# Patient Record
Sex: FEMALE | Race: BLACK OR AFRICAN AMERICAN | NOT HISPANIC OR LATINO | Employment: OTHER | URBAN - METROPOLITAN AREA
[De-identification: names, ages, dates, MRNs, and addresses within clinical notes are randomized per-mention and may not be internally consistent; named-entity substitution may affect disease eponyms.]

---

## 2017-01-20 RX ORDER — GABAPENTIN 100 MG/1
CAPSULE ORAL
Qty: 180 CAPSULE | Refills: 3 | Status: SHIPPED | OUTPATIENT
Start: 2017-01-20 | End: 2017-03-07

## 2017-02-20 ENCOUNTER — TELEPHONE (OUTPATIENT)
Dept: INTERNAL MEDICINE | Facility: CLINIC | Age: 72
End: 2017-02-20

## 2017-02-20 NOTE — TELEPHONE ENCOUNTER
----- Message from Hermila Carrillo sent at 2/20/2017 11:23 AM EST -----  Contact: Patient  Patient called still with complaints of left hip pain.  Patient saw Dr. Dumont on 01/17/2017, and hip was injected but with no relief.  She would like to speak with Dr. Romero, especially if Dr. Romero is considering another medication as most meds have negative effect on patient's glaucoma.  Please advise.     Patient:  [phone]    Pharmacy:  [facility] [address] [place] [address] AT [facility] - [phone] [facility] [phone]

## 2017-02-28 ENCOUNTER — TELEPHONE (OUTPATIENT)
Dept: INTERNAL MEDICINE | Facility: CLINIC | Age: 72
End: 2017-02-28

## 2017-02-28 NOTE — TELEPHONE ENCOUNTER
----- Message from Anant Cameron sent at 2/28/2017  2:25 PM EST -----  Contact: pt  Pt calling , she says that she saw Dr. Dumont last week, and he says there is nothing further he can do for her. She says that Dr. Dumont thinks that it is nerve. She says that she called over the weekend and Dr. Mercado was on call. She says she was in pain, and he directed her to increase her Gabapentin to 3 times a day and take tylenol. She says that she is getting some relief from that. She is wanting to discuss. Please advise.     #[phone]

## 2017-03-07 ENCOUNTER — OFFICE VISIT (OUTPATIENT)
Dept: INTERNAL MEDICINE | Facility: CLINIC | Age: 72
End: 2017-03-07

## 2017-03-07 VITALS
SYSTOLIC BLOOD PRESSURE: 186 MMHG | DIASTOLIC BLOOD PRESSURE: 110 MMHG | HEART RATE: 97 BPM | BODY MASS INDEX: 23.69 KG/M2 | WEIGHT: 138 LBS | OXYGEN SATURATION: 98 %

## 2017-03-07 DIAGNOSIS — M54.14 THORACIC RADICULOPATHY: Primary | ICD-10-CM

## 2017-03-07 DIAGNOSIS — M54.16 LUMBAR RADICULOPATHY, CHRONIC: ICD-10-CM

## 2017-03-07 PROCEDURE — 99213 OFFICE O/P EST LOW 20 MIN: CPT | Performed by: INTERNAL MEDICINE

## 2017-03-07 RX ORDER — TIMOLOL MALEATE 5 MG/ML
1 SOLUTION/ DROPS OPHTHALMIC 4 TIMES DAILY
COMMUNITY
Start: 2017-01-27 | End: 2018-04-16

## 2017-03-07 RX ORDER — GABAPENTIN 100 MG/1
100 CAPSULE ORAL 3 TIMES DAILY
COMMUNITY
End: 2017-04-13 | Stop reason: SDUPTHER

## 2017-03-07 RX ORDER — PREDNISONE 10 MG/1
TABLET ORAL
Qty: 36 TABLET | Refills: 0 | Status: SHIPPED | OUTPATIENT
Start: 2017-03-07 | End: 2017-04-13

## 2017-03-07 RX ORDER — TRIPROLIDINE/PSEUDOEPHEDRINE 2.5MG-60MG
1 TABLET ORAL
COMMUNITY
Start: 2017-01-20 | End: 2021-03-01 | Stop reason: SDUPTHER

## 2017-03-07 NOTE — PROGRESS NOTES
Subjective   Marilia Leo is a 71 y.o. female. Patient c/o pain behind the left shoulder blade and left side of the lower back.     History of Present Illness   Patient c/o pain in the left lower back, radiating down to her ankle with prolonged sitting. Also she has pain behind the left shoulderblade radiating to the left arm. There is burning sensation and one spot on the medial part of the left elbow gets hot.  Patient had those pains for a while, but gradually they are getting worse. The pain is worse with immobility, sitting and laying. Stretching helps a bit. She had been to see  and he had given lidocaine and steroid injection into the left hip bursa for presumable bursitis 6 weeks ago. Had her pelvis and left hip s-rays. The injection effect lasted about a week.  Patient takes Tylenol and also Gabapentin, recently she had increased Gabapentin to 300 mg at bedtime and 100 mg twice a day. She believes that Gabapentin had affected adversly her eye sight and now she has to use cane.Had been using ice packs on  recommendation. Patient states that she is unable to go to PT. Interested in epidurals.  Patient has h/o resistant HTN and for years required combination treatment for good BP control. The choice of drugs had been difficult due to glaucoma and several intolerances and side effects. Patient now takes low doses of Amlodipine 2.5 mg, Losartan 50 mg, Spironolactone 25 mg and Hydralazine. Reports that her BP are well controlled at home.  The following portions of the patient's history were reviewed and updated as appropriate: allergies, current medications, past family history, past medical history, past social history, past surgical history and problem list.    Review of Systems   HENT: Negative.    Respiratory: Negative.    Cardiovascular: Negative.    Musculoskeletal: Positive for back pain and gait problem.   Hematological: Negative.        Objective   Physical Exam   Constitutional: She is  oriented to person, place, and time. Vital signs are normal. She appears well-developed. No distress.   HENT:   Head: Normocephalic and atraumatic.   Right Ear: External ear normal.   Left Ear: External ear normal.   Nose: Nose normal. No mucosal edema. Right sinus exhibits no maxillary sinus tenderness and no frontal sinus tenderness. Left sinus exhibits no maxillary sinus tenderness and no frontal sinus tenderness.   Mouth/Throat: Oropharynx is clear and moist. No oropharyngeal exudate.   Eyes: Conjunctivae, EOM and lids are normal. Pupils are equal, round, and reactive to light. Right eye exhibits no discharge. Left eye exhibits no discharge. Right conjunctiva is not injected. Left conjunctiva is not injected. No scleral icterus. Right eye exhibits normal extraocular motion. Left eye exhibits normal extraocular motion.   Neck: Normal range of motion and full passive range of motion without pain. Neck supple. No JVD present. Carotid bruit is not present. No thyromegaly present.   Cardiovascular: Normal rate, regular rhythm, S1 normal, S2 normal, normal heart sounds and intact distal pulses.  PMI is not displaced.  Exam reveals no gallop and no friction rub.    No murmur heard.  Pulmonary/Chest: Effort normal and breath sounds normal. No accessory muscle usage. No respiratory distress. She has no decreased breath sounds. She has no wheezes. She has no rhonchi. She has no rales.   Abdominal: Soft. Bowel sounds are normal. She exhibits no distension, no pulsatile liver, no fluid wave, no abdominal bruit, no ascites and no mass. There is no tenderness. There is no rebound and no guarding.   Musculoskeletal: She exhibits no edema or deformity.   Lymphadenopathy:        Head (right side): No submental, no submandibular, no preauricular, no posterior auricular and no occipital adenopathy present.        Head (left side): No submental, no submandibular, no tonsillar, no preauricular, no posterior auricular and no  occipital adenopathy present.     She has no cervical adenopathy.        Right cervical: No superficial cervical, no deep cervical and no posterior cervical adenopathy present.       Left cervical: No superficial cervical, no deep cervical and no posterior cervical adenopathy present.        Right: No supraclavicular adenopathy present.        Left: No supraclavicular adenopathy present.   Neurological: She is alert and oriented to person, place, and time. She has normal strength and normal reflexes.   Reflex Scores:       Bicep reflexes are 2+ on the right side and 2+ on the left side.       Patellar reflexes are 2+ on the right side and 2+ on the left side.  Skin: Skin is warm and dry. No rash noted. She is not diaphoretic. No erythema.   Psychiatric: She has a normal mood and affect. Her speech is normal and behavior is normal. Thought content normal.   Vitals reviewed.      Assessment/Plan   1. Thoracic radiculopathy - continue Gabapentin and OTC Tylenol, add steroids and refer for possible epidurals.  2. Lumbar radiculopathy - refer for epidurals and treat with Gabapentin and steroids. Apply heat. 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  3. HTN - high BP today, well controlled on her previous office visits. Possibly due to the pain and anguish. Will monitor and reevaluate in 2 weeks. I do worry that prednisone may increase it even more. Might need to double her Losartan and/or Amlodipine doses.

## 2017-03-10 ENCOUNTER — TELEPHONE (OUTPATIENT)
Dept: INTERNAL MEDICINE | Facility: CLINIC | Age: 72
End: 2017-03-10

## 2017-03-10 NOTE — TELEPHONE ENCOUNTER
----- Message from Anant Cameron sent at 3/10/2017  2:38 PM EST -----  Contact: Quiana nurse  with [facility]  Quiana with [facility] would like to know if it is ok to start pt's care on Monday per Pt's request. Please advise.     #[phone]

## 2017-03-13 ENCOUNTER — TELEPHONE (OUTPATIENT)
Dept: INTERNAL MEDICINE | Facility: CLINIC | Age: 72
End: 2017-03-13

## 2017-03-13 NOTE — TELEPHONE ENCOUNTER
----- Message from Hermila Carrillo sent at 3/13/2017 11:25 AM EDT -----  Contact: Quintin Ribeiro, PT with [facility], called to inform Dr. Romero he has evaluated this patient and patient does not meet criteria for treatment.     Quintin:  [phone]

## 2017-03-13 NOTE — TELEPHONE ENCOUNTER
----- Message from Isaura Martinez sent at 3/13/2017  9:21 AM EDT -----  Corina joshua/ [facility] calling to let you know they called for  orders Friday for PT.  They just wamnted you to know they will be going to the pts home  today for PT eval.    Corina hernandez [facility]- # [phone]

## 2017-03-22 ENCOUNTER — OFFICE VISIT (OUTPATIENT)
Dept: INTERNAL MEDICINE | Facility: CLINIC | Age: 72
End: 2017-03-22

## 2017-03-22 VITALS
SYSTOLIC BLOOD PRESSURE: 134 MMHG | BODY MASS INDEX: 24.59 KG/M2 | DIASTOLIC BLOOD PRESSURE: 92 MMHG | HEIGHT: 64 IN | WEIGHT: 144 LBS

## 2017-03-22 DIAGNOSIS — M54.14 THORACIC RADICULOPATHY: Primary | ICD-10-CM

## 2017-03-22 DIAGNOSIS — M54.16 LUMBAR RADICULOPATHY, CHRONIC: ICD-10-CM

## 2017-03-22 PROCEDURE — 99213 OFFICE O/P EST LOW 20 MIN: CPT | Performed by: INTERNAL MEDICINE

## 2017-03-22 NOTE — PROGRESS NOTES
Subjective   Marilia Leo is a 71 y.o. female. Here for thoracic and lumbar radiculopathy f/u.    History of Present Illness   Marilia Leo 71 y.o. female presents today for thoracic and lumbar radiculopathy f/u. Last was seen on 03/07/3017 and on that visit medication was changed due to uncontrolled pain and discomfort.We had started Prednisone.  Patient is compliant with treatment and denies  side effects.She was not able to tolerate increased dose of Gabapentin during the day due to sedation, but doing better with increased dose at night. Patient states that change in treatment helped to achieve better control of the symptoms.  The following portions of the patient's history were reviewed and updated as appropriate: allergies, current medications, past family history, past medical history, past social history, past surgical history and problem list.    Review of Systems   Constitutional: Negative for chills and fever.   Eyes: Negative for pain and redness.   Respiratory: Negative for cough and shortness of breath.    Cardiovascular: Negative for chest pain and leg swelling.   Neurological: Negative for dizziness and headaches.       Objective   Physical Exam   Constitutional: She is oriented to person, place, and time. She appears well-developed and well-nourished.   HENT:   Head: Normocephalic and atraumatic.   Right Ear: Tympanic membrane, external ear and ear canal normal.   Left Ear: Tympanic membrane, external ear and ear canal normal.   Nose: Nose normal. Right sinus exhibits no maxillary sinus tenderness and no frontal sinus tenderness. Left sinus exhibits no maxillary sinus tenderness and no frontal sinus tenderness.   Mouth/Throat: Uvula is midline, oropharynx is clear and moist and mucous membranes are normal.   Eyes: Conjunctivae and EOM are normal. Pupils are equal, round, and reactive to light. Right eye exhibits no discharge. Left eye exhibits no discharge. No scleral icterus.   Neck: Neck supple.  No JVD present.   Cardiovascular: Normal rate, regular rhythm and normal heart sounds.  Exam reveals no gallop and no friction rub.    No murmur heard.  Pulmonary/Chest: Effort normal and breath sounds normal. She has no wheezes. She has no rales.   Musculoskeletal: She exhibits no edema.   Lymphadenopathy:     She has no cervical adenopathy.   Neurological: She is alert and oriented to person, place, and time. No cranial nerve deficit.   Skin: Skin is warm and dry. No rash noted.   Psychiatric: She has a normal mood and affect. Her behavior is normal.   Vitals reviewed.      Assessment/Plan   Diagnoses and all orders for this visit:    Thoracic radiculopathy    Lumbar radiculopathy, chronic    Thoracic and lumbar radiculopathy - much improved with use of steroids and increased dose of Gabapentin at nighty. Finish steroids, continue Gabapentin and exercise. OK to get massage or go to chiropractor.

## 2017-04-10 ENCOUNTER — LAB (OUTPATIENT)
Dept: INTERNAL MEDICINE | Facility: CLINIC | Age: 72
End: 2017-04-10

## 2017-04-10 DIAGNOSIS — I10 BENIGN ESSENTIAL HYPERTENSION: ICD-10-CM

## 2017-04-10 DIAGNOSIS — E78.00 PURE HYPERCHOLESTEROLEMIA: ICD-10-CM

## 2017-04-10 LAB
ALBUMIN SERPL-MCNC: 3.99 G/DL (ref 3.4–4.6)
ALBUMIN/GLOB SERPL: 1.1 G/DL
ALP SERPL-CCNC: 65 U/L (ref 46–116)
ALT SERPL W P-5'-P-CCNC: 22 U/L (ref 14–59)
ANION GAP SERPL CALCULATED.3IONS-SCNC: 8 MMOL/L
AST SERPL-CCNC: 20 U/L (ref 7–37)
BILIRUB SERPL-MCNC: 0.6 MG/DL (ref 0.2–1)
BUN BLD-MCNC: 15 MG/DL (ref 6–22)
BUN/CREAT SERPL: 13.5 (ref 7–25)
CALCIUM SPEC-SCNC: 9.1 MG/DL (ref 8.6–10.5)
CHLORIDE SERPL-SCNC: 103 MMOL/L (ref 95–107)
CHOLEST SERPL-MCNC: 260 MG/DL (ref 0–200)
CO2 SERPL-SCNC: 30 MMOL/L (ref 23–32)
CREAT BLD-MCNC: 1.11 MG/DL (ref 0.55–1.02)
GFR SERPL CREATININE-BSD FRML MDRD: 59 ML/MIN/1.73
GLOBULIN UR ELPH-MCNC: 3.5 GM/DL
GLUCOSE BLD-MCNC: 88 MG/DL (ref 70–100)
HDLC SERPL-MCNC: 103 MG/DL (ref 40–81)
LDLC SERPL CALC-MCNC: 144 MG/DL (ref 0–100)
LDLC/HDLC SERPL: 1.4 {RATIO}
POTASSIUM BLD-SCNC: 3.9 MMOL/L (ref 3.3–5.3)
PROT SERPL-MCNC: 7.5 G/DL (ref 6.3–8.4)
SODIUM BLD-SCNC: 141 MMOL/L (ref 136–145)
TRIGL SERPL-MCNC: 64 MG/DL (ref 0–150)
VLDLC SERPL-MCNC: 12.8 MG/DL

## 2017-04-10 PROCEDURE — 36415 COLL VENOUS BLD VENIPUNCTURE: CPT | Performed by: INTERNAL MEDICINE

## 2017-04-10 PROCEDURE — 80061 LIPID PANEL: CPT | Performed by: INTERNAL MEDICINE

## 2017-04-10 PROCEDURE — 80053 COMPREHEN METABOLIC PANEL: CPT | Performed by: INTERNAL MEDICINE

## 2017-04-13 ENCOUNTER — OFFICE VISIT (OUTPATIENT)
Dept: INTERNAL MEDICINE | Facility: CLINIC | Age: 72
End: 2017-04-13

## 2017-04-13 VITALS
DIASTOLIC BLOOD PRESSURE: 92 MMHG | HEIGHT: 64 IN | WEIGHT: 144 LBS | BODY MASS INDEX: 24.59 KG/M2 | SYSTOLIC BLOOD PRESSURE: 154 MMHG

## 2017-04-13 DIAGNOSIS — I10 BENIGN ESSENTIAL HYPERTENSION: ICD-10-CM

## 2017-04-13 DIAGNOSIS — R53.82 CHRONIC FATIGUE: ICD-10-CM

## 2017-04-13 DIAGNOSIS — M54.16 LUMBAR RADICULOPATHY, CHRONIC: ICD-10-CM

## 2017-04-13 DIAGNOSIS — E78.00 PURE HYPERCHOLESTEROLEMIA: Primary | ICD-10-CM

## 2017-04-13 PROCEDURE — 99213 OFFICE O/P EST LOW 20 MIN: CPT | Performed by: INTERNAL MEDICINE

## 2017-04-13 RX ORDER — GABAPENTIN 100 MG/1
100 CAPSULE ORAL 4 TIMES DAILY
Qty: 360 CAPSULE | Refills: 3 | Status: SHIPPED | OUTPATIENT
Start: 2017-04-13 | End: 2017-10-16 | Stop reason: SDUPTHER

## 2017-04-13 RX ORDER — AMLODIPINE BESYLATE 5 MG/1
5 TABLET ORAL DAILY
Qty: 90 TABLET | Refills: 3 | Status: SHIPPED | OUTPATIENT
Start: 2017-04-13 | End: 2017-10-16 | Stop reason: SDUPTHER

## 2017-04-13 RX ORDER — LOSARTAN POTASSIUM 50 MG/1
50 TABLET ORAL 2 TIMES DAILY
Qty: 180 TABLET | Refills: 3 | Status: SHIPPED | OUTPATIENT
Start: 2017-04-13 | End: 2017-10-16 | Stop reason: SDUPTHER

## 2017-04-13 NOTE — PROGRESS NOTES
"Mae Leo is a 72 y.o. female. Here for HTN and hyperlipidemia f/u    History of Present Illness     /92  Ht 64\" (162.6 cm)  Wt 144 lb (65.3 kg)  BMI 24.72 kg/m2    Current Outpatient Prescriptions:   •  acetaminophen (TYLENOL) 325 MG tablet, , Disp: , Rfl:   •  amLODIPine (NORVASC) 2.5 MG tablet, Take 1 tablet by mouth daily., Disp: 90 tablet, Rfl: 3  •  Benzocaine-Menthol 3-3 MG strip, , Disp: , Rfl:   •  COMBIGAN 0.2-0.5 % ophthalmic solution, 1 drop Every 12 (Twelve) Hours., Disp: , Rfl:   •  DUREZOL 0.05 % ophthalmic emulsion, Administer 1 drop to the right eye 4 (Four) Times a Day., Disp: , Rfl:   •  estradiol (ESTRACE) 0.5 MG tablet, Take  by mouth daily., Disp: , Rfl:   •  gabapentin (NEURONTIN) 100 MG capsule, Take 100 mg by mouth 3 (Three) Times a Day. Takes 2 capsules three times a day, Disp: , Rfl:   •  hydrALAZINE (APRESOLINE) 50 MG tablet, Take 1 tablet by mouth 3 (three) times a day. (Patient taking differently: Take 50 mg by mouth 2 (two) times a day.), Disp: 90 tablet, Rfl: 11  •  ketorolac (ACULAR) 0.5 % ophthalmic solution, 1 drop 2 (Two) Times a Day., Disp: , Rfl:   •  losartan (COZAAR) 50 MG tablet, Take 1 tablet by mouth 2 (two) times a day., Disp: 180 tablet, Rfl: 3  •  prednisoLONE acetate (PRED FORTE) 1 % ophthalmic suspension, , Disp: , Rfl:   •  STOOL SOFTENER & LAXATIVE 8.6-50 MG per tablet, , Disp: , Rfl:   •  timolol (TIMOPTIC) 0.5 % ophthalmic solution, Administer 1 drop to the right eye 4 (Four) Times a Day., Disp: , Rfl:   •  spironolactone (ALDACTONE) 25 MG tablet, Take 1 tablet by mouth daily., Disp: 30 tablet, Rfl: 11    Patient has long-standing benign resistant HTN.  BP is usually in 140s/150s/80s-90s at home with daily use of Ca channel blocker and ARB. On low salt diet with good compliance. Takes medication regularly. Denies chest pain, dyspnea,lightheadedness,  lower extremity edema. Patient checks blood pressure at home regularly. Reports that all " numbers are in the normal range. Had been off Spironolactone and Hydralazine for the lat 8-9 months.    Patient has been diagnosed with hyperlipidemia. Target LDL is < 130 mg/dl (2 or more risk factors are present). Patient is on low fat diet with good compliance. Patient has never been on medical treatment: has excellent HDL. Exercise 5-6 days a week and walking.    The following portions of the patient's history were reviewed and updated as appropriate: allergies, current medications, past family history, past medical history, past social history, past surgical history and problem list.    Review of Systems   Constitutional: Negative for chills and fever.   HENT: Ear discharge: right ear pops.    Eyes: Negative for pain and redness.   Respiratory: Negative for cough and shortness of breath.    Cardiovascular: Negative for chest pain and leg swelling.   Neurological: Negative for dizziness and headaches.       Objective   Physical Exam   Constitutional: She is oriented to person, place, and time. She appears well-developed and well-nourished.   HENT:   Head: Normocephalic and atraumatic.   Right Ear: Tympanic membrane, external ear and ear canal normal.   Left Ear: Tympanic membrane, external ear and ear canal normal.   Nose: Nose normal. Right sinus exhibits no maxillary sinus tenderness and no frontal sinus tenderness. Left sinus exhibits no maxillary sinus tenderness and no frontal sinus tenderness.   Mouth/Throat: Uvula is midline, oropharynx is clear and moist and mucous membranes are normal.   Eyes: Conjunctivae and EOM are normal. Pupils are equal, round, and reactive to light. Right eye exhibits no discharge. Left eye exhibits no discharge. No scleral icterus.   Neck: Neck supple. No JVD present.   Cardiovascular: Normal rate, regular rhythm and normal heart sounds.  Exam reveals no gallop and no friction rub.    No murmur heard.  Pulmonary/Chest: Effort normal and breath sounds normal. She has no wheezes.  She has no rales.   Musculoskeletal: She exhibits no edema.   Lymphadenopathy:     She has no cervical adenopathy.   Neurological: She is alert and oriented to person, place, and time. No cranial nerve deficit.   Skin: Skin is warm and dry. No rash noted.   Psychiatric: She has a normal mood and affect. Her behavior is normal.   Vitals reviewed.      Assessment/Plan   Diagnoses and all orders for this visit:    Pure hypercholesterolemia  -     Comprehensive Metabolic Panel; Future  -     Lipid Panel; Future  -     TSH; Future    Benign essential hypertension  -     Comprehensive Metabolic Panel; Future  -     Lipid Panel; Future  -     Urinalysis With / Microscopic If Indicated; Future  -     TSH; Future  -     Microalbumin / Creatinine Urine Ratio; Future  -     amLODIPine (NORVASC) 5 MG tablet; Take 1 tablet by mouth Daily.  -     losartan (COZAAR) 50 MG tablet; Take 1 tablet by mouth 2 (Two) Times a Day.    Chronic fatigue  -     TSH; Future  -     CBC & Differential; Future    Lumbar radiculopathy, chronic  -     gabapentin (NEURONTIN) 100 MG capsule; Take 1 capsule by mouth 4 (Four) Times a Day. Takes 2 capsules three times a day      HTN - not well contrlled with current medication regimen. Normal kidney tests and electrolytes. Recommended low salt diet. Continue same dose of Losartan and increase Amlodipine to 5 mg a day. Continue home BP monitoring. Potential side effects explained.  Hyperlipidemia - well controlled with diet alone. Excellent HDL, decent LDL, no interventions needed.  Thoracic radiculopathy - continue Gabapentin 100 mg in am and 300 mg in pm, decent control.

## 2017-09-21 ENCOUNTER — OFFICE VISIT (OUTPATIENT)
Dept: INTERNAL MEDICINE | Facility: CLINIC | Age: 72
End: 2017-09-21

## 2017-09-21 VITALS
DIASTOLIC BLOOD PRESSURE: 94 MMHG | WEIGHT: 145 LBS | HEIGHT: 64 IN | SYSTOLIC BLOOD PRESSURE: 142 MMHG | BODY MASS INDEX: 24.75 KG/M2

## 2017-09-21 DIAGNOSIS — M54.14 THORACIC RADICULOPATHY: Primary | ICD-10-CM

## 2017-09-21 PROCEDURE — 99213 OFFICE O/P EST LOW 20 MIN: CPT | Performed by: INTERNAL MEDICINE

## 2017-09-21 RX ORDER — PREDNISONE 10 MG/1
TABLET ORAL
Qty: 20 TABLET | Refills: 0 | Status: SHIPPED | OUTPATIENT
Start: 2017-09-21 | End: 2017-10-16

## 2017-09-21 NOTE — PROGRESS NOTES
Subjective   Marilia Leo is a 72 y.o. female.     History of Present Illness   Marilia Leo 72 y.o. female complains of the pain over the upper left back next to the left shoulder. It had started few months ago after she had to be in the supine position for a long time while in the eye surgery.  Marilia Leo describes pain as burning and sharp/stabbing. Patient states that intensity of pain is severe and reaches 7 /10. Pain is aggravated by reaching oput with the left arm - states that the pain is located under the shoulder blade. Pain is alleviated by ice and VOltaren gel, given to her by her sister.. Marilia Leo had been using Tylenol and Neurontin (Gabapentin) for the pain control with some relief. Patient takes medication at night and 1-2 times a day - Gabapentin makes her very drowzy.She has burning sensation under the shoulder blade if she raises her left arm. The only time she is not in pain, is when she sits very still w/o any movements on the left side..  PMH is significant to cervical radiculopathy (had cervical fusion Sx)and per patient her current pain feels a lot like that.  The following portions of the patient's history were reviewed and updated as appropriate: allergies, current medications, past family history, past medical history, past social history, past surgical history and problem list.    Review of Systems   Constitutional: Negative for chills and fever.   Eyes: Negative for pain and redness.   Respiratory: Negative for cough and shortness of breath.    Cardiovascular: Negative for chest pain and leg swelling.   Musculoskeletal: Positive for joint swelling (left shoulder pain and feels like a stabbing sharp pain at times).   Neurological: Negative for dizziness and headaches.       Objective   Physical Exam   Constitutional: She is oriented to person, place, and time. She appears well-developed and well-nourished.   HENT:   Head: Normocephalic and atraumatic.   Right Ear: Tympanic membrane,  external ear and ear canal normal.   Left Ear: Tympanic membrane, external ear and ear canal normal.   Nose: Nose normal. Right sinus exhibits no maxillary sinus tenderness and no frontal sinus tenderness. Left sinus exhibits no maxillary sinus tenderness and no frontal sinus tenderness.   Mouth/Throat: Uvula is midline, oropharynx is clear and moist and mucous membranes are normal.   Eyes: Conjunctivae and EOM are normal. Pupils are equal, round, and reactive to light. Right eye exhibits no discharge. Left eye exhibits no discharge. No scleral icterus.   Neck: Neck supple. No JVD present.   Cardiovascular: Normal rate, regular rhythm and normal heart sounds.  Exam reveals no gallop and no friction rub.    No murmur heard.  Pulmonary/Chest: Effort normal and breath sounds normal. She has no wheezes. She has no rales.   Musculoskeletal: She exhibits tenderness (extreme tendereness on palpation left off the T5-8). She exhibits no edema.   Lymphadenopathy:     She has no cervical adenopathy.   Neurological: She is alert and oriented to person, place, and time. No cranial nerve deficit.   Skin: Skin is warm and dry. No rash noted.   Psychiatric: She has a normal mood and affect. Her behavior is normal.   Vitals reviewed.      Assessment/Plan   Marilia was seen today for shoulder pain.    Diagnoses and all orders for this visit:    Thoracic radiculopathy  -     predniSONE (DELTASONE) 10 MG tablet; TID x 3d, then BID x 3d, then qd x 5d      Thoracic radiculopathy - will try to treat with steroids. Patient declines PT, as she doesn't drive. Will refer to .

## 2017-09-28 ENCOUNTER — TELEPHONE (OUTPATIENT)
Dept: INTERNAL MEDICINE | Facility: CLINIC | Age: 72
End: 2017-09-28

## 2017-09-28 NOTE — TELEPHONE ENCOUNTER
----- Message from Leti Durand sent at 9/28/2017 10:43 AM EDT -----  Contact: pt - Dr Romero's pt - RE: visit  Pt calling and would like to inform that pt was given Prednisone last week. Pt informs that pt stills burning, not as bad, Prednisone has helped. Could you please call pt if any questions or concerns? Please advise. Thanks      Pt # [phone]

## 2017-10-02 DIAGNOSIS — M54.14 THORACIC RADICULOPATHY: Primary | ICD-10-CM

## 2017-10-11 ENCOUNTER — LAB (OUTPATIENT)
Dept: INTERNAL MEDICINE | Facility: CLINIC | Age: 72
End: 2017-10-11

## 2017-10-11 DIAGNOSIS — I10 BENIGN ESSENTIAL HYPERTENSION: ICD-10-CM

## 2017-10-11 DIAGNOSIS — R53.82 CHRONIC FATIGUE: ICD-10-CM

## 2017-10-11 DIAGNOSIS — E78.00 PURE HYPERCHOLESTEROLEMIA: ICD-10-CM

## 2017-10-11 LAB
ALBUMIN SERPL-MCNC: 4.5 G/DL (ref 3.5–5.2)
ALBUMIN/GLOB SERPL: 1.5 G/DL
ALP SERPL-CCNC: 65 U/L (ref 39–117)
ALT SERPL-CCNC: 15 U/L (ref 1–33)
AST SERPL-CCNC: 15 U/L (ref 1–32)
BACTERIA UR QL AUTO: ABNORMAL /HPF
BASOPHILS # BLD AUTO: 0.04 10*3/MM3 (ref 0–0.2)
BASOPHILS NFR BLD AUTO: 0.6 % (ref 0–1.5)
BILIRUB SERPL-MCNC: 0.6 MG/DL (ref 0.1–1.2)
BILIRUB UR QL STRIP: NEGATIVE
BUN SERPL-MCNC: 17 MG/DL (ref 8–23)
BUN/CREAT SERPL: 15 (ref 7–25)
CALCIUM SERPL-MCNC: 9.9 MG/DL (ref 8.6–10.5)
CHLORIDE SERPL-SCNC: 104 MMOL/L (ref 98–107)
CHOLEST SERPL-MCNC: 238 MG/DL (ref 0–200)
CLARITY UR: CLEAR
CO2 SERPL-SCNC: 26.8 MMOL/L (ref 22–29)
COLOR UR: YELLOW
CREAT SERPL-MCNC: 1.13 MG/DL (ref 0.57–1)
EOSINOPHIL # BLD AUTO: 0.4 10*3/MM3 (ref 0–0.7)
EOSINOPHIL # BLD AUTO: 6.5 % (ref 0.3–6.2)
ERYTHROCYTE [DISTWIDTH] IN BLOOD BY AUTOMATED COUNT: 15.1 % (ref 11.7–13)
GLOBULIN SER CALC-MCNC: 3 GM/DL
GLUCOSE SERPL-MCNC: 90 MG/DL (ref 65–99)
GLUCOSE UR STRIP-MCNC: NEGATIVE MG/DL
HCT VFR BLD AUTO: 40 % (ref 35.6–45.5)
HDLC SERPL-MCNC: 88 MG/DL (ref 40–60)
HGB BLD-MCNC: 12.7 G/DL (ref 11.9–15.5)
HGB UR QL STRIP.AUTO: ABNORMAL
HYALINE CASTS UR QL AUTO: ABNORMAL /LPF
IMM GRANULOCYTES # BLD: 0 10*3/MM3 (ref 0–0.03)
IMM GRANULOCYTES NFR BLD: 0 % (ref 0–0.5)
KETONES UR QL STRIP: NEGATIVE
LDLC SERPL CALC-MCNC: 133 MG/DL (ref 0–100)
LEUKOCYTE ESTERASE UR QL STRIP.AUTO: ABNORMAL
LYMPHOCYTES # BLD AUTO: 3.4 10*3/MM3 (ref 0.9–4.8)
LYMPHOCYTES NFR BLD AUTO: 54.8 % (ref 19.6–45.3)
MCH RBC QN AUTO: 28.3 PG (ref 26.9–32)
MCHC RBC AUTO-ENTMCNC: 31.8 G/DL (ref 32.4–36.3)
MCV RBC AUTO: 89.1 FL (ref 80.5–98.2)
MONOCYTES # BLD AUTO: 0.38 10*3/MM3 (ref 0.2–1.2)
MONOCYTES NFR BLD AUTO: 6.1 % (ref 5–12)
NEUTROPHILS # BLD AUTO: 1.98 10*3/MM3 (ref 1.9–8.1)
NEUTROPHILS NFR BLD AUTO: 32 % (ref 42.7–76)
NITRITE UR QL STRIP: NEGATIVE
PH UR STRIP.AUTO: 7 [PH] (ref 5–8)
PLATELET # BLD AUTO: 255 10*3/MM3 (ref 140–500)
POTASSIUM SERPL-SCNC: 4.4 MMOL/L (ref 3.5–5.2)
PROT SERPL-MCNC: 7.5 G/DL (ref 6–8.5)
PROT UR QL STRIP: NEGATIVE
RBC # BLD AUTO: 4.49 10*6/MM3 (ref 3.9–5.2)
RBC # UR: ABNORMAL /HPF
REF LAB TEST METHOD: ABNORMAL
SODIUM SERPL-SCNC: 145 MMOL/L (ref 136–145)
SP GR UR STRIP: 1.01 (ref 1–1.03)
SQUAMOUS #/AREA URNS HPF: ABNORMAL /HPF
TRIGL SERPL-MCNC: 86 MG/DL (ref 0–150)
TSH SERPL-ACNC: 2.36 MIU/ML (ref 0.27–4.2)
UROBILINOGEN UR QL STRIP: ABNORMAL
VLDLC SERPL-MCNC: 17.2 MG/DL (ref 5–40)
WBC # BLD AUTO: 6.2 10*3/MM3 (ref 4.5–10.7)
WBC UR QL AUTO: ABNORMAL /HPF

## 2017-10-11 PROCEDURE — 81001 URINALYSIS AUTO W/SCOPE: CPT | Performed by: INTERNAL MEDICINE

## 2017-10-12 LAB
CREAT 24H UR-MCNC: 29.4 MG/DL
MICROALBUMIN UR-MCNC: 20 UG/ML
MICROALBUMIN/CREAT UR: 68 MG/G CREAT (ref 0–30)

## 2017-10-13 ENCOUNTER — APPOINTMENT (OUTPATIENT)
Dept: MRI IMAGING | Facility: HOSPITAL | Age: 72
End: 2017-10-13

## 2017-10-16 ENCOUNTER — OFFICE VISIT (OUTPATIENT)
Dept: INTERNAL MEDICINE | Facility: CLINIC | Age: 72
End: 2017-10-16

## 2017-10-16 VITALS
BODY MASS INDEX: 24.59 KG/M2 | RESPIRATION RATE: 14 BRPM | SYSTOLIC BLOOD PRESSURE: 142 MMHG | WEIGHT: 144 LBS | HEIGHT: 64 IN | DIASTOLIC BLOOD PRESSURE: 90 MMHG

## 2017-10-16 DIAGNOSIS — I10 BENIGN ESSENTIAL HYPERTENSION: ICD-10-CM

## 2017-10-16 DIAGNOSIS — Z12.31 VISIT FOR SCREENING MAMMOGRAM: ICD-10-CM

## 2017-10-16 DIAGNOSIS — Z23 NEED FOR VACCINATION: ICD-10-CM

## 2017-10-16 DIAGNOSIS — Z00.00 HEALTH CARE MAINTENANCE: Primary | ICD-10-CM

## 2017-10-16 DIAGNOSIS — M54.16 LUMBAR RADICULOPATHY, CHRONIC: ICD-10-CM

## 2017-10-16 DIAGNOSIS — H61.21 IMPACTED CERUMEN OF RIGHT EAR: ICD-10-CM

## 2017-10-16 DIAGNOSIS — E78.00 PURE HYPERCHOLESTEROLEMIA: ICD-10-CM

## 2017-10-16 DIAGNOSIS — Z12.11 SCREENING FOR COLORECTAL CANCER: ICD-10-CM

## 2017-10-16 DIAGNOSIS — Z12.12 SCREENING FOR COLORECTAL CANCER: ICD-10-CM

## 2017-10-16 PROCEDURE — 99214 OFFICE O/P EST MOD 30 MIN: CPT | Performed by: INTERNAL MEDICINE

## 2017-10-16 PROCEDURE — 90662 IIV NO PRSV INCREASED AG IM: CPT | Performed by: INTERNAL MEDICINE

## 2017-10-16 PROCEDURE — G0439 PPPS, SUBSEQ VISIT: HCPCS | Performed by: INTERNAL MEDICINE

## 2017-10-16 PROCEDURE — G0008 ADMIN INFLUENZA VIRUS VAC: HCPCS | Performed by: INTERNAL MEDICINE

## 2017-10-16 PROCEDURE — 96160 PT-FOCUSED HLTH RISK ASSMT: CPT | Performed by: INTERNAL MEDICINE

## 2017-10-16 RX ORDER — LOSARTAN POTASSIUM 50 MG/1
50 TABLET ORAL 2 TIMES DAILY
Qty: 180 TABLET | Refills: 3 | Status: SHIPPED | OUTPATIENT
Start: 2017-10-16 | End: 2019-01-03

## 2017-10-16 RX ORDER — AMLODIPINE BESYLATE 5 MG/1
5 TABLET ORAL DAILY
Qty: 90 TABLET | Refills: 3 | Status: SHIPPED | OUTPATIENT
Start: 2017-10-16 | End: 2018-09-04 | Stop reason: SDUPTHER

## 2017-10-16 RX ORDER — NEPAFENAC 0.3 %
SUSPENSION, DROPS(FINAL DOSAGE FORM)(ML) OPHTHALMIC (EYE)
COMMUNITY
Start: 2017-10-06 | End: 2019-05-13

## 2017-10-16 RX ORDER — SPIRONOLACTONE 25 MG/1
25 TABLET ORAL DAILY
Qty: 30 TABLET | Refills: 11 | Status: SHIPPED | OUTPATIENT
Start: 2017-10-16 | End: 2020-08-04

## 2017-10-16 RX ORDER — GABAPENTIN 100 MG/1
100 CAPSULE ORAL 4 TIMES DAILY
Qty: 360 CAPSULE | Refills: 3 | Status: SHIPPED | OUTPATIENT
Start: 2017-10-16 | End: 2018-09-04 | Stop reason: SDUPTHER

## 2017-10-16 NOTE — PROGRESS NOTES
"Mae Leo is a 72 y.o. female.     History of Present Illness   /90 (BP Location: Left arm, Patient Position: Sitting, Cuff Size: Adult)  Resp 14  Ht 64\" (162.6 cm)  Wt 144 lb (65.3 kg)  BMI 24.72 kg/m2  Patient is being seen for subsequent wellness visit.  Hospitalizations in a past: none  Once per lifetime Medicare screening tests: ECG - 02/04/2010, nl    AAA risk assessment: 1. FH: none. 2.H/o tobacco smoking: in remote past, as per . 3. CV or PAD: none.    Tobacco use: in remote past, as per    Alcohol use: occasionally  Illicit drugs use: none  Diet: healthy heart  Exercise: walking    Anxiety screening: How many days in the last 2 weeks you were  1. Feeling anxious, nervous, on edge: none  2. Unable to stop worrying: none  3. Worrying too much about different things: none  4. Having problems relaxing:none  5. Feeling restless or unable to sit still:none  6. Feeling irritable or easely annoyed: none  7. Being afraid that something awful might happen: none    Depression screening PHQ9:  Over the past 2 weeks how often have you been bothered by  1. Lack of interest or pleasuer in usual activities: none  2. Feeling down, depressed, hopeless:none  3. Troubles falling asleep/sleeping too long:none  4. Feeling tired, having little energy: none  5. Poor appetite or overeating: none  6. Feeling bad about yourself:none.  7. Trouble concentrating: at the baseline.  8. Moving or speaking too slow or much faster than usual: none  9. Thoughts about harming yourself:none.    Cognitive impairment screening:   Do you have difficulties with:  1. Language: no  2. Behavior: no  3. Learning/retaining new information: no  4. Handling complex tasks: no  5. Reasoning: no  6. Spacial ability and orientation: no    Hearing: normal.  Driving: none due to poor vision  ADL: independent  ADL details: Does patient needs help with:  telephone use: no  Transportation: yes  Housework: no  Shopping: no  meal " "preparation: little  laundry: no  managing medication:no  Participate in the social activities: Y    Fall risk factors:   1.Use of alcohol: no    2.Polypharmacy: no  3.H/o of previous fall:  no  4. Mobility impairment:  No, but uses a cane due to poor eyesight  5. Visual impairment:  yes  6. Deconditioning: yes  7. Use of antihypertensive medication:  yes  8. Use of sedatives: no  9. Use of antidepressant: no    Home safety:   1.loose rugs: no   2.unfamiliar environment: no   3. Uneven floors: no   4. No grab bars in the bathroom:  no  5. No banister on stairs: no      Advanced directives: has Living Will. Discussed. I agree with patient wishes and decision. and has POA..Daughter is POA.    Co-managers: ophthalmology  and  with Param, general surgeon       /90 (BP Location: Left arm, Patient Position: Sitting, Cuff Size: Adult)  Resp 14  Ht 64\" (162.6 cm)  Wt 144 lb (65.3 kg)  BMI 24.72 kg/m2    Current Outpatient Prescriptions:   •  acetaminophen (TYLENOL) 325 MG tablet, , Disp: , Rfl:   •  amLODIPine (NORVASC) 5 MG tablet, Take 1 tablet by mouth Daily., Disp: 90 tablet, Rfl: 3  •  Benzocaine-Menthol 3-3 MG strip, , Disp: , Rfl:   •  COMBIGAN 0.2-0.5 % ophthalmic solution, 1 drop Every 12 (Twelve) Hours., Disp: , Rfl:   •  DUREZOL 0.05 % ophthalmic emulsion, Administer 1 drop to the right eye 4 (Four) Times a Day., Disp: , Rfl:   •  estradiol (ESTRACE) 0.5 MG tablet, Take  by mouth daily., Disp: , Rfl:   •  gabapentin (NEURONTIN) 100 MG capsule, Take 1 capsule by mouth 4 (Four) Times a Day. Takes 2 capsules three times a day, Disp: 360 capsule, Rfl: 3  •  ILEVRO 0.3 % suspension, , Disp: , Rfl:   •  ketorolac (ACULAR) 0.5 % ophthalmic solution, 1 drop 2 (Two) Times a Day., Disp: , Rfl:   •  losartan (COZAAR) 50 MG tablet, Take 1 tablet by mouth 2 (Two) Times a Day., Disp: 180 tablet, Rfl: 3  •  predniSONE (DELTASONE) 10 MG tablet, TID x 3d, then BID x 3d, then qd x " 5d, Disp: 20 tablet, Rfl: 0  •  spironolactone (ALDACTONE) 25 MG tablet, Take 1 tablet by mouth daily., Disp: 30 tablet, Rfl: 11  •  STOOL SOFTENER & LAXATIVE 8.6-50 MG per tablet, , Disp: , Rfl:   •  timolol (TIMOPTIC) 0.5 % ophthalmic solution, Administer 1 drop to the right eye 4 (Four) Times a Day., Disp: , Rfl:     Patient has long-standing benign essential HTN.  BP is usually well controlled with daily use of Ca channel blocker, potassium sparing diuretic and ARB. On low salt diet with good compliance. Takes medication regularly. Denies chest pain, dyspnea,lightheadedness,  lower extremity edema. Patient checks blood pressure at home regularly. Reports that all numbers are in the normal range.    Patient has been diagnosed with hyperlipidemia. Target LDL is < 130 mg/dl (2 or more risk factors are present). Patient is on low fat diet with good compliance. Patient had never been prescribed statins. Exercise none due to visual impairment.    Patient with chronic thoracic radiculopathy. Had been talking Gabapentin 100 mg 4 pills a day with decent pain control. Also takes over the counter Tylenol. Patient states that has minimal sedation due to Gabapentin. Patient was referred for MRI, but was not able to get a test done due to claustrophobia.                                    The following portions of the patient's history were reviewed and updated as appropriate: allergies, current medications, past family history, past medical history, past social history, past surgical history and problem list.    Review of Systems   Constitutional: Negative for chills and fever.   HENT: Negative for postnasal drip, sinus pressure and sore throat.    Eyes: Negative for pain and itching.   Respiratory: Negative for cough and chest tightness.    Cardiovascular: Negative for chest pain and leg swelling.   Gastrointestinal: Negative for abdominal pain and blood in stool.   Endocrine: Negative for cold intolerance and heat  intolerance.   Genitourinary: Negative for difficulty urinating and flank pain.   Musculoskeletal: Positive for back pain (left side pain ). Negative for neck pain.   Skin: Negative for color change and rash.   Neurological: Positive for dizziness. Negative for weakness and headaches.   Hematological: Negative for adenopathy. Does not bruise/bleed easily.   Psychiatric/Behavioral: Negative for sleep disturbance. The patient is not nervous/anxious.        Objective   Physical Exam   Constitutional: She is oriented to person, place, and time. She appears well-developed and well-nourished. No distress.   HENT:   Head: Normocephalic and atraumatic.   Right Ear: External ear normal.   Left Ear: Tympanic membrane, external ear and ear canal normal.   Nose: Nose normal.   Mouth/Throat: Oropharynx is clear and moist. No oropharyngeal exudate.   R ear cerumen impaction   Eyes: Conjunctivae and EOM are normal. Pupils are equal, round, and reactive to light. Right eye exhibits no discharge. Left eye exhibits no discharge. No scleral icterus.       Pale blue discoloration of the right cornea   Neck: Normal range of motion. Neck supple. No JVD present. No thyromegaly present.   Cardiovascular: Normal rate, regular rhythm, S1 normal, S2 normal, normal heart sounds and intact distal pulses.  Exam reveals no gallop and no friction rub.    No murmur heard.  Pulmonary/Chest: Effort normal and breath sounds normal. No respiratory distress. She has no decreased breath sounds. She has no wheezes. She has no rhonchi. She has no rales. Right breast exhibits no inverted nipple, no mass, no nipple discharge, no skin change and no tenderness. Left breast exhibits no inverted nipple, no mass, no nipple discharge, no skin change and no tenderness. Breasts are symmetrical.   S/p breast reduction   Abdominal: Soft. Bowel sounds are normal. She exhibits no distension and no mass. There is no tenderness. There is no rebound and no guarding.    Musculoskeletal: She exhibits no edema.   Lymphadenopathy:        Head (right side): No submental, no submandibular, no preauricular, no posterior auricular and no occipital adenopathy present.        Head (left side): No submental, no submandibular, no preauricular, no posterior auricular and no occipital adenopathy present.     She has no cervical adenopathy.        Right cervical: No superficial cervical, no deep cervical and no posterior cervical adenopathy present.       Left cervical: No superficial cervical, no deep cervical and no posterior cervical adenopathy present.     She has no axillary adenopathy.        Right: No supraclavicular adenopathy present.        Left: No supraclavicular adenopathy present.   Neurological: She is alert and oriented to person, place, and time. She has normal reflexes. She displays normal reflexes. No cranial nerve deficit. She exhibits normal muscle tone. Coordination normal.   Reflex Scores:       Bicep reflexes are 2+ on the right side and 2+ on the left side.       Patellar reflexes are 2+ on the right side and 2+ on the left side.  Skin: Skin is warm. No rash noted. She is not diaphoretic. No erythema.   Psychiatric: She has a normal mood and affect. Her behavior is normal. Thought content normal.   Vitals reviewed.      Assessment/Plan   Marilia was seen today for subsequent medicare wellness, hypertension and hyperlipidemia.    Diagnoses and all orders for this visit:    Health care maintenance    Need for vaccination  -     Flu Vaccine High Dose PF 65YR+ ([id number])    Pure hypercholesterolemia    Visit for screening mammogram  -     Mammo Screening Bilateral With CAD    Screening for colorectal cancer  -     Ambulatory Referral to General Surgery    Benign essential hypertension  -     spironolactone (ALDACTONE) 25 MG tablet; Take 1 tablet by mouth Daily.  -     losartan (COZAAR) 50 MG tablet; Take 1 tablet by mouth 2 (Two) Times a Day.  -     amLODIPine (NORVASC) 5  MG tablet; Take 1 tablet by mouth Daily.    Lumbar radiculopathy, chronic  -     gabapentin (NEURONTIN) 100 MG capsule; Take 1 capsule by mouth 4 (Four) Times a Day. Takes 2 capsules three times a day    Impacted cerumen of right ear    Annual wellness visit with preventive exam as well as age and risk appropriate councelling completed.    Cardiovascular disease councelling: current. Recommended: Healthy Heart diet recommended.  Diabetes screening and councelling: current. Recommended: Not at risk for diabetes.  Breast cancer screening: is due. Will schedule..  Osteoporosis screening: up to date. Recommended every 5 years. Next screening is due in 2020..  Glaucoma screening: under the care of ophthalmologist.   AAA screening: not needed..  Hep C screening (born between 5[birth year]-5911) completed..  Colorectal cancer screening: is due. Will schedule. Benefits and risks discussed..    Immunizations:   1. Influenza vaccine  is up to date and recommended yearly.   2. Pneumococcal vaccines: completed.   3. Zostavax: completed.      Advanced directives planning: has Living Will. Discussed. I agree with patient wishes and decision..    Medications reviewed and medication list updated.   Potential harmful drug-disease interactions in the elderly:none.  High risk medication in elderly: none  ASA use: no indications.    HTN - decent blood pressure  with current medication. Reminded of the importance of low salt diet. Normal kidney tests and electrolytes. Increase fluid intake.Continue same treatment.  Hyperlipidemia - decent cholesterol control with diet., No interventions needed at that time.  Microscopic hematuria, very mild - most likely due to post-menopausal dryness. No interventions needed. Reassured.  Chronic pain due to thoracic radiculopathy - will continue Gabapentin, that she takes in addition to OTC Tylenol, usually takes 4 pills a day.Riley report reviewed. [facility] Controlled substance agreement was signed  today and will be scanned into EHR. Patient is compliant with medication and takes it according to the directions. Riley reports are being reviewed at least every 6 months., I will continue to monitor clinical progress with regualar office visits, random pill counts and urine drug screens..  Cerumen impaction right ear - will clean.

## 2017-10-16 NOTE — PATIENT INSTRUCTIONS
Annual wellness visit with preventive exam as well as age and risk appropriate councelling completed.    Cardiovascular disease councelling: current. Recommended: Healthy Heart diet recommended.  Diabetes screening and councelling: current. Recommended: Not at risk for diabetes.  Breast cancer screening: is due. Will schedule..  Osteoporosis screening: up to date. Recommended every 5 years. Next screening is due in 2020..  Glaucoma screening: under the care of ophthalmologist.   AAA screening: not needed..  Hep C screening (born between 0353-1549) completed..  Colorectal cancer screening: is due. Will schedule. Benefits and risks discussed..    Immunizations:   1. Influenza vaccine  is up to date and recommended yearly.   2. Pneumococcal vaccines: completed.   3. Zostavax: completed.      Advanced directives planning: has Living Will. Discussed. I agree with patient wishes and decision..    Medications reviewed and medication list updated.   Potential harmful drug-disease interactions in the elderly:none.  High risk medication in elderly: none  ASA use: no indications.    HTN - decent blood pressure  with current medication. Reminded of the importance of low salt diet. Normal kidney tests and electrolytes. Increase fluid intake.Continue same treatment.  Hyperlipidemia - decent cholesterol control with diet., No interventions needed at that time.  Microscopic hematuria, very mild - most likely due to post-menopausal dryness. No interventions needed. Reassured.    Return in about 6 months (around 4/16/2018) for HTN, chronic pain.

## 2018-04-16 ENCOUNTER — OFFICE VISIT (OUTPATIENT)
Dept: INTERNAL MEDICINE | Facility: CLINIC | Age: 73
End: 2018-04-16

## 2018-04-16 VITALS
HEIGHT: 64 IN | BODY MASS INDEX: 24.75 KG/M2 | OXYGEN SATURATION: 95 % | DIASTOLIC BLOOD PRESSURE: 88 MMHG | HEART RATE: 97 BPM | WEIGHT: 145 LBS | SYSTOLIC BLOOD PRESSURE: 172 MMHG

## 2018-04-16 DIAGNOSIS — N95.1 MENOPAUSAL VASOMOTOR SYNDROME: ICD-10-CM

## 2018-04-16 DIAGNOSIS — I10 BENIGN ESSENTIAL HYPERTENSION: Primary | ICD-10-CM

## 2018-04-16 DIAGNOSIS — M54.12 CERVICAL RADICULOPATHY: ICD-10-CM

## 2018-04-16 DIAGNOSIS — F41.9 ANXIETY: ICD-10-CM

## 2018-04-16 PROBLEM — H61.21 IMPACTED CERUMEN OF RIGHT EAR: Status: RESOLVED | Noted: 2017-10-16 | Resolved: 2018-04-16

## 2018-04-16 PROCEDURE — 99214 OFFICE O/P EST MOD 30 MIN: CPT | Performed by: INTERNAL MEDICINE

## 2018-04-16 RX ORDER — TOBRAMYCIN AND DEXAMETHASONE 3; 1 MG/ML; MG/ML
SUSPENSION/ DROPS OPHTHALMIC
COMMUNITY
Start: 2018-03-16 | End: 2019-05-13

## 2018-04-16 RX ORDER — DULOXETIN HYDROCHLORIDE 30 MG/1
30 CAPSULE, DELAYED RELEASE ORAL DAILY
Qty: 30 CAPSULE | Refills: 0 | Status: SHIPPED | OUTPATIENT
Start: 2018-04-16 | End: 2018-09-04

## 2018-04-16 RX ORDER — ESTRADIOL 0.5 MG/1
0.5 TABLET ORAL DAILY
Qty: 30 TABLET | Refills: 11 | Status: SHIPPED | OUTPATIENT
Start: 2018-04-16 | End: 2018-09-04 | Stop reason: SDUPTHER

## 2018-04-16 RX ORDER — DULOXETIN HYDROCHLORIDE 60 MG/1
60 CAPSULE, DELAYED RELEASE ORAL DAILY
Qty: 30 CAPSULE | Refills: 11 | Status: SHIPPED | OUTPATIENT
Start: 2018-04-16 | End: 2018-09-04

## 2018-04-16 NOTE — PROGRESS NOTES
"Subjective   Marilia Leo is a 73 y.o. female.     History of Present Illness     /88 (BP Location: Left arm, Patient Position: Sitting, Cuff Size: Adult)   Pulse 97   Ht 162.6 cm (64\")   Wt 65.8 kg (145 lb)   SpO2 95%   BMI 24.89 kg/m²     Current Outpatient Prescriptions:   •  acetaminophen (TYLENOL) 325 MG tablet, , Disp: , Rfl:   •  amLODIPine (NORVASC) 5 MG tablet, Take 1 tablet by mouth Daily., Disp: 90 tablet, Rfl: 3  •  Benzocaine-Menthol 3-3 MG strip, , Disp: , Rfl:   •  DUREZOL 0.05 % ophthalmic emulsion, Administer 1 drop to the right eye 4 (Four) Times a Day., Disp: , Rfl:   •  gabapentin (NEURONTIN) 100 MG capsule, Take 1 capsule by mouth 4 (Four) Times a Day. Takes 2 capsules three times a day, Disp: 360 capsule, Rfl: 3  •  ILEVRO 0.3 % suspension, , Disp: , Rfl:   •  ketorolac (ACULAR) 0.5 % ophthalmic solution, 1 drop 2 (Two) Times a Day., Disp: , Rfl:   •  losartan (COZAAR) 50 MG tablet, Take 1 tablet by mouth 2 (Two) Times a Day., Disp: 180 tablet, Rfl: 3  •  spironolactone (ALDACTONE) 25 MG tablet, Take 1 tablet by mouth Daily., Disp: 30 tablet, Rfl: 11  •  STOOL SOFTENER & LAXATIVE 8.6-50 MG per tablet, , Disp: , Rfl:   •  tobramycin-dexamethasone (TOBRADEX) 0.3-0.1 % ophthalmic suspension, , Disp: , Rfl:     Patient has long-standing benign essential HTN.  BP is usually not well contrlled with daily use of Ca channel blocker, potassium sparing diuretic and ARB. On low salt diet with poor compliance. Takes medication regularly, but is compliant with with daily use of Amlodipine 5 mg and Losartan BID. She does not take Spironolactone regularly, as she believes that it affects her vision adversely. Denies chest pain, dyspnea,lightheadedness,  lower extremity edema. Patient checks blood pressure at home regularly. and BPs had been elevated up to 150s systolic.     Patient states that her left scalp and head, and back is better. Usually she takes 100 mg of Gabapentin, and 200 mg at " bedtime. Gabapentin is too expensive and makes her too sedated, has to take afternoon nap.     Patient is postmenopausal and c/o uncontrollable night sweats.    Patient c/o increased anxiety and occasional panic attacks with fast heartbeats.    The following portions of the patient's history were reviewed and updated as appropriate: allergies, current medications, past family history, past medical history, past social history, past surgical history and problem list.    Review of Systems   Constitutional: Negative for chills and fever.   Eyes: Negative for pain and redness.   Respiratory: Negative for cough and shortness of breath.    Cardiovascular: Negative for chest pain and leg swelling.   Neurological: Positive for headaches. Negative for dizziness.       Objective   Physical Exam   Constitutional: She is oriented to person, place, and time. She appears well-developed and well-nourished.   HENT:   Head: Normocephalic and atraumatic.   Right Ear: Tympanic membrane, external ear and ear canal normal.   Left Ear: Tympanic membrane, external ear and ear canal normal.   Nose: Nose normal. Right sinus exhibits no maxillary sinus tenderness and no frontal sinus tenderness. Left sinus exhibits no maxillary sinus tenderness and no frontal sinus tenderness.   Mouth/Throat: Uvula is midline, oropharynx is clear and moist and mucous membranes are normal.   Eyes: Conjunctivae and EOM are normal. Pupils are equal, round, and reactive to light. Right eye exhibits no discharge. Left eye exhibits no discharge. No scleral icterus.   Neck: Neck supple. No JVD present.   Cardiovascular: Normal rate, regular rhythm and normal heart sounds.  Exam reveals no gallop and no friction rub.    No murmur heard.  Pulmonary/Chest: Effort normal and breath sounds normal. She has no wheezes. She has no rales.   Musculoskeletal: She exhibits no edema.   Lymphadenopathy:     She has no cervical adenopathy.   Neurological: She is alert and  oriented to person, place, and time. No cranial nerve deficit.   Skin: Skin is warm and dry. No rash noted.   Psychiatric: She has a normal mood and affect. Her behavior is normal.   Vitals reviewed.      Assessment/Plan   Marilia was seen today for hypertension and pain.    Diagnoses and all orders for this visit:    Benign essential hypertension    Cervical radiculopathy  -     DULoxetine (CYMBALTA) 30 MG capsule; Take 1 capsule by mouth Daily.  -     DULoxetine (CYMBALTA) 60 MG capsule; Take 1 capsule by mouth Daily.    Anxiety  -     DULoxetine (CYMBALTA) 30 MG capsule; Take 1 capsule by mouth Daily.  -     DULoxetine (CYMBALTA) 60 MG capsule; Take 1 capsule by mouth Daily.    Menopausal vasomotor syndrome  -     estradiol (ESTRACE) 0.5 MG tablet; Take 1 tablet by mouth Daily.      1. HTN - poor control, anxiety might be contributing. The choice of medication is very difficult due to her glaucoma and visual problems. Will increase Amlodipine 10 mg a day, for now patient to take 2 of her 5 mg pills. Take Spironolactone 1-2 times a week.  2. Chronic pain - somewhat better with Gabapentin, but patient is concerned that the medication is expensive and has side effects.  Stop Gabapentin. Will try Duloxetine: start 30 mg daily, and in a month change to 60 mg a day.  3. Postmenopausal hot flushes- will restart Estrace 0.5 mg a day. Patient to buy as cash pay at [employer].

## 2018-04-16 NOTE — PATIENT INSTRUCTIONS
1. HTN - poor control, anxiety might be contributing. The choice of medication is very difficult due to her glaucoma and visual problems. Will increase Amlodipine 10 mg a day, for now patient to take 2 of her 5 mg pills. Continue Spironolactone 1-2 times a week.  2. Chronic pain - somewhat better with Gabapentin, but patient is concerned that the medication is expensive and has side effects.Stop Gabapentin. Will try Duloxetine: start 30 mg daily, and in a month change to 60 mg a day.  3. Postmenopausal hot flushes- will restart Estrace 0.5 mg a day. Patient to buy as cash pay at [employer].

## 2018-05-10 ENCOUNTER — TELEPHONE (OUTPATIENT)
Dept: INTERNAL MEDICINE | Facility: CLINIC | Age: 73
End: 2018-05-10

## 2018-05-10 NOTE — TELEPHONE ENCOUNTER
----- Message from Eliz Mckoy sent at 5/10/2018 12:38 PM EDT -----  Contact: Pt  Pt has been trying to take   DULoxetine (CYMBALTA) 60 MG capsule  For the last couple of weeks. She has experienced extreme Nausea, and tried to wait it out.  Has also vomited on occasion.  She would like to know if she can continue with her gabapentin.    Please advise.  Pt#  Phone:  M:[phone]

## 2018-05-15 ENCOUNTER — TELEPHONE (OUTPATIENT)
Dept: INTERNAL MEDICINE | Facility: CLINIC | Age: 73
End: 2018-05-15

## 2018-05-15 NOTE — TELEPHONE ENCOUNTER
----- Message from Leti Durand sent at 5/15/2018  3:48 PM EDT -----  Contact: pt - Dr Romero's pt - RE: changing med's  Pt calling and would like a return call regarding Rx Cymbalta. Pt informs that Cymbalta is making pt very sick. Pt informs would like to go back on Gabapentin. Pt informs that pt has some Gabapentin left to take. Pt informs that pt has appt on 06/15/2018. Could you please call pt to discuss? Please advise. Thanks    Pt informs does not need any Rx    Pt #[phone]

## 2018-09-04 ENCOUNTER — OFFICE VISIT (OUTPATIENT)
Dept: INTERNAL MEDICINE | Facility: CLINIC | Age: 73
End: 2018-09-04

## 2018-09-04 VITALS
DIASTOLIC BLOOD PRESSURE: 80 MMHG | WEIGHT: 144 LBS | BODY MASS INDEX: 24.59 KG/M2 | SYSTOLIC BLOOD PRESSURE: 142 MMHG | RESPIRATION RATE: 14 BRPM | HEIGHT: 64 IN

## 2018-09-04 DIAGNOSIS — N95.1 MENOPAUSAL SYMPTOM: ICD-10-CM

## 2018-09-04 DIAGNOSIS — Z12.12 SCREENING FOR COLORECTAL CANCER: ICD-10-CM

## 2018-09-04 DIAGNOSIS — N95.2 ATROPHIC VAGINITIS: ICD-10-CM

## 2018-09-04 DIAGNOSIS — Z12.11 SCREENING FOR COLORECTAL CANCER: ICD-10-CM

## 2018-09-04 DIAGNOSIS — I10 BENIGN ESSENTIAL HYPERTENSION: Primary | ICD-10-CM

## 2018-09-04 DIAGNOSIS — N95.1 MENOPAUSAL VASOMOTOR SYNDROME: ICD-10-CM

## 2018-09-04 DIAGNOSIS — M54.16 LUMBAR RADICULOPATHY, CHRONIC: ICD-10-CM

## 2018-09-04 PROBLEM — R53.82 CHRONIC FATIGUE: Status: RESOLVED | Noted: 2017-04-13 | Resolved: 2018-09-04

## 2018-09-04 PROCEDURE — 99214 OFFICE O/P EST MOD 30 MIN: CPT | Performed by: INTERNAL MEDICINE

## 2018-09-04 RX ORDER — ESTRADIOL 0.5 MG/1
0.5 TABLET ORAL DAILY
Qty: 90 TABLET | Refills: 3 | Status: SHIPPED | OUTPATIENT
Start: 2018-09-04 | End: 2019-01-03 | Stop reason: SDUPTHER

## 2018-09-04 RX ORDER — GABAPENTIN 100 MG/1
100 CAPSULE ORAL 4 TIMES DAILY
Qty: 120 CAPSULE | Refills: 5 | Status: SHIPPED | OUTPATIENT
Start: 2018-09-04 | End: 2019-04-13 | Stop reason: SDUPTHER

## 2018-09-04 RX ORDER — POLYMYXIN B SULFATE AND TRIMETHOPRIM 1; 10000 MG/ML; [USP'U]/ML
SOLUTION OPHTHALMIC
COMMUNITY
Start: 2018-07-31 | End: 2019-05-13

## 2018-09-04 RX ORDER — AMLODIPINE BESYLATE 10 MG/1
10 TABLET ORAL DAILY
Qty: 90 TABLET | Refills: 3 | Status: SHIPPED | OUTPATIENT
Start: 2018-09-04 | End: 2019-01-03 | Stop reason: SDUPTHER

## 2018-09-04 NOTE — PROGRESS NOTES
"Subjective   Marilia Leo is a 73 y.o. female. Here for HTN, chronic pain and hot flushes f/u.    History of Present Illness   Marilia Leo 73 y.o. female presents today for benign essential hypertension f/u. Last was seen on 04/16/2018 and on that visit medication was changed due to inadequate control.We had increased the dose of Amlodipine to 10 mg.  Patient is compliant with treatment and denies  side effects. Patient states that blood pressure at home had been in the good range. Checks regularly. Her systolic stays in 140s.    Marilia Leo 73 y.o. female presents today for chronic pain f/u. Last was seen on 04/16/2018 and on that visit medication was changed due to inadequate control.We had discontinued Gabapentin and started Duloxetine.  Patient is not compliant with treatment, she was not able to take and reports side effects: sleepiness and fatigue due to Duiloxetine.She had restarted Gabapentin, and states that it controls her pain quite well.     Marilia Leo 73 y.o. female presents today for postmenopausal hot flushes and atrophic vaginitis f/u. Last was seen on 04/16/2018 and on that visit medication was changed due to inadequate control.We had started Estradiol 0.5 mg a day.  Patient is compliant with treatment and denies  side effects. Patient reporets that the meedication had ehelped her hot flushes and she had been sleeping better, No improvement in vaginal dryness.     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    /80 (BP Location: Left arm, Patient Position: Sitting, Cuff Size: Adult)   Resp 14   Ht 162.6 cm (64\")   Wt 65.3 kg (144 lb)   BMI 24.72 kg/m²     Current Outpatient Prescriptions:   •  acetaminophen (TYLENOL) 325 MG tablet, , Disp: , Rfl:   •  amLODIPine (NORVASC) 5 MG tablet, Take 1 tablet by mouth Daily., Disp: 90 tablet, Rfl: 3  •  DUREZOL 0.05 % ophthalmic emulsion, Administer 1 drop to the right eye 4 (Four) " Times a Day., Disp: , Rfl:   •  estradiol (ESTRACE) 0.5 MG tablet, Take 1 tablet by mouth Daily., Disp: 30 tablet, Rfl: 11  •  gabapentin (NEURONTIN) 100 MG capsule, Take 1 capsule by mouth 4 (Four) Times a Day. Takes 2 capsules three times a day, Disp: 360 capsule, Rfl: 3  •  ILEVRO 0.3 % suspension, , Disp: , Rfl:   •  ketorolac (ACULAR) 0.5 % ophthalmic solution, 1 drop 2 (Two) Times a Day., Disp: , Rfl:   •  losartan (COZAAR) 50 MG tablet, Take 1 tablet by mouth 2 (Two) Times a Day., Disp: 180 tablet, Rfl: 3  •  spironolactone (ALDACTONE) 25 MG tablet, Take 1 tablet by mouth Daily., Disp: 30 tablet, Rfl: 11  •  STOOL SOFTENER & LAXATIVE 8.6-50 MG per tablet, , Disp: , Rfl:   •  tobramycin-dexamethasone (TOBRADEX) 0.3-0.1 % ophthalmic suspension, , Disp: , Rfl:   •  trimethoprim-polymyxin b (POLYTRIM) 27059-6.1 UNIT/ML-% ophthalmic solution, , Disp: , Rfl:     The following portions of the patient's history were reviewed and updated as appropriate: allergies, current medications, past family history, past medical history, past social history, past surgical history and problem list.    Review of Systems   Constitutional: Negative for chills and fever.   Eyes: Negative for pain and redness.   Respiratory: Negative for cough and shortness of breath.    Cardiovascular: Negative for chest pain and leg swelling.   Neurological: Positive for headaches. Negative for dizziness.       Objective   Physical Exam   Constitutional: She is oriented to person, place, and time. Vital signs are normal. She appears well-developed and well-nourished. No distress.   HENT:   Head: Normocephalic and atraumatic.   Right Ear: External ear normal.   Left Ear: External ear normal.   Nose: Nose normal. No mucosal edema. Right sinus exhibits no maxillary sinus tenderness and no frontal sinus tenderness. Left sinus exhibits no maxillary sinus tenderness and no frontal sinus tenderness.   Mouth/Throat: Oropharynx is clear and moist. No  oropharyngeal exudate.   Eyes: Pupils are equal, round, and reactive to light. Conjunctivae, EOM and lids are normal. Right eye exhibits no discharge. Left eye exhibits no discharge. Right conjunctiva is not injected. Left conjunctiva is not injected. No scleral icterus. Right eye exhibits normal extraocular motion. Left eye exhibits normal extraocular motion.       S/p corneal prosthesis right eye   Neck: Normal range of motion and full passive range of motion without pain. Neck supple. No JVD present. Carotid bruit is not present. No thyromegaly present.   Cardiovascular: Normal rate, regular rhythm, S1 normal, S2 normal, normal heart sounds and intact distal pulses.  PMI is not displaced.  Exam reveals no gallop and no friction rub.    No murmur heard.  Pulmonary/Chest: Effort normal and breath sounds normal. No accessory muscle usage. No respiratory distress. She has no decreased breath sounds. She has no wheezes. She has no rhonchi. She has no rales.   Abdominal: Soft. Bowel sounds are normal. She exhibits no distension, no pulsatile liver, no fluid wave, no abdominal bruit, no ascites and no mass. There is no tenderness. There is no rebound and no guarding.   Musculoskeletal: She exhibits no edema or deformity.   Lymphadenopathy:        Head (right side): No submental, no submandibular, no preauricular, no posterior auricular and no occipital adenopathy present.        Head (left side): No submental, no submandibular, no tonsillar, no preauricular, no posterior auricular and no occipital adenopathy present.     She has no cervical adenopathy.        Right cervical: No superficial cervical, no deep cervical and no posterior cervical adenopathy present.       Left cervical: No superficial cervical, no deep cervical and no posterior cervical adenopathy present.        Right: No supraclavicular adenopathy present.        Left: No supraclavicular adenopathy present.   Neurological: She is alert and oriented to  person, place, and time. She has normal strength and normal reflexes. She displays normal reflexes. No cranial nerve deficit. She exhibits normal muscle tone. Coordination normal.   Reflex Scores:       Bicep reflexes are 2+ on the right side and 2+ on the left side.       Patellar reflexes are 2+ on the right side and 2+ on the left side.  Skin: Skin is warm and dry. No rash noted. She is not diaphoretic. No erythema.   Psychiatric: She has a normal mood and affect. Her speech is normal and behavior is normal. Thought content normal.   Vitals reviewed.      Assessment/Plan   Marilia was seen today for hypertension and back pain.    Diagnoses and all orders for this visit:    Benign essential hypertension  -     amLODIPine (NORVASC) 10 MG tablet; Take 1 tablet by mouth Daily.    Atrophic vaginitis    Lumbar radiculopathy, chronic  -     gabapentin (NEURONTIN) 100 MG capsule; Take 1 capsule by mouth 4 (Four) Times a Day. Takes 2 capsules three times a day    Menopausal symptom    Menopausal vasomotor syndrome  -     estradiol (ESTRACE) 0.5 MG tablet; Take 1 tablet by mouth Daily.      1. HTN - it is probably as well controlled as possible - will continue low salt diet and current medication.  2. Chronic pain in the neck and back due to DDD - decent control with Gabapentin, was unable to tolerate Duloxetine. Will continue same. Riley report reviewed. [facility] Controlled substance agreement was signed and is in EHR. Patient is compliant with medication and takes it according to the directions. Riley reports are being reviewed at least every 6 months., I will continue to monitor clinical progress with regualar office visits, random pill counts and urine drug screens.   3. Menopausal hot flushes and insomnia - much improved with daily use of oral HRT, will continue same.

## 2018-09-04 NOTE — PATIENT INSTRUCTIONS
1. HTN - it is probably as well controlled as possible - will continue low salt diet and current medication.  2. Chronic pain in the neck and back due to DDD - decent control with Gabapentin, was unable to tolerate Duloxetine. Will continue same. Riley report reviewed. [facility] Controlled substance agreement was signed and is in EHR. Patient is compliant with medication and takes it according to the directions. Riley reports are being reviewed at least every 6 months., I will continue to monitor clinical progress with regualar office visits, random pill counts and urine drug screens.   3. Menopausal hot flushes and insomnia - much improved with daily use of oral HRT, will continue same.

## 2018-09-27 DIAGNOSIS — Z12.12 SCREENING FOR COLORECTAL CANCER: ICD-10-CM

## 2018-09-27 DIAGNOSIS — Z12.11 SCREENING FOR COLORECTAL CANCER: ICD-10-CM

## 2018-12-04 ENCOUNTER — TELEPHONE (OUTPATIENT)
Dept: INTERNAL MEDICINE | Facility: CLINIC | Age: 73
End: 2018-12-04

## 2018-12-04 ENCOUNTER — LAB (OUTPATIENT)
Dept: INTERNAL MEDICINE | Facility: CLINIC | Age: 73
End: 2018-12-04

## 2018-12-04 DIAGNOSIS — Z12.31 VISIT FOR SCREENING MAMMOGRAM: Primary | ICD-10-CM

## 2018-12-04 DIAGNOSIS — I10 BENIGN ESSENTIAL HYPERTENSION: ICD-10-CM

## 2018-12-04 LAB
ALBUMIN SERPL-MCNC: 4.6 G/DL (ref 3.5–5.2)
ALBUMIN/GLOB SERPL: 1.4 G/DL
ALP SERPL-CCNC: 79 U/L (ref 39–117)
ALT SERPL-CCNC: 12 U/L (ref 1–33)
AST SERPL-CCNC: 15 U/L (ref 1–32)
BACTERIA UR QL AUTO: ABNORMAL /HPF
BILIRUB SERPL-MCNC: 0.5 MG/DL (ref 0.1–1.2)
BILIRUB UR QL STRIP: NEGATIVE
BUN SERPL-MCNC: 11 MG/DL (ref 8–23)
BUN/CREAT SERPL: 12.2 (ref 7–25)
CALCIUM SERPL-MCNC: 9.7 MG/DL (ref 8.6–10.5)
CHLORIDE SERPL-SCNC: 102 MMOL/L (ref 98–107)
CHOLEST SERPL-MCNC: 251 MG/DL (ref 0–200)
CLARITY UR: CLEAR
CO2 SERPL-SCNC: 25.4 MMOL/L (ref 22–29)
COLOR UR: YELLOW
CREAT SERPL-MCNC: 0.9 MG/DL (ref 0.57–1)
GLOBULIN SER CALC-MCNC: 3.3 GM/DL
GLUCOSE SERPL-MCNC: 93 MG/DL (ref 65–99)
GLUCOSE UR STRIP-MCNC: NEGATIVE MG/DL
HDLC SERPL-MCNC: 86 MG/DL (ref 40–60)
HGB UR QL STRIP.AUTO: ABNORMAL
HYALINE CASTS UR QL AUTO: ABNORMAL /LPF
KETONES UR QL STRIP: NEGATIVE
LDLC SERPL CALC-MCNC: 150 MG/DL (ref 0–100)
LEUKOCYTE ESTERASE UR QL STRIP.AUTO: NEGATIVE
NITRITE UR QL STRIP: NEGATIVE
PH UR STRIP.AUTO: 6.5 [PH] (ref 5–8)
POTASSIUM SERPL-SCNC: 3.5 MMOL/L (ref 3.5–5.2)
PROT SERPL-MCNC: 7.9 G/DL (ref 6–8.5)
PROT UR QL STRIP: NEGATIVE
RBC # UR: ABNORMAL /HPF
REF LAB TEST METHOD: ABNORMAL
SODIUM SERPL-SCNC: 142 MMOL/L (ref 136–145)
SP GR UR STRIP: 1.01 (ref 1–1.03)
SQUAMOUS #/AREA URNS HPF: ABNORMAL /HPF
TRIGL SERPL-MCNC: 76 MG/DL (ref 0–150)
TSH SERPL-ACNC: 2.97 MIU/ML (ref 0.27–4.2)
UROBILINOGEN UR QL STRIP: ABNORMAL
VLDLC SERPL-MCNC: 15.2 MG/DL (ref 5–40)
WBC UR QL AUTO: ABNORMAL /HPF
YEAST URNS QL MICRO: ABNORMAL /HPF

## 2018-12-04 PROCEDURE — 36415 COLL VENOUS BLD VENIPUNCTURE: CPT | Performed by: INTERNAL MEDICINE

## 2018-12-04 PROCEDURE — 81001 URINALYSIS AUTO W/SCOPE: CPT | Performed by: INTERNAL MEDICINE

## 2018-12-04 NOTE — TELEPHONE ENCOUNTER
----- Message from Mayte Duval sent at 12/4/2018 10:51 AM EST -----  Contact: patient  Ms. Leo is requesting an order for her mammogram at [facility].  Ary didn't think she probably needed an order since she has been there before but Ms. Leo thought she did.    Patient call back number:  [phone]    Thank you,    Mayte

## 2019-01-03 ENCOUNTER — OFFICE VISIT (OUTPATIENT)
Dept: INTERNAL MEDICINE | Facility: CLINIC | Age: 74
End: 2019-01-03

## 2019-01-03 VITALS
SYSTOLIC BLOOD PRESSURE: 148 MMHG | RESPIRATION RATE: 14 BRPM | BODY MASS INDEX: 24.59 KG/M2 | WEIGHT: 144 LBS | DIASTOLIC BLOOD PRESSURE: 82 MMHG | HEIGHT: 64 IN

## 2019-01-03 DIAGNOSIS — N95.1 MENOPAUSAL VASOMOTOR SYNDROME: ICD-10-CM

## 2019-01-03 DIAGNOSIS — I10 BENIGN ESSENTIAL HYPERTENSION: ICD-10-CM

## 2019-01-03 DIAGNOSIS — M54.12 CERVICAL RADICULOPATHY: ICD-10-CM

## 2019-01-03 DIAGNOSIS — Z00.00 HEALTH CARE MAINTENANCE: Primary | ICD-10-CM

## 2019-01-03 DIAGNOSIS — E78.00 PURE HYPERCHOLESTEROLEMIA: ICD-10-CM

## 2019-01-03 DIAGNOSIS — N95.1 MENOPAUSAL SYMPTOM: ICD-10-CM

## 2019-01-03 DIAGNOSIS — N95.2 ATROPHIC VAGINITIS: ICD-10-CM

## 2019-01-03 DIAGNOSIS — M54.16 LUMBAR RADICULOPATHY, CHRONIC: ICD-10-CM

## 2019-01-03 PROCEDURE — 99397 PER PM REEVAL EST PAT 65+ YR: CPT | Performed by: INTERNAL MEDICINE

## 2019-01-03 PROCEDURE — G0439 PPPS, SUBSEQ VISIT: HCPCS | Performed by: INTERNAL MEDICINE

## 2019-01-03 PROCEDURE — 99214 OFFICE O/P EST MOD 30 MIN: CPT | Performed by: INTERNAL MEDICINE

## 2019-01-03 PROCEDURE — 96160 PT-FOCUSED HLTH RISK ASSMT: CPT | Performed by: INTERNAL MEDICINE

## 2019-01-03 RX ORDER — AMLODIPINE BESYLATE 10 MG/1
10 TABLET ORAL DAILY
Qty: 90 TABLET | Refills: 3 | Status: SHIPPED | OUTPATIENT
Start: 2019-01-03 | End: 2019-11-18 | Stop reason: SDUPTHER

## 2019-01-03 RX ORDER — ESTRADIOL 0.5 MG/1
0.5 TABLET ORAL DAILY
Qty: 90 TABLET | Refills: 3 | Status: SHIPPED | OUTPATIENT
Start: 2019-01-03 | End: 2020-01-16

## 2019-01-03 RX ORDER — ROSUVASTATIN CALCIUM 5 MG/1
5 TABLET, COATED ORAL DAILY
Qty: 30 TABLET | Refills: 11 | Status: SHIPPED | OUTPATIENT
Start: 2019-01-03 | End: 2020-01-16

## 2019-01-03 RX ORDER — IRBESARTAN 150 MG/1
150 TABLET ORAL NIGHTLY
Qty: 90 TABLET | Refills: 3 | Status: SHIPPED | OUTPATIENT
Start: 2019-01-03 | End: 2019-11-06 | Stop reason: SDUPTHER

## 2019-01-03 NOTE — PATIENT INSTRUCTIONS
"Annual wellness visit with preventive exam as well as age and risk appropriate councelling completed.    Cardiovascular disease councelling: current. Recommended: Needs better cholesterol control.Offered statin and reasons for treatment had been explained, but patient continues to decline.  Diabetes screening and councelling: current. Recommended: Not at risk for diabetes.  Breast cancer screening: up to date. Recommended every year..  Osteoporosis screening: up to date. Recommended every 5 years. Next screening is due in 2020..  Glaucoma screening: being treated.   AAA screening: not needed..  Hep C screening (born between [birth year]) completed..  Colorectal cancer screening: patient had negative Cologuart in 09/2018 - will have to repeat in 2021..    Immunizations:   1. Influenza vaccine  is up to date and recommended yearly.   2. Pneumococcal vaccines: completed.   3. Shingles prevention:  Zostavax completed, recommended to get new shingles vaccine Shindrix at [facility], benefits explained.      Advanced directives planning: has Living Will. Discussed. I agree with patient wishes and decision..    Medications reviewed and medication list updated.   Potential harmful drug-disease interactions in the elderly:none.  High risk medication in elderly: none  ASA use: no indications.    HTN - not well contrlled with current medication regimen, but this is as good as we are able to manage, given her previous attempts, contraindications and myriad of side effects. Normal kidney tests and electrolytes. Recommended low salt diet. Continue same medical treatment.  Hyperlipidemia - not well contrlled , patient has increased risk of heart disease, but she keeps to decline statin due to the side effects that she had experienced in a past (hair loss). I had managed to convince her to take Crestor twice a week, very low dose. Normal liver function tests. LDL target is below < 100 mg/dl (CHD or \"CHD risk equivalent\" is present). " Patient's 150.   Cervical and lumbar radiculopathy - well controlled with daily use of Gabapentin. Will continue same, Riley report reviewed. [facility] Controlled substance agreement was signed today and will be scanned into EHR. Patient is compliant with medication and takes it according to the directions. Riley reports are being reviewed at least every 6 months., I will continue to monitor clinical progress with regualar office visits, random pill counts and urine drug screens..  Postmenopausal vasomotor s-m and atrophic vaginitis - will continue Estradiol.  Return in about 2 months (around 3/3/2019) for HTN, cholest.

## 2019-01-03 NOTE — PROGRESS NOTES
"Mae Leo is a 73 y.o. female.     History of Present Illness   /82 (BP Location: Left arm, Patient Position: Sitting, Cuff Size: Adult)   Resp 14   Ht 162.6 cm (64\")   Wt 65.3 kg (144 lb)   BMI 24.72 kg/m²   Patient is being seen for subsequent wellness visit.  Hospitalizations in a past: none  Once per lifetime Medicare screening tests: ECG - 02/04/2010, nl    AAA risk assessment: 1. FH: none. 2.H/o tobacco smoking: in remote past, as per . 3. CV or PAD: none.    Tobacco use: in remote past, as per    Alcohol use: occasionally  Illicit drugs use: none  Diet: healthy heart  Exercise: walking    Anxiety screening: How many days in the last 2 weeks you were  1. Feeling anxious, nervous, on edge:few, usually triggered by her limitations and inability to see well.  2. Unable to stop worrying: none  3. Worrying too much about different things: none  4. Having problems relaxing:none  5. Feeling restless or unable to sit still:none  6. Feeling irritable or easely annoyed: none  7. Being afraid that something awful might happen: none    Depression screening PHQ9:  Over the past 2 weeks how often have you been bothered by  1. Lack of interest or pleasuer in usual activities: none  2. Feeling down, depressed, hopeless:none  3. Troubles falling asleep/sleeping too long:none  4. Feeling tired, having little energy: none  5. Poor appetite or overeating: none  6. Feeling bad about yourself:none.  7. Trouble concentrating: at the baseline.  8. Moving or speaking too slow or much faster than usual: none  9. Thoughts about harming yourself:none.    Cognitive impairment screening:   Do you have difficulties with:  1. Language: no  2. Behavior: no  3. Learning/retaining new information: no  4. Handling complex tasks: no  5. Reasoning: no  6. Spacial ability and orientation: no    Hearing: normal.  Driving: unrestricted  ADL: independent  ADL details: Does patient needs help with:  telephone use: " "no  Transportation: no  Housework: no  Shopping: no  meal preparation: no  laundry: no  managing medication:no  Participate in the social activities: Y    Fall risk factors:   1.Use of alcohol: no    2.Polypharmacy: yes  3.H/o of previous fall:  no  4. Mobility impairment:  no  5. Visual impairment:  yes  6. Deconditioning: yes  7. Use of antihypertensive medication:  yes  8. Use of sedatives: no  9. Use of antidepressant: no    Home safety:   1.loose rugs: no   2.unfamiliar environment: no   3. Uneven floors: no   4. No grab bars in the bathroom: yes, recommended to install, but patient states that she uses shower bench.  5. No banister on stairs: no      Advanced directives: completed and Living Will is on file in the hospital. Discussed. I agree with patients decision..    Co-managers: ophthalmology  and  at [facility], general surgeon         /82 (BP Location: Left arm, Patient Position: Sitting, Cuff Size: Adult)   Resp 14   Ht 162.6 cm (64\")   Wt 65.3 kg (144 lb)   BMI 24.72 kg/m²     Current Outpatient Medications:   •  acetaminophen (TYLENOL) 325 MG tablet, , Disp: , Rfl:   •  amLODIPine (NORVASC) 10 MG tablet, Take 1 tablet by mouth Daily., Disp: 90 tablet, Rfl: 3  •  DUREZOL 0.05 % ophthalmic emulsion, Administer 1 drop to the right eye 4 (Four) Times a Day., Disp: , Rfl:   •  estradiol (ESTRACE) 0.5 MG tablet, Take 1 tablet by mouth Daily., Disp: 90 tablet, Rfl: 3  •  gabapentin (NEURONTIN) 100 MG capsule, Take 1 capsule by mouth 4 (Four) Times a Day. Takes 2 capsules three times a day (Patient taking differently: Take 100 mg by mouth 4 (Four) Times a Day. Patient takes one capsule by mouth every morning and two capsules by mouth every evening verified with patient on 09/12/2018 [facility]), Disp: 120 capsule, Rfl: 5  •  ILEVRO 0.3 % suspension, , Disp: , Rfl:   •  ketorolac (ACULAR) 0.5 % ophthalmic solution, 1 drop 2 (Two) Times a Day., Disp: , Rfl:   •  losartan " "(COZAAR) 50 MG tablet, Take 1 tablet by mouth 2 (Two) Times a Day., Disp: 180 tablet, Rfl: 3  •  spironolactone (ALDACTONE) 25 MG tablet, Take 1 tablet by mouth Daily., Disp: 30 tablet, Rfl: 11  •  STOOL SOFTENER & LAXATIVE 8.6-50 MG per tablet, , Disp: , Rfl:   •  tobramycin-dexamethasone (TOBRADEX) 0.3-0.1 % ophthalmic suspension, , Disp: , Rfl:   •  trimethoprim-polymyxin b (POLYTRIM) 56707-0.1 UNIT/ML-% ophthalmic solution, , Disp: , Rfl:     Patient has long-standing benign essential HTN.  BP is usually well controlled with daily use of Ca channel blocker, potassium sparing diuretic and ARB. On low salt diet with good compliance. Takes medication regularly. Denies chest pain, dyspnea,lightheadedness,  lower extremity edema. Patient checks blood pressure at home regularly. Reports that all numbers are in the normal range. She is unable to take 100 mg of Losartan, as she believes that this medication causes her to have back pain.    Patient has been diagnosed with hyperlipidemia. Target LDL is < 100 mg/dl (CHD or \"CHD risk equivalent\" is present). Patient is on low fat diet with good compliance. Patient had been offered statins treatment repeatedly, her calculated next 10 years CV risk is known to exceed 20%, but she had repeatedly declined the medication.      Patient had been diagnosed with chronic lumbar radiculopathy and with cervical radiculopathy, that had been bothering her perhaps less frequently. Her pain is well controlled with Gabapentin - takes on average 300 mg a day. NO side effects. Patient is aware of the scope of side effects, including dependence risk.                              The following portions of the patient's history were reviewed and updated as appropriate: allergies, current medications, past family history, past medical history, past social history, past surgical history and problem list.    Review of Systems   Constitutional: Negative for chills and fever.   HENT: Negative for " postnasal drip, sinus pressure and sore throat.    Eyes: Negative for pain and itching.   Respiratory: Negative for cough and chest tightness.    Cardiovascular: Negative for chest pain and leg swelling.   Gastrointestinal: Negative for abdominal pain and blood in stool.   Endocrine: Negative for cold intolerance and heat intolerance.   Genitourinary: Positive for frequency. Negative for difficulty urinating and flank pain.   Musculoskeletal: Negative for back pain and neck pain.   Skin: Negative for color change and rash.   Neurological: Positive for dizziness. Negative for weakness and headaches.   Hematological: Negative for adenopathy. Does not bruise/bleed easily.   Psychiatric/Behavioral: Negative for sleep disturbance. The patient is not nervous/anxious.        Objective   Physical Exam   Constitutional: She is oriented to person, place, and time. Vital signs are normal. She appears well-developed. No distress.   Central weight distribution   HENT:   Head: Normocephalic and atraumatic.   Right Ear: External ear normal.   Left Ear: External ear normal.   Nose: Nose normal. No mucosal edema. Right sinus exhibits no maxillary sinus tenderness and no frontal sinus tenderness. Left sinus exhibits no maxillary sinus tenderness and no frontal sinus tenderness.   Mouth/Throat: Oropharynx is clear and moist. No oropharyngeal exudate.   Eyes: Lids are normal. Right eye exhibits no discharge. Left eye exhibits no discharge. Left conjunctiva is not injected. Left conjunctiva has no hemorrhage. No scleral icterus. Right eye exhibits abnormal extraocular motion. Left eye exhibits normal extraocular motion. Right pupil is not round and not reactive. Pupils are unequal.   Right pupil is obliterated by the white patch   Neck: Normal range of motion and full passive range of motion without pain. Neck supple. No JVD present. Carotid bruit is not present. No thyromegaly present.   Cardiovascular: Normal rate, regular rhythm, S1  normal, S2 normal, normal heart sounds and intact distal pulses. PMI is not displaced. Exam reveals no gallop and no friction rub.   No murmur heard.  Pulmonary/Chest: Effort normal and breath sounds normal. No accessory muscle usage. No respiratory distress. She has no decreased breath sounds. She has no wheezes. She has no rhonchi. She has no rales.   Abdominal: Soft. Bowel sounds are normal. She exhibits no distension, no pulsatile liver, no fluid wave, no abdominal bruit, no ascites and no mass. There is no tenderness. There is no rebound and no guarding.   Musculoskeletal: She exhibits no edema or deformity.   Lymphadenopathy:        Head (right side): No submental, no submandibular, no preauricular, no posterior auricular and no occipital adenopathy present.        Head (left side): No submental, no submandibular, no tonsillar, no preauricular, no posterior auricular and no occipital adenopathy present.     She has no cervical adenopathy.        Right cervical: No superficial cervical, no deep cervical and no posterior cervical adenopathy present.       Left cervical: No superficial cervical, no deep cervical and no posterior cervical adenopathy present.        Right: No supraclavicular adenopathy present.        Left: No supraclavicular adenopathy present.   Neurological: She is alert and oriented to person, place, and time. She has normal strength and normal reflexes. She displays normal reflexes. No cranial nerve deficit. She exhibits normal muscle tone. Coordination normal.   Reflex Scores:       Bicep reflexes are 2+ on the right side and 2+ on the left side.       Patellar reflexes are 2+ on the right side and 2+ on the left side.  Skin: Skin is warm and dry. No rash noted. She is not diaphoretic. No erythema.   Psychiatric: She has a normal mood and affect. Her speech is normal and behavior is normal. Thought content normal.   Vitals reviewed.      Assessment/Plan   Marilia was seen today for medicare  wellness-subsequent, hypertension and hyperlipidemia.    Diagnoses and all orders for this visit:    Health care maintenance    Benign essential hypertension  -     amLODIPine (NORVASC) 10 MG tablet; Take 1 tablet by mouth Daily.  -     irbesartan (AVAPRO) 150 MG tablet; Take 1 tablet by mouth Every Night.    Pure hypercholesterolemia  -     rosuvastatin (CRESTOR) 5 MG tablet; Take 1 tablet by mouth Daily.  -     Comprehensive Metabolic Panel; Future  -     Lipid Panel; Future    Lumbar radiculopathy, chronic    Cervical radiculopathy    Atrophic vaginitis        Annual wellness visit with preventive exam as well as age and risk appropriate councelling completed.    Cardiovascular disease councelling: current. Recommended: Needs better cholesterol control.Offered statin and reasons for treatment had been explained, but patient continues to decline.  Diabetes screening and councelling: current. Recommended: Not at risk for diabetes.  Breast cancer screening: up to date. Recommended every year..  Osteoporosis screening: up to date. Recommended every 5 years. Next screening is due in 2020..  Glaucoma screening: being treated.   AAA screening: not needed..  Hep C screening (born between [birth year]) completed..  Colorectal cancer screening: patient had negative Cologuart in 09/2018 - will have to repeat in 2021..    Immunizations:   1. Influenza vaccine  is up to date and recommended yearly.   2. Pneumococcal vaccines: completed.   3. Shingles prevention:  Zostavax completed, recommended to get new shingles vaccine Shindrix at [facility], benefits explained.      Advanced directives planning: has Living Will. Discussed. I agree with patient wishes and decision..    Medications reviewed and medication list updated.   Potential harmful drug-disease interactions in the elderly:none.  High risk medication in elderly: none  ASA use: no indications.    HTN - not well contrlled with current medication regimen, but this is as good  "as we are able to manage, given her previous attempts, contraindications and myriad of side effects. Normal kidney tests and electrolytes. Recommended low salt diet. Continue same medical treatment.  Hyperlipidemia - not well contrlled , patient has increased risk of heart disease, but she keeps to decline statin due to the side effects that she had experienced in a past (hair loss). I had managed to convince her to take Crestor twice a week, very low dose. Normal liver function tests. LDL target is below < 100 mg/dl (CHD or \"CHD risk equivalent\" is present). Patient's 150.   Cervical and lumbar radiculopathy - well controlled with daily use of Gabapentin. Will continue same, Riley report reviewed. [facility] Controlled substance agreement was signed today and will be scanned into EHR. Patient is compliant with medication and takes it according to the directions. Riley reports are being reviewed at least every 6 months., I will continue to monitor clinical progress with regualar office visits, random pill counts and urine drug screens..  Postmenopausal vasomotor s-m and atrophic vaginitis - will continue Estradiol.  "

## 2019-04-13 DIAGNOSIS — M54.16 LUMBAR RADICULOPATHY, CHRONIC: ICD-10-CM

## 2019-04-16 RX ORDER — GABAPENTIN 100 MG/1
CAPSULE ORAL
Qty: 120 CAPSULE | Refills: 0 | OUTPATIENT
Start: 2019-04-16 | End: 2019-05-13 | Stop reason: SDUPTHER

## 2019-05-06 ENCOUNTER — LAB (OUTPATIENT)
Dept: INTERNAL MEDICINE | Facility: CLINIC | Age: 74
End: 2019-05-06

## 2019-05-06 DIAGNOSIS — E78.00 PURE HYPERCHOLESTEROLEMIA: ICD-10-CM

## 2019-05-06 LAB
ALBUMIN SERPL-MCNC: 4.7 G/DL (ref 3.5–5.2)
ALBUMIN/GLOB SERPL: 1.5 G/DL
ALP SERPL-CCNC: 68 U/L (ref 39–117)
ALT SERPL-CCNC: 16 U/L (ref 1–33)
AST SERPL-CCNC: 15 U/L (ref 1–32)
BILIRUB SERPL-MCNC: 0.4 MG/DL (ref 0.2–1.2)
BUN SERPL-MCNC: 11 MG/DL (ref 8–23)
BUN/CREAT SERPL: 12.5 (ref 7–25)
CALCIUM SERPL-MCNC: 9.8 MG/DL (ref 8.6–10.5)
CHLORIDE SERPL-SCNC: 102 MMOL/L (ref 98–107)
CHOLEST SERPL-MCNC: 233 MG/DL (ref 0–200)
CO2 SERPL-SCNC: 26.9 MMOL/L (ref 22–29)
CREAT SERPL-MCNC: 0.88 MG/DL (ref 0.57–1)
GLOBULIN SER CALC-MCNC: 3.1 GM/DL
GLUCOSE SERPL-MCNC: 102 MG/DL (ref 65–99)
HDLC SERPL-MCNC: 84 MG/DL (ref 40–60)
LDLC SERPL CALC-MCNC: 138 MG/DL (ref 0–100)
POTASSIUM SERPL-SCNC: 3.6 MMOL/L (ref 3.5–5.2)
PROT SERPL-MCNC: 7.8 G/DL (ref 6–8.5)
SODIUM SERPL-SCNC: 143 MMOL/L (ref 136–145)
TRIGL SERPL-MCNC: 57 MG/DL (ref 0–150)
VLDLC SERPL-MCNC: 11.4 MG/DL

## 2019-05-06 PROCEDURE — 36415 COLL VENOUS BLD VENIPUNCTURE: CPT | Performed by: INTERNAL MEDICINE

## 2019-05-13 ENCOUNTER — OFFICE VISIT (OUTPATIENT)
Dept: INTERNAL MEDICINE | Facility: CLINIC | Age: 74
End: 2019-05-13

## 2019-05-13 VITALS
WEIGHT: 145 LBS | BODY MASS INDEX: 24.75 KG/M2 | SYSTOLIC BLOOD PRESSURE: 132 MMHG | HEIGHT: 64 IN | RESPIRATION RATE: 14 BRPM | DIASTOLIC BLOOD PRESSURE: 78 MMHG

## 2019-05-13 DIAGNOSIS — M54.12 CERVICAL RADICULOPATHY: ICD-10-CM

## 2019-05-13 DIAGNOSIS — R73.01 IMPAIRED FASTING BLOOD SUGAR: ICD-10-CM

## 2019-05-13 DIAGNOSIS — R00.2 PALPITATIONS: ICD-10-CM

## 2019-05-13 DIAGNOSIS — M54.16 LUMBAR RADICULOPATHY, CHRONIC: ICD-10-CM

## 2019-05-13 DIAGNOSIS — I10 BENIGN ESSENTIAL HYPERTENSION: Primary | ICD-10-CM

## 2019-05-13 DIAGNOSIS — E78.00 PURE HYPERCHOLESTEROLEMIA: ICD-10-CM

## 2019-05-13 PROCEDURE — 99214 OFFICE O/P EST MOD 30 MIN: CPT | Performed by: INTERNAL MEDICINE

## 2019-05-13 RX ORDER — DORZOLAMIDE HYDROCHLORIDE AND TIMOLOL MALEATE 20; 5 MG/ML; MG/ML
1 SOLUTION/ DROPS OPHTHALMIC 2 TIMES DAILY
COMMUNITY
End: 2022-02-18

## 2019-05-13 RX ORDER — GABAPENTIN 100 MG/1
CAPSULE ORAL
Qty: 120 CAPSULE | Refills: 1 | OUTPATIENT
Start: 2019-05-13 | End: 2019-11-30 | Stop reason: SDUPTHER

## 2019-05-13 NOTE — PATIENT INSTRUCTIONS
HTN - well controlled with current medication regimen. Normal kidney tests and electrolytes. Recommended low salt diet. Patient believes that she had developed several side effects due to Irbesartan, awe will try to decrease the dose to 75 mg a day - patient will try to take just half of 150 mg pills..  Hyperlipidemia - not well contrlled with statin. Normal liver function tests. LDL target is below < 130 mg/dl (2 or more risk factors are present). Patient's 138. Continue same treatment.   Impaired fasting blood sugar - patient has a strong FH of diabetes, she is at risk of diabetes. Low carb diet and regular exercise recommended.   Cervical radiculopathy - will continue Gabapentin. Riley report reviewed. [facility] Controlled substance agreement was signed and is in EHR. Patient is compliant with medication and takes it according to the directions. Riley reports are being reviewed at least every 6 months., I will continue to monitor clinical progress with regualar office visits, random pill counts and urine drug screens.

## 2019-05-13 NOTE — PROGRESS NOTES
"Subjective   Marilia Leo is a 74 y.o. female.     History of Present Illness     /78 (BP Location: Left arm, Patient Position: Sitting, Cuff Size: Adult)   Resp 14   Ht 162.6 cm (64\")   Wt 65.8 kg (145 lb)   BMI 24.89 kg/m²     Current Outpatient Medications:   •  acetaminophen (TYLENOL) 325 MG tablet, , Disp: , Rfl:   •  amLODIPine (NORVASC) 10 MG tablet, Take 1 tablet by mouth Daily., Disp: 90 tablet, Rfl: 3  •  dorzolamide-timolol (COSOPT) 22.3-6.8 MG/ML ophthalmic solution, 1 drop 2 (Two) Times a Day., Disp: , Rfl:   •  DUREZOL 0.05 % ophthalmic emulsion, Administer 1 drop to the right eye 4 (Four) Times a Day., Disp: , Rfl:   •  estradiol (ESTRACE) 0.5 MG tablet, Take 1 tablet by mouth Daily., Disp: 90 tablet, Rfl: 3  •  gabapentin (NEURONTIN) 100 MG capsule, TAKE 1 CAPSULE BY MOUTH EVERY MORNING AND 2 CAPSULES BY MOUTH EVERY NIGHT AT BEDTIME, Disp: 120 capsule, Rfl: 0  •  irbesartan (AVAPRO) 150 MG tablet, Take 1 tablet by mouth Every Night., Disp: 90 tablet, Rfl: 3  •  rosuvastatin (CRESTOR) 5 MG tablet, Take 1 tablet by mouth Daily., Disp: 30 tablet, Rfl: 11  •  spironolactone (ALDACTONE) 25 MG tablet, Take 1 tablet by mouth Daily., Disp: 30 tablet, Rfl: 11  •  STOOL SOFTENER & LAXATIVE 8.6-50 MG per tablet, , Disp: , Rfl:   •  tobramycin (TOBREX) 0.3 % ointment ophthalmic ointment, Every 8 (Eight) Hours., Disp: , Rfl:     Patient has long-standing benign essential HTN.  BP is usually well controlled with daily use of Ca channel blocker, potassium sparing diuretic and ARB. On low salt diet with good compliance. Takes medication regularly. Denies chest pain, dyspnea,lightheadedness,  lower extremity edema. Patient does not check blood pressure. Patient believes that Irbesartan causes her back to hurt and some itching. Sometimes she has palpitation sat night - \"feels like my heart flips\".    Patient has been diagnosed with hyperlipidemia. Target LDL is < 100 mg/dl (CHD or \"CHD risk equivalent\" is " present). Patient is on low fat diet with good compliance. Patient is compliant with treatment: daily use of Crestor (rozuvastatin). Side effects of medication: none. Exercise none. Patient c/o pains in the left shoulder and arm for the last 3-4 weeks, that she associates with keeping her head in a different position to see anything, and pull on her neck. She takes Gabapentin 100 mg during the daytime and 200 at bedtime.      The following portions of the patient's history were reviewed and updated as appropriate: allergies, current medications, past family history, past medical history, past social history, past surgical history and problem list.    Review of Systems   Constitutional: Negative for chills and fever.   Eyes: Negative for pain and redness.   Respiratory: Negative for cough and shortness of breath.    Cardiovascular: Negative for chest pain and leg swelling.   Neurological: Negative for dizziness and headaches.       Objective   Physical Exam   Constitutional: She is oriented to person, place, and time. She appears well-developed.   Central weight distribution   HENT:   Head: Normocephalic and atraumatic.   Right Ear: Tympanic membrane, external ear and ear canal normal.   Left Ear: Tympanic membrane, external ear and ear canal normal.   Nose: Nose normal. Right sinus exhibits no maxillary sinus tenderness and no frontal sinus tenderness. Left sinus exhibits no maxillary sinus tenderness and no frontal sinus tenderness.   Mouth/Throat: Uvula is midline, oropharynx is clear and moist and mucous membranes are normal.   Eyes: Conjunctivae and EOM are normal. Pupils are equal, round, and reactive to light. Right eye exhibits discharge (some whitish scant crusts in the right eye eyelashes). Left eye exhibits no discharge. No scleral icterus.   Neck: Neck supple. No JVD present.   Cardiovascular: Normal rate, regular rhythm and normal heart sounds. Exam reveals no gallop and no friction rub.   No murmur  heard.  Pulmonary/Chest: Effort normal and breath sounds normal. She has no wheezes. She has no rales.   Musculoskeletal: She exhibits edema (minimal right eye edema) and tenderness (left side of the neck and shoulder).   Lymphadenopathy:     She has no cervical adenopathy.   Neurological: She is alert and oriented to person, place, and time. No cranial nerve deficit.   Skin: Skin is warm and dry. No rash noted.   Psychiatric: She has a normal mood and affect. Her behavior is normal.   Vitals reviewed.      Assessment/Plan   Marilia was seen today for hypertension and hyperlipidemia.    Diagnoses and all orders for this visit:    Benign essential hypertension  -     Comprehensive Metabolic Panel; Future  -     Urinalysis With Microscopic If Indicated (No Culture) - Urine, Clean Catch; Future    Pure hypercholesterolemia  -     CBC & Differential; Future  -     Comprehensive Metabolic Panel; Future  -     Lipid Panel; Future  -     TSH; Future    Cervical radiculopathy    Impaired fasting blood sugar    Palpitations  -     Holter Monitor - 48 Hour; Future    HTN - well controlled with current medication regimen. Normal kidney tests and electrolytes. Recommended low salt diet. Patient believes that she had developed several side effects due to Irbesartan, awe will try to decrease the dose to 75 mg a day - patient will try to take just half of 150 mg pills..  Hyperlipidemia - not well contrlled with statin. Normal liver function tests. LDL target is below < 130 mg/dl (2 or more risk factors are present). Patient's 138. Continue same treatment.   Impaired fasting blood sugar - patient has a strong FH of diabetes, she is at risk of diabetes. Low carb diet and regular exercise recommended.   Cervical radiculopathy - will continue Gabapentin. Riley report reviewed. Psychiatric Controlled substance agreement was signed and is in EHR. Patient is compliant with medication and takes it according to the directions. Riley  reports are being reviewed at least every 6 months., I will continue to monitor clinical progress with regualar office visits, random pill counts and urine drug screens.   Palpitations - I had offered Holter and ECHO, but patient declines.

## 2019-08-16 ENCOUNTER — HOSPITAL ENCOUNTER (EMERGENCY)
Facility: HOSPITAL | Age: 74
Discharge: HOME | End: 2019-08-16
Attending: EMERGENCY MEDICINE | Admitting: EMERGENCY MEDICINE

## 2019-08-16 ENCOUNTER — TELEPHONE (OUTPATIENT)
Dept: INTERNAL MEDICINE | Facility: CLINIC | Age: 74
End: 2019-08-16

## 2019-08-16 ENCOUNTER — APPOINTMENT (OUTPATIENT)
Dept: CT IMAGING | Facility: HOSPITAL | Age: 74
End: 2019-08-16

## 2019-08-16 VITALS
WEIGHT: 142 LBS | BODY MASS INDEX: 22.82 KG/M2 | HEART RATE: 59 BPM | TEMPERATURE: 97.4 F | RESPIRATION RATE: 16 BRPM | HEIGHT: 66 IN | OXYGEN SATURATION: 99 % | DIASTOLIC BLOOD PRESSURE: 104 MMHG | SYSTOLIC BLOOD PRESSURE: 179 MMHG

## 2019-08-16 DIAGNOSIS — R10.9 ABDOMINAL WALL PAIN IN RIGHT FLANK: Primary | ICD-10-CM

## 2019-08-16 DIAGNOSIS — N28.1 RENAL CYST, LEFT: ICD-10-CM

## 2019-08-16 LAB
ALBUMIN SERPL-MCNC: 4.7 G/DL (ref 3.5–5.2)
ALBUMIN/GLOB SERPL: 1.7 G/DL
ALP SERPL-CCNC: 66 U/L (ref 39–117)
ALT SERPL W P-5'-P-CCNC: 10 U/L (ref 1–33)
ANION GAP SERPL CALCULATED.3IONS-SCNC: 12.2 MMOL/L (ref 5–15)
AST SERPL-CCNC: 15 U/L (ref 1–32)
BASOPHILS # BLD AUTO: 0.07 10*3/MM3 (ref 0–0.2)
BASOPHILS NFR BLD AUTO: 0.8 % (ref 0–1.5)
BILIRUB SERPL-MCNC: 0.4 MG/DL (ref 0.2–1.2)
BILIRUB UR QL STRIP: NEGATIVE
BUN BLD-MCNC: 13 MG/DL (ref 8–23)
BUN/CREAT SERPL: 13.3 (ref 7–25)
CALCIUM SPEC-SCNC: 9.6 MG/DL (ref 8.6–10.5)
CHLORIDE SERPL-SCNC: 107 MMOL/L (ref 98–107)
CLARITY UR: CLEAR
CO2 SERPL-SCNC: 27.8 MMOL/L (ref 22–29)
COLOR UR: YELLOW
CREAT BLD-MCNC: 0.98 MG/DL (ref 0.57–1)
DEPRECATED RDW RBC AUTO: 46.1 FL (ref 37–54)
EOSINOPHIL # BLD AUTO: 0.83 10*3/MM3 (ref 0–0.4)
EOSINOPHIL NFR BLD AUTO: 10 % (ref 0.3–6.2)
ERYTHROCYTE [DISTWIDTH] IN BLOOD BY AUTOMATED COUNT: 14 % (ref 12.3–15.4)
GFR SERPL CREATININE-BSD FRML MDRD: 67 ML/MIN/1.73
GLOBULIN UR ELPH-MCNC: 2.8 GM/DL
GLUCOSE BLD-MCNC: 94 MG/DL (ref 65–99)
GLUCOSE UR STRIP-MCNC: NEGATIVE MG/DL
HCT VFR BLD AUTO: 43.4 % (ref 34–46.6)
HGB BLD-MCNC: 13.3 G/DL (ref 12–15.9)
HGB UR QL STRIP.AUTO: NEGATIVE
IMM GRANULOCYTES # BLD AUTO: 0.01 10*3/MM3 (ref 0–0.05)
IMM GRANULOCYTES NFR BLD AUTO: 0.1 % (ref 0–0.5)
KETONES UR QL STRIP: NEGATIVE
LEUKOCYTE ESTERASE UR QL STRIP.AUTO: NEGATIVE
LIPASE SERPL-CCNC: 30 U/L (ref 13–60)
LYMPHOCYTES # BLD AUTO: 3.01 10*3/MM3 (ref 0.7–3.1)
LYMPHOCYTES NFR BLD AUTO: 36.4 % (ref 19.6–45.3)
MCH RBC QN AUTO: 27.4 PG (ref 26.6–33)
MCHC RBC AUTO-ENTMCNC: 30.6 G/DL (ref 31.5–35.7)
MCV RBC AUTO: 89.3 FL (ref 79–97)
MONOCYTES # BLD AUTO: 0.58 10*3/MM3 (ref 0.1–0.9)
MONOCYTES NFR BLD AUTO: 7 % (ref 5–12)
NEUTROPHILS # BLD AUTO: 3.77 10*3/MM3 (ref 1.7–7)
NEUTROPHILS NFR BLD AUTO: 45.7 % (ref 42.7–76)
NITRITE UR QL STRIP: NEGATIVE
NRBC BLD AUTO-RTO: 0.1 /100 WBC (ref 0–0.2)
PH UR STRIP.AUTO: 7.5 [PH] (ref 5–8)
PLATELET # BLD AUTO: 321 10*3/MM3 (ref 140–450)
PMV BLD AUTO: 9.8 FL (ref 6–12)
POTASSIUM BLD-SCNC: 3.6 MMOL/L (ref 3.5–5.2)
PROT SERPL-MCNC: 7.5 G/DL (ref 6–8.5)
PROT UR QL STRIP: NEGATIVE
RBC # BLD AUTO: 4.86 10*6/MM3 (ref 3.77–5.28)
SODIUM BLD-SCNC: 147 MMOL/L (ref 136–145)
SP GR UR STRIP: 1.01 (ref 1–1.03)
UROBILINOGEN UR QL STRIP: NORMAL
WBC NRBC COR # BLD: 8.27 10*3/MM3 (ref 3.4–10.8)

## 2019-08-16 PROCEDURE — 99283 EMERGENCY DEPT VISIT LOW MDM: CPT

## 2019-08-16 PROCEDURE — 81003 URINALYSIS AUTO W/O SCOPE: CPT | Performed by: EMERGENCY MEDICINE

## 2019-08-16 PROCEDURE — 96374 THER/PROPH/DIAG INJ IV PUSH: CPT

## 2019-08-16 PROCEDURE — 80053 COMPREHEN METABOLIC PANEL: CPT | Performed by: EMERGENCY MEDICINE

## 2019-08-16 PROCEDURE — 85025 COMPLETE CBC W/AUTO DIFF WBC: CPT | Performed by: EMERGENCY MEDICINE

## 2019-08-16 PROCEDURE — 83690 ASSAY OF LIPASE: CPT | Performed by: EMERGENCY MEDICINE

## 2019-08-16 PROCEDURE — 25010000002 KETOROLAC TROMETHAMINE PER 15 MG: Performed by: EMERGENCY MEDICINE

## 2019-08-16 PROCEDURE — 74176 CT ABD & PELVIS W/O CONTRAST: CPT

## 2019-08-16 RX ORDER — GABAPENTIN 300 MG/1
300 CAPSULE ORAL ONCE
Status: DISCONTINUED | OUTPATIENT
Start: 2019-08-16 | End: 2019-08-16 | Stop reason: HOSPADM

## 2019-08-16 RX ORDER — KETOROLAC TROMETHAMINE 15 MG/ML
10 INJECTION, SOLUTION INTRAMUSCULAR; INTRAVENOUS ONCE
Status: COMPLETED | OUTPATIENT
Start: 2019-08-16 | End: 2019-08-16

## 2019-08-16 RX ORDER — SODIUM CHLORIDE 0.9 % (FLUSH) 0.9 %
10 SYRINGE (ML) INJECTION AS NEEDED
Status: DISCONTINUED | OUTPATIENT
Start: 2019-08-16 | End: 2019-08-16 | Stop reason: HOSPADM

## 2019-08-16 RX ADMIN — KETOROLAC TROMETHAMINE 10 MG: 15 INJECTION, SOLUTION INTRAMUSCULAR; INTRAVENOUS at 14:05

## 2019-08-16 NOTE — TELEPHONE ENCOUNTER
----- Message from Eliz Mckoy sent at 8/16/2019 11:07 AM EDT -----  Contact: pt JERRY  She is going to ER for abd pain.

## 2019-08-16 NOTE — ED PROVIDER NOTES
" EMERGENCY DEPARTMENT ENCOUNTER    CHIEF COMPLAINT  Chief Complaint: RLQ pain  History given by: pt  History limited by: none  Room Number: 11/11  PMD: Arianna Romero MD      HPI:  Pt is a 74 y.o. female who presents complaining of severe RLQ \"burning\" pain that started four days ago and has gotten progressively worse. Pt admits to chills, urinary frequency, and dysuria but denies fever, N/V/D.    Duration:  Four days  Onset: gradual  Timing: constant  Location: RLQ  Radiation: none  Quality: \"burning\"  Intensity/Severity: severe  Progression: progressively worse  Associated Symptoms: chills, urinary frequency, dysuria  Aggravating Factors: none  Alleviating Factors: none  Previous Episodes: none  Treatment before arrival: none    PAST MEDICAL HISTORY  Active Ambulatory Problems     Diagnosis Date Noted   • Atopic dermatitis 04/18/2016   • Benign essential hypertension 04/18/2016   • Cervical radiculopathy 04/18/2016   • Glaucoma 04/18/2016   • Pure hypercholesterolemia 04/18/2016   • Menopausal symptom 04/18/2016   • Atrophic vaginitis 04/18/2016   • Health care maintenance 10/13/2016   • Trochanteric bursitis of left hip 10/13/2016   • Thoracic radiculopathy 03/07/2017   • Lumbar radiculopathy, chronic 03/07/2017   • Impaired fasting blood sugar 05/13/2019   • Palpitations 05/13/2019     Resolved Ambulatory Problems     Diagnosis Date Noted   • Neck pain 04/18/2016   • Chronic fatigue 04/13/2017   • Impacted cerumen of right ear 10/16/2017     Past Medical History:   Diagnosis Date   • Angioedema    • Cataract    • Diverticulosis    • Edema    • Esophageal reflux    • Glaucoma    • Hammer toe    • History of mammogram    • Hx of bone scan    • Hypercholesterolemia    • Medial epicondylitis    • Papanicolaou smear    • Trochanteric bursitis        PAST SURGICAL HISTORY  Past Surgical History:   Procedure Laterality Date   • APPENDECTOMY     • CERVICAL FUSION      Dr.John Sexton   • [facility]      [facility]   • CORNEAL TRANSPLANT     • FETAL BLOOD TRANSFUSION     • FOOT SURGERY  2013    right bunion and hammer toe Dr Phelan   • PARTIAL HYSTERECTOMY      ovaries intact       FAMILY HISTORY  Family History   Problem Relation Age of Onset   • Hypertension Mother    • Diabetes Mother    • Hypertension Father    • Diabetes Sister    • Multiple sclerosis Sister    • Rheum arthritis Sister    • Lung cancer Sister         smoker   • Diabetes Brother    • Heart disease Brother    • Hypertension Brother    • Rheum arthritis Brother    • Sarcoidosis Brother    • Glaucoma Brother        SOCIAL HISTORY  Social History     Socioeconomic History   • Marital status: Single     Spouse name: Not on file   • Number of children: Not on file   • Years of education: Not on file   • Highest education level: Not on file   Tobacco Use   • Smoking status: Former Smoker     Packs/day: 1.00     Years: 5.00     Pack years: 5.00     Types: Cigarettes     Last attempt to quit: 1980     Years since quittin.6   • Smokeless tobacco: Never Used   Substance and Sexual Activity   • Alcohol use: Yes     Comment: occasionally   • Drug use: No       ALLERGIES  Codeine; Penicillins; and Sulfa antibiotics    REVIEW OF SYSTEMS  Review of Systems   Constitutional: Positive for chills. Negative for fever.   Respiratory: Negative for cough and shortness of breath.    Cardiovascular: Negative for chest pain and leg swelling.   Gastrointestinal: Positive for abdominal pain (RLQ). Negative for diarrhea and vomiting.   Genitourinary: Positive for frequency. Negative for dysuria.   Musculoskeletal: Negative for back pain.   Skin: Negative for rash.       PHYSICAL EXAM  ED Triage Vitals [19 1244]   Temp Heart Rate Resp BP SpO2   97.4 °F (36.3 °C) 79 18 -- 98 %      Temp src Heart Rate Source Patient Position BP Location FiO2 (%)   Tympanic Monitor -- -- --       Physical Exam   Constitutional: She is oriented to person, place, and time.  No distress.   HENT:   Head: Normocephalic and atraumatic.   Eyes: EOM are normal. Pupils are equal, round, and reactive to light.   Neck: Normal range of motion. Neck supple.   Cardiovascular: Normal rate, regular rhythm and normal heart sounds.   Pulmonary/Chest: Effort normal and breath sounds normal. No respiratory distress.   Abdominal: Soft. She exhibits no mass. There is tenderness in the right lower quadrant. There is CVA tenderness (right). There is no rebound and no guarding.   Musculoskeletal: Normal range of motion. She exhibits tenderness (right flank). She exhibits no edema.   Neurological: She is alert and oriented to person, place, and time. She has normal sensation and normal strength.   Skin: Skin is warm and dry. No rash noted.   Psychiatric: Mood and affect normal.   Nursing note and vitals reviewed.      LAB RESULTS  Lab Results (last 24 hours)     Procedure Component Value Units Date/Time    Urinalysis With Microscopic If Indicated (No Culture) - Urine, Clean Catch [[id number]]  (Normal) Collected:  08/16/19 1305    Specimen:  Urine, Clean Catch Updated:  08/16/19 1315     Color, UA Yellow     Appearance, UA Clear     pH, UA 7.5     Specific Gravity, UA 1.011     Glucose, UA Negative     Ketones, UA Negative     Bilirubin, UA Negative     Blood, UA Negative     Protein, UA Negative     Leuk Esterase, UA Negative     Nitrite, UA Negative     Urobilinogen, UA 1.0 E.U./dL    Narrative:       Urine microscopic not indicated.    CBC & Differential [[id number]] Collected:  08/16/19 1313    Specimen:  Blood Updated:  08/16/19 7551    Narrative:       The following orders were created for panel order CBC & Differential.  Procedure                               Abnormality         Status                     ---------                               -----------         ------                     CBC Auto Differential[[id number]]        Abnormal            Final result                 Please view results for  these tests on the individual orders.    Comprehensive Metabolic Panel [[id number]]  (Abnormal) Collected:  08/16/19 1313    Specimen:  Blood Updated:  08/16/19 1347     Glucose 94 mg/dL      BUN 13 mg/dL      Creatinine 0.98 mg/dL      Sodium 147 mmol/L      Potassium 3.6 mmol/L      Chloride 107 mmol/L      CO2 27.8 mmol/L      Calcium 9.6 mg/dL      Total Protein 7.5 g/dL      Albumin 4.70 g/dL      ALT (SGPT) 10 U/L      AST (SGOT) 15 U/L      Alkaline Phosphatase 66 U/L      Total Bilirubin 0.4 mg/dL      eGFR  African Amer 67 mL/min/1.73      Globulin 2.8 gm/dL      A/G Ratio 1.7 g/dL      BUN/Creatinine Ratio 13.3     Anion Gap 12.2 mmol/L     Narrative:       GFR Normal >60  Chronic Kidney Disease <60  Kidney Failure <15    Lipase [[id number]]  (Normal) Collected:  08/16/19 1313    Specimen:  Blood Updated:  08/16/19 1347     Lipase 30 U/L     CBC Auto Differential [[id number]]  (Abnormal) Collected:  08/16/19 1313    Specimen:  Blood Updated:  08/16/19 1331     WBC 8.27 10*3/mm3      RBC 4.86 10*6/mm3      Hemoglobin 13.3 g/dL      Hematocrit 43.4 %      MCV 89.3 fL      MCH 27.4 pg      MCHC 30.6 g/dL      RDW 14.0 %      RDW-SD 46.1 fl      MPV 9.8 fL      Platelets 321 10*3/mm3      Neutrophil % 45.7 %      Lymphocyte % 36.4 %      Monocyte % 7.0 %      Eosinophil % 10.0 %      Basophil % 0.8 %      Immature Grans % 0.1 %      Neutrophils, Absolute 3.77 10*3/mm3      Lymphocytes, Absolute 3.01 10*3/mm3      Monocytes, Absolute 0.58 10*3/mm3      Eosinophils, Absolute 0.83 10*3/mm3      Basophils, Absolute 0.07 10*3/mm3      Immature Grans, Absolute 0.01 10*3/mm3      nRBC 0.1 /100 WBC           I ordered the above labs and reviewed the results    RADIOLOGY  CT Abdomen Pelvis Without Contrast       small left ovarian cyst and multiple left renal cysts that will need follow up, but otherwise negative.        I ordered the above noted radiological studies. Interpreted by radiologist. Discussed with radiologist  (Dr Roberts). Reviewed by me in PACS.       PROCEDURES  Procedures      PROGRESS AND CONSULTS       1355  Rechecked patient who is resting. BP - 173/97.  Discussed all lab and test results, specifically the abd/pel CT results that will need follow up. Discussed plan to give the pt medication for the pain and then discharge the pt. Discussed my suspicion for shingles and that the pt should watch for a rash to the area. Pt understands and agrees with the plan. All questions have been answered.  Pt states she will take gabapentin and tylenol at home for the pain.  Will follow up for Renal cyst.       MEDICAL DECISION MAKING  Results were reviewed/discussed with the patient and they were also made aware of online access. Pt also made aware that some labs, such as cultures, will not be resulted during ER visit and follow up with PMD is necessary.     MDM  Number of Diagnoses or Management Options     Amount and/or Complexity of Data Reviewed  Clinical lab tests: ordered and reviewed (Normal)  Tests in the radiology section of CPT®: ordered and reviewed (Abd/pel CT - small left ovarian cyst and multiple left renal cysts that will need follow up, but otherwise negative.)  Discussion of test results with the performing providers: yes (Dr Roberts)           DIAGNOSIS  Final diagnoses:   Abdominal wall pain in right flank   Renal cyst, left       DISPOSITION  DISCHARGE    Patient discharged in stable condition.    Reviewed implications of results, diagnosis, meds, responsibility to follow up, warning signs and symptoms of possible worsening, potential complications and reasons to return to ER.    Patient/Family voiced understanding of above instructions.    Discussed plan for discharge, as there is no emergent indication for admission. Patient referred to primary care provider for BP management due to today's BP. Pt/family is agreeable and understands need for follow up and repeat testing.  Pt is aware that discharge does not  mean that nothing is wrong but it indicates no emergency is present that requires admission and they must continue care with follow-up as given below or physician of their choice.     FOLLOW-UP  Arianna Romero MD  [id number] [id number]  [phone]    Schedule an appointment as soon as possible for a visit   If symptoms worsen or do not improve         Medication List      No changes were made to your prescriptions during this visit.           Latest Documented Vital Signs:  As of 2:02 PM  BP- (!) 181/97 HR- 79 Temp- 97.4 °F (36.3 °C) (Tympanic) O2 sat- 98%    --  Documentation assistance provided by bishnu Mcintyre for Dr Dean.  Information recorded by the scribe was done at my direction and has been verified and validated by me.        Betty Mcintyre  08/16/19 [id number]       Clifford Dean MD  08/16/19 [id number]

## 2019-11-06 DIAGNOSIS — I10 BENIGN ESSENTIAL HYPERTENSION: ICD-10-CM

## 2019-11-07 RX ORDER — IRBESARTAN 150 MG/1
TABLET ORAL
Qty: 90 TABLET | Refills: 3 | Status: SHIPPED | OUTPATIENT
Start: 2019-11-07 | End: 2020-08-04

## 2019-11-18 DIAGNOSIS — I10 BENIGN ESSENTIAL HYPERTENSION: ICD-10-CM

## 2019-11-18 RX ORDER — AMLODIPINE BESYLATE 10 MG/1
TABLET ORAL
Qty: 90 TABLET | Refills: 1 | Status: SHIPPED | OUTPATIENT
Start: 2019-11-18 | End: 2020-06-24

## 2019-11-30 DIAGNOSIS — M54.16 LUMBAR RADICULOPATHY, CHRONIC: ICD-10-CM

## 2019-12-03 RX ORDER — GABAPENTIN 100 MG/1
CAPSULE ORAL
Qty: 120 CAPSULE | Refills: 0 | Status: SHIPPED | OUTPATIENT
Start: 2019-12-03 | End: 2020-02-28

## 2020-01-09 ENCOUNTER — LAB (OUTPATIENT)
Dept: INTERNAL MEDICINE | Facility: CLINIC | Age: 75
End: 2020-01-09

## 2020-01-09 DIAGNOSIS — I10 BENIGN ESSENTIAL HYPERTENSION: ICD-10-CM

## 2020-01-09 DIAGNOSIS — E78.00 PURE HYPERCHOLESTEROLEMIA: ICD-10-CM

## 2020-01-09 LAB
ALBUMIN SERPL-MCNC: 4.8 G/DL (ref 3.5–5.2)
ALBUMIN/GLOB SERPL: 1.5 G/DL
ALP SERPL-CCNC: 72 U/L (ref 39–117)
ALT SERPL-CCNC: 14 U/L (ref 1–33)
APPEARANCE UR: CLEAR
AST SERPL-CCNC: 15 U/L (ref 1–32)
BASOPHILS # BLD AUTO: 0.06 10*3/MM3 (ref 0–0.2)
BASOPHILS NFR BLD AUTO: 0.9 % (ref 0–1.5)
BILIRUB SERPL-MCNC: 0.8 MG/DL (ref 0.2–1.2)
BILIRUB UR QL STRIP: NEGATIVE
BUN SERPL-MCNC: 13 MG/DL (ref 8–23)
BUN/CREAT SERPL: 13.1 (ref 7–25)
CALCIUM SERPL-MCNC: 9.4 MG/DL (ref 8.6–10.5)
CHLORIDE SERPL-SCNC: 102 MMOL/L (ref 98–107)
CHOLEST SERPL-MCNC: 269 MG/DL (ref 0–200)
CO2 SERPL-SCNC: 26.2 MMOL/L (ref 22–29)
COLOR UR: YELLOW
CREAT SERPL-MCNC: 0.99 MG/DL (ref 0.57–1)
EOSINOPHIL # BLD AUTO: 0.51 10*3/MM3 (ref 0–0.4)
EOSINOPHIL # BLD AUTO: 7.6 % (ref 0.3–6.2)
ERYTHROCYTE [DISTWIDTH] IN BLOOD BY AUTOMATED COUNT: 13.6 % (ref 12.3–15.4)
GLOBULIN SER CALC-MCNC: 3.1 GM/DL
GLUCOSE SERPL-MCNC: 83 MG/DL (ref 65–99)
GLUCOSE UR QL: NEGATIVE
HCT VFR BLD AUTO: 40 % (ref 34–46.6)
HDLC SERPL-MCNC: 96 MG/DL (ref 40–60)
HGB BLD-MCNC: 13.6 G/DL (ref 12–15.9)
HGB UR QL STRIP: NEGATIVE
IMM GRANULOCYTES # BLD: 0.02 10*3/MM3 (ref 0–0.05)
IMM GRANULOCYTES NFR BLD: 0.3 % (ref 0–0.5)
KETONES UR QL STRIP: NEGATIVE
LDLC SERPL CALC-MCNC: 155 MG/DL (ref 0–100)
LEUKOCYTE ESTERASE UR QL STRIP: NEGATIVE
LYMPHOCYTES # BLD AUTO: 3.14 10*3/MM3 (ref 0.7–3.1)
LYMPHOCYTES NFR BLD AUTO: 46.7 % (ref 19.6–45.3)
MCH RBC QN AUTO: 27.9 PG (ref 26.6–33)
MCHC RBC AUTO-ENTMCNC: 34 G/DL (ref 31.5–35.7)
MCV RBC AUTO: 82 FL (ref 79–97)
MONOCYTES # BLD AUTO: 0.51 10*3/MM3 (ref 0.1–0.9)
MONOCYTES NFR BLD AUTO: 7.6 % (ref 5–12)
NEUTROPHILS # BLD AUTO: 2.48 10*3/MM3 (ref 1.7–7)
NEUTROPHILS NFR BLD AUTO: 36.9 % (ref 42.7–76)
NITRITE UR QL STRIP: NEGATIVE
NRBC BLD AUTO-RTO: 0 /100 WBC (ref 0–0.2)
PH UR STRIP.AUTO: 7 [PH] (ref 5–8)
PLATELET # BLD AUTO: 274 10*3/MM3 (ref 140–450)
POTASSIUM SERPL-SCNC: 3.8 MMOL/L (ref 3.5–5.2)
PROT SERPL-MCNC: 7.9 G/DL (ref 6–8.5)
PROTEIN: NEGATIVE
RBC # BLD AUTO: 4.88 10*6/MM3 (ref 3.77–5.28)
SODIUM SERPL-SCNC: 142 MMOL/L (ref 136–145)
SP GR UR: 1.01 (ref 1–1.03)
TRIGL SERPL-MCNC: 89 MG/DL (ref 0–150)
UROBILINOGEN UR STRIP-MCNC: NORMAL MG/DL
VLDLC SERPL-MCNC: 17.8 MG/DL
WBC # BLD AUTO: 6.72 10*3/MM3 (ref 3.4–10.8)

## 2020-01-10 LAB — TSH SERPL-ACNC: 1.98 UIU/ML (ref 0.27–4.2)

## 2020-01-16 ENCOUNTER — OFFICE VISIT (OUTPATIENT)
Dept: INTERNAL MEDICINE | Facility: CLINIC | Age: 75
End: 2020-01-16

## 2020-01-16 VITALS
BODY MASS INDEX: 23.63 KG/M2 | WEIGHT: 147 LBS | SYSTOLIC BLOOD PRESSURE: 138 MMHG | DIASTOLIC BLOOD PRESSURE: 80 MMHG | RESPIRATION RATE: 14 BRPM | HEIGHT: 66 IN

## 2020-01-16 DIAGNOSIS — I10 BENIGN ESSENTIAL HYPERTENSION: ICD-10-CM

## 2020-01-16 DIAGNOSIS — M54.12 CERVICAL RADICULOPATHY: ICD-10-CM

## 2020-01-16 DIAGNOSIS — Z12.31 VISIT FOR SCREENING MAMMOGRAM: ICD-10-CM

## 2020-01-16 DIAGNOSIS — E78.00 PURE HYPERCHOLESTEROLEMIA: ICD-10-CM

## 2020-01-16 DIAGNOSIS — Z23 NEED FOR PNEUMOCOCCAL VACCINE: ICD-10-CM

## 2020-01-16 DIAGNOSIS — Z00.00 HEALTH CARE MAINTENANCE: Primary | ICD-10-CM

## 2020-01-16 DIAGNOSIS — R60.0 EDEMA, LOWER EXTREMITY: ICD-10-CM

## 2020-01-16 PROBLEM — R00.2 PALPITATIONS: Status: RESOLVED | Noted: 2019-05-13 | Resolved: 2020-01-16

## 2020-01-16 PROBLEM — R73.01 IMPAIRED FASTING BLOOD SUGAR: Status: RESOLVED | Noted: 2019-05-13 | Resolved: 2020-01-16

## 2020-01-16 PROCEDURE — 96160 PT-FOCUSED HLTH RISK ASSMT: CPT | Performed by: INTERNAL MEDICINE

## 2020-01-16 PROCEDURE — 99214 OFFICE O/P EST MOD 30 MIN: CPT | Performed by: INTERNAL MEDICINE

## 2020-01-16 PROCEDURE — G0009 ADMIN PNEUMOCOCCAL VACCINE: HCPCS | Performed by: INTERNAL MEDICINE

## 2020-01-16 PROCEDURE — 90732 PPSV23 VACC 2 YRS+ SUBQ/IM: CPT | Performed by: INTERNAL MEDICINE

## 2020-01-16 PROCEDURE — G0439 PPPS, SUBSEQ VISIT: HCPCS | Performed by: INTERNAL MEDICINE

## 2020-01-16 NOTE — PATIENT INSTRUCTIONS
Annual wellness visit with preventive exam as well as age and risk appropriate councelling completed.    Cardiovascular disease councelling: current. Recommended: unfortunately, patient is unable to take statins due to myelopathy.  Diabetes screening and councelling: current. Recommended: Not at risk for diabetes.  Breast cancer screening: is due. Will schedule..  Osteoporosis screening: up to date. Recommended every 5 years. Next screening is due in 2021..  Glaucoma screening: under the care of ophthalmologist.   AAA screening: not needed..  Hep C screening (born between [birth year]) completed..  Colorectal cancer screening: up to date, recommended every 3 years. Next screening is due in 2021 - will repeat Cologuard..    Immunizations:   1. Influenza vaccine  is up to date and recommended yearly.   2. Pneumococcal vaccines: Pneumovaccine will be administered today.   3. Shingles prevention:  Zostavax completed, recommended to get new shingles vaccine Shingrix at the pharmacy, benefits explained.      Advanced directives planning: has Living Will. Discussed. I agree with patient wishes and decision. and has POA..    Medications reviewed and medication list updated.   Potential harmful drug-disease interactions in the elderly:Ca channel blocker and edema and Gabapentin and edema.  High risk medication in elderly: Gabapentin  ASA use: no indications.    HTN - well controlled with current medication regimen. Normal kidney tests and electrolytes. Recommended low salt diet. Continue same medical treatment.  Hyperlipidemia - not well contrlled without statin. Normal liver function tests. LDL target is below < 130 mg/dl (2 or more risk factors are present). Patient's 89. Patient states that she is not able to take medication due to leg pains, the pain had improved as she had discontinued medication. Her calculated 10 years CV risk is 8%.  LE edema - most likely due to the combination of use of Amlodipine and Gabapentin,  explained. Role of dietary salt restriction in control and prevention of edema explained to the patient. Advised to read labels on comertial food packaging, avoid TV dinners, canned soups and processed foods. Recommended to elevate feet whenever at rest. Wearing compression stoking or travelers socks encouraged, especially for the long car rides, airplane flights and in all situations of prolonged standing or walking.I had asked patient to restart taking Spironolactone every morning.She still has some pills at home.Will call us if refill is needed.  Cervical radiculopathy - decent control with daily use of Gabapentin and Tylenol, will continue same.Riley report reviewed. [facility] Controlled substance agreement was signed today and will be scanned into EHR. Patient is compliant with medication and takes it according to the directions. Riley reports are being reviewed at least every 6 months., I will continue to monitor clinical progress with regualar office visits, random pill counts and urine drug screens..  Return in about 6 months (around 7/16/2020) for HTn, cervical radiculopathy, LE edema.

## 2020-01-16 NOTE — PROGRESS NOTES
"Mae Leo is a 74 y.o. female.     History of Present Illness   /80 (BP Location: Left arm, Patient Position: Sitting, Cuff Size: Adult)   Resp 14   Ht 167.6 cm (66\")   Wt 66.7 kg (147 lb)   BMI 23.73 kg/m²   Patient is being seen for subsequent wellness visit.  Hospitalizations in a past: once for eye surgery and because the BP was uncontrolled after the surgery..   Once per lifetime Medicare screening tests: ECG - 02/04/2010, nl    AAA risk assessment: 1. FH: none. 2.H/o tobacco smoking: in remote past, as per . 3. CV or PAD: none.    Tobacco use: in remote past, as per    Alcohol use: seldom  Illicit drugs use: none  Diet: well balanced  Exercise: none    Anxiety screening: How many days in the last 2 weeks you were  1. Feeling anxious, nervous, on edge: none  2. Unable to stop worrying: none  3. Worrying too much about different things: none  4. Having problems relaxing:none  5. Feeling restless or unable to sit still:none  6. Feeling irritable or easely annoyed: none  7. Being afraid that something awful might happen: none    Depression screening PHQ9:  Over the past 2 weeks how often have you been bothered by  1. Lack of interest or pleasuer in usual activities: none  2. Feeling down, depressed, hopeless:none  3. Troubles falling asleep/sleeping too long:none  4. Feeling tired, having little energy: none  5. Poor appetite or overeating: none  6. Feeling bad about yourself:none.  7. Trouble concentrating: at the baseline.  8. Moving or speaking too slow or much faster than usual: none  9. Thoughts about harming yourself:none.    Cognitive impairment screening:   Do you have difficulties with:  1. Language: no  2. Behavior: no  3. Learning/retaining new information: no  4. Handling complex tasks: no  5. Reasoning: no  6. Spacial ability and orientation: no    Hearing: normal.  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 Mobility impairment:  no  5. Visual impairment:  yes  6. Deconditioning: yes  7. Use of antihypertensive medication:  yes  8. Use of sedatives: no  9. Use of antidepressant: no    Home safety:   1.loose rugs: no   2.unfamiliar environment: no   3. Uneven floors: no   4. No grab bars in the bathroom: yes, recommended to install, but she usually uses a bench while taking a shower.  5. No banister on stairs: no      Advanced directives: completed and Living Will is on file in the hospital. Discussed. I agree with patients decision..Daughter is POA.    Co-managers: ophthalmology , rheumatology , general surgeon       /80 (BP Location: Left arm, Patient Position: Sitting, Cuff Size: Adult)   Resp 14   Ht 167.6 cm (66\")   Wt 66.7 kg (147 lb)   BMI 23.73 kg/m²     Current Outpatient Medications:   •  acetaminophen (TYLENOL) 325 MG tablet, , Disp: , Rfl:   •  amLODIPine (NORVASC) 10 MG tablet, TAKE 1 TABLET EVERY DAY, Disp: 90 tablet, Rfl: 1  •  dorzolamide-timolol (COSOPT) 22.3-6.8 MG/ML ophthalmic solution, 1 drop 2 (Two) Times a Day., Disp: , Rfl:   •  DUREZOL 0.05 % ophthalmic emulsion, Administer 1 drop to the right eye 4 (Four) Times a Day., Disp: , Rfl:   •  estradiol (ESTRACE) 0.5 MG tablet, Take 1 tablet by mouth Daily., Disp: 90 tablet, Rfl: 3  •  gabapentin (NEURONTIN) 100 MG capsule, TAKE 1 CAPSULE BY MOUTH EVERY MORNING AND 2 CAPSULES AT BEDTIME, Disp: 120 capsule, Rfl: 0  •  irbesartan (AVAPRO) 150 MG tablet, TAKE 1 TABLET  NIGHTLY, Disp: 90 tablet, Rfl: 3  •  rosuvastatin (CRESTOR) 5 MG tablet, Take 1 tablet by mouth Daily., Disp: 30 tablet, Rfl: 11  •  spironolactone (ALDACTONE) 25 MG tablet, Take 1 " tablet by mouth Daily., Disp: 30 tablet, Rfl: 11  •  STOOL SOFTENER & LAXATIVE 8.6-50 MG per tablet, , Disp: , Rfl:   •  tobramycin (TOBREX) 0.3 % ointment ophthalmic ointment, Every 8 (Eight) Hours., Disp: , Rfl:     Patient has long-standing benign essential HTN.  BP is usually well controlled with daily use of Ca channel blocker and ARB. On low salt diet with good compliance. Takes medication regularly. Denies chest pain, dyspnea,lightheadednes. Patient does not check blood pressure. She used to take Spironolactone in a past, but had not been taking lately.    Patient complains of the swelling in the lower extremities. Patient reports that swelling started several months ago. Describes swelling as constant.  Marilia Leo denies  shortness of breath. Patient reports that the swelling is worse on the left and the swelling is worse toward the end of the day and improves overnight. Pertinent medical history includes use of Ca channel blocker, use of Neurontin.      Current Outpatient Medications:   •  acetaminophen (TYLENOL) 325 MG tablet, , Disp: , Rfl:   •  amLODIPine (NORVASC) 10 MG tablet, TAKE 1 TABLET EVERY DAY, Disp: 90 tablet, Rfl: 1  •  dorzolamide-timolol (COSOPT) 22.3-6.8 MG/ML ophthalmic solution, 1 drop 2 (Two) Times a Day., Disp: , Rfl:   •  DUREZOL 0.05 % ophthalmic emulsion, Administer 1 drop to the right eye 4 (Four) Times a Day., Disp: , Rfl:   •  estradiol (ESTRACE) 0.5 MG tablet, Take 1 tablet by mouth Daily., Disp: 90 tablet, Rfl: 3  •  gabapentin (NEURONTIN) 100 MG capsule, TAKE 1 CAPSULE BY MOUTH EVERY MORNING AND 2 CAPSULES AT BEDTIME, Disp: 120 capsule, Rfl: 0  •  irbesartan (AVAPRO) 150 MG tablet, TAKE 1 TABLET  NIGHTLY, Disp: 90 tablet, Rfl: 3  •  rosuvastatin (CRESTOR) 5 MG tablet, Take 1 tablet by mouth Daily., Disp: 30 tablet, Rfl: 11  •  spironolactone (ALDACTONE) 25 MG tablet, Take 1 tablet by mouth Daily., Disp: 30 tablet, Rfl: 11  •  STOOL SOFTENER & LAXATIVE 8.6-50 MG per tablet, ,  "Disp: , Rfl:   •  tobramycin (TOBREX) 0.3 % ointment ophthalmic ointment, Every 8 (Eight) Hours., Disp: , Rfl:     /80 (BP Location: Left arm, Patient Position: Sitting, Cuff Size: Adult)   Resp 14   Ht 167.6 cm (66\")   Wt 66.7 kg (147 lb)   BMI 23.73 kg/m²     Patient has been diagnosed with hyperlipidemia. Target LDL is < 100 mg/dl (CHD or \"CHD risk equivalent\" is present). Patient is on low fat diet with good compliance. Patient is not compliant with treatment: daily use of Crestor (rozuvastatin). She had discontinued medication on her own in 06/2019 due to some tightness and pain in her legs. Stopping medication had relieved her s-ms.    Patient had been suffering with cervical radiculopathy, that she usually controlled with Gabapentin 200 mg at bedtime, and 1 pill at noon. Takes OTC Tylenol as well.                                    The following portions of the patient's history were reviewed and updated as appropriate: allergies, current medications, past family history, past medical history, past social history, past surgical history and problem list.    Review of Systems   Constitutional: Negative for chills and fever.   HENT: Negative for postnasal drip, sinus pressure and sore throat.    Eyes: Negative for pain and itching.   Respiratory: Negative for cough and chest tightness.    Cardiovascular: Positive for leg swelling. Negative for chest pain.   Gastrointestinal: Negative for abdominal pain and blood in stool.   Endocrine: Negative for cold intolerance and heat intolerance.   Genitourinary: Negative for difficulty urinating and flank pain.   Musculoskeletal: Positive for back pain. Negative for neck pain.   Skin: Negative for color change and rash.   Neurological: Negative for dizziness, weakness and headaches.   Hematological: Negative for adenopathy. Does not bruise/bleed easily.   Psychiatric/Behavioral: Positive for sleep disturbance. The patient is not nervous/anxious.        Objective "   Physical Exam   Constitutional: She is oriented to person, place, and time. Vital signs are normal. She appears well-developed and well-nourished. No distress.   HENT:   Head: Normocephalic and atraumatic.   Right Ear: External ear normal.   Left Ear: External ear normal.   Nose: Nose normal. No mucosal edema. Right sinus exhibits no maxillary sinus tenderness and no frontal sinus tenderness. Left sinus exhibits no maxillary sinus tenderness and no frontal sinus tenderness.   Mouth/Throat: Oropharynx is clear and moist. No oropharyngeal exudate.   Eyes: Pupils are equal, round, and reactive to light. Conjunctivae, EOM and lids are normal. Right eye exhibits no discharge. Left eye exhibits no discharge. Right conjunctiva is not injected. Left conjunctiva is not injected. No scleral icterus. Right eye exhibits normal extraocular motion. Left eye exhibits normal extraocular motion.   Neck: Normal range of motion and full passive range of motion without pain. Neck supple. No JVD present. Carotid bruit is not present. No thyromegaly present.   Cardiovascular: Normal rate, regular rhythm, S1 normal, S2 normal, normal heart sounds and intact distal pulses. PMI is not displaced. Exam reveals no gallop and no friction rub.   No murmur heard.  Pulmonary/Chest: Effort normal and breath sounds normal. No accessory muscle usage. No respiratory distress. She has no decreased breath sounds. She has no wheezes. She has no rhonchi. She has no rales.   Abdominal: Soft. Bowel sounds are normal. She exhibits no distension, no pulsatile liver, no fluid wave, no abdominal bruit, no ascites and no mass. There is no tenderness. There is no rebound and no guarding.   Musculoskeletal: She exhibits edema (periorbital OD). She exhibits no deformity.   Lymphadenopathy:        Head (right side): No submental, no submandibular, no preauricular, no posterior auricular and no occipital adenopathy present.        Head (left side): No submental, no  submandibular, no tonsillar, no preauricular, no posterior auricular and no occipital adenopathy present.     She has no cervical adenopathy.        Right cervical: No superficial cervical, no deep cervical and no posterior cervical adenopathy present.       Left cervical: No superficial cervical, no deep cervical and no posterior cervical adenopathy present.        Right: No supraclavicular adenopathy present.        Left: No supraclavicular adenopathy present.   Neurological: She is alert and oriented to person, place, and time. She has normal strength and normal reflexes. She displays normal reflexes. No cranial nerve deficit. She exhibits normal muscle tone. Coordination normal.   Reflex Scores:       Bicep reflexes are 2+ on the right side and 2+ on the left side.       Patellar reflexes are 2+ on the right side and 2+ on the left side.  Skin: Skin is warm and dry. No rash noted. She is not diaphoretic. No erythema.   Psychiatric: She has a normal mood and affect. Her speech is normal and behavior is normal. Thought content normal.   Vitals reviewed.      Assessment/Plan   Marilia was seen today for medicare wellness-subsequent, foot swelling and hyperlipidemia.    Diagnoses and all orders for this visit:    Health care maintenance    Need for pneumococcal vaccine  -     Pneumococcal Polysaccharide Vaccine 23-Valent Greater Than or Equal To 1yo Subcutaneous / IM    Visit for screening mammogram  -     Mammo Screening Bilateral With CAD; Future    Benign essential hypertension    Pure hypercholesterolemia    Cervical radiculopathy    Edema, lower extremity         Annual wellness visit with preventive exam as well as age and risk appropriate councelling completed.    Cardiovascular disease councelling: current. Recommended: unfortunately, patient is unable to take statins due to myelopathy.  Diabetes screening and councelling: current. Recommended: Not at risk for diabetes.  Breast cancer screening: is due. Will  schedule..  Osteoporosis screening: up to date. Recommended every 5 years. Next screening is due in 2021..  Glaucoma screening: under the care of ophthalmologist.   AAA screening: not needed..  Hep C screening (born between [birth year]) completed..  Colorectal cancer screening: up to date, recommended every 3 years. Next screening is due in 2021 - will repeat Cologuard..    Immunizations:   1. Influenza vaccine  is up to date and recommended yearly.   2. Pneumococcal vaccines: Pneumovaccine will be administered today.   3. Shingles prevention:  Zostavax completed, recommended to get new shingles vaccine Shingrix at the pharmacy, benefits explained.      Advanced directives planning: has Living Will. Discussed. I agree with patient wishes and decision. and has POA..    Medications reviewed and medication list updated.   Potential harmful drug-disease interactions in the elderly:Ca channel blocker and edema and Gabapentin and edema.  High risk medication in elderly: Gabapentin  ASA use: no indications.    HTN - well controlled with current medication regimen. Normal kidney tests and electrolytes. Recommended low salt diet. Continue same medical treatment.  Hyperlipidemia - not well contrlled without statin. Normal liver function tests. LDL target is below < 130 mg/dl (2 or more risk factors are present). Patient's 89. Patient states that she is not able to take medication due to leg pains, the pain had improved as she had discontinued medication. Her calculated 10 years CV risk is 8%.  LE edema - most likely due to the combination of use of Amlodipine and Gabapentin, explained. Role of dietary salt restriction in control and prevention of edema explained to the patient. Advised to read labels on comertial food packaging, avoid TV dinners, canned soups and processed foods. Recommended to elevate feet whenever at rest. Wearing compression stoking or travelers socks encouraged, especially for the long car rides, airplane  flights and in all situations of prolonged standing or walking.I had asked patient to restart taking Spironolactone every morning.She still has some pills at home.Will call us if refill is needed.  Cervical radiculopathy - decent control with daily use of Gabapentin and Tylenol, will continue same.Riley report reviewed. [facility] Controlled substance agreement was signed today and will be scanned into EHR. Patient is compliant with medication and takes it according to the directions. Riley reports are being reviewed at least every 6 months., I will continue to monitor clinical progress with regualar office visits, random pill counts and urine drug screens..

## 2020-02-27 DIAGNOSIS — M54.16 LUMBAR RADICULOPATHY, CHRONIC: ICD-10-CM

## 2020-02-28 RX ORDER — GABAPENTIN 100 MG/1
CAPSULE ORAL
Qty: 120 CAPSULE | Refills: 3 | OUTPATIENT
Start: 2020-02-28 | End: 2020-08-04

## 2020-06-24 DIAGNOSIS — I10 BENIGN ESSENTIAL HYPERTENSION: ICD-10-CM

## 2020-06-24 RX ORDER — AMLODIPINE BESYLATE 10 MG/1
TABLET ORAL
Qty: 90 TABLET | Refills: 1 | Status: SHIPPED | OUTPATIENT
Start: 2020-06-24 | End: 2020-08-04

## 2020-08-04 ENCOUNTER — TELEPHONE (OUTPATIENT)
Dept: INTERNAL MEDICINE | Facility: CLINIC | Age: 75
End: 2020-08-04

## 2020-08-04 ENCOUNTER — APPOINTMENT (OUTPATIENT)
Dept: GENERAL RADIOLOGY | Facility: HOSPITAL | Age: 75
End: 2020-08-04

## 2020-08-04 ENCOUNTER — APPOINTMENT (OUTPATIENT)
Dept: CT IMAGING | Facility: HOSPITAL | Age: 75
End: 2020-08-04

## 2020-08-04 ENCOUNTER — HOSPITAL ENCOUNTER (OUTPATIENT)
Facility: HOSPITAL | Age: 75
Setting detail: OBSERVATION
Discharge: HOME | End: 2020-08-06
Attending: EMERGENCY MEDICINE | Admitting: INTERNAL MEDICINE

## 2020-08-04 DIAGNOSIS — A41.9 SEPSIS, DUE TO UNSPECIFIED ORGANISM, UNSPECIFIED WHETHER ACUTE ORGAN DYSFUNCTION PRESENT (HCC): Primary | ICD-10-CM

## 2020-08-04 DIAGNOSIS — R74.8 ABNORMAL TRANSAMINASES: ICD-10-CM

## 2020-08-04 PROBLEM — R00.0 SINUS TACHYCARDIA: Status: ACTIVE | Noted: 2020-08-04

## 2020-08-04 PROBLEM — M54.9 BACK PAIN: Status: ACTIVE | Noted: 2020-08-04

## 2020-08-04 PROBLEM — R11.0 NAUSEA: Status: ACTIVE | Noted: 2020-08-04

## 2020-08-04 PROBLEM — R79.89 ELEVATED LFTS: Status: ACTIVE | Noted: 2020-08-04

## 2020-08-04 PROBLEM — R50.9 FEVER OF UNKNOWN ORIGIN (FUO): Status: ACTIVE | Noted: 2020-08-04

## 2020-08-04 LAB
ALBUMIN SERPL-MCNC: 4.4 G/DL (ref 3.5–5.2)
ALBUMIN/GLOB SERPL: 1.3 G/DL
ALP SERPL-CCNC: 116 U/L (ref 39–117)
ALT SERPL W P-5'-P-CCNC: 198 U/L (ref 1–33)
ANION GAP SERPL CALCULATED.3IONS-SCNC: 9.3 MMOL/L (ref 5–15)
AST SERPL-CCNC: 157 U/L (ref 1–32)
B PARAPERT DNA SPEC QL NAA+PROBE: NOT DETECTED
B PERT DNA SPEC QL NAA+PROBE: NOT DETECTED
BACTERIA UR QL AUTO: ABNORMAL /HPF
BASOPHILS # BLD AUTO: 0.04 10*3/MM3 (ref 0–0.2)
BASOPHILS NFR BLD AUTO: 0.4 % (ref 0–1.5)
BILIRUB SERPL-MCNC: 0.6 MG/DL (ref 0–1.2)
BILIRUB UR QL STRIP: NEGATIVE
BUN SERPL-MCNC: 11 MG/DL (ref 8–23)
BUN/CREAT SERPL: 10 (ref 7–25)
C PNEUM DNA NPH QL NAA+NON-PROBE: NOT DETECTED
CALCIUM SPEC-SCNC: 9.7 MG/DL (ref 8.6–10.5)
CHLORIDE SERPL-SCNC: 103 MMOL/L (ref 98–107)
CLARITY UR: CLEAR
CO2 SERPL-SCNC: 25.7 MMOL/L (ref 22–29)
COLOR UR: YELLOW
CREAT SERPL-MCNC: 1.1 MG/DL (ref 0.57–1)
D-LACTATE SERPL-SCNC: 1.1 MMOL/L (ref 0.5–2)
DEPRECATED RDW RBC AUTO: 41.5 FL (ref 37–54)
EOSINOPHIL # BLD AUTO: 0.32 10*3/MM3 (ref 0–0.4)
EOSINOPHIL NFR BLD AUTO: 3.3 % (ref 0.3–6.2)
ERYTHROCYTE [DISTWIDTH] IN BLOOD BY AUTOMATED COUNT: 13.2 % (ref 12.3–15.4)
FLUAV H1 2009 PAND RNA NPH QL NAA+PROBE: NOT DETECTED
FLUAV H1 HA GENE NPH QL NAA+PROBE: NOT DETECTED
FLUAV H3 RNA NPH QL NAA+PROBE: NOT DETECTED
FLUAV SUBTYP SPEC NAA+PROBE: NOT DETECTED
FLUBV RNA ISLT QL NAA+PROBE: NOT DETECTED
GFR SERPL CREATININE-BSD FRML MDRD: 59 ML/MIN/1.73
GLOBULIN UR ELPH-MCNC: 3.4 GM/DL
GLUCOSE SERPL-MCNC: 116 MG/DL (ref 65–99)
GLUCOSE UR STRIP-MCNC: NEGATIVE MG/DL
HADV DNA SPEC NAA+PROBE: NOT DETECTED
HCOV 229E RNA SPEC QL NAA+PROBE: NOT DETECTED
HCOV HKU1 RNA SPEC QL NAA+PROBE: NOT DETECTED
HCOV NL63 RNA SPEC QL NAA+PROBE: NOT DETECTED
HCOV OC43 RNA SPEC QL NAA+PROBE: NOT DETECTED
HCT VFR BLD AUTO: 43.6 % (ref 34–46.6)
HGB BLD-MCNC: 13.8 G/DL (ref 12–15.9)
HGB UR QL STRIP.AUTO: NEGATIVE
HMPV RNA NPH QL NAA+NON-PROBE: NOT DETECTED
HPIV1 RNA SPEC QL NAA+PROBE: NOT DETECTED
HPIV2 RNA SPEC QL NAA+PROBE: NOT DETECTED
HPIV3 RNA NPH QL NAA+PROBE: NOT DETECTED
HPIV4 P GENE NPH QL NAA+PROBE: NOT DETECTED
HYALINE CASTS UR QL AUTO: ABNORMAL /LPF
IMM GRANULOCYTES # BLD AUTO: 0.03 10*3/MM3 (ref 0–0.05)
IMM GRANULOCYTES NFR BLD AUTO: 0.3 % (ref 0–0.5)
KETONES UR QL STRIP: NEGATIVE
LEUKOCYTE ESTERASE UR QL STRIP.AUTO: NEGATIVE
LYMPHOCYTES # BLD AUTO: 1.19 10*3/MM3 (ref 0.7–3.1)
LYMPHOCYTES NFR BLD AUTO: 12.1 % (ref 19.6–45.3)
M PNEUMO IGG SER IA-ACNC: NOT DETECTED
MCH RBC QN AUTO: 27.2 PG (ref 26.6–33)
MCHC RBC AUTO-ENTMCNC: 31.7 G/DL (ref 31.5–35.7)
MCV RBC AUTO: 86 FL (ref 79–97)
MONOCYTES # BLD AUTO: 0.65 10*3/MM3 (ref 0.1–0.9)
MONOCYTES NFR BLD AUTO: 6.6 % (ref 5–12)
NEUTROPHILS NFR BLD AUTO: 7.59 10*3/MM3 (ref 1.7–7)
NEUTROPHILS NFR BLD AUTO: 77.3 % (ref 42.7–76)
NITRITE UR QL STRIP: NEGATIVE
NRBC BLD AUTO-RTO: 0 /100 WBC (ref 0–0.2)
PH UR STRIP.AUTO: 7.5 [PH] (ref 5–8)
PLATELET # BLD AUTO: 288 10*3/MM3 (ref 140–450)
PMV BLD AUTO: 9.2 FL (ref 6–12)
POTASSIUM SERPL-SCNC: 3.6 MMOL/L (ref 3.5–5.2)
PROCALCITONIN SERPL-MCNC: 0.2 NG/ML (ref 0–0.25)
PROT SERPL-MCNC: 7.8 G/DL (ref 6–8.5)
PROT UR QL STRIP: ABNORMAL
RBC # BLD AUTO: 5.07 10*6/MM3 (ref 3.77–5.28)
RBC # UR: ABNORMAL /HPF
REF LAB TEST METHOD: ABNORMAL
RHINOVIRUS RNA SPEC NAA+PROBE: NOT DETECTED
RSV RNA NPH QL NAA+NON-PROBE: NOT DETECTED
SARS-COV-2 RNA NPH QL NAA+NON-PROBE: NOT DETECTED
SODIUM SERPL-SCNC: 138 MMOL/L (ref 136–145)
SP GR UR STRIP: 1.01 (ref 1–1.03)
SQUAMOUS #/AREA URNS HPF: ABNORMAL /HPF
UROBILINOGEN UR QL STRIP: ABNORMAL
WBC # BLD AUTO: 9.82 10*3/MM3 (ref 3.4–10.8)
WBC UR QL AUTO: ABNORMAL /HPF

## 2020-08-04 PROCEDURE — G0378 HOSPITAL OBSERVATION PER HR: HCPCS

## 2020-08-04 PROCEDURE — 25010000002 CEFTRIAXONE PER 250 MG: Performed by: EMERGENCY MEDICINE

## 2020-08-04 PROCEDURE — 84145 PROCALCITONIN (PCT): CPT | Performed by: EMERGENCY MEDICINE

## 2020-08-04 PROCEDURE — 80053 COMPREHEN METABOLIC PANEL: CPT | Performed by: EMERGENCY MEDICINE

## 2020-08-04 PROCEDURE — 0202U NFCT DS 22 TRGT SARS-COV-2: CPT | Performed by: EMERGENCY MEDICINE

## 2020-08-04 PROCEDURE — P9612 CATHETERIZE FOR URINE SPEC: HCPCS

## 2020-08-04 PROCEDURE — 71045 X-RAY EXAM CHEST 1 VIEW: CPT

## 2020-08-04 PROCEDURE — 99285 EMERGENCY DEPT VISIT HI MDM: CPT

## 2020-08-04 PROCEDURE — 25010000002 ONDANSETRON PER 1 MG: Performed by: EMERGENCY MEDICINE

## 2020-08-04 PROCEDURE — 81001 URINALYSIS AUTO W/SCOPE: CPT | Performed by: EMERGENCY MEDICINE

## 2020-08-04 PROCEDURE — 96361 HYDRATE IV INFUSION ADD-ON: CPT

## 2020-08-04 PROCEDURE — 36415 COLL VENOUS BLD VENIPUNCTURE: CPT

## 2020-08-04 PROCEDURE — 87040 BLOOD CULTURE FOR BACTERIA: CPT | Performed by: EMERGENCY MEDICINE

## 2020-08-04 PROCEDURE — 85025 COMPLETE CBC W/AUTO DIFF WBC: CPT | Performed by: EMERGENCY MEDICINE

## 2020-08-04 PROCEDURE — 96375 TX/PRO/DX INJ NEW DRUG ADDON: CPT

## 2020-08-04 PROCEDURE — 25010000002 MORPHINE PER 10 MG: Performed by: EMERGENCY MEDICINE

## 2020-08-04 PROCEDURE — 74177 CT ABD & PELVIS W/CONTRAST: CPT

## 2020-08-04 PROCEDURE — 83605 ASSAY OF LACTIC ACID: CPT | Performed by: EMERGENCY MEDICINE

## 2020-08-04 PROCEDURE — 25010000002 IOPAMIDOL 61 % SOLUTION: Performed by: EMERGENCY MEDICINE

## 2020-08-04 PROCEDURE — 96365 THER/PROPH/DIAG IV INF INIT: CPT

## 2020-08-04 RX ORDER — LOSARTAN POTASSIUM 50 MG/1
50 TABLET ORAL NIGHTLY
Status: DISCONTINUED | OUTPATIENT
Start: 2020-08-04 | End: 2020-08-06 | Stop reason: HOSPADM

## 2020-08-04 RX ORDER — SODIUM CHLORIDE 0.9 % (FLUSH) 0.9 %
10 SYRINGE (ML) INJECTION AS NEEDED
Status: DISCONTINUED | OUTPATIENT
Start: 2020-08-04 | End: 2020-08-06 | Stop reason: HOSPADM

## 2020-08-04 RX ORDER — TIMOLOL MALEATE 5 MG/ML
1 SOLUTION/ DROPS OPHTHALMIC 2 TIMES DAILY
Status: DISCONTINUED | OUTPATIENT
Start: 2020-08-04 | End: 2020-08-05

## 2020-08-04 RX ORDER — MORPHINE SULFATE 2 MG/ML
2 INJECTION, SOLUTION INTRAMUSCULAR; INTRAVENOUS ONCE
Status: COMPLETED | OUTPATIENT
Start: 2020-08-04 | End: 2020-08-04

## 2020-08-04 RX ORDER — ACETAMINOPHEN 325 MG/1
650 TABLET ORAL EVERY 4 HOURS PRN
Status: DISCONTINUED | OUTPATIENT
Start: 2020-08-04 | End: 2020-08-06 | Stop reason: HOSPADM

## 2020-08-04 RX ORDER — DORZOLAMIDE HCL 20 MG/ML
1 SOLUTION/ DROPS OPHTHALMIC 2 TIMES DAILY
Status: DISCONTINUED | OUTPATIENT
Start: 2020-08-04 | End: 2020-08-05

## 2020-08-04 RX ORDER — IRBESARTAN 150 MG/1
150 TABLET ORAL NIGHTLY
COMMUNITY
End: 2020-12-09 | Stop reason: SDUPTHER

## 2020-08-04 RX ORDER — ACETAMINOPHEN 650 MG/1
650 SUPPOSITORY RECTAL EVERY 4 HOURS PRN
Status: DISCONTINUED | OUTPATIENT
Start: 2020-08-04 | End: 2020-08-06 | Stop reason: HOSPADM

## 2020-08-04 RX ORDER — CEFTRIAXONE SODIUM 1 G/50ML
1 INJECTION, SOLUTION INTRAVENOUS EVERY 24 HOURS
Status: DISCONTINUED | OUTPATIENT
Start: 2020-08-05 | End: 2020-08-05

## 2020-08-04 RX ORDER — GABAPENTIN 100 MG/1
100 CAPSULE ORAL EVERY 12 HOURS SCHEDULED
Status: DISCONTINUED | OUTPATIENT
Start: 2020-08-04 | End: 2020-08-06 | Stop reason: HOSPADM

## 2020-08-04 RX ORDER — HYDROCODONE BITARTRATE AND ACETAMINOPHEN 5; 325 MG/1; MG/1
1 TABLET ORAL EVERY 4 HOURS PRN
Status: DISCONTINUED | OUTPATIENT
Start: 2020-08-04 | End: 2020-08-06 | Stop reason: HOSPADM

## 2020-08-04 RX ORDER — CEFTRIAXONE SODIUM 1 G/50ML
1 INJECTION, SOLUTION INTRAVENOUS ONCE
Status: COMPLETED | OUTPATIENT
Start: 2020-08-04 | End: 2020-08-04

## 2020-08-04 RX ORDER — OFLOXACIN 3 MG/ML
1 SOLUTION/ DROPS OPHTHALMIC 2 TIMES DAILY
COMMUNITY

## 2020-08-04 RX ORDER — GABAPENTIN 100 MG/1
100 CAPSULE ORAL DAILY
COMMUNITY
End: 2020-08-28 | Stop reason: DRUGHIGH

## 2020-08-04 RX ORDER — SODIUM CHLORIDE 9 MG/ML
75 INJECTION, SOLUTION INTRAVENOUS CONTINUOUS
Status: DISCONTINUED | OUTPATIENT
Start: 2020-08-04 | End: 2020-08-06 | Stop reason: HOSPADM

## 2020-08-04 RX ORDER — ACETAMINOPHEN 160 MG/5ML
650 SOLUTION ORAL EVERY 4 HOURS PRN
Status: DISCONTINUED | OUTPATIENT
Start: 2020-08-04 | End: 2020-08-06 | Stop reason: HOSPADM

## 2020-08-04 RX ORDER — AMLODIPINE BESYLATE 10 MG/1
10 TABLET ORAL DAILY
Status: DISCONTINUED | OUTPATIENT
Start: 2020-08-05 | End: 2020-08-06 | Stop reason: HOSPADM

## 2020-08-04 RX ORDER — ONDANSETRON 2 MG/ML
4 INJECTION INTRAMUSCULAR; INTRAVENOUS ONCE
Status: COMPLETED | OUTPATIENT
Start: 2020-08-04 | End: 2020-08-04

## 2020-08-04 RX ORDER — GABAPENTIN 100 MG/1
100 CAPSULE ORAL 3 TIMES DAILY
COMMUNITY
End: 2020-09-14 | Stop reason: SDUPTHER

## 2020-08-04 RX ORDER — OFLOXACIN 3 MG/ML
1 SOLUTION/ DROPS OPHTHALMIC 4 TIMES DAILY
COMMUNITY
End: 2021-03-01 | Stop reason: SDUPTHER

## 2020-08-04 RX ORDER — NITROFURANTOIN 25; 75 MG/1; MG/1
100 CAPSULE ORAL 2 TIMES DAILY
COMMUNITY
End: 2020-08-04

## 2020-08-04 RX ORDER — AMLODIPINE BESYLATE 10 MG/1
10 TABLET ORAL DAILY
COMMUNITY
End: 2020-12-09 | Stop reason: SDUPTHER

## 2020-08-04 RX ORDER — ACETAMINOPHEN 500 MG
1000 TABLET ORAL ONCE
Status: COMPLETED | OUTPATIENT
Start: 2020-08-04 | End: 2020-08-04

## 2020-08-04 RX ORDER — DORZOLAMIDE HYDROCHLORIDE AND TIMOLOL MALEATE 20; 5 MG/ML; MG/ML
1 SOLUTION/ DROPS OPHTHALMIC 2 TIMES DAILY
Status: DISCONTINUED | OUTPATIENT
Start: 2020-08-04 | End: 2020-08-04 | Stop reason: CLARIF

## 2020-08-04 RX ORDER — SODIUM CHLORIDE 0.9 % (FLUSH) 0.9 %
10 SYRINGE (ML) INJECTION EVERY 12 HOURS SCHEDULED
Status: DISCONTINUED | OUTPATIENT
Start: 2020-08-04 | End: 2020-08-06 | Stop reason: HOSPADM

## 2020-08-04 RX ORDER — FLUOROMETHOLONE 0.1 %
1 SUSPENSION, DROPS(FINAL DOSAGE FORM)(ML) OPHTHALMIC (EYE) 2 TIMES DAILY
Status: DISCONTINUED | OUTPATIENT
Start: 2020-08-04 | End: 2020-08-05

## 2020-08-04 RX ORDER — ONDANSETRON 2 MG/ML
4 INJECTION INTRAMUSCULAR; INTRAVENOUS EVERY 6 HOURS PRN
Status: DISCONTINUED | OUTPATIENT
Start: 2020-08-04 | End: 2020-08-06 | Stop reason: HOSPADM

## 2020-08-04 RX ADMIN — LOSARTAN POTASSIUM 50 MG: 50 TABLET, FILM COATED ORAL at 22:49

## 2020-08-04 RX ADMIN — MORPHINE SULFATE 2 MG: 2 INJECTION, SOLUTION INTRAMUSCULAR; INTRAVENOUS at 17:01

## 2020-08-04 RX ADMIN — ONDANSETRON 4 MG: 2 INJECTION INTRAMUSCULAR; INTRAVENOUS at 17:01

## 2020-08-04 RX ADMIN — GABAPENTIN 100 MG: 100 CAPSULE ORAL at 22:49

## 2020-08-04 RX ADMIN — IOPAMIDOL 85 ML: 612 INJECTION, SOLUTION INTRAVENOUS at 18:05

## 2020-08-04 RX ADMIN — SODIUM CHLORIDE 75 ML/HR: 9 INJECTION, SOLUTION INTRAVENOUS at 22:55

## 2020-08-04 RX ADMIN — ACETAMINOPHEN 1000 MG: 500 TABLET ORAL at 15:02

## 2020-08-04 RX ADMIN — CEFTRIAXONE SODIUM 1 G: 1 INJECTION, SOLUTION INTRAVENOUS at 15:59

## 2020-08-04 NOTE — ED TRIAGE NOTES
Dx c uti on Wednesday - has been on abx and is having body wide pain    Patient was placed in face mask during first look triage.  Patient was wearing a face mask throughout encounter.  I wore personal protective equipment throughout the encounter.  Hand hygiene was performed before and after patient encounter.

## 2020-08-04 NOTE — ED NOTES
Patient was placed in face mask in first look. Patient was wearing facemask when I entered the room and throughout our encounter. I wore full protective equipment throughout this patient encounter including a face mask, and gloves. Hand hygiene was performed before donning protective equipment and after removal when leaving the room.       Ehsan Levy RN  08/04/20 [id number]

## 2020-08-04 NOTE — ED PROVIDER NOTES
EMERGENCY DEPARTMENT ENCOUNTER    Room Number:  20/20  Date seen:  8/4/2020  PCP: Provider, No Known  Historian: Patient      HPI:  Chief Complaint: Fever, back pain  A complete HPI/ROS/PMH/PSH/SH/FH are unobtainable due to: Nothing  Context: Marilia Leo is a 75 y.o. female who presents to the ED c/o fever and back pain onset this morning.  She reports that she was seen last Wednesday at [facility] and was diagnosed with a urinary tract infection for which she was treated with Macrodantin.  She had been feeling better but today developed pain throughout her back and also fever and chills.  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  She denies further dysuria.  She has had mild cough but no shortness of air.  She denies any known sick contacts.  She is had no diarrhea.            PAST MEDICAL HISTORY  Active Ambulatory Problems     Diagnosis Date Noted   • Benign essential hypertension 04/18/2016   • Cervical radiculopathy 04/18/2016   • Glaucoma 04/18/2016   • Pure hypercholesterolemia 04/18/2016   • Menopausal symptom 04/18/2016   • Atrophic vaginitis 04/18/2016   • Health care maintenance 10/13/2016   • Trochanteric bursitis of left hip 10/13/2016   • Thoracic radiculopathy 03/07/2017   • Lumbar radiculopathy, chronic 03/07/2017   • Edema, lower extremity 01/16/2020     Resolved Ambulatory Problems     Diagnosis Date Noted   • Atopic dermatitis 04/18/2016   • Neck pain 04/18/2016   • Chronic fatigue 04/13/2017   • Impacted cerumen of right ear 10/16/2017   • Impaired fasting blood sugar 05/13/2019   • Palpitations 05/13/2019     Past Medical History:   Diagnosis Date   • Angioedema    • Cataract    • Diverticulosis    • Edema    • Esophageal reflux    • Hammer toe    • History of mammogram    • Hx of bone scan    • Hypercholesterolemia    • Medial epicondylitis    • Papanicolaou smear    •  Trochanteric bursitis          PAST SURGICAL HISTORY  Past Surgical History:   Procedure Laterality Date   • APPENDECTOMY     • CERVICAL FUSION      Dr.John Sexton   • COLONOSCOPY      2007 Dr Bradshaw Normal   • CORNEAL TRANSPLANT     • FETAL BLOOD TRANSFUSION     • FOOT SURGERY  2013    right bunion and hammer toe Dr Phelan   • SUBTOTAL HYSTERECTOMY      ovaries intact         FAMILY HISTORY  Family History   Problem Relation Age of Onset   • Hypertension Mother    • Diabetes Mother    • Hypertension Father    • Diabetes Sister    • Multiple sclerosis Sister    • Rheum arthritis Sister    • Lung cancer Sister         smoker   • Diabetes Brother    • Heart disease Brother    • Hypertension Brother    • Rheum arthritis Brother    • Sarcoidosis Brother    • Glaucoma Brother          SOCIAL HISTORY  Social History     Socioeconomic History   • Marital status: Single     Spouse name: Not on file   • Number of children: Not on file   • Years of education: Not on file   • Highest education level: Not on file   Tobacco Use   • Smoking status: Former Smoker     Packs/day: 1.00     Years: 5.00     Pack years: 5.00     Types: Cigarettes     Last attempt to quit: 1980     Years since quittin.6   • Smokeless tobacco: Never Used   Substance and Sexual Activity   • Alcohol use: Yes     Comment: occasionally   • Drug use: No         ALLERGIES  Codeine; Penicillins; and Sulfa antibiotics        REVIEW OF SYSTEMS  Review of Systems   Review of all 14 systems is negative other than stated in the HPI above.      PHYSICAL EXAM  ED Triage Vitals [20 1421]   Temp Heart Rate Resp BP SpO2   (!) 101.3 °F (38.5 °C) (!) 135 16 142/93 94 %      Temp src Heart Rate Source Patient Position BP Location FiO2 (%)   Tympanic Monitor -- -- --         GENERAL: Awake and alert, no acute distress  HENT: nares patent, oropharynx clear  EYES: The right eyelid is shot, the left cornea is partially opacified  CV: regular rhythm,  tachycardic  RESPIRATORY: normal effort, lungs clear to auscultation bilaterally  ABDOMEN: soft, nondistended, mild left upper quadrant tenderness without rebound or guarding  MUSCULOSKELETAL: no deformity  NEURO: alert, moves all extremities, follows commands  SKIN: warm, dry    Vital signs and nursing notes reviewed.          LAB RESULTS  Recent Results (from the past 24 hour(s))   Respiratory Panel PCR w/COVID-19(SARS-CoV-2) MICHAEL/DEVYN/CALLIE In-House, NP Swab in UTM/VTP, 8-12 Hr TAT - Swab, Nasopharynx    Collection Time: 08/04/20  3:08 PM   Result Value Ref Range    ADENOVIRUS, PCR Not Detected Not Detected    Coronavirus 229E Not Detected Not Detected    Coronavirus HKU1 Not Detected Not Detected    Coronavirus NL63 Not Detected Not Detected    Coronavirus OC43 Not Detected Not Detected    COVID19 Not Detected Not Detected - Ref. Range    Human Metapneumovirus Not Detected Not Detected    Human Rhinovirus/Enterovirus Not Detected Not Detected    Influenza A PCR Not Detected Not Detected    Influenza A H1 Not Detected Not Detected    Influenza A H1 2009 PCR Not Detected Not Detected    Influenza A H3 Not Detected Not Detected    Influenza B PCR Not Detected Not Detected    Parainfluenza Virus 1 Not Detected Not Detected    Parainfluenza Virus 2 Not Detected Not Detected    Parainfluenza Virus 3 Not Detected Not Detected    Parainfluenza Virus 4 Not Detected Not Detected    RSV, PCR Not Detected Not Detected    Bordetella pertussis pcr Not Detected Not Detected    Bordetella parapertussis PCR Not Detected Not Detected    Chlamydophila pneumoniae PCR Not Detected Not Detected    Mycoplasma pneumo by PCR Not Detected Not Detected   Comprehensive Metabolic Panel    Collection Time: 08/04/20  3:27 PM   Result Value Ref Range    Glucose 116 (H) 65 - 99 mg/dL    BUN 11 8 - 23 mg/dL    Creatinine 1.10 (H) 0.57 - 1.00 mg/dL    Sodium 138 136 - 145 mmol/L    Potassium 3.6 3.5 - 5.2 mmol/L    Chloride 103 98 - 107 mmol/L    CO2  25.7 22.0 - 29.0 mmol/L    Calcium 9.7 8.6 - 10.5 mg/dL    Total Protein 7.8 6.0 - 8.5 g/dL    Albumin 4.40 3.50 - 5.20 g/dL    ALT (SGPT) 198 (H) 1 - 33 U/L    AST (SGOT) 157 (H) 1 - 32 U/L    Alkaline Phosphatase 116 39 - 117 U/L    Total Bilirubin 0.6 0.0 - 1.2 mg/dL    eGFR  African Amer 59 (L) >60 mL/min/1.73    Globulin 3.4 gm/dL    A/G Ratio 1.3 g/dL    BUN/Creatinine Ratio 10.0 7.0 - 25.0    Anion Gap 9.3 5.0 - 15.0 mmol/L   Procalcitonin    Collection Time: 08/04/20  3:27 PM   Result Value Ref Range    Procalcitonin 0.20 0.00 - 0.25 ng/mL   CBC Auto Differential    Collection Time: 08/04/20  3:27 PM   Result Value Ref Range    WBC 9.82 3.40 - 10.80 10*3/mm3    RBC 5.07 3.77 - 5.28 10*6/mm3    Hemoglobin 13.8 12.0 - 15.9 g/dL    Hematocrit 43.6 34.0 - 46.6 %    MCV 86.0 79.0 - 97.0 fL    MCH 27.2 26.6 - 33.0 pg    MCHC 31.7 31.5 - 35.7 g/dL    RDW 13.2 12.3 - 15.4 %    RDW-SD 41.5 37.0 - 54.0 fl    MPV 9.2 6.0 - 12.0 fL    Platelets 288 140 - 450 10*3/mm3    Neutrophil % 77.3 (H) 42.7 - 76.0 %    Lymphocyte % 12.1 (L) 19.6 - 45.3 %    Monocyte % 6.6 5.0 - 12.0 %    Eosinophil % 3.3 0.3 - 6.2 %    Basophil % 0.4 0.0 - 1.5 %    Immature Grans % 0.3 0.0 - 0.5 %    Neutrophils, Absolute 7.59 (H) 1.70 - 7.00 10*3/mm3    Lymphocytes, Absolute 1.19 0.70 - 3.10 10*3/mm3    Monocytes, Absolute 0.65 0.10 - 0.90 10*3/mm3    Eosinophils, Absolute 0.32 0.00 - 0.40 10*3/mm3    Basophils, Absolute 0.04 0.00 - 0.20 10*3/mm3    Immature Grans, Absolute 0.03 0.00 - 0.05 10*3/mm3    nRBC 0.0 0.0 - 0.2 /100 WBC   Lactic Acid, Plasma    Collection Time: 08/04/20  3:34 PM   Result Value Ref Range    Lactate 1.1 0.5 - 2.0 mmol/L   Urinalysis With Culture If Indicated - Urine, Catheter    Collection Time: 08/04/20  3:59 PM   Result Value Ref Range    Color, UA Yellow Yellow, Straw    Appearance, UA Clear Clear    pH, UA 7.5 5.0 - 8.0    Specific Gravity, UA 1.014 1.005 - 1.030    Glucose, UA Negative Negative    Ketones, UA  Negative Negative    Bilirubin, UA Negative Negative    Blood, UA Negative Negative    Protein, UA 30 mg/dL (1+) (A) Negative    Leuk Esterase, UA Negative Negative    Nitrite, UA Negative Negative    Urobilinogen, UA 1.0 E.U./dL 0.2 - 1.0 E.U./dL   Urinalysis, Microscopic Only - Urine, Catheter    Collection Time: 08/04/20  3:59 PM   Result Value Ref Range    RBC, UA 3-5 (A) None Seen, 0-2 /HPF    WBC, UA 0-2 None Seen, 0-2 /HPF    Bacteria, UA None Seen None Seen /HPF    Squamous Epithelial Cells, UA 0-2 None Seen, 0-2 /HPF    Hyaline Casts, UA None Seen None Seen /LPF    Methodology Automated Microscopy        Ordered the above labs and reviewed the results.        RADIOLOGY  Ct Abdomen Pelvis With Contrast    Result Date: 8/4/2020  CT ABDOMEN AND PELVIS WITH IV CONTRAST  HISTORY: 75-year-old female with left upper quadrant pain and fever. Recent UTI.  TECHNIQUE: Radiation dose reduction techniques were utilized, including automated exposure control and exposure modulation based on body size. 3 mm images were obtained through the abdomen and pelvis after the administration of IV contrast. Compared with previous CT from 08/16/2019.  FINDINGS: The liver and gallbladder appear unremarkable and there is no biliary dilatation. Splenic size is normal. The pancreas, adrenals, and right kidney appear unremarkable other than a subcentimeter right renal cortical cyst. There are a few tiny left renal cysts as well as a 4.4 cm cyst which contains a calcification along the posterior wall, stable. No acute bowel abnormality is seen. Gastric folds may be slightly thickened. The uterus is surgically absent. There is a stable 1.6 cm left ovarian cyst, image 93. Right adnexa appears unremarkable. There are very mild abdominal aortic atherosclerotic changes without aneurysmal dilatation.      No definite acute abnormality is seen. Please follow-up as clinically indicated.  Discussed with Dr. Navarro.      Xr Chest 1 View    Result  Date: 8/4/2020  ONE VIEW PORTABLE CHEST  HISTORY: Fever. Possible Covid 19 infection.  FINDINGS: The lungs are well-expanded and clear and the heart and hilar structures are normal. There is no acute disease.  This report was finalized on 8/4/2020 4:43 PM by Dr. Nathanael Kyle M.D.        Ordered the above noted radiological studies. Reviewed by me in PACS.            PROCEDURES  Procedures            MEDICATIONS GIVEN IN ER  Medications   sodium chloride 0.9 % flush 10 mL (has no administration in time range)   acetaminophen (TYLENOL) tablet 1,000 mg (1,000 mg Oral Given 8/4/20 1502)   cefTRIAXone (ROCEPHIN) IVPB 1 g (0 g Intravenous Stopped 8/4/20 1630)   morphine injection 2 mg (2 mg Intravenous Given 8/4/20 1701)   ondansetron (ZOFRAN) injection 4 mg (4 mg Intravenous Given 8/4/20 1701)   iopamidol (ISOVUE-300) 61 % injection 100 mL (85 mL Intravenous Given by Other 8/4/20 1805)                   MEDICAL DECISION MAKING, PROGRESS, and CONSULTS    ED Course as of Aug 04 1907   Tue Aug 04, 2020   1552 Urine culture information from [place] has been requested.    [JR]   1628 COVID19: Not Detected [JR]   1628 ALT (SGPT)(!): 198 [JR]   1629 AST (SGOT)(!): 157 [JR]   1905 Discussed with Dr. Grewal [facility], who agrees to admit to inpatient telemetry bed.     [JR]      ED Course User Index  [JR] Oli Navarro MD       MDM      75-year-old female presents with sepsis criteria including fever and tachycardia.  This is in the setting of recent urinary tract infection treated with Macrodantin.  She was given empiric Rocephin due to suspected pyelonephritis on arrival.  Her urine however looks reassuring today and on CT abdomen there is no radiographic evidence of pyelonephritis.  Additionally her ALT and AST are elevated however there is no significant right upper quadrant tenderness on exam and no radiographic evidence of cholecystitis on CT abdomen.  It is unclear some of her symptoms and laboratory abnormalities  may be related to recent initiation of methotrexate.  I am still concerned for possible bacteremia.  She will be admitted for blood culture surveillance.  COVID testing is negative.  Isolation has been discontinued at this time after discussion with the admitting hospitalist.    I wore a surgical mask, gloves, and eye protection during this patient encounter.  Patient also wearing a surgical mask.  Hand hygeine performed before and after seeing the patient.    DIAGNOSIS  Final diagnoses:   Sepsis, due to unspecified organism, unspecified whether acute organ dysfunction present (CMS/[facility])   Abnormal transaminases         DISPOSITION  ADMIT            Latest Documented Vital Signs:  As of 19:07  BP- 127/81 HR- 84 Temp- (!) 101.3 °F (38.5 °C) (Tympanic) O2 sat- 96%        --    Please note that portions of this were completed with a voice recognition program.          Oli Navarro MD  08/04/20 1911

## 2020-08-04 NOTE — ED NOTES
Spoke with pt family and updated them on pt care. Family provided pt code.     Danyelle Harrison RN  08/04/20 8214

## 2020-08-04 NOTE — TELEPHONE ENCOUNTER
GONZALO CALLED IN AND STATED HER MOTHER BELIEVES SHE STILL HAS A UTI AND FEELS LIKE SHE HAS GOTTEN WORSE . PATIENT DID GO TO THE ER ON Wednesday FOR DIZZINESS AND FAINTING   120/80  WENT TO [facility] .  THE ONLY THING THAT CAME BACK WAS A UTI  AND SHE WAS GIVEN THE MEDICATION gabapentin (NEURONTIN) 100 MG capsule   PATIENT HAS 3 PILLS LEFT .     PATIENT HAS  THE FOLLOWING SYMPTOMS : COUGH , BODY ACHES , NAUSEA , BACK PAIN AND BUTTOCKS PAIN AND GETS HORSE AT NIGHT PATIENT BELIEVES ITS THE MEDICATION THAT IS GIVING HER THE SYMPTOMS AND NOT THE INJECTION OF METHOTREXATE FOR HER CLOCKOMA.       SENT TO [id number] - [facility] [address] AT [facility] - [phone] [facility] [phone]   [phone]      GONZALO STATED PATIENT WANTS TO KNOW WHAT SHE SHOULD DO .      PATIENT CALL BACK  [phone].  GONZALO NUMBER [phone]

## 2020-08-05 LAB
ALBUMIN SERPL-MCNC: 3.5 G/DL (ref 3.5–5.2)
ALBUMIN/GLOB SERPL: 1.2 G/DL
ALP SERPL-CCNC: 104 U/L (ref 39–117)
ALT SERPL W P-5'-P-CCNC: 184 U/L (ref 1–33)
ANION GAP SERPL CALCULATED.3IONS-SCNC: 8.2 MMOL/L (ref 5–15)
AST SERPL-CCNC: 152 U/L (ref 1–32)
BASOPHILS # BLD AUTO: 0.05 10*3/MM3 (ref 0–0.2)
BASOPHILS NFR BLD AUTO: 0.8 % (ref 0–1.5)
BILIRUB SERPL-MCNC: 0.4 MG/DL (ref 0–1.2)
BUN SERPL-MCNC: 15 MG/DL (ref 8–23)
BUN/CREAT SERPL: 14.7 (ref 7–25)
CALCIUM SPEC-SCNC: 8.8 MG/DL (ref 8.6–10.5)
CHLORIDE SERPL-SCNC: 105 MMOL/L (ref 98–107)
CO2 SERPL-SCNC: 23.8 MMOL/L (ref 22–29)
CREAT SERPL-MCNC: 1.02 MG/DL (ref 0.57–1)
DEPRECATED RDW RBC AUTO: 39.3 FL (ref 37–54)
EOSINOPHIL # BLD AUTO: 0.46 10*3/MM3 (ref 0–0.4)
EOSINOPHIL NFR BLD AUTO: 7.3 % (ref 0.3–6.2)
ERYTHROCYTE [DISTWIDTH] IN BLOOD BY AUTOMATED COUNT: 12.9 % (ref 12.3–15.4)
GFR SERPL CREATININE-BSD FRML MDRD: 64 ML/MIN/1.73
GLOBULIN UR ELPH-MCNC: 2.9 GM/DL
GLUCOSE SERPL-MCNC: 99 MG/DL (ref 65–99)
HAV IGM SERPL QL IA: NORMAL
HBV CORE IGM SERPL QL IA: NORMAL
HBV SURFACE AG SERPL QL IA: NORMAL
HCT VFR BLD AUTO: 37.1 % (ref 34–46.6)
HCV AB SER DONR QL: NORMAL
HGB BLD-MCNC: 12.2 G/DL (ref 12–15.9)
HOLD SPECIMEN: NORMAL
IMM GRANULOCYTES # BLD AUTO: 0.02 10*3/MM3 (ref 0–0.05)
IMM GRANULOCYTES NFR BLD AUTO: 0.3 % (ref 0–0.5)
LYMPHOCYTES # BLD AUTO: 1.39 10*3/MM3 (ref 0.7–3.1)
LYMPHOCYTES NFR BLD AUTO: 22.1 % (ref 19.6–45.3)
MCH RBC QN AUTO: 27.6 PG (ref 26.6–33)
MCHC RBC AUTO-ENTMCNC: 32.9 G/DL (ref 31.5–35.7)
MCV RBC AUTO: 83.9 FL (ref 79–97)
MONOCYTES # BLD AUTO: 0.63 10*3/MM3 (ref 0.1–0.9)
MONOCYTES NFR BLD AUTO: 10 % (ref 5–12)
NEUTROPHILS NFR BLD AUTO: 3.73 10*3/MM3 (ref 1.7–7)
NEUTROPHILS NFR BLD AUTO: 59.5 % (ref 42.7–76)
NRBC BLD AUTO-RTO: 0 /100 WBC (ref 0–0.2)
PLATELET # BLD AUTO: 258 10*3/MM3 (ref 140–450)
PMV BLD AUTO: 9.5 FL (ref 6–12)
POTASSIUM SERPL-SCNC: 3.6 MMOL/L (ref 3.5–5.2)
PROCALCITONIN SERPL-MCNC: 0.24 NG/ML (ref 0–0.25)
PROT SERPL-MCNC: 6.4 G/DL (ref 6–8.5)
RBC # BLD AUTO: 4.42 10*6/MM3 (ref 3.77–5.28)
SODIUM SERPL-SCNC: 137 MMOL/L (ref 136–145)
WBC # BLD AUTO: 6.28 10*3/MM3 (ref 3.4–10.8)

## 2020-08-05 PROCEDURE — 96361 HYDRATE IV INFUSION ADD-ON: CPT

## 2020-08-05 PROCEDURE — 99205 OFFICE O/P NEW HI 60 MIN: CPT | Performed by: INTERNAL MEDICINE

## 2020-08-05 PROCEDURE — G0378 HOSPITAL OBSERVATION PER HR: HCPCS

## 2020-08-05 PROCEDURE — 80053 COMPREHEN METABOLIC PANEL: CPT | Performed by: HOSPITALIST

## 2020-08-05 PROCEDURE — 80074 ACUTE HEPATITIS PANEL: CPT | Performed by: INTERNAL MEDICINE

## 2020-08-05 PROCEDURE — 85025 COMPLETE CBC W/AUTO DIFF WBC: CPT | Performed by: HOSPITALIST

## 2020-08-05 PROCEDURE — 84145 PROCALCITONIN (PCT): CPT | Performed by: HOSPITALIST

## 2020-08-05 RX ORDER — OFLOXACIN 3 MG/ML
1 SOLUTION/ DROPS OPHTHALMIC 2 TIMES DAILY
Status: DISCONTINUED | OUTPATIENT
Start: 2020-08-05 | End: 2020-08-06 | Stop reason: HOSPADM

## 2020-08-05 RX ORDER — DIFLUPREDNATE OPHTHALMIC 0.5 MG/ML
1 EMULSION OPHTHALMIC DAILY
COMMUNITY
End: 2021-09-28 | Stop reason: SDUPTHER

## 2020-08-05 RX ORDER — OFLOXACIN 3 MG/ML
1 SOLUTION/ DROPS OPHTHALMIC 4 TIMES DAILY
Status: DISCONTINUED | OUTPATIENT
Start: 2020-08-05 | End: 2020-08-06 | Stop reason: HOSPADM

## 2020-08-05 RX ORDER — DIFLUPREDNATE OPHTHALMIC 0.5 MG/ML
1 EMULSION OPHTHALMIC
Status: DISCONTINUED | OUTPATIENT
Start: 2020-08-05 | End: 2020-08-06 | Stop reason: HOSPADM

## 2020-08-05 RX ORDER — DIFLUPREDNATE OPHTHALMIC 0.5 MG/ML
1 EMULSION OPHTHALMIC DAILY
Status: DISCONTINUED | OUTPATIENT
Start: 2020-08-05 | End: 2020-08-06 | Stop reason: HOSPADM

## 2020-08-05 RX ADMIN — NETARSUDIL 1 DROP: 0.2 SOLUTION/ DROPS OPHTHALMIC; TOPICAL at 20:38

## 2020-08-05 RX ADMIN — GABAPENTIN 100 MG: 100 CAPSULE ORAL at 20:35

## 2020-08-05 RX ADMIN — OFLOXACIN 1 DROP: 3 SOLUTION/ DROPS OPHTHALMIC at 11:24

## 2020-08-05 RX ADMIN — DUREZOL 1 DROP: 0.5 EMULSION OPHTHALMIC at 16:20

## 2020-08-05 RX ADMIN — OFLOXACIN 1 DROP: 3 SOLUTION/ DROPS OPHTHALMIC at 19:05

## 2020-08-05 RX ADMIN — LOSARTAN POTASSIUM 50 MG: 50 TABLET, FILM COATED ORAL at 20:35

## 2020-08-05 RX ADMIN — DUREZOL 1 DROP: 0.5 EMULSION OPHTHALMIC at 20:35

## 2020-08-05 RX ADMIN — ACETAMINOPHEN 650 MG: 325 TABLET, FILM COATED ORAL at 03:41

## 2020-08-05 RX ADMIN — DUREZOL 1 DROP: 0.5 EMULSION OPHTHALMIC at 10:00

## 2020-08-05 RX ADMIN — DUREZOL 1 DROP: 0.5 EMULSION OPHTHALMIC at 19:05

## 2020-08-05 RX ADMIN — DUREZOL 1 DROP: 0.5 EMULSION OPHTHALMIC at 14:55

## 2020-08-05 RX ADMIN — OFLOXACIN 1 DROP: 3 SOLUTION/ DROPS OPHTHALMIC at 22:13

## 2020-08-05 RX ADMIN — OFLOXACIN 1 DROP: 3 SOLUTION/ DROPS OPHTHALMIC at 10:00

## 2020-08-05 RX ADMIN — SODIUM CHLORIDE 75 ML/HR: 9 INJECTION, SOLUTION INTRAVENOUS at 11:29

## 2020-08-05 RX ADMIN — AMLODIPINE BESYLATE 10 MG: 10 TABLET ORAL at 09:03

## 2020-08-05 RX ADMIN — DORZOLAMIDE HYDROCHLORIDE AND TIMOLOL MALEATE 1 DROP: 20; 5 SOLUTION/ DROPS OPHTHALMIC at 09:00

## 2020-08-05 RX ADMIN — SODIUM CHLORIDE, PRESERVATIVE FREE 10 ML: 5 INJECTION INTRAVENOUS at 20:36

## 2020-08-05 RX ADMIN — GABAPENTIN 100 MG: 100 CAPSULE ORAL at 09:03

## 2020-08-05 RX ADMIN — DUREZOL 1 DROP: 0.5 EMULSION OPHTHALMIC at 22:12

## 2020-08-05 RX ADMIN — DUREZOL 1 DROP: 0.5 EMULSION OPHTHALMIC at 11:24

## 2020-08-05 NOTE — H&P
Patient Care Team:  Provider, No Known as PCP - General    Chief complaint fever    Subjective     Very pleasant 74yo woman diagnosed with UTI 7d ago at [place] and treated with Macrodantin (still taking). 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 She woke this AM feeling very sick. She describes nausea (no vomiting), ARCHIE, muscle aches, back pain, and subjective fever. On arrival in ER her temp was 101.3 and she was tachycardic with HR of 134 (sinus tach on EKG). She was given IV Rocephin, a gram of APAP, 4mg of IV Zofran, and 2mg of IV morphine and is feeling much better now. She is afebrile now and HR is 84. She remains normotensive.       Review of Systems   Constitutional: Positive for appetite change, fatigue and fever. Negative for chills, diaphoresis and unexpected weight change.   HENT: Negative for congestion, ear pain, facial swelling, hearing loss, mouth sores, nosebleeds, rhinorrhea, sore throat, trouble swallowing and voice change.    Eyes: Positive for visual disturbance (chronic).   Respiratory: Negative for cough, choking, chest tightness, shortness of breath and stridor.    Cardiovascular: Negative for chest pain, palpitations and leg swelling.   Gastrointestinal: Positive for nausea. Negative for abdominal pain, blood in stool, diarrhea and vomiting.   Endocrine: Negative for cold intolerance and heat intolerance.   Genitourinary: Negative for decreased urine volume, difficulty urinating, dysuria and hematuria.   Musculoskeletal: Positive for back pain and myalgias. Negative for neck pain.   Skin: Negative for color change, pallor and rash.   Allergic/Immunologic: Positive for immunocompromised state. Negative for environmental allergies and food allergies.   Neurological: Negative for tremors, seizures, syncope, facial asymmetry, speech difficulty, light-headedness, numbness and headaches.   Hematological:  Negative for adenopathy. Does not bruise/bleed easily.   Psychiatric/Behavioral: Negative for behavioral problems, confusion and hallucinations.        Past Medical History:   Diagnosis Date   • Angioedema     due to ACEI 2002   • Atopic dermatitis 2016   • Cataract    • Diverticulosis    • Edema     due to Norvasc   • Esophageal reflux    • Glaucoma     right eye open angle Dr astorga s/p lazar Sx x3   • Hammer toe    • History of mammogram     3/11/14 Quick 4/10/15   • Hx of bone scan     DEXA normal 2010, 2015   • Hypercholesterolemia    • Impaired fasting blood sugar 2019   • Medial epicondylitis     L    • Palpitations 2019   • Papanicolaou smear     reported pos. pap smear and previous pos 2 or more years ago  s/p hysterectomy   • Trochanteric bursitis      Past Surgical History:   Procedure Laterality Date   • APPENDECTOMY     • CERVICAL FUSION      Dr.John Sexton   • COLONOSCOPY      2007 Dr Bradshaw Normal   • CORNEAL TRANSPLANT     • FETAL BLOOD TRANSFUSION     • FOOT SURGERY  2013    right bunion and hammer toe Dr Phelan   • SUBTOTAL HYSTERECTOMY      ovaries intact     Family History   Problem Relation Age of Onset   • Hypertension Mother    • Diabetes Mother    • Hypertension Father    • Diabetes Sister    • Multiple sclerosis Sister    • Rheum arthritis Sister    • Lung cancer Sister         smoker   • Diabetes Brother    • Heart disease Brother    • Hypertension Brother    • Rheum arthritis Brother    • Sarcoidosis Brother    • Glaucoma Brother      Social History     Tobacco Use   • Smoking status: Former Smoker     Packs/day: 1.00     Years: 5.00     Pack years: 5.00     Types: Cigarettes     Last attempt to quit: 1980     Years since quittin.6   • Smokeless tobacco: Never Used   Substance Use Topics   • Alcohol use: Yes     Comment: occasionally   • Drug use: No       (Not in a hospital admission)  Allergies:  Codeine; Penicillins; and Sulfa  antibiotics    Objective      Vital Signs  Temp:  [98.4 °F (36.9 °C)-101.3 °F (38.5 °C)] 98.4 °F (36.9 °C)  Heart Rate:  [] 78  Resp:  [16-17] 17  BP: (108-142)/(65-93) 108/75    Physical Exam   Constitutional: She is oriented to person, place, and time. She appears well-developed and well-nourished. No distress.   HENT:   Head: Normocephalic and atraumatic.   Mouth/Throat: Oropharynx is clear and moist. No oropharyngeal exudate.   Eyes:   Left pupil is round and reactive to light  Right pupil is obscured by scarred cornea   Neck: Normal range of motion. Neck supple. No JVD present. No thyromegaly present.   Cardiovascular: Normal rate, regular rhythm, normal heart sounds and intact distal pulses.   Pulmonary/Chest: Effort normal and breath sounds normal. No respiratory distress.   Abdominal: Soft. Bowel sounds are normal. She exhibits no distension and no mass. There is no tenderness. No hernia.   Musculoskeletal: Normal range of motion. She exhibits no edema or deformity.   Lymphadenopathy:     She has no cervical adenopathy.   Neurological: She is alert and oriented to person, place, and time. No cranial nerve deficit or sensory deficit.   Skin: Skin is warm and dry. Capillary refill takes 2 to 3 seconds. No rash noted. She is not diaphoretic. No erythema.   Psychiatric: She has a normal mood and affect. Her behavior is normal. Thought content normal.   Nursing note and vitals reviewed.      Results Review:   I reviewed the patient's new clinical results.  I reviewed the patient's new imaging results and agree with the interpretation.  I reviewed the patient's other test results and agree with the interpretation  I personally viewed and interpreted the patient's EKG/Telemetry data  Discussed with pt and Dr. Navarro  Results from last 7 days   Lab Units 08/04/20  1527   WBC 10*3/mm3 9.82   HEMOGLOBIN g/dL 13.8   PLATELETS 10*3/mm3 288     Results from last 7 days   Lab Units 08/04/20  1527   SODIUM mmol/L  138   POTASSIUM mmol/L 3.6   CHLORIDE mmol/L 103   CO2 mmol/L 25.7   BUN mg/dL 11   CREATININE mg/dL 1.10*   CALCIUM mg/dL 9.7   Estimated Creatinine Clearance: 44.3 mL/min (A) (by C-G formula based on SCr of 1.1 mg/dL (H)).      Assessment/Plan       Fever of unknown origin (FUO)    Benign essential hypertension    Glaucoma    Sepsis (CMS/HCC)    Elevated LFTs    Nausea    Back pain    Sinus tachycardia          Plan:   (Given Rocephin in ER, will continue for now  Blood cultures sent  No evidence of UTI here  ID consult  Cr up just a bit, will give gentle IVFs overnight  Trend temperatures  BPs reassuring, HR much improved now  Watch LFTs, no abnormality on imaging, wonder if due to MTX, Macrodantin, or combination of both?  ?could MTX be causing fever  Continue home meds for BP and ophthalmologic issues    Full code  SCDs for DVT ppx  Further orders to follow as suggested by evolving hospital course    ).       I discussed the patients findings and my recommendations with patient and primary care team    Cali Grewal MD  08/04/20  20:01    Time: 45min

## 2020-08-05 NOTE — PLAN OF CARE
Problem: Patient Care Overview  Goal: Plan of Care Review  Outcome: Ongoing (interventions implemented as appropriate)  Flowsheets (Taken 8/5/2020 2818)  Progress: no change  Plan of Care Reviewed With: patient  Outcome Summary: VSS. prn tylenol for abdomen/neck pain. Med rec complete, eye drops need review by MD. Home meds locked on unit, home eye drops sent to pharmacy. Will CTM.

## 2020-08-05 NOTE — PROGRESS NOTES
Clinical Pharmacy Services: Medication History    Marilia Leo is a 75 y.o. female presenting to [facility] for   Chief Complaint   Patient presents with   • Pain   • Fever       She  has a past medical history of Angioedema, Atopic dermatitis (4/18/2016), Cataract, Diverticulosis, Edema, Esophageal reflux, Glaucoma, Hammer toe, History of mammogram, bone scan, Hypercholesterolemia, Impaired fasting blood sugar (5/13/2019), Medial epicondylitis, Palpitations (5/13/2019), Papanicolaou smear, and Trochanteric bursitis.    Allergies as of 08/04/2020 - Reviewed 08/04/2020   Allergen Reaction Noted   • Codeine  04/18/2016   • Penicillins  04/18/2016   • Sulfa antibiotics  04/18/2016       Medication information was obtained from: patient  Pharmacy and Phone Number:     Prior to Admission Medications     Prescriptions Last Dose Informant Patient Reported? Taking?    acetaminophen (TYLENOL) 325 MG tablet  Self Yes Yes    Take 325 mg by mouth As Needed.    amLODIPine (NORVASC) 10 MG tablet  Self Yes Yes    Take 10 mg by mouth Daily.    dorzolamide-timolol (COSOPT) 22.3-6.8 MG/ML ophthalmic solution  Self Yes Yes    Administer 1 drop to both eyes 2 (Two) Times a Day.    DUREZOL 0.05 % ophthalmic emulsion  Self Yes Yes    Administer 1 drop to the right eye Every 2 (Two) Hours As Needed.    gabapentin (NEURONTIN) 100 MG capsule  Self Yes Yes    Take 100 mg by mouth Daily.    gabapentin (NEURONTIN) 100 MG capsule  Self Yes Yes    Take 200 mg by mouth Every Night.    irbesartan (AVAPRO) 150 MG tablet  Self Yes Yes    Take 150 mg by mouth Every Night.    Netarsudil Dimesylate (Rhopressa) 0.02 % solution  Self Yes Yes    Administer 1 drop into the left eye Every Night.    ofloxacin (OCUFLOX) 0.3 % ophthalmic solution  Self Yes Yes    Administer 1 drop into the left eye 2 (Two) Times a Day.    ofloxacin (OCUFLOX) 0.3 % ophthalmic solution  Self Yes Yes    Administer 1 drop to the right eye 4 (Four) Times a Day.     natamycin (Natacyn) 5 % ophthalmic solution  Self Yes No    Administer 1 drop to both eyes Every 14 (Fourteen) Days. Patient does not need until 8/18/20            Medication notes:     This medication list is complete to the best of my knowledge as of 8/4/2020    Please call if questions.    Pearl Maciel [facility]  Medication History Technician  [phone]    8/4/2020 20:29

## 2020-08-05 NOTE — PROGRESS NOTES
[facility]     LOS: 1 day     Name: Marilia Leo  Age: 75 y.o.  Sex: female  :  1945  MRN: [id number]         Primary Care Physician: Grover Garcias MD    Diet Regular    Subjective     Patient is seen.    Objective   Temp:  [98.2 °F (36.8 °C)-98.6 °F (37 °C)] 98.6 °F (37 °C)  Heart Rate:  [74-78] 76  Resp:  [16-17] 16  BP: (103-128)/(54-79) 114/69  SpO2:  [98 %-99 %] 98 %  on   ;   Device (Oxygen Therapy): room air  Body mass index is 23.86 kg/m².    Physical Exam   General: patient is awake and alert.  Head and ENT: normocephalic and atraumatic. Right pupil obscured  Lungs: symmetric expansion and equal air entry bilaterally.  Heart: regular rate, rhythm, no murmurs.  Abdomen: soft, nontender, bowel sounds present.  Extremities:  No clubbing, cyanosis or edema.  Neurologic:  No focal neurological deficits present.  Cranial nerves intact.  Psychiatry:  Normal mood and affect.  Skin:  Warm and no rash.    Reviewed medications and new clinical results        Results from last 7 days   Lab Units 20  0510 20  1527   WBC 10*3/mm3 6.28 9.82   HEMOGLOBIN g/dL 12.2 13.8   PLATELETS 10*3/mm3 258 288     Results from last 7 days   Lab Units 20  0510 20  1527   SODIUM mmol/L 137 138   POTASSIUM mmol/L 3.6 3.6   CHLORIDE mmol/L 105 103   CO2 mmol/L 23.8 25.7   BUN mg/dL 15 11   CREATININE mg/dL 1.02* 1.10*   CALCIUM mg/dL 8.8 9.7   GLUCOSE mg/dL 99 116*     Lab Results   Component Value Date    ANIONGAP 8.2 2020     No results found for: POCGLU  No results found for: HGBA1C  Estimated Creatinine Clearance: 47.5 mL/min (A) (by C-G formula based on SCr of 1.02 mg/dL (H)).    Lab Results   Component Value Date    COVID19 Not Detected 2020     Assessment/Plan   Active Hospital Problems    Diagnosis  POA   • **Fever of unknown origin (FUO) [R50.9]  Yes   • Sepsis (CMS/HCC) [A41.9]  Yes   • Elevated LFTs [R79.89]  Yes   • Nausea  [R11.0]  Yes   • Back pain [M54.9]  Yes   • Sinus tachycardia [R00.0]  Yes   • Benign essential hypertension [I10]  Yes   • Glaucoma [H40.9]  Yes      Resolved Hospital Problems   No resolved problems to display.     75 y.o. female      Assessment and plan  1. Fever of unknown origin, infectious Disease on board.  Etiology is considered to be most likely hepatitis.  2. Drug-induced hepatitis, likely due to nitrofurantoin.  Infectious Disease on board, follow recommendations.  3.  Hypertension, continue home antihypertensive medications, titrate medications further based on BP response.  4.  Neuropathy: Continue Neurontin.  5.  Further plans based on hospital course and follow-up with repeat labs.    Cliff Griffin MD   08/05/20  19:17

## 2020-08-05 NOTE — PROGRESS NOTES
Discharge Planning Assessment  [facility]     Patient Name: Marilia Leo  MRN: [id number]  Today's Date: 8/5/2020    Admit Date: 8/4/2020    Discharge Needs Assessment     Row Name 08/05/20 9981       Living Environment    Lives With  significant other    Current Living Arrangements  home/apartment/condo    Primary Care Provided by  self    Provides Primary Care For  no one, unable/limited ability to care for self    Family Caregiver if Needed  child(paul), adult;significant other    Quality of Family Relationships  unable to assess    Able to Return to Prior Arrangements  yes       Resource/Environmental Concerns    Resource/Environmental Concerns  none       Transition Planning    Patient/Family Anticipates Transition to  home with family    Patient/Family Anticipated Services at Transition  none    Transportation Anticipated  family or friend will provide       Discharge Needs Assessment    Readmission Within the Last 30 Days  no previous admission in last 30 days    Concerns to be Addressed  care coordination/care conferences;decision making;discharge planning    Equipment Currently Used at Home  cane, straight    Anticipated Changes Related to Illness  none    Equipment Needed After Discharge  none    Provided Post Acute Provider List?  N/A    Current Discharge Risk  physical impairment        Discharge Plan     Row Name 08/05/20 5487       Plan    Plan  Home with significant other. Denies any needs. Family to transport.     Patient/Family in Agreement with Plan  yes    Plan Comments  Met with pt. at bedside. Explained roll of . Face sheet and pharmacy verified. Pt lives with her significant other. There are no steps to enter home.  DME equipment includes a cane.  Pt is independent with ADLs. Pt has never been to Rehab or used HH in the past. Pt's PCP was Dr Romero but she has left her practice. Pt states she has an appointment with the Dr Garcias, which took Dr Romero's place, on 8/28/20 at 1000.  Uses [facility] on [address] and [facility] for her long-term medication.  At discharge, family will transport. Pt denies any discharge needs and none identified at this time.  Explained that CCP would follow to assess for discharge needs.  Javier Pollack RN-BC               Demographic Summary     Row Name 08/05/20 9907       General Information    Admission Type  observation    Arrived From  emergency department    Required Notices Provided  Observation Status Notice    Reason for Consult  discharge planning    Preferred Language  English     Used During This Interaction  no        Functional Status     Row Name 08/05/20 2982       Functional Status    Usual Activity Tolerance  good    Current Activity Tolerance  good       Functional Status, IADL    Medications  assistive equipment and person    Meal Preparation  assistive equipment and person    Housekeeping  assistive equipment and person    Laundry  assistive equipment and person    Shopping  assistive equipment and person       Mental Status    General Appearance WDL  WDL       Mental Status Summary    Recent Changes in Mental Status/Cognitive Functioning  no changes        Psychosocial    No documentation.       Abuse/Neglect    No documentation.       Legal    No documentation.       Substance Abuse    No documentation.       Patient Forms    No documentation.           Javier Pollack RN

## 2020-08-05 NOTE — CONSULTS
Referring Provider: Cali Grewal MD      Subjective   History of present illness:    This very nice 75-year-old we are asked for evaluation opinion regarding fever of unknown origin    She reports that last week she an injection of methotrexate into her right eye to reduce inflammation from prior corneal surgeries.  She said after this procedure she had a syncopal episode was evaluated at a local but outlying ER.  She was evaluated with CAT scans and cardiac testing and ultimately diagnosed with a UTI.  She says neither then nor now does she have any urinary symptoms whatsoever.  She was started on nitrofurantoin and discharge.  She did well up until yesterday when she awoke and had diffuse myalgias.  These were very intense and associated with some mild chills but no jerzy fevers or night sweats.  She denies any other new symptoms except some mild abdominal discomfort.  She never heard back from her primary care doctor on what to do based on the symptoms so presented to the [facility] emergency department yesterday and was admitted.  She was given a dose of Rocephin and urinalysis was negative.  Her liver function tests were acutely elevated with an ALT of 198 and .  Alkaline phosphatase was mildly elevated 116 but total bilirubin was normal.  She denies any exposure to ticks or animals other than her daughter's pet dog.  She denies alcohol consumption outside of 1-2 alcoholic beverages every 2 weeks    Reviewed her prior liver function tests and she had an ALT/AST of 14/15 in January 2020 and 10/15 in August 2019    Past Medical History:   Diagnosis Date   • Angioedema     due to ACEI 8/2002   • Atopic dermatitis 4/18/2016   • Cataract    • Diverticulosis    • Edema     due to Norvasc   • Esophageal reflux    • Glaucoma     right eye open angle Dr astorga s/p corrie Sx x3   • Hammer toe    • History of mammogram     3/11/14 Quick 4/10/15   • Hx of bone scan     DEXA normal 2/2010, 09/2015    • Hypercholesterolemia    • Impaired fasting blood sugar 5/13/2019   • Medial epicondylitis     L 2005   • Palpitations 5/13/2019   • Papanicolaou smear     reported pos. pap smear and previous pos 2 or more years ago  s/p hysterectomy   • Trochanteric bursitis        Past Surgical History:   Procedure Laterality Date   • APPENDECTOMY     • CERVICAL FUSION      Dr.John Sexton   • COLONOSCOPY      4/2007 Dr Bradshaw Normal   • CORNEAL TRANSPLANT     • FETAL BLOOD TRANSFUSION  1960   • FOOT SURGERY  02/2013    right bunion and hammer toe Dr Phelan   • SUBTOTAL HYSTERECTOMY      ovaries intact       Family history infectious diseases  Social history: She lives here in [place], [place] with her significant other.  Denies tobacco use.  She is retired from "[employer].    Allergies   Allergen Reactions   • Codeine    • Penicillins    • Sulfa Antibiotics        Review of Systems  Pertinent items are noted in HPI, all other systems reviewed and negative    Objective     Physical Exam:   Vital Signs   Temp:  [98.2 °F (36.8 °C)-101.3 °F (38.5 °C)] 98.6 °F (37 °C)  Heart Rate:  [] 76  Resp:  [16-17] 16  BP: (103-142)/(54-93) 128/77    GENERAL: Awake and alert, in no acute distress.   HEENT: Oropharynx is clear. Hearing is grossly normal.   EYES: PERRL. No conjunctival injection. No lid lag.   LYMPHATICS: No lymphadenopathy of the neck or inguinal regions.   HEART: Regular rate and regular rhythm. No peripheral edema.   LUNGS: Clear to auscultation anteriorly with normal respiratory effort.   GI: Soft, nontender, nondistended. No appreciable organomegaly.   SKIN: Warm and dry without cutaneous eruptions   PSYCHIATRIC: Appropriate mood, affect, insight, and judgment.     Results Review:  White count 6.28  Procalcitonin 0.24  Platelets 258  Creatinine 1.02             Microbiology:  Blood cultures no growth to date  Respiratory pathogen panel negative    Radiology:  CT abdomen pelvis shows no  definitive acute abnormality  Chest x-ray and apparently interpreted: No acute disease    Assessment/Plan   1. Fever likely to number 2  2. Hepatitis, likely drug induced    Suspect drug toxicity related to the nitrofurantoin.  She does not have UTI and never had urinary symptoms so I do not imagine that this drug was ever indicated.  We are going to hold now and I would also hold the Rocephin.  I will check an acute hepatitis panel just to make sure she does not have something like acute hepatitis a but I think this is really unlikely.  I would simply recommend holding the medications and repeating test as an outpatient.  She stays afebrile then I think she can be discharged whenever okay with others.  Regarding the methotrexate, I do not think ophthalmologic administration will achieve sufficient serum levels to cause liver toxicity although I would have to defer to her treating ophthalmologist whether or not she should continue on this medication.      Thank you for this consult.  We will continue to follow along and tailor antibiotics as the patient's clinical course evolves.      Jasno Sifuentes MD  08/05/20  10:22 AM

## 2020-08-05 NOTE — PROGRESS NOTES
Continued Stay Note  [facility]     Patient Name: Marilia Leo  MRN: [id number]  Today's Date: 8/5/2020    Admit Date: 8/4/2020    Discharge Plan     Row Name 08/05/20 1337       Plan    Plan  Home with significant other. Denies any needs. Family to transport.     Patient/Family in Agreement with Plan  yes    Plan Comments  Met with pt. at bedside. Explained roll of . Face sheet and pharmacy verified. Pt lives with her significant other. There are no steps to enter home.  DME equipment includes a cane.  Pt is independent with ADLs. Pt has never been to Rehab or used HH in the past. Pt's PCP was Dr Romero but she has left her practice. Pt states she has an appointment with the Dr Garcias, which took Dr Romero's place, on 8/28/20 at 1000. Uses [facility] on [address] and [facility] for her long-term medication.  At discharge, family will transport. Pt denies any discharge needs and none identified at this time.  Explained that CCP would follow to assess for discharge needs.  Javier Pollack RN-BC        Discharge Codes    No documentation.             Javier Pollack, RN

## 2020-08-05 NOTE — PLAN OF CARE
Problem: Patient Care Overview  Goal: Plan of Care Review  Outcome: Ongoing (interventions implemented as appropriate)  Flowsheets (Taken 8/5/2020 7850)  Progress: improving  Plan of Care Reviewed With: patient  Outcome Summary: Abd pain improved, home meds corrected, IVF, doing well.

## 2020-08-05 NOTE — ED NOTES
Daughter stated that she received methotrexate 100 mcg - the ED MD has requested this dose     Ehsan Levy, RN  08/04/20 2100       Ehsan Levy, RN  08/04/20 2100

## 2020-08-06 ENCOUNTER — READMISSION MANAGEMENT (OUTPATIENT)
Dept: CALL CENTER | Facility: HOSPITAL | Age: 75
End: 2020-08-06

## 2020-08-06 VITALS
TEMPERATURE: 98.7 F | OXYGEN SATURATION: 100 % | SYSTOLIC BLOOD PRESSURE: 138 MMHG | DIASTOLIC BLOOD PRESSURE: 76 MMHG | WEIGHT: 139 LBS | HEIGHT: 64 IN | BODY MASS INDEX: 23.73 KG/M2 | HEART RATE: 86 BPM | RESPIRATION RATE: 18 BRPM

## 2020-08-06 LAB
ALBUMIN SERPL-MCNC: 3.5 G/DL (ref 3.5–5.2)
ALP SERPL-CCNC: 110 U/L (ref 39–117)
ALT SERPL W P-5'-P-CCNC: 196 U/L (ref 1–33)
ANION GAP SERPL CALCULATED.3IONS-SCNC: 9.7 MMOL/L (ref 5–15)
AST SERPL-CCNC: 117 U/L (ref 1–32)
BASOPHILS # BLD AUTO: 0.08 10*3/MM3 (ref 0–0.2)
BASOPHILS NFR BLD AUTO: 0.9 % (ref 0–1.5)
BILIRUB CONJ SERPL-MCNC: <0.2 MG/DL (ref 0–0.3)
BILIRUB INDIRECT SERPL-MCNC: ABNORMAL MG/DL
BILIRUB SERPL-MCNC: 0.2 MG/DL (ref 0–1.2)
BUN SERPL-MCNC: 11 MG/DL (ref 8–23)
BUN/CREAT SERPL: 13.6 (ref 7–25)
CALCIUM SPEC-SCNC: 8.7 MG/DL (ref 8.6–10.5)
CHLORIDE SERPL-SCNC: 106 MMOL/L (ref 98–107)
CO2 SERPL-SCNC: 23.3 MMOL/L (ref 22–29)
CREAT SERPL-MCNC: 0.81 MG/DL (ref 0.57–1)
DEPRECATED RDW RBC AUTO: 39.8 FL (ref 37–54)
EOSINOPHIL # BLD AUTO: 0.87 10*3/MM3 (ref 0–0.4)
EOSINOPHIL NFR BLD AUTO: 10.2 % (ref 0.3–6.2)
ERYTHROCYTE [DISTWIDTH] IN BLOOD BY AUTOMATED COUNT: 13 % (ref 12.3–15.4)
GFR SERPL CREATININE-BSD FRML MDRD: 84 ML/MIN/1.73
GLUCOSE SERPL-MCNC: 103 MG/DL (ref 65–99)
HCT VFR BLD AUTO: 34.4 % (ref 34–46.6)
HGB BLD-MCNC: 11.7 G/DL (ref 12–15.9)
IMM GRANULOCYTES # BLD AUTO: 0.02 10*3/MM3 (ref 0–0.05)
IMM GRANULOCYTES NFR BLD AUTO: 0.2 % (ref 0–0.5)
LYMPHOCYTES # BLD AUTO: 2.55 10*3/MM3 (ref 0.7–3.1)
LYMPHOCYTES NFR BLD AUTO: 29.8 % (ref 19.6–45.3)
MCH RBC QN AUTO: 28.4 PG (ref 26.6–33)
MCHC RBC AUTO-ENTMCNC: 34 G/DL (ref 31.5–35.7)
MCV RBC AUTO: 83.5 FL (ref 79–97)
MONOCYTES # BLD AUTO: 0.99 10*3/MM3 (ref 0.1–0.9)
MONOCYTES NFR BLD AUTO: 11.6 % (ref 5–12)
NEUTROPHILS NFR BLD AUTO: 4.05 10*3/MM3 (ref 1.7–7)
NEUTROPHILS NFR BLD AUTO: 47.3 % (ref 42.7–76)
NRBC BLD AUTO-RTO: 0 /100 WBC (ref 0–0.2)
PLATELET # BLD AUTO: 269 10*3/MM3 (ref 140–450)
PMV BLD AUTO: 9.6 FL (ref 6–12)
POTASSIUM SERPL-SCNC: 3.7 MMOL/L (ref 3.5–5.2)
PROT SERPL-MCNC: 6.2 G/DL (ref 6–8.5)
RBC # BLD AUTO: 4.12 10*6/MM3 (ref 3.77–5.28)
SODIUM SERPL-SCNC: 139 MMOL/L (ref 136–145)
WBC # BLD AUTO: 8.56 10*3/MM3 (ref 3.4–10.8)

## 2020-08-06 PROCEDURE — 99214 OFFICE O/P EST MOD 30 MIN: CPT | Performed by: INTERNAL MEDICINE

## 2020-08-06 PROCEDURE — 80048 BASIC METABOLIC PNL TOTAL CA: CPT | Performed by: INTERNAL MEDICINE

## 2020-08-06 PROCEDURE — 80076 HEPATIC FUNCTION PANEL: CPT | Performed by: INTERNAL MEDICINE

## 2020-08-06 PROCEDURE — G0378 HOSPITAL OBSERVATION PER HR: HCPCS

## 2020-08-06 PROCEDURE — 85025 COMPLETE CBC W/AUTO DIFF WBC: CPT | Performed by: INTERNAL MEDICINE

## 2020-08-06 RX ADMIN — AMLODIPINE BESYLATE 10 MG: 10 TABLET ORAL at 11:09

## 2020-08-06 RX ADMIN — DORZOLAMIDE HYDROCHLORIDE AND TIMOLOL MALEATE 1 DROP: 20; 5 SOLUTION/ DROPS OPHTHALMIC at 13:05

## 2020-08-06 RX ADMIN — OFLOXACIN 1 DROP: 3 SOLUTION/ DROPS OPHTHALMIC at 11:10

## 2020-08-06 RX ADMIN — DUREZOL 1 DROP: 0.5 EMULSION OPHTHALMIC at 11:10

## 2020-08-06 RX ADMIN — GABAPENTIN 100 MG: 100 CAPSULE ORAL at 11:09

## 2020-08-06 RX ADMIN — ACETAMINOPHEN 650 MG: 325 TABLET, FILM COATED ORAL at 06:30

## 2020-08-06 RX ADMIN — DUREZOL 1 DROP: 0.5 EMULSION OPHTHALMIC at 11:15

## 2020-08-06 RX ADMIN — SODIUM CHLORIDE, PRESERVATIVE FREE 10 ML: 5 INJECTION INTRAVENOUS at 11:20

## 2020-08-06 RX ADMIN — DUREZOL 1 DROP: 0.5 EMULSION OPHTHALMIC at 06:25

## 2020-08-06 NOTE — PROGRESS NOTES
ID note for FUO  Subjective: She denies any significant pain.  She denies any fevers or chills or night sweats  General: Afebrile, no acute distress, abdomen soft nontender nondistended    Acute hepatitis panel was negative  Blood cultures no growth to date  Creatinine 0.8  White count 8.6  Platelets 269  Differential in the white count is 47% neutrophils, 29% lymphocytes, 12% monocytes, 10% eosinophils    Assessment and plan  1. Fever likely to number 2  2. Hepatitis, likely drug induced  3.  Eosinophilia, likely related to drug-induced hepatitis    She is stable off the antibiotics.  I am going to go ahead and repeat her LFTs today.  Suspect drug-induced hepatitis from the nitrofurantoin.    Home when okay with others.

## 2020-08-06 NOTE — PLAN OF CARE
Problem: Patient Care Overview  Goal: Plan of Care Review  Outcome: Ongoing (interventions implemented as appropriate)  Flowsheets (Taken 8/6/2020 2309)  Progress: improving  Plan of Care Reviewed With: patient  Outcome Summary: VSS. No c/o pain. Possible d/c today. Continue to monitor.

## 2020-08-06 NOTE — DISCHARGE SUMMARY
[facility]    Date of Discharge:  8/6/2020    PCP: Grover Garcias MD    Discharge Diagnosis:   Active Hospital Problems    Diagnosis  POA   • **Fever of unknown origin (FUO) [R50.9]  Yes   • Sepsis (CMS/HCC) [A41.9]  Yes   • Elevated LFTs [R79.89]  Yes   • Nausea [R11.0]  Yes   • Back pain [M54.9]  Yes   • Sinus tachycardia [R00.0]  Yes   • Benign essential hypertension [I10]  Yes   • Glaucoma [H40.9]  Yes      Resolved Hospital Problems   No resolved problems to display.     Procedures Performed       Consults     Date and Time Order Name Status Description    8/4/2020 2146 Inpatient Infectious Diseases Consult Completed     8/4/2020 1812 [facility] (on-call MD unless specified) Details Completed         Hospital Course    75-year-old male admitted to the hospital with fever of unknown origin, infectious Disease evaluated the patient.  Patient's fever etiology was thought most likely due to hepatitis.  Patient had drug-induced hepatitis likely due to nitrofurantoin.  Infectious Disease recommended discontinue antibiotics.  Patient also has neuropathy, for which she would continue her Neurontin on outpatient basis.  Her blood pressure also remained stable, patient is deemed stable to be discharged, she does have mild transaminitis, liver enzymes have trended down.  Patient is deemed stable and safe to be discharged home.  Total time spent is more than 30 minutes.  Plan of care was discussed with the patient and she verbalized understanding.    Condition on Discharge: Improved.     Temp:  [98.2 °F (36.8 °C)-98.7 °F (37.1 °C)] 98.7 °F (37.1 °C)  Heart Rate:  [79-86] 86  Resp:  [16-18] 18  BP: (114-138)/(69-77) 138/76  Body mass index is 23.86 kg/m².    Physical Exam    General: patient is awake and alert.  Head and ENT: normocephalic and atraumatic.  Lungs: symmetric expansion and equal air entry bilaterally.  Heart: regular rate, rhythm, no murmurs.  Abdomen: soft,  nontender, bowel sounds present.  Extremities:  No clubbing, cyanosis or edema.  Neurologic:  No focal neurological deficits present.  Cranial nerves intact.  Psychiatry:  Normal mood and affect.  Skin:  Warm and no rash.       Discharge Medications      Continue These Medications      Instructions Start Date   acetaminophen 325 MG tablet  Commonly known as:  TYLENOL   325 mg, Oral, As Needed      amLODIPine 10 MG tablet  Commonly known as:  NORVASC   10 mg, Oral, Daily      dorzolamide-timolol 22.3-6.8 MG/ML ophthalmic solution  Commonly known as:  COSOPT   1 drop, Both Eyes, 2 Times Daily, Patient uses PF Cosopt and wishes to use own supply of PF      Durezol 0.05 % ophthalmic emulsion  Generic drug:  difluprednate   1 drop, Left Eye, Daily, Daily in Left eye and q2h while awake in right eye       Durezol 0.05 % ophthalmic emulsion  Generic drug:  difluprednate   1 drop, Right Eye, Every 2 Hours While Awake, Q2H while awake in Right eye and daily in Left eye      gabapentin 100 MG capsule  Commonly known as:  NEURONTIN   100 mg, Oral, Daily      gabapentin 100 MG capsule  Commonly known as:  NEURONTIN   200 mg, Oral, Nightly      irbesartan 150 MG tablet  Commonly known as:  AVAPRO   150 mg, Oral, Nightly      METHOTREXATE SODIUM (PF) IJ   Injection, Every 14 Days, Administered at [facility] every 14 days with first injection on 7/29       Natacyn 5 % ophthalmic solution  Generic drug:  natamycin   1 drop, Right Eye, 2 times daily, Cycles on for 14 days and off for 14 days Patient does not need until 8/18/20, this comes from compound pharmacy      ofloxacin 0.3 % ophthalmic solution  Commonly known as:  OCUFLOX   1 drop, Left Eye, 2 Times Daily      ofloxacin 0.3 % ophthalmic solution  Commonly known as:  OCUFLOX   1 drop, Right Eye, 4 Times Daily      REFRESH OPTIVE ALBERTO-3 OP   1 drop, Ophthalmic, 2 Times Daily PRN      Rhopressa 0.02 % solution  Generic drug:  Netarsudil Dimesylate   1 drop,  Left Eye, Nightly              Additional Instructions for the Follow-ups that You Need to Schedule     Discharge Follow-up with PCP   As directed       Currently Documented PCP:    Grover Garcias MD    PCP Phone Number:    [phone]     Follow Up Details:  Follow-up with PCP in 7 days.           Follow-up Information     Grover Garcias MD .    Specialties:  Family Medicine, Emergency Medicine  Why:  Follow-up with PCP in 7 days.  Contact information:  [facility] [place] [address]  [phone]                 Test Results Pending at Discharge   Order Current Status    Blood Culture - Blood, Arm, Left Preliminary result    Blood Culture - Blood, Arm, Right Preliminary result         Cliff Griffin MD  08/06/20  13:32    Discharge time spent greater than 30 minutes.

## 2020-08-07 ENCOUNTER — TRANSITIONAL CARE MANAGEMENT TELEPHONE ENCOUNTER (OUTPATIENT)
Dept: CALL CENTER | Facility: HOSPITAL | Age: 75
End: 2020-08-07

## 2020-08-07 NOTE — OUTREACH NOTE
Prep Survey      Responses   [facility] patient discharged from?  [place]   Is LACE score < 7 ?  Yes   Eligibility  [facility]   Date of Admission  08/04/20   Date of Discharge  08/06/20   Discharge Disposition  Home or Self Care   COVID-19 Test Status  Negative   Does the patient have one of the following disease processes/diagnoses(primary or secondary)?  Sepsis   Does the patient have Home health ordered?  No   Is there a DME ordered?  No   Prep survey completed?  Yes          Arabella Rojas RN

## 2020-08-07 NOTE — PROGRESS NOTES
Case Management Discharge Note      Final Note: Home with significant other. Denies any needs. Family to transport.     Provided Post Acute Provider List?: N/A    Destination      No service has been selected for the patient.      Durable Medical Equipment      No service has been selected for the patient.      Dialysis/Infusion      No service has been selected for the patient.      Home Medical Care      No service has been selected for the patient.      Therapy      No service has been selected for the patient.      Community Resources      No service has been selected for the patient.        Transportation Services  Private: Car    Final Discharge Disposition Code: 01 - home or self-care

## 2020-08-07 NOTE — OUTREACH NOTE
"Call Center TCM Note      Responses   [facility]   COVID-19 Test Status  Negative   Does the patient have one of the following disease processes/diagnoses(primary or secondary)?  Sepsis   TCM attempt successful?  Yes [Pt and daughter, Talon]   Call start time  1016   Call end time  1022   Discharge diagnosis  Fever of unknown origin,  sepsis   Meds reviewed with patient/caregiver?  Yes   Is the patient having any side effects they believe may be caused by any medication additions or changes?  No   Does the patient have all medications related to this admission filled (includes all antibiotics, inhalers, nebulizers,steroids,etc.)  N/A   Is the patient taking all medications as directed (includes completed medication regime)?  Yes   Does the patient have a primary care provider?   Yes   Comments regarding PCP  8/18/20. Pt reported she wanted to be seen sooner so there was an appt available on 8/10/20 that was offered to patient. She reports she will check with daughter since she is the  and call back to schedule appt if it's ok.    Does the patient have an appointment with their PCP within 7 days of discharge?  Greater than 7 days   What is preventing the patient from scheduling follow up appointments within 7 days of discharge?  Earlier appointment not available   Nursing Interventions  Verified appointment date/time/provider   Has the patient kept scheduled appointments due by today?  N/A   Pulse Ox monitoring  None   Psychosocial issues?  No   Did the patient receive a copy of their discharge instructions?  Yes   Nursing interventions  Reviewed instructions with patient   What is the patient's perception of their health status since discharge?  Improving [Pt reports, \"I'm feeling better still a little off centered. My balance is not very to begin with.\" ]   Nursing interventions  Nurse provided patient education   Is the patient/caregiver able to teach back Sepsis?  " S - Shivering,fever or very cold, S - Sleepy, difficult to arouse,confused, S - Short of breath   Nursing interventions  Nurse provided patient education   Is patient/caregiver able to teach back steps to recovery at home?  Rest and regain strength   Is the patient/caregiver able to teach back signs and symptoms of worsening condition:  Fever, Shortness of breath/rapid respiratory rate, Hyperthermia, Altered mental status(confusion/coma)   If the patient is a current smoker, are they able to teach back resources for cessation?  -- [Nonsmoker]   Is the patient/caregiver able to teach back the hierarchy of who to call/visit for symptoms/problems? PCP, Specialist, Home health nurse, Urgent Care, ED, 911  Yes   TCM call completed?  Yes          Annel Parra RN    8/7/2020, 10:22

## 2020-08-09 LAB
BACTERIA SPEC AEROBE CULT: NORMAL
BACTERIA SPEC AEROBE CULT: NORMAL

## 2020-08-10 ENCOUNTER — OFFICE VISIT (OUTPATIENT)
Dept: INTERNAL MEDICINE | Facility: CLINIC | Age: 75
End: 2020-08-10

## 2020-08-10 VITALS
BODY MASS INDEX: 23.73 KG/M2 | HEIGHT: 64 IN | DIASTOLIC BLOOD PRESSURE: 82 MMHG | WEIGHT: 139 LBS | SYSTOLIC BLOOD PRESSURE: 128 MMHG | OXYGEN SATURATION: 98 % | HEART RATE: 85 BPM

## 2020-08-10 DIAGNOSIS — R74.01 TRANSAMINITIS: Primary | ICD-10-CM

## 2020-08-10 LAB
ALBUMIN SERPL-MCNC: 4.5 G/DL (ref 3.5–5.2)
ALBUMIN/GLOB SERPL: 1.8 G/DL
ALP SERPL-CCNC: 111 U/L (ref 39–117)
ALT SERPL-CCNC: 71 U/L (ref 1–33)
AST SERPL-CCNC: 22 U/L (ref 1–32)
BILIRUB SERPL-MCNC: 0.3 MG/DL (ref 0–1.2)
BUN SERPL-MCNC: 20 MG/DL (ref 8–23)
BUN/CREAT SERPL: 22.7 (ref 7–25)
CALCIUM SERPL-MCNC: 9.3 MG/DL (ref 8.6–10.5)
CHLORIDE SERPL-SCNC: 103 MMOL/L (ref 98–107)
CO2 SERPL-SCNC: 25.5 MMOL/L (ref 22–29)
CREAT SERPL-MCNC: 0.88 MG/DL (ref 0.57–1)
ERYTHROCYTE [DISTWIDTH] IN BLOOD BY AUTOMATED COUNT: 13.1 % (ref 12.3–15.4)
GLOBULIN SER CALC-MCNC: 2.5 GM/DL
GLUCOSE SERPL-MCNC: 83 MG/DL (ref 65–99)
HCT VFR BLD AUTO: 38.4 % (ref 34–46.6)
HGB BLD-MCNC: 12.3 G/DL (ref 12–15.9)
MCH RBC QN AUTO: 27.6 PG (ref 26.6–33)
MCHC RBC AUTO-ENTMCNC: 32 G/DL (ref 31.5–35.7)
MCV RBC AUTO: 86.1 FL (ref 79–97)
PLATELET # BLD AUTO: 328 10*3/MM3 (ref 140–450)
POTASSIUM SERPL-SCNC: 4.1 MMOL/L (ref 3.5–5.2)
PROT SERPL-MCNC: 7 G/DL (ref 6–8.5)
RBC # BLD AUTO: 4.46 10*6/MM3 (ref 3.77–5.28)
SODIUM SERPL-SCNC: 142 MMOL/L (ref 136–145)
WBC # BLD AUTO: 11 10*3/MM3 (ref 3.4–10.8)

## 2020-08-10 PROCEDURE — 99214 OFFICE O/P EST MOD 30 MIN: CPT | Performed by: FAMILY MEDICINE

## 2020-08-10 NOTE — PATIENT INSTRUCTIONS
Liver Function Tests  Why am I having this test?  Liver function tests are done to see how well your liver is working. The proteins and enzymes measured in the test can alert your health care provider to inflammation, damage, or disease in your liver. It is common to have liver function tests:  · When you are taking certain medicines.  · If you have liver disease.  · If you drink a lot of alcohol.  · When you are not feeling well.  · When you have other conditions that may affect your liver.  · During annual physical exams.  · If you have symptoms such as yellowing of the skin (jaundice), abdominal pain, or nausea and vomiting.  What is being tested?  These tests measure various substances in your blood. This may include:  · Alanine transaminase (ALT). This is an enzyme in the liver.  · Aspartate transaminase (AST). This is an enzyme in the liver, heart, and muscles.  · Alkaline phosphatase (ALP). This is a protein in the liver, bile ducts, bone, and other body tissues.  · Total bilirubin. This is a yellow pigment in bile.  · Albumin. This is a protein in the liver.  · Prothrombin time and international normalized ratio (PT and INR). PT measures the time it takes for your blood to clot. INR is a calculation of blood clotting time based on your PT result. It is also calculated based on normal ranges defined by the lab that processed your test.  · Total protein. This includes two proteins, albumin and globulin, found in the blood.  What kind of sample is taken?    A blood sample is required for this test. It is usually collected by inserting a needle into a blood vessel.  How do I prepare for this test?  How you prepare will depend on which tests are being done and the reason for doing them. You may need to:  · Avoid eating for 4-6 hours before the test, or as told by your health care provider.  · Stop taking certain medicines before your blood test, as told by your health care provider.  Tell a health care provider  about:  · All medicines you are taking, including vitamins, herbs, eye drops, creams, and over-the-counter medicines.  · Any medical conditions you have.  · Whether you are pregnant or may be pregnant.  How are the results reported?  Your test results will be reported as values. Your health care provider will compare your results to normal ranges that were established after testing a large group of people (reference ranges). Reference ranges may vary among labs and hospitals. For the substances measured in liver function tests, common reference ranges are:  ALT  · Infant: 10-40 international units/L.  · Child or adult: 4-36 international units/L at 37°C or 4-36 units/L (SI units).  · Reference ranges may be higher for older adults.  AST  · [place] 0-5 days old:  units/L.  · Child younger than 3 years old: 15-60 units/L.  · 3-6 years old: 15-50 units/L.  · 6-12 years old: 10-50 units/L.  · 12-18 years old: 10-40 units/L.  · Adult: 0-35 units/L or 0-0.58 microkatals/L (SI units).  · Reference ranges may be higher for older adults.  ALP  · Child younger than 2 years old:  units/L.  · 2-8 years old:  units/L.  · 9-15 years old:  units/L.  · 16-21 years old:  units/L.  · Adult:  units/L or 0.5-2.0 microkatals/L (SI units).  · Reference ranges may be higher for older adults.  Total bilirubin  · : 1.0-12.0 mg/dL or 17.1-205 micromoles/L (SI units).  · Child or adult: 0.3-1.0 mg/dL or 5.1-17 micromoles/L.  Albumin  · Premature infant: 3.0-4.2 g/dL.  · [place]: 3.5-5.4 g/dL.  · Infant: 4.4-5.4 g/dL.  · Child: 4.0-5.9 g/dL.  · Adult: 3.5-5.0 g/dL or 35-50 g/L (SI units).  PT  · 11.0-12.5 seconds; 85%-100%.  INR  · 0.8-1.1.  Total protein  · Premature infant: 4.2-7.6 g/dL.  · : 4.6-7.4 g/dL.  · Infant: 6.0-6.7 g/dL.  · Child: 6.2-8.0 g/dL.  · Adult: 6.4-8.3 g/dL or 64-83 g/L (SI units).  What do the results mean?  Results that are within the reference ranges are considered  normal. For each substance measured, results outside the reference range can indicate various health issues.  ALT  · Levels above the normal range may indicate liver disease.  AST  · Levels above the normal range may indicate liver disease. Sometimes levels also increase after burns, surgery, heart attack, muscle damage, or seizure.  ALP  · Levels above the normal range may be seen in biliary obstruction, liver diseases, bone disease, thyroid disease, tumors, fractures, leukemia, lymphoma, or several other conditions. People with blood type O or B may show higher levels after a fatty meal.  · Levels below the normal range may indicate bone and teeth conditions, malnutrition, protein deficiency, or Tr's disease.  Total bilirubin  · Levels above the normal range may indicate problems with the liver, gallbladder, or bile ducts.  Albumin  · Levels above the normal range may indicate dehydration. They may also be caused by a diet that is high in protein.  · Levels below the normal range may indicate kidney disease, liver disease, or malabsorption of nutrients.  PT and INR  · Levels above the normal range mean that your blood is clotting slower than normal. This may be due to blood disorders, liver disorders, or low levels of vitamin K.  Total protein  · Levels above the normal range may be due to infection or other diseases.  · Levels below the normal range may be due to an immune system disorder, bleeding, burns, kidney disorder, liver disease, trouble absorbing or getting nutrients, or other conditions that affect the intestines.  Talk with your health care provider about what your results mean.  Questions to ask your health care provider  Ask your health care provider, or the department that is doing the test:  · When will my results be ready?  · How will I get my results?  · What are my treatment options?  · What other tests do I need?  · What are my next steps?  Summary  · Liver function tests are done to see  how well your liver is working.  · These tests measure various proteins and enzymes in your blood. The results can alert your health care provider to inflammation, damage, or disease in your liver.  · Talk with your health care provider about what your results mean.  This information is not intended to replace advice given to you by your health care provider. Make sure you discuss any questions you have with your health care provider.  Document Released: 01/20/2006 Document Revised: 08/07/2019 Document Reviewed: 10/02/2018  Elsevier Patient Education © 2020 [employer]

## 2020-08-10 NOTE — PROGRESS NOTES
Subjective   Marilia Leo is a 75 y.o. female.  Establish care and patient here to discuss her hospital follow-up for sepsis.    History of Present Illness   New patient to me    Patient was admitted to [facility], [place], operated by [facility] from August 4, 2020 to August 6, 2020 and principal problem was fever of unknown origin which she was also treated for sepsis, elevated liver function tests as well as some nausea and blood pressure.  Initially there because of fever of unknown origin and after infectious diseases consulted it was determined that likely this is hepatitis secondary to nitrofurantoin.  Antibiotics were stopped and she was monitored and it was noted that her transaminitis was slowly improving.  Discharge summary and med reconciliation were reviewed.    The following portions of the patient's history were reviewed and updated as appropriate: allergies, current medications, past family history, past medical history, past social history, past surgical history and problem list.    Review of Systems   Constitutional: Negative for fatigue and fever.   Respiratory: Negative for shortness of breath and wheezing.    Cardiovascular: Negative for chest pain and palpitations.   Gastrointestinal: Negative for constipation and nausea.       Objective   Physical Exam   Constitutional: She appears well-developed and well-nourished. No distress.   HENT:   Right Ear: External ear normal.   Left Ear: External ear normal.   Nose: Nose normal.   Eyes: Conjunctivae are normal. Right eye exhibits no discharge. Left eye exhibits no discharge.   Cardiovascular: Normal rate, regular rhythm and normal heart sounds. Exam reveals no gallop and no friction rub.   No murmur heard.  Pulmonary/Chest: Effort normal and breath sounds normal. She has no wheezes. She has no rales.   Skin: Skin is warm. Capillary refill takes less than 2 seconds. She is not diaphoretic.   Nursing note and vitals reviewed.      Assessment/Plan   Marilia was seen today for  hospital follow up.    Diagnoses and all orders for this visit:    Transaminitis  -     CBC (No Diff)  -     Comprehensive Metabolic Panel      CBC and CMP ensure that the liver enzymes are trending down.  If they are trending down a lot and likely we will see the patient back in about a month.  If they are not trending down quite as much the patient should see me back at her regular scheduled time also counseled the patient against using any Tylenol or alcohol until she is healed.

## 2020-08-11 DIAGNOSIS — R74.01 TRANSAMINITIS: Primary | ICD-10-CM

## 2020-08-24 ENCOUNTER — RESULTS ENCOUNTER (OUTPATIENT)
Dept: INTERNAL MEDICINE | Facility: CLINIC | Age: 75
End: 2020-08-24

## 2020-08-24 DIAGNOSIS — R74.01 TRANSAMINITIS: ICD-10-CM

## 2020-08-26 LAB
ALBUMIN SERPL-MCNC: 4.7 G/DL (ref 3.5–5.2)
ALBUMIN/GLOB SERPL: 1.8 G/DL
ALP SERPL-CCNC: 78 U/L (ref 39–117)
ALT SERPL-CCNC: 13 U/L (ref 1–33)
AST SERPL-CCNC: 17 U/L (ref 1–32)
BILIRUB SERPL-MCNC: 0.6 MG/DL (ref 0–1.2)
BUN SERPL-MCNC: 14 MG/DL (ref 8–23)
BUN/CREAT SERPL: 14.3 (ref 7–25)
CALCIUM SERPL-MCNC: 9.3 MG/DL (ref 8.6–10.5)
CHLORIDE SERPL-SCNC: 105 MMOL/L (ref 98–107)
CO2 SERPL-SCNC: 26 MMOL/L (ref 22–29)
CREAT SERPL-MCNC: 0.98 MG/DL (ref 0.57–1)
ERYTHROCYTE [DISTWIDTH] IN BLOOD BY AUTOMATED COUNT: 13.2 % (ref 12.3–15.4)
GLOBULIN SER CALC-MCNC: 2.6 GM/DL
GLUCOSE SERPL-MCNC: 83 MG/DL (ref 65–99)
HCT VFR BLD AUTO: 39.2 % (ref 34–46.6)
HGB BLD-MCNC: 12.5 G/DL (ref 12–15.9)
MCH RBC QN AUTO: 27.7 PG (ref 26.6–33)
MCHC RBC AUTO-ENTMCNC: 31.9 G/DL (ref 31.5–35.7)
MCV RBC AUTO: 86.7 FL (ref 79–97)
PLATELET # BLD AUTO: 345 10*3/MM3 (ref 140–450)
POTASSIUM SERPL-SCNC: 4 MMOL/L (ref 3.5–5.2)
PROT SERPL-MCNC: 7.3 G/DL (ref 6–8.5)
RBC # BLD AUTO: 4.52 10*6/MM3 (ref 3.77–5.28)
SODIUM SERPL-SCNC: 143 MMOL/L (ref 136–145)
WBC # BLD AUTO: 7.33 10*3/MM3 (ref 3.4–10.8)

## 2020-08-28 ENCOUNTER — OFFICE VISIT (OUTPATIENT)
Dept: INTERNAL MEDICINE | Facility: CLINIC | Age: 75
End: 2020-08-28

## 2020-08-28 VITALS
OXYGEN SATURATION: 99 % | DIASTOLIC BLOOD PRESSURE: 92 MMHG | WEIGHT: 141 LBS | HEART RATE: 70 BPM | TEMPERATURE: 97.5 F | HEIGHT: 64 IN | BODY MASS INDEX: 24.07 KG/M2 | SYSTOLIC BLOOD PRESSURE: 130 MMHG

## 2020-08-28 DIAGNOSIS — R74.01 TRANSAMINITIS: Primary | ICD-10-CM

## 2020-08-28 DIAGNOSIS — Z79.899 HIGH RISK MEDICATION USE: ICD-10-CM

## 2020-08-28 DIAGNOSIS — Z13.220 SCREENING FOR LIPID DISORDERS: ICD-10-CM

## 2020-08-28 DIAGNOSIS — M54.16 LUMBAR RADICULOPATHY, CHRONIC: ICD-10-CM

## 2020-08-28 DIAGNOSIS — I10 BENIGN ESSENTIAL HYPERTENSION: ICD-10-CM

## 2020-08-28 PROCEDURE — 99213 OFFICE O/P EST LOW 20 MIN: CPT | Performed by: FAMILY MEDICINE

## 2020-08-28 NOTE — PROGRESS NOTES
Subjective   Marilia Leo is a 75 y.o. female. F/u on transaminitis.    History of Present Illness     Transaminitis - She has been really avoiding alcohol and tylenol since we met last and is here today to review her labs with me from a couple of days ago that I ordered.  No adbominal pain, jaundice, stool color changes.    Lumbar radiculopathy - stable.  She needed to get setup with me to get gabapentin in the future.  Takes as prescribed or sometimes less depending on need.  Needs UDS and med agreement.    The following portions of the patient's history were reviewed and updated as appropriate: allergies, current medications, past family history, past medical history, past social history, past surgical history and problem list.    Review of Systems   Constitutional: Negative for fatigue and fever.   Respiratory: Negative for shortness of breath and wheezing.    Cardiovascular: Negative for chest pain and palpitations.   Gastrointestinal: Negative for constipation and nausea.       Objective   Physical Exam   Constitutional: She appears well-developed and well-nourished. No distress.   Cardiovascular: Normal rate, regular rhythm and normal heart sounds. Exam reveals no gallop and no friction rub.   No murmur heard.  Pulmonary/Chest: Effort normal and breath sounds normal. She has no wheezes. She has no rales.   Skin: Skin is warm. Capillary refill takes less than 2 seconds. She is not diaphoretic.   Nursing note and vitals reviewed.      Assessment/Plan   Marilia was seen today for establish care and hypertension.    Diagnoses and all orders for this visit:    Transaminitis  -     CBC (No Diff); Future  -     Comprehensive Metabolic Panel; Future    Lumbar radiculopathy, chronic    High risk medication use  -     Compliance Drug Analysis, Ur - Urine, Clean Catch    Benign essential hypertension  -     CBC (No Diff); Future  -     Comprehensive Metabolic Panel; Future    Screening for lipid disorders  -     Lipid  Panel; Future      Transaminitis appears resolved.  I counseled to avoid alcohol and tylenol for another couple of weeks and then she can slowly have them back.  Also ordered drug screen for the gabapentin.  Also ordered labs for the future.  See back in 6 months or sooner if they need me.

## 2020-08-28 NOTE — PATIENT INSTRUCTIONS
Liver Function Tests  Why am I having this test?  Liver function tests are done to see how well your liver is working. The proteins and enzymes measured in the test can alert your health care provider to inflammation, damage, or disease in your liver. It is common to have liver function tests:  · When you are taking certain medicines.  · If you have liver disease.  · If you drink a lot of alcohol.  · When you are not feeling well.  · When you have other conditions that may affect your liver.  · During annual physical exams.  · If you have symptoms such as yellowing of the skin (jaundice), abdominal pain, or nausea and vomiting.  What is being tested?  These tests measure various substances in your blood. This may include:  · Alanine transaminase (ALT). This is an enzyme in the liver.  · Aspartate transaminase (AST). This is an enzyme in the liver, heart, and muscles.  · Alkaline phosphatase (ALP). This is a protein in the liver, bile ducts, bone, and other body tissues.  · Total bilirubin. This is a yellow pigment in bile.  · Albumin. This is a protein in the liver.  · Prothrombin time and international normalized ratio (PT and INR). PT measures the time it takes for your blood to clot. INR is a calculation of blood clotting time based on your PT result. It is also calculated based on normal ranges defined by the lab that processed your test.  · Total protein. This includes two proteins, albumin and globulin, found in the blood.  What kind of sample is taken?    A blood sample is required for this test. It is usually collected by inserting a needle into a blood vessel.  How do I prepare for this test?  How you prepare will depend on which tests are being done and the reason for doing them. You may need to:  · Avoid eating for 4-6 hours before the test, or as told by your health care provider.  · Stop taking certain medicines before your blood test, as told by your health care provider.  Tell a health care provider  about:  · All medicines you are taking, including vitamins, herbs, eye drops, creams, and over-the-counter medicines.  · Any medical conditions you have.  · Whether you are pregnant or may be pregnant.  How are the results reported?  Your test results will be reported as values. Your health care provider will compare your results to normal ranges that were established after testing a large group of people (reference ranges). Reference ranges may vary among labs and hospitals. For the substances measured in liver function tests, common reference ranges are:  ALT  · Infant: 10-40 international units/L.  · Child or adult: 4-36 international units/L at 37°C or 4-36 units/L (SI units).  · Reference ranges may be higher for older adults.  AST  · [place] 0-5 days old:  units/L.  · Child younger than 3 years old: 15-60 units/L.  · 3-6 years old: 15-50 units/L.  · 6-12 years old: 10-50 units/L.  · 12-18 years old: 10-40 units/L.  · Adult: 0-35 units/L or 0-0.58 microkatals/L (SI units).  · Reference ranges may be higher for older adults.  ALP  · Child younger than 2 years old:  units/L.  · 2-8 years old:  units/L.  · 9-15 years old:  units/L.  · 16-21 years old:  units/L.  · Adult:  units/L or 0.5-2.0 microkatals/L (SI units).  · Reference ranges may be higher for older adults.  Total bilirubin  · : 1.0-12.0 mg/dL or 17.1-205 micromoles/L (SI units).  · Child or adult: 0.3-1.0 mg/dL or 5.1-17 micromoles/L.  Albumin  · Premature infant: 3.0-4.2 g/dL.  · [place]: 3.5-5.4 g/dL.  · Infant: 4.4-5.4 g/dL.  · Child: 4.0-5.9 g/dL.  · Adult: 3.5-5.0 g/dL or 35-50 g/L (SI units).  PT  · 11.0-12.5 seconds; 85%-100%.  INR  · 0.8-1.1.  Total protein  · Premature infant: 4.2-7.6 g/dL.  · : 4.6-7.4 g/dL.  · Infant: 6.0-6.7 g/dL.  · Child: 6.2-8.0 g/dL.  · Adult: 6.4-8.3 g/dL or 64-83 g/L (SI units).  What do the results mean?  Results that are within the reference ranges are considered  normal. For each substance measured, results outside the reference range can indicate various health issues.  ALT  · Levels above the normal range may indicate liver disease.  AST  · Levels above the normal range may indicate liver disease. Sometimes levels also increase after burns, surgery, heart attack, muscle damage, or seizure.  ALP  · Levels above the normal range may be seen in biliary obstruction, liver diseases, bone disease, thyroid disease, tumors, fractures, leukemia, lymphoma, or several other conditions. People with blood type O or B may show higher levels after a fatty meal.  · Levels below the normal range may indicate bone and teeth conditions, malnutrition, protein deficiency, or Tr's disease.  Total bilirubin  · Levels above the normal range may indicate problems with the liver, gallbladder, or bile ducts.  Albumin  · Levels above the normal range may indicate dehydration. They may also be caused by a diet that is high in protein.  · Levels below the normal range may indicate kidney disease, liver disease, or malabsorption of nutrients.  PT and INR  · Levels above the normal range mean that your blood is clotting slower than normal. This may be due to blood disorders, liver disorders, or low levels of vitamin K.  Total protein  · Levels above the normal range may be due to infection or other diseases.  · Levels below the normal range may be due to an immune system disorder, bleeding, burns, kidney disorder, liver disease, trouble absorbing or getting nutrients, or other conditions that affect the intestines.  Talk with your health care provider about what your results mean.  Questions to ask your health care provider  Ask your health care provider, or the department that is doing the test:  · When will my results be ready?  · How will I get my results?  · What are my treatment options?  · What other tests do I need?  · What are my next steps?  Summary  · Liver function tests are done to see  how well your liver is working.  · These tests measure various proteins and enzymes in your blood. The results can alert your health care provider to inflammation, damage, or disease in your liver.  · Talk with your health care provider about what your results mean.  This information is not intended to replace advice given to you by your health care provider. Make sure you discuss any questions you have with your health care provider.  Document Released: 01/20/2006 Document Revised: 08/07/2019 Document Reviewed: 10/02/2018  Elsevier Patient Education © 2020 [employer] Inc.

## 2020-09-03 LAB — DRUGS UR: NORMAL

## 2020-09-14 ENCOUNTER — TELEPHONE (OUTPATIENT)
Dept: INTERNAL MEDICINE | Facility: CLINIC | Age: 75
End: 2020-09-14

## 2020-09-14 DIAGNOSIS — M54.16 LUMBAR RADICULOPATHY, CHRONIC: Primary | ICD-10-CM

## 2020-09-14 RX ORDER — GABAPENTIN 100 MG/1
100 CAPSULE ORAL 3 TIMES DAILY
Qty: 270 CAPSULE | Refills: 1 | Status: SHIPPED | OUTPATIENT
Start: 2020-09-14 | End: 2021-03-09 | Stop reason: SDUPTHER

## 2020-09-14 NOTE — TELEPHONE ENCOUNTER
PATIENT IS CALLING TO SEE IF A MEDICATION CAN BE REFILLED OF:    gabapentin (NEURONTIN) 100 MG capsule  SHE HAS 1 DAY SUPPLY LEFT. SHE IS USING PHARMACY ON FILE.    PHARMACY SAID IT NEEDS A PA.    [facility] PH#: [phone]

## 2020-12-09 DIAGNOSIS — I10 BENIGN ESSENTIAL HYPERTENSION: Primary | ICD-10-CM

## 2020-12-09 RX ORDER — IRBESARTAN 150 MG/1
150 TABLET ORAL NIGHTLY
Qty: 90 TABLET | Refills: 3 | Status: SHIPPED | OUTPATIENT
Start: 2020-12-09 | End: 2021-03-01 | Stop reason: SDUPTHER

## 2020-12-09 RX ORDER — AMLODIPINE BESYLATE 10 MG/1
10 TABLET ORAL DAILY
Qty: 90 TABLET | Refills: 3 | Status: SHIPPED | OUTPATIENT
Start: 2020-12-09 | End: 2021-03-01 | Stop reason: SDUPTHER

## 2020-12-09 NOTE — TELEPHONE ENCOUNTER
Caller: Marilia Leo    Relationship: Self    Best call back number: [phone]   Medication needed:   Requested Prescriptions     Pending Prescriptions Disp Refills   • amLODIPine (NORVASC) 10 MG tablet        Sig: Take 1 tablet by mouth Daily.   • irbesartan (AVAPRO) 150 MG tablet        Sig: Take 1 tablet by mouth Every Night.       When do you need the refill by: TODAY    What details did the patient provide when requesting the medication:   3 MONTH SUPPLY    Does the patient have less than a 3 day supply:  [x] Yes  [] No        What is the patient's preferred pharmacy: [facility] - [facility] [address] [facility] - [phone]  - [phone] FX

## 2021-01-04 ENCOUNTER — TELEMEDICINE (OUTPATIENT)
Dept: INTERNAL MEDICINE | Facility: CLINIC | Age: 76
End: 2021-01-04

## 2021-01-04 DIAGNOSIS — B02.29 OTHER POSTHERPETIC NERVOUS SYSTEM INVOLVEMENT: Primary | ICD-10-CM

## 2021-01-04 PROCEDURE — 99213 OFFICE O/P EST LOW 20 MIN: CPT | Performed by: FAMILY MEDICINE

## 2021-01-04 NOTE — PROGRESS NOTES
Subjective   Marilia Leo is a 75 y.o. female. Shingles for 10 days    You have chosen to receive care through a telehealth visit.  Do you consent to use a video/audio connection for your medical care today? Yes.  Used [employer] video.      History of Present Illness     Patient has been dealing with Shingles for the past 10 days.  A week ago she broke out on her chest above the breast and after using cortizone and benadryl the rash went away.  However she has been dealing with some pain and burning in the distribution of the rash and up on her shoulder since then.  She also mentions he has been having headaches over the last week or so but denies fever, chills, loss of taste or smell, exposure to Covid.    The following portions of the patient's history were reviewed and updated as appropriate: allergies, current medications, past family history, past medical history, past social history, past surgical history and problem list.    Review of Systems   Constitutional: Negative for chills, diaphoresis, fatigue and fever.   Respiratory: Negative for cough, chest tightness and shortness of breath.    Neurological: Positive for headaches.        Burning and itching in the distribution of where the rash was located.       Objective   Physical Exam  Constitutional:       Appearance: Normal appearance. She is normal weight.   HENT:      Right Ear: External ear normal.      Left Ear: External ear normal.      Nose: Nose normal.   Eyes:      General:         Right eye: No discharge.         Left eye: No discharge.      Conjunctiva/sclera: Conjunctivae normal.   Skin:     Findings: No rash.   Neurological:      Mental Status: She is alert.         Assessment/Plan   Diagnoses and all orders for this visit:    1. Other postherpetic nervous system involvement (Primary)      I authorized her to increase her gabapentin up to 4-5 doses per day to help with the postherpetic neuralgia.  This is for the short-term until she gets feeling  better.  I asked him to give me a call back at the end of the week if she is not getting any better.  I imagine she got a cold or some other virus that is causing her to have the shingles outbreak.  She screens negative for Covid.    I spent 15 minutes on the call and another 5 minutes completing the encounter along with research.

## 2021-01-04 NOTE — PATIENT INSTRUCTIONS
Postherpetic Neuralgia  Postherpetic neuralgia (PHN) is nerve pain that occurs after a shingles infection. Shingles is a painful rash that appears on one area of the body, usually on the trunk or face. Shingles is caused by the varicella-zoster virus. This is the same virus that causes chickenpox. In people who have had chickenpox, the virus can resurface years later and cause shingles.  You may have PHN if you continue to have pain for 4 months after your shingles rash has gone away. PHN appears in the same area where you had the shingles rash. The pain usually goes away after the rash disappears.  Getting a vaccination for shingles can prevent PHN. This vaccine is recommended for people older than 60. It may prevent shingles, and may also lower your risk of PHN if you do get shingles.  What are the causes?  This condition is caused by damage to your nerves from the varicella-zoster virus. The damage makes your nerves overly sensitive.  What increases the risk?  The following factors may make you more likely to develop this condition:  · Being older than 60 years of age.  · Having severe pain before your shingles rash starts.  · Having a severe rash.  · Having shingles in and around the eye area.  · Having a disease that makes your body unable to fight infections (weak immune system).  What are the signs or symptoms?  The main symptom of this condition is pain. The pain may:  · Often be very bad and may be described as stabbing, burning, or feeling like an electric shock.  · Come and go or may be there all the time.  · Be triggered by light touches on the skin or changes in temperature.  You may have itching along with the pain.  How is this diagnosed?  This condition may be diagnosed based on your symptoms and your history of shingles. Lab studies and other diagnostic tests are usually not needed.  How is this treated?  There is no cure for this condition. Treatment for PHN will focus on pain relief.  Over-the-counter pain relievers do not usually relieve PHN pain. You may need to work with a pain specialist. Treatment may include:  · Antidepressant medicines to help with pain and improve sleep.  · Anti-seizure medicines to relieve nerve pain.  · Strong pain relievers (opioids).  · A numbing patch worn on the skin (lidocaine patch).  · Botox (botulinum toxin) injections to block pain signals between nerves and muscles.  · Injections of numbing medicine or anti-inflammatory medicines around irritated nerves.  Follow these instructions at home:    · It may take a long time to recover from PHN. Work closely with your health care provider and develop a good support system at home.  · Take over-the-counter and prescription medicines only as told by your health care provider.  · Do not drive or use heavy machinery while taking prescription pain medicine.  · Wear loose, comfortable clothing.  · Cover sensitive areas with a dressing to reduce friction from clothing rubbing on the area.  · If directed, put ice on the painful area:  ? Put ice in a plastic bag.  ? Place a towel between your skin and the bag.  ? Leave the ice on for 20 minutes, 2-3 times a day.  · Talk to your health care provider if you feel depressed or desperate. Living with long-term pain can be depressing.  · Keep all follow-up visits as told by your health care provider. This is important.  Contact a health care provider if:  · Your medicine is not helping.  · You are struggling to manage your pain at home.  Summary  · Postherpetic neuralgia is a very painful disorder that can occur after an episode of shingles.  · The pain is often severe, burning, electric, or stabbing.  · Prescription medicines can be helpful in managing persistent pain.  · Getting a vaccination for shingles can prevent PHN. This vaccine is recommended for people older than 60.  This information is not intended to replace advice given to you by your health care provider. Make sure  you discuss any questions you have with your health care provider.  Document Revised: 11/30/2018 Document Reviewed: 03/06/2018  Elsevier Patient Education © 2020 [employer]

## 2021-01-19 DIAGNOSIS — Z03.89 ENCOUNTER FOR OBSERVATION FOR OTHER SUSPECTED DISEASES AND CONDITIONS RULED OUT: ICD-10-CM

## 2021-01-19 DIAGNOSIS — Z12.31 ENCOUNTER FOR SCREENING MAMMOGRAM FOR MALIGNANT NEOPLASM OF BREAST: Primary | ICD-10-CM

## 2021-01-25 DIAGNOSIS — Z12.31 ENCOUNTER FOR SCREENING MAMMOGRAM FOR BREAST CANCER: Primary | ICD-10-CM

## 2021-02-02 DIAGNOSIS — B02.23 POST-HERPETIC POLYNEUROPATHY: Primary | ICD-10-CM

## 2021-02-02 DIAGNOSIS — B02.9 HERPES ZOSTER WITHOUT COMPLICATION: ICD-10-CM

## 2021-02-02 RX ORDER — VALACYCLOVIR HYDROCHLORIDE 1 G/1
1000 TABLET, FILM COATED ORAL 3 TIMES DAILY
Qty: 21 TABLET | Refills: 0 | Status: SHIPPED | OUTPATIENT
Start: 2021-02-02 | End: 2021-02-09

## 2021-03-01 ENCOUNTER — OFFICE VISIT (OUTPATIENT)
Dept: INTERNAL MEDICINE | Facility: CLINIC | Age: 76
End: 2021-03-01

## 2021-03-01 VITALS
SYSTOLIC BLOOD PRESSURE: 140 MMHG | OXYGEN SATURATION: 99 % | HEIGHT: 64 IN | BODY MASS INDEX: 24.07 KG/M2 | WEIGHT: 141 LBS | TEMPERATURE: 98 F | HEART RATE: 78 BPM | DIASTOLIC BLOOD PRESSURE: 90 MMHG

## 2021-03-01 DIAGNOSIS — M54.16 LUMBAR RADICULOPATHY, CHRONIC: ICD-10-CM

## 2021-03-01 DIAGNOSIS — E78.00 PURE HYPERCHOLESTEROLEMIA: ICD-10-CM

## 2021-03-01 DIAGNOSIS — I10 BENIGN ESSENTIAL HYPERTENSION: Primary | ICD-10-CM

## 2021-03-01 PROBLEM — A41.9 SEPSIS: Status: RESOLVED | Noted: 2020-08-04 | Resolved: 2021-03-01

## 2021-03-01 PROBLEM — M54.9 BACK PAIN: Status: RESOLVED | Noted: 2020-08-04 | Resolved: 2021-03-01

## 2021-03-01 PROCEDURE — 99214 OFFICE O/P EST MOD 30 MIN: CPT | Performed by: FAMILY MEDICINE

## 2021-03-01 RX ORDER — AMLODIPINE BESYLATE 5 MG/1
5 TABLET ORAL DAILY
Qty: 90 TABLET | Refills: 3 | Status: SHIPPED | OUTPATIENT
Start: 2021-03-01 | End: 2022-03-18 | Stop reason: SDUPTHER

## 2021-03-01 RX ORDER — IRBESARTAN 150 MG/1
150 TABLET ORAL NIGHTLY
Qty: 90 TABLET | Refills: 3 | Status: SHIPPED | OUTPATIENT
Start: 2021-03-01 | End: 2021-12-16

## 2021-03-01 NOTE — PROGRESS NOTES
"Chief Complaint  Hyperlipidemia and Hypertension    Subjective          Marilia Leo presents to [facility]  History of Present Illness    Hypertension - stable.  Patient taking medication as prescribed.  Denies chest pain, shortness of breath, headache, lower extremity edema.  Patient is taking amlodipine and irbesartan.    HLD-stable.  Trying to adhere to a low-fat diet.  She is on no medication for it at this time.     Lumbar radiculopathy - stable.  Takes gabapentin as prescribed or sometimes less depending on need.  UDS and med agreement UTD.  Not sure where contract went last time so had her sign a new one.    Review of Systems   Constitutional: Negative for activity change, fatigue and fever.   Respiratory: Negative for cough and shortness of breath.    Cardiovascular: Negative for chest pain and palpitations.   Gastrointestinal: Negative for abdominal pain.     Objective   Vital Signs:   /90 (BP Location: Left arm, Patient Position: Sitting, Cuff Size: Adult)   Pulse 78   Temp 98 °F (36.7 °C) (Oral)   Ht 162.6 cm (64\")   Wt 64 kg (141 lb)   SpO2 99%   BMI 24.20 kg/m²     Physical Exam  Vitals signs and nursing note reviewed.   Constitutional:       General: She is not in acute distress.     Appearance: Normal appearance.   Cardiovascular:      Rate and Rhythm: Normal rate and regular rhythm.      Heart sounds: Normal heart sounds.   Pulmonary:      Effort: Pulmonary effort is normal.      Breath sounds: Normal breath sounds.   Neurological:      Mental Status: She is alert.          I have reviewed the HPI, review of systems, and physical exam and they are all accurate with the patient and for this encounter.      Result Review :   The following data was reviewed by: Grover Garcias MD on 03/01/2021:  Common labs    Common Labsle 8/6/20 8/6/20 8/6/20 8/10/20 8/10/20 8/26/20 8/26/20    0404 0404 0404 1353 1353 1025 1025   Glucose  103 (A)        Glucose     83  83 "   BUN  11   20  14   Creatinine  0.81   0.88  0.98   eGFR Non African Am     63  55 (A)   eGFR  Am  84   76  67   Sodium  139   142  143   Potassium  3.7   4.1  4.0   Chloride  106   103  105   Calcium  8.7   9.3  9.3   Total Protein     7.0  7.3   Albumin   3.50  4.50  4.70   Total Bilirubin   0.2  0.3  0.6   Alkaline Phosphatase   110  111  78   AST (SGOT)   117 (A)  22  17   ALT (SGPT)   196 (A)  71 (A)  13   WBC 8.56   11.00 (A)  7.33    Hemoglobin 11.7 (A)   12.3  12.5    Hematocrit 34.4   38.4  39.2    Platelets 269   328  345    (A) Abnormal value       Comments are available for some flowsheets but are not being displayed.                     Assessment and Plan    Diagnoses and all orders for this visit:    1. Benign essential hypertension (Primary)  -     amLODIPine (NORVASC) 5 MG tablet; Take 1 tablet by mouth Daily.  Dispense: 90 tablet; Refill: 3  -     irbesartan (AVAPRO) 150 MG tablet; Take 1 tablet by mouth Every Night.  Dispense: 90 tablet; Refill: 3  -     CBC (No Diff)  -     Comprehensive Metabolic Panel    2. Pure hypercholesterolemia  -     CBC (No Diff)  -     Comprehensive Metabolic Panel  -     Lipid Panel    3. Lumbar radiculopathy, chronic      All conditions stable.  I refilled medications as above and labs as above as well.  Everything appears stable.      Follow Up   Return in about 6 months (around 9/1/2021) for Medicare Wellness.  Patient was given instructions and counseling regarding her condition or for health maintenance advice. Please see specific information pulled into the AVS if appropriate.

## 2021-03-02 DIAGNOSIS — Z23 IMMUNIZATION DUE: ICD-10-CM

## 2021-03-02 LAB
ALBUMIN SERPL-MCNC: 4.1 G/DL (ref 3.5–5.2)
ALBUMIN/GLOB SERPL: 1.5 G/DL
ALP SERPL-CCNC: 72 U/L (ref 39–117)
ALT SERPL-CCNC: 10 U/L (ref 1–33)
AST SERPL-CCNC: 16 U/L (ref 1–32)
BILIRUB SERPL-MCNC: 0.4 MG/DL (ref 0–1.2)
BUN SERPL-MCNC: 18 MG/DL (ref 8–23)
BUN/CREAT SERPL: 17.3 (ref 7–25)
CALCIUM SERPL-MCNC: 9.4 MG/DL (ref 8.6–10.5)
CHLORIDE SERPL-SCNC: 105 MMOL/L (ref 98–107)
CHOLEST SERPL-MCNC: 208 MG/DL (ref 0–200)
CO2 SERPL-SCNC: 25 MMOL/L (ref 22–29)
CREAT SERPL-MCNC: 1.04 MG/DL (ref 0.57–1)
ERYTHROCYTE [DISTWIDTH] IN BLOOD BY AUTOMATED COUNT: 13.7 % (ref 12.3–15.4)
GLOBULIN SER CALC-MCNC: 2.8 GM/DL
GLUCOSE SERPL-MCNC: 88 MG/DL (ref 65–99)
HCT VFR BLD AUTO: 39.3 % (ref 34–46.6)
HDLC SERPL-MCNC: 69 MG/DL (ref 40–60)
HGB BLD-MCNC: 13.1 G/DL (ref 12–15.9)
LDLC SERPL CALC-MCNC: 117 MG/DL (ref 0–100)
MCH RBC QN AUTO: 27.8 PG (ref 26.6–33)
MCHC RBC AUTO-ENTMCNC: 33.3 G/DL (ref 31.5–35.7)
MCV RBC AUTO: 83.3 FL (ref 79–97)
PLATELET # BLD AUTO: 332 10*3/MM3 (ref 140–450)
POTASSIUM SERPL-SCNC: 3.9 MMOL/L (ref 3.5–5.2)
PROT SERPL-MCNC: 6.9 G/DL (ref 6–8.5)
RBC # BLD AUTO: 4.72 10*6/MM3 (ref 3.77–5.28)
SODIUM SERPL-SCNC: 141 MMOL/L (ref 136–145)
TRIGL SERPL-MCNC: 123 MG/DL (ref 0–150)
VLDLC SERPL CALC-MCNC: 22 MG/DL (ref 5–40)
WBC # BLD AUTO: 9.27 10*3/MM3 (ref 3.4–10.8)

## 2021-03-09 DIAGNOSIS — M54.16 LUMBAR RADICULOPATHY, CHRONIC: ICD-10-CM

## 2021-03-09 RX ORDER — GABAPENTIN 100 MG/1
100 CAPSULE ORAL 3 TIMES DAILY
Qty: 270 CAPSULE | Refills: 1 | Status: SHIPPED | OUTPATIENT
Start: 2021-03-09 | End: 2021-11-08

## 2021-03-09 NOTE — TELEPHONE ENCOUNTER
PATIENT NEEDS REFILL ON:gabapentin (NEURONTIN) 100 MG capsule     PATIENT CAN BE REACHED ON: [phone]    PHARMACY PREFERRED:[id number] - [facility] [address] AT [facility] [place] [place] [facility] [address] - [phone]  - [phone]   [phone]      That she would also like a call back about her labs please advise

## 2021-04-10 ENCOUNTER — HOSPITAL ENCOUNTER (EMERGENCY)
Facility: HOSPITAL | Age: 76
Discharge: HOME | End: 2021-04-10
Attending: EMERGENCY MEDICINE | Admitting: EMERGENCY MEDICINE

## 2021-04-10 ENCOUNTER — APPOINTMENT (OUTPATIENT)
Dept: GENERAL RADIOLOGY | Facility: HOSPITAL | Age: 76
End: 2021-04-10

## 2021-04-10 VITALS
DIASTOLIC BLOOD PRESSURE: 87 MMHG | OXYGEN SATURATION: 98 % | SYSTOLIC BLOOD PRESSURE: 133 MMHG | HEART RATE: 94 BPM | TEMPERATURE: 100.8 F | RESPIRATION RATE: 18 BRPM

## 2021-04-10 DIAGNOSIS — T50.Z95A ADVERSE EFFECT OF VACCINE, INITIAL ENCOUNTER: Primary | ICD-10-CM

## 2021-04-10 LAB
ALBUMIN SERPL-MCNC: 4.2 G/DL (ref 3.5–5.2)
ALBUMIN/GLOB SERPL: 1.4 G/DL
ALP SERPL-CCNC: 74 U/L (ref 39–117)
ALT SERPL W P-5'-P-CCNC: 12 U/L (ref 1–33)
ANION GAP SERPL CALCULATED.3IONS-SCNC: 9.5 MMOL/L (ref 5–15)
AST SERPL-CCNC: 15 U/L (ref 1–32)
B PARAPERT DNA SPEC QL NAA+PROBE: NOT DETECTED
B PERT DNA SPEC QL NAA+PROBE: NOT DETECTED
BACTERIA UR QL AUTO: ABNORMAL /HPF
BASOPHILS # BLD AUTO: 0.06 10*3/MM3 (ref 0–0.2)
BASOPHILS NFR BLD AUTO: 0.8 % (ref 0–1.5)
BILIRUB SERPL-MCNC: 0.5 MG/DL (ref 0–1.2)
BILIRUB UR QL STRIP: NEGATIVE
BUN SERPL-MCNC: 15 MG/DL (ref 8–23)
BUN/CREAT SERPL: 16.1 (ref 7–25)
C PNEUM DNA NPH QL NAA+NON-PROBE: NOT DETECTED
CALCIUM SPEC-SCNC: 8.8 MG/DL (ref 8.6–10.5)
CHLORIDE SERPL-SCNC: 108 MMOL/L (ref 98–107)
CLARITY UR: CLEAR
CO2 SERPL-SCNC: 24.5 MMOL/L (ref 22–29)
COLOR UR: YELLOW
CREAT SERPL-MCNC: 0.93 MG/DL (ref 0.57–1)
D-LACTATE SERPL-SCNC: 1.7 MMOL/L (ref 0.5–2)
DEPRECATED RDW RBC AUTO: 39.9 FL (ref 37–54)
EOSINOPHIL # BLD AUTO: 0.34 10*3/MM3 (ref 0–0.4)
EOSINOPHIL NFR BLD AUTO: 4.8 % (ref 0.3–6.2)
ERYTHROCYTE [DISTWIDTH] IN BLOOD BY AUTOMATED COUNT: 13.2 % (ref 12.3–15.4)
FLUAV SUBTYP SPEC NAA+PROBE: NOT DETECTED
FLUBV RNA ISLT QL NAA+PROBE: NOT DETECTED
GFR SERPL CREATININE-BSD FRML MDRD: 71 ML/MIN/1.73
GLOBULIN UR ELPH-MCNC: 2.9 GM/DL
GLUCOSE SERPL-MCNC: 107 MG/DL (ref 65–99)
GLUCOSE UR STRIP-MCNC: NEGATIVE MG/DL
HADV DNA SPEC NAA+PROBE: NOT DETECTED
HCOV 229E RNA SPEC QL NAA+PROBE: NOT DETECTED
HCOV HKU1 RNA SPEC QL NAA+PROBE: NOT DETECTED
HCOV NL63 RNA SPEC QL NAA+PROBE: NOT DETECTED
HCOV OC43 RNA SPEC QL NAA+PROBE: NOT DETECTED
HCT VFR BLD AUTO: 39.5 % (ref 34–46.6)
HGB BLD-MCNC: 12.9 G/DL (ref 12–15.9)
HGB UR QL STRIP.AUTO: NEGATIVE
HMPV RNA NPH QL NAA+NON-PROBE: NOT DETECTED
HPIV1 RNA SPEC QL NAA+PROBE: NOT DETECTED
HPIV2 RNA SPEC QL NAA+PROBE: NOT DETECTED
HPIV3 RNA NPH QL NAA+PROBE: NOT DETECTED
HPIV4 P GENE NPH QL NAA+PROBE: NOT DETECTED
HYALINE CASTS UR QL AUTO: ABNORMAL /LPF
IMM GRANULOCYTES # BLD AUTO: 0.02 10*3/MM3 (ref 0–0.05)
IMM GRANULOCYTES NFR BLD AUTO: 0.3 % (ref 0–0.5)
KETONES UR QL STRIP: NEGATIVE
LEUKOCYTE ESTERASE UR QL STRIP.AUTO: ABNORMAL
LYMPHOCYTES # BLD AUTO: 0.84 10*3/MM3 (ref 0.7–3.1)
LYMPHOCYTES NFR BLD AUTO: 11.8 % (ref 19.6–45.3)
M PNEUMO IGG SER IA-ACNC: NOT DETECTED
MCH RBC QN AUTO: 27.4 PG (ref 26.6–33)
MCHC RBC AUTO-ENTMCNC: 32.7 G/DL (ref 31.5–35.7)
MCV RBC AUTO: 83.9 FL (ref 79–97)
MONOCYTES # BLD AUTO: 0.44 10*3/MM3 (ref 0.1–0.9)
MONOCYTES NFR BLD AUTO: 6.2 % (ref 5–12)
NEUTROPHILS NFR BLD AUTO: 5.39 10*3/MM3 (ref 1.7–7)
NEUTROPHILS NFR BLD AUTO: 76.1 % (ref 42.7–76)
NITRITE UR QL STRIP: NEGATIVE
NRBC BLD AUTO-RTO: 0 /100 WBC (ref 0–0.2)
PH UR STRIP.AUTO: 7 [PH] (ref 5–8)
PLATELET # BLD AUTO: 287 10*3/MM3 (ref 140–450)
PMV BLD AUTO: 9.3 FL (ref 6–12)
POTASSIUM SERPL-SCNC: 3.4 MMOL/L (ref 3.5–5.2)
PROCALCITONIN SERPL-MCNC: 0.07 NG/ML (ref 0–0.25)
PROT SERPL-MCNC: 7.1 G/DL (ref 6–8.5)
PROT UR QL STRIP: ABNORMAL
RBC # BLD AUTO: 4.71 10*6/MM3 (ref 3.77–5.28)
RBC # UR: ABNORMAL /HPF
REF LAB TEST METHOD: ABNORMAL
RHINOVIRUS RNA SPEC NAA+PROBE: NOT DETECTED
RSV RNA NPH QL NAA+NON-PROBE: NOT DETECTED
SARS-COV-2 RNA NPH QL NAA+NON-PROBE: NOT DETECTED
SODIUM SERPL-SCNC: 142 MMOL/L (ref 136–145)
SP GR UR STRIP: 1.01 (ref 1–1.03)
SQUAMOUS #/AREA URNS HPF: ABNORMAL /HPF
UROBILINOGEN UR QL STRIP: ABNORMAL
WBC # BLD AUTO: 7.09 10*3/MM3 (ref 3.4–10.8)
WBC UR QL AUTO: ABNORMAL /HPF

## 2021-04-10 PROCEDURE — 87040 BLOOD CULTURE FOR BACTERIA: CPT | Performed by: PHYSICIAN ASSISTANT

## 2021-04-10 PROCEDURE — 99284 EMERGENCY DEPT VISIT MOD MDM: CPT

## 2021-04-10 PROCEDURE — 84145 PROCALCITONIN (PCT): CPT | Performed by: PHYSICIAN ASSISTANT

## 2021-04-10 PROCEDURE — 71045 X-RAY EXAM CHEST 1 VIEW: CPT

## 2021-04-10 PROCEDURE — 0202U NFCT DS 22 TRGT SARS-COV-2: CPT | Performed by: PHYSICIAN ASSISTANT

## 2021-04-10 PROCEDURE — 81001 URINALYSIS AUTO W/SCOPE: CPT | Performed by: PHYSICIAN ASSISTANT

## 2021-04-10 PROCEDURE — 80053 COMPREHEN METABOLIC PANEL: CPT | Performed by: PHYSICIAN ASSISTANT

## 2021-04-10 PROCEDURE — 85025 COMPLETE CBC W/AUTO DIFF WBC: CPT | Performed by: PHYSICIAN ASSISTANT

## 2021-04-10 PROCEDURE — 83605 ASSAY OF LACTIC ACID: CPT | Performed by: PHYSICIAN ASSISTANT

## 2021-04-10 RX ORDER — SODIUM CHLORIDE 0.9 % (FLUSH) 0.9 %
10 SYRINGE (ML) INJECTION AS NEEDED
Status: DISCONTINUED | OUTPATIENT
Start: 2021-04-10 | End: 2021-04-10 | Stop reason: HOSPADM

## 2021-04-10 RX ORDER — ACETAMINOPHEN 325 MG/1
650 TABLET ORAL ONCE
Status: COMPLETED | OUTPATIENT
Start: 2021-04-10 | End: 2021-04-10

## 2021-04-10 RX ADMIN — ACETAMINOPHEN 650 MG: 325 TABLET, FILM COATED ORAL at 09:27

## 2021-04-10 RX ADMIN — SODIUM CHLORIDE 1000 ML: 9 INJECTION, SOLUTION INTRAVENOUS at 09:24

## 2021-04-10 NOTE — ED TRIAGE NOTES
Pt reports she had her second dose of the pfizer vaccine around 1430 yesterday. Pt now reports a headache, body aches and chills that started around 0400 this morning.  Pt denies feeling ill prior to vaccine. Pt reports taking Tylenol 325MG around 0730 this morning.    Pt was wearing a mask during assessment.  This RN wore appropriate PPE

## 2021-04-10 NOTE — ED PROVIDER NOTES
EMERGENCY DEPARTMENT ENCOUNTER    Room Number:  02/02  Date of encounter:  4/10/2021  PCP: Grover Garcias MD  Historian: Patient, daughter      I used full protective equipment while examining this patient.  This includes face mask, gloves and protective eyewear.  I washed my hands before entering the room and immediately upon leaving the room      HPI:  Chief Complaint: Fever, malaise, cough  A complete HPI/ROS/PMH/PSH/SH/FH are unobtainable due to: Nothing    Context: Marilia Leo is a 76 y.o. female who presents to the ED c/o sudden onset of fever, headache, malaise, and cough, shortness of breath, myalgias.  Patient states this started approximately 4 AM this morning.  Patient did receive her second Covid vaccination yesterday afternoon.  Patient states she did well initially with this.  Patient states her cough and shortness of breath have resolved however she still feels ill with muscle aches and a headache.  Patient denies any known COVID-19 exposure.  She denies any dysuria, abdominal pain, vomiting.  She did take 325 of Tylenol this morning.  Patient denies any precipitating or alleviating factors.    Review of Medical Records  I reviewed patient's last office visit with her internal medicine doctor from 3/1/2021.  Patient be treated for hypertension, hypercholesterolemia.    PAST MEDICAL HISTORY  Active Ambulatory Problems     Diagnosis Date Noted   • Benign essential hypertension 04/18/2016   • Cervical radiculopathy 04/18/2016   • Glaucoma 04/18/2016   • Pure hypercholesterolemia 04/18/2016   • Menopausal symptom 04/18/2016   • Atrophic vaginitis 04/18/2016   • Health care maintenance 10/13/2016   • Thoracic radiculopathy 03/07/2017   • Lumbar radiculopathy, chronic 03/07/2017   • Edema, lower extremity 01/16/2020   • Elevated LFTs 08/04/2020   • Fever of unknown origin (FUO) 08/04/2020   • Nausea 08/04/2020   • Sinus tachycardia 08/04/2020     Resolved Ambulatory Problems     Diagnosis Date Noted    • Atopic dermatitis 2016   • Neck pain 2016   • Trochanteric bursitis of left hip 10/13/2016   • Chronic fatigue 2017   • Impacted cerumen of right ear 10/16/2017   • Impaired fasting blood sugar 2019   • Palpitations 2019   • Sepsis (CMS/HCC) 2020   • Back pain 2020     Past Medical History:   Diagnosis Date   • Angioedema    • Cataract    • Diverticulosis    • Edema    • Esophageal reflux    • Hammer toe    • History of mammogram    • Hx of bone scan    • Hypercholesterolemia    • Medial epicondylitis    • Papanicolaou smear    • Trochanteric bursitis          PAST SURGICAL HISTORY  Past Surgical History:   Procedure Laterality Date   • APPENDECTOMY     • CERVICAL FUSION      Dr.John Sexton   • COLONOSCOPY      2007 Dr Bradshaw Normal   • CORNEAL TRANSPLANT     • FETAL BLOOD TRANSFUSION     • FOOT SURGERY  2013    right bunion and hammer toe Dr Phelan   • SUBTOTAL HYSTERECTOMY      ovaries intact         FAMILY HISTORY  Family History   Problem Relation Age of Onset   • Hypertension Mother    • Diabetes Mother    • Hypertension Father    • Diabetes Sister    • Multiple sclerosis Sister    • Rheum arthritis Sister    • Lung cancer Sister         smoker   • Diabetes Brother    • Heart disease Brother    • Hypertension Brother    • Rheum arthritis Brother    • Sarcoidosis Brother    • Glaucoma Brother          SOCIAL HISTORY  Social History     Socioeconomic History   • Marital status: Single     Spouse name: Not on file   • Number of children: Not on file   • Years of education: Not on file   • Highest education level: Not on file   Tobacco Use   • Smoking status: Former Smoker     Packs/day: 1.00     Years: 5.00     Pack years: 5.00     Types: Cigarettes     Quit date:      Years since quittin.3   • Smokeless tobacco: Never Used   Substance and Sexual Activity   • Alcohol use: Yes     Comment: occasionally   • Drug use: No         ALLERGIES  Codeine,  Macrobid [nitrofurantoin], Penicillins, and Sulfa antibiotics        REVIEW OF SYSTEMS  All systems reviewed and negative except for those discussed in HPI.       PHYSICAL EXAM    I have reviewed the triage vital signs and nursing notes.    ED Triage Vitals   Temp Heart Rate Resp BP SpO2   04/10/21 0829 04/10/21 0828 04/10/21 0828 -- 04/10/21 0828   (!) 100.8 °F (38.2 °C) 120 18  94 %      Temp src Heart Rate Source Patient Position BP Location FiO2 (%)   04/10/21 0829 04/10/21 0828 -- -- --   Tympanic Monitor          Physical Exam  GENERAL: Alert, oriented, not distressed  HENT: head atraumatic, no nuchal rigidity  EYES: no scleral icterus, EOMI  CV: regular rhythm, regular rate, no murmur  RESPIRATORY: normal effort, CTA  ABDOMEN: soft, nontender  MUSCULOSKELETAL: no deformity, FROM, no calf swelling or tenderness  NEURO: alert, moves all extremities, follows commands  SKIN: warm, dry        LAB RESULTS  Recent Results (from the past 24 hour(s))   Respiratory Panel PCR w/COVID-19(SARS-CoV-2) MICHAEL/DEVYN/CALLIE/PAD/COR/MAD/TATIANNA In-House, NP Swab in UTM/VTM, 3-4 HR TAT - Swab, Nasopharynx    Collection Time: 04/10/21  9:07 AM    Specimen: Nasopharynx; Swab   Result Value Ref Range    ADENOVIRUS, PCR Not Detected Not Detected    Coronavirus 229E Not Detected Not Detected    Coronavirus HKU1 Not Detected Not Detected    Coronavirus NL63 Not Detected Not Detected    Coronavirus OC43 Not Detected Not Detected    COVID19 Not Detected Not Detected - Ref. Range    Human Metapneumovirus Not Detected Not Detected    Human Rhinovirus/Enterovirus Not Detected Not Detected    Influenza A PCR Not Detected Not Detected    Influenza B PCR Not Detected Not Detected    Parainfluenza Virus 1 Not Detected Not Detected    Parainfluenza Virus 2 Not Detected Not Detected    Parainfluenza Virus 3 Not Detected Not Detected    Parainfluenza Virus 4 Not Detected Not Detected    RSV, PCR Not Detected Not Detected    Bordetella pertussis pcr Not  Detected Not Detected    Bordetella parapertussis PCR Not Detected Not Detected    Chlamydophila pneumoniae PCR Not Detected Not Detected    Mycoplasma pneumo by PCR Not Detected Not Detected   Comprehensive Metabolic Panel    Collection Time: 04/10/21  9:22 AM    Specimen: Blood   Result Value Ref Range    Glucose 107 (H) 65 - 99 mg/dL    BUN 15 8 - 23 mg/dL    Creatinine 0.93 0.57 - 1.00 mg/dL    Sodium 142 136 - 145 mmol/L    Potassium 3.4 (L) 3.5 - 5.2 mmol/L    Chloride 108 (H) 98 - 107 mmol/L    CO2 24.5 22.0 - 29.0 mmol/L    Calcium 8.8 8.6 - 10.5 mg/dL    Total Protein 7.1 6.0 - 8.5 g/dL    Albumin 4.20 3.50 - 5.20 g/dL    ALT (SGPT) 12 1 - 33 U/L    AST (SGOT) 15 1 - 32 U/L    Alkaline Phosphatase 74 39 - 117 U/L    Total Bilirubin 0.5 0.0 - 1.2 mg/dL    eGFR  African Amer 71 >60 mL/min/1.73    Globulin 2.9 gm/dL    A/G Ratio 1.4 g/dL    BUN/Creatinine Ratio 16.1 7.0 - 25.0    Anion Gap 9.5 5.0 - 15.0 mmol/L   Lactic Acid, Plasma    Collection Time: 04/10/21  9:22 AM    Specimen: Blood   Result Value Ref Range    Lactate 1.7 0.5 - 2.0 mmol/L   Procalcitonin    Collection Time: 04/10/21  9:22 AM    Specimen: Blood   Result Value Ref Range    Procalcitonin 0.07 0.00 - 0.25 ng/mL   CBC Auto Differential    Collection Time: 04/10/21  9:22 AM    Specimen: Blood   Result Value Ref Range    WBC 7.09 3.40 - 10.80 10*3/mm3    RBC 4.71 3.77 - 5.28 10*6/mm3    Hemoglobin 12.9 12.0 - 15.9 g/dL    Hematocrit 39.5 34.0 - 46.6 %    MCV 83.9 79.0 - 97.0 fL    MCH 27.4 26.6 - 33.0 pg    MCHC 32.7 31.5 - 35.7 g/dL    RDW 13.2 12.3 - 15.4 %    RDW-SD 39.9 37.0 - 54.0 fl    MPV 9.3 6.0 - 12.0 fL    Platelets 287 140 - 450 10*3/mm3    Neutrophil % 76.1 (H) 42.7 - 76.0 %    Lymphocyte % 11.8 (L) 19.6 - 45.3 %    Monocyte % 6.2 5.0 - 12.0 %    Eosinophil % 4.8 0.3 - 6.2 %    Basophil % 0.8 0.0 - 1.5 %    Immature Grans % 0.3 0.0 - 0.5 %    Neutrophils, Absolute 5.39 1.70 - 7.00 10*3/mm3    Lymphocytes, Absolute 0.84 0.70 - 3.10  10*3/mm3    Monocytes, Absolute 0.44 0.10 - 0.90 10*3/mm3    Eosinophils, Absolute 0.34 0.00 - 0.40 10*3/mm3    Basophils, Absolute 0.06 0.00 - 0.20 10*3/mm3    Immature Grans, Absolute 0.02 0.00 - 0.05 10*3/mm3    nRBC 0.0 0.0 - 0.2 /100 WBC   Urinalysis With Microscopic If Indicated (No Culture) - Urine, Clean Catch    Collection Time: 04/10/21 10:50 AM    Specimen: Urine, Clean Catch   Result Value Ref Range    Color, UA Yellow Yellow, Straw    Appearance, UA Clear Clear    pH, UA 7.0 5.0 - 8.0    Specific Gravity, UA 1.014 1.005 - 1.030    Glucose, UA Negative Negative    Ketones, UA Negative Negative    Bilirubin, UA Negative Negative    Blood, UA Negative Negative    Protein, UA Trace (A) Negative    Leuk Esterase, UA Large (3+) (A) Negative    Nitrite, UA Negative Negative    Urobilinogen, UA 1.0 E.U./dL 0.2 - 1.0 E.U./dL   Urinalysis, Microscopic Only - Urine, Clean Catch    Collection Time: 04/10/21 10:50 AM    Specimen: Urine, Clean Catch   Result Value Ref Range    RBC, UA 0-2 None Seen, 0-2 /HPF    WBC, UA 31-50 (A) None Seen, 0-2 /HPF    Bacteria, UA None Seen None Seen /HPF    Squamous Epithelial Cells, UA 0-2 None Seen, 0-2 /HPF    Hyaline Casts, UA 0-2 None Seen /LPF    Methodology Automated Microscopy        Ordered the above labs and independently reviewed the results.        RADIOLOGY  XR Chest 1 View    Result Date: 4/10/2021  XR CHEST 1 VW-  HISTORY:  Cough, fever.  COMPARISON:  Chest radiograph 08/04/2020.  FINDINGS:  A single view of the chest was obtained. The cardiac silhouette and mediastinal and hilar contours are not significantly changed. There is calcific aortic atherosclerosis. Lungs and pleural spaces are clear. There is multilevel degenerative disc disease. There is partially surgical hardware projecting over the lower cervical spine. A safety pin projecting over the right axilla is likely external.  This report was finalized on 4/10/2021 11:00 AM by Dr. Rose Foss M.D.        I  ordered the above noted radiological studies. Reviewed by me and discussed with radiologist.  See dictation for official radiology interpretation.    MEDICATIONS GIVEN IN ER    Medications   sodium chloride 0.9 % bolus 1,000 mL (0 mL Intravenous Stopped 4/10/21 1217)   acetaminophen (TYLENOL) tablet 650 mg (650 mg Oral Given 4/10/21 0927)         PROGRESS, DATA ANALYSIS, CONSULTS, AND MEDICAL DECISION MAKING    All labs have been independently reviewed by me.  All radiology studies have been reviewed by me and discussed with radiologist dictating the report.   EKG's independently viewed and interpreted by me.  Discussion below represents my analysis of pertinent findings related to patient's condition, differential diagnosis, treatment plan and final disposition.    I have discussed case with Dr. Day, emergency room physician.  He has performed his own bedside examination and agrees with treatment plan.    ED Course as of Apr 10 1437   Sat Apr 10, 2021   0845 Patient presents with malaise, headache, fever, cough.  Patient did receive the second Covid vaccination yesterday.  Patient is febrile and tachycardic here.  We will check basic labs, chest x-ray, urine, Covid test to rule out underlying infection.    [EE]   0949 WBC: 7.09 [EE]   0949 Hemoglobin: 12.9 [EE]   1008 Procalcitonin: 0.07 [EE]   1008 Lactate: 1.7 [EE]   1056 Chest x-ray interpreted by myself shows no acute infiltrate or effusion.  I will await final radiologist interpretation.    [EE]   1127 Patient has stable vitals and a fairly benign work-up here.  No evidence of sepsis, pneumonia.  Patient has stable vitals.  Plan to discharge.  I suspect this is all due to patient's recent Covid vaccination.  I have discussed this with the patient and daughter, they are in agreement with discharge plan.    [EE]   1129 COVID19: Not Detected [EE]   1129 Bacteria, UA: None Seen [EE]      ED Course User Index  [EE] Star Encarnacion PA       AS OF 14:37 EDT  VITALS:    BP - 133/87  HR - 94  TEMP - (!) 100.8 °F (38.2 °C) (Tympanic)  O2 SATS - 98%        DIAGNOSIS  Final diagnoses:   Adverse effect of vaccine, initial encounter         DISPOSITION  Discharged           Star Encarnacion PA  04/10/21 [id number]

## 2021-04-10 NOTE — ED PROVIDER NOTES
Brief history of present illness: 76-year-old female presents the emergency department for evaluation of sudden onset fevers, chills, transient dyspnea, and headache.  She received her second COVID-19 immunization shot yesterday at 1400.  Tylenol 325 at 0730 this morning.    Physical exam:    ED Triage Vitals   Temp Heart Rate Resp BP SpO2   04/10/21 0829 04/10/21 0828 04/10/21 0828 04/10/21 0854 04/10/21 0828   (!) 100.8 °F (38.2 °C) 120 18 130/79 94 %      Temp src Heart Rate Source Patient Position BP Location FiO2 (%)   04/10/21 0829 04/10/21 0828 04/10/21 0854 04/10/21 0854 --   Tympanic Monitor Lying Right arm      Uncomfortable appearing though not overtly toxic.  Pink warm well perfused no respiratory distress.  No lower extremity edema or tenderness.  Awake and alert.    MDM: With plan for laboratory and radiologic evaluation of this patient as well as symptomatic treatment.  Patient agreeable with plan.    I have seen and personally evaluated this patient, discussed the case with the treating advanced practice provider, and reviewed their note. I was involved in the medical decision making during the evaluation, testing and disposition planning for this patient.     Claudy Day MD  04/10/21 0954

## 2021-04-10 NOTE — ED NOTES
This RN wore mask, gloves, gown, and protective eyewear throughout patient encounter. Pt wearing mask at all times. Hand hygiene performed before and after entering room.        Shiela Zarate RN  04/10/21 [id number]

## 2021-04-12 ENCOUNTER — EPISODE CHANGES (OUTPATIENT)
Dept: CASE MANAGEMENT | Facility: OTHER | Age: 76
End: 2021-04-12

## 2021-04-13 ENCOUNTER — PATIENT OUTREACH (OUTPATIENT)
Dept: CASE MANAGEMENT | Facility: OTHER | Age: 76
End: 2021-04-13

## 2021-04-13 NOTE — OUTREACH NOTE
Patient Outreach Note    Brief call with pt to follow up recent ED visit 4/10 with symptoms after second vaccine injection. Pt states she is doing fine. Only experiencing a slight headache. No questions, concerns or needs regarding health wellness. Pt given number for 24/7 hotliine. Pt appreciative of phone call and declined to participate in care advising program. No needs identified.     Marium Copeland RN  Ambulatory     4/13/2021, 11:30 EDT

## 2021-04-15 LAB
BACTERIA SPEC AEROBE CULT: NORMAL
BACTERIA SPEC AEROBE CULT: NORMAL

## 2021-09-20 ENCOUNTER — E-VISIT (OUTPATIENT)
Dept: FAMILY MEDICINE CLINIC | Facility: TELEHEALTH | Age: 76
End: 2021-09-20

## 2021-09-20 DIAGNOSIS — R39.89 SUSPECTED UTI: Primary | ICD-10-CM

## 2021-09-20 PROCEDURE — 99422 OL DIG E/M SVC 11-20 MIN: CPT | Performed by: NURSE PRACTITIONER

## 2021-09-20 RX ORDER — AMLODIPINE BESYLATE 5 MG/1
TABLET ORAL
COMMUNITY
Start: 2021-09-02 | End: 2021-09-28 | Stop reason: SDUPTHER

## 2021-09-20 RX ORDER — CIPROFLOXACIN 250 MG/1
250 TABLET, FILM COATED ORAL 2 TIMES DAILY
Qty: 6 TABLET | Refills: 0 | Status: SHIPPED | OUTPATIENT
Start: 2021-09-20 | End: 2021-09-23

## 2021-09-20 NOTE — PATIENT INSTRUCTIONS
Urinary Tract Infection, Adult    A urinary tract infection (UTI) is an infection of any part of the urinary tract. The urinary tract includes the kidneys, ureters, bladder, and urethra. These organs make, store, and get rid of urine in the body.  Your health care provider may use other names to describe the infection. An upper UTI affects the ureters and kidneys (pyelonephritis). A lower UTI affects the bladder (cystitis) and urethra (urethritis).  What are the causes?  Most urinary tract infections are caused by bacteria in your genital area, around the entrance to your urinary tract (urethra). These bacteria grow and cause inflammation of your urinary tract.  What increases the risk?  You are more likely to develop this condition if:  · You have a urinary catheter that stays in place (indwelling).  · You are not able to control when you urinate or have a bowel movement (you have incontinence).  · You are female and you:  ? Use a spermicide or diaphragm for birth control.  ? Have low estrogen levels.  ? Are pregnant.  · You have certain genes that increase your risk (genetics).  · You are sexually active.  · You take antibiotic medicines.  · You have a condition that causes your flow of urine to slow down, such as:  ? An enlarged prostate, if you are male.  ? Blockage in your urethra (stricture).  ? A kidney stone.  ? A nerve condition that affects your bladder control (neurogenic bladder).  ? Not getting enough to drink, or not urinating often.  · You have certain medical conditions, such as:  ? Diabetes.  ? A weak disease-fighting system (immunesystem).  ? Sickle cell disease.  ? Gout.  ? Spinal cord injury.  What are the signs or symptoms?  Symptoms of this condition include:  · Needing to urinate right away (urgently).  · Frequent urination or passing small amounts of urine frequently.  · Pain or burning with urination.  · Blood in the urine.  · Urine that smells bad or unusual.  · Trouble urinating.  · Cloudy  urine.  · Vaginal discharge, if you are female.  · Pain in the abdomen or the lower back.  You may also have:  · Vomiting or a decreased appetite.  · Confusion.  · Irritability or tiredness.  · A fever.  · Diarrhea.  The first symptom in older adults may be confusion. In some cases, they may not have any symptoms until the infection has worsened.  How is this diagnosed?  This condition is diagnosed based on your medical history and a physical exam. You may also have other tests, including:  · Urine tests.  · Blood tests.  · Tests for sexually transmitted infections (STIs).  If you have had more than one UTI, a cystoscopy or imaging studies may be done to determine the cause of the infections.  How is this treated?  Treatment for this condition includes:  · Antibiotic medicine.  · Over-the-counter medicines to treat discomfort.  · Drinking enough water to stay hydrated.  If you have frequent infections or have other conditions such as a kidney stone, you may need to see a health care provider who specializes in the urinary tract (urologist).  In rare cases, urinary tract infections can cause sepsis. Sepsis is a life-threatening condition that occurs when the body responds to an infection. Sepsis is treated in the hospital with IV antibiotics, fluids, and other medicines.  Follow these instructions at home:    Medicines  · Take over-the-counter and prescription medicines only as told by your health care provider.  · If you were prescribed an antibiotic medicine, take it as told by your health care provider. Do not stop using the antibiotic even if you start to feel better.  General instructions  · Make sure you:  ? Empty your bladder often and completely. Do not hold urine for long periods of time.  ? Empty your bladder after sex.  ? Wipe from front to back after a bowel movement if you are female. Use each tissue one time when you wipe.  · Drink enough fluid to keep your urine pale yellow.  · Keep all follow-up  visits as told by your health care provider. This is important.  Contact a health care provider if:  · Your symptoms do not get better after 1-2 days.  · Your symptoms go away and then return.  Get help right away if you have:  · Severe pain in your back or your lower abdomen.  · A fever.  · Nausea or vomiting.  Summary  · A urinary tract infection (UTI) is an infection of any part of the urinary tract, which includes the kidneys, ureters, bladder, and urethra.  · Most urinary tract infections are caused by bacteria in your genital area, around the entrance to your urinary tract (urethra).  · Treatment for this condition often includes antibiotic medicines.  · If you were prescribed an antibiotic medicine, take it as told by your health care provider. Do not stop using the antibiotic even if you start to feel better.  · Keep all follow-up visits as told by your health care provider. This is important.  This information is not intended to replace advice given to you by your health care provider. Make sure you discuss any questions you have with your health care provider.  Document Revised: 12/05/2019 Document Reviewed: 06/27/2019  [employer] Patient Education © 2021 [employer]

## 2021-09-20 NOTE — PROGRESS NOTES
Marilia Leo    1945  [id number]    I have reviewed the e-Visit questionnaire and patient's answers, my assessment and plan are as follows:      HPI  Marilia Leo is a 76 y.o. with a 1-4 day history of urinary frequency and urgency.  She denies fever/chills, nausea/vomiting, belly/back pain, hematuria, or vaginal symptoms.  She has had similar symptoms in the past for which she was hospitalized, the infection resolved with antibiotic treatment for UTI    Review of Systems - Negative except symptoms listed in HPI      Diagnoses and all orders for this visit:    1. Suspected UTI (Primary)  -     ciprofloxacin (Cipro) 250 MG tablet; Take 1 tablet by mouth 2 (Two) Times a Day for 3 days.  Dispense: 6 tablet; Refill: 0    --Cipro is an antibiotic for bacterial infections; complete this as prescribed    -Continue to increase your fluid intake.   -Abstain from intercourse during antibiotic treatment.   -Practice good perineal hygiene: wipe front to back  -Do not hold your urine- go to the bathroom every 2-3 hours.     -Warning signs: severe abdominal/pelvic/back pain, fever >101, blood in urine - seek medical attention as soon as possible for a hands on/objective exam and possible labs.     -Follow up with your PCP in 2 days if no improvement in symptoms or if symptoms begin to worsen.       Any medications prescribed have been sent electronically to   [id number] - [place], [place] - [address] AT [facility] - [phone]  - [phone] FX  [address]  [facility] [address]  Phone: [phone] Fax: [phone]    [facility] - [place], [place] - [address] [facility] - [phone]  - [phone] FX  [address] [facility] [address]  Phone: [phone] Fax: [phone]      Time Documentation  Counseled patient  Counseling topics: diagnosis, treatment options and return instructions  Total encounter time: counseling time more than 50%  of visit: 20 minutes        Stefani Diop, APRN  09/20/21  13:55 EDT

## 2021-09-28 ENCOUNTER — OFFICE VISIT (OUTPATIENT)
Dept: INTERNAL MEDICINE | Facility: CLINIC | Age: 76
End: 2021-09-28

## 2021-09-28 VITALS
SYSTOLIC BLOOD PRESSURE: 123 MMHG | HEART RATE: 88 BPM | BODY MASS INDEX: 23.76 KG/M2 | DIASTOLIC BLOOD PRESSURE: 68 MMHG | OXYGEN SATURATION: 98 % | TEMPERATURE: 98 F | HEIGHT: 64 IN | WEIGHT: 139.2 LBS

## 2021-09-28 DIAGNOSIS — R35.0 URINARY FREQUENCY: ICD-10-CM

## 2021-09-28 DIAGNOSIS — Z00.00 MEDICARE ANNUAL WELLNESS VISIT, SUBSEQUENT: Primary | ICD-10-CM

## 2021-09-28 DIAGNOSIS — E78.00 PURE HYPERCHOLESTEROLEMIA: ICD-10-CM

## 2021-09-28 DIAGNOSIS — Z13.820 SCREENING FOR OSTEOPOROSIS: ICD-10-CM

## 2021-09-28 DIAGNOSIS — Z78.0 POSTMENOPAUSAL: ICD-10-CM

## 2021-09-28 PROBLEM — M54.14 THORACIC RADICULOPATHY: Status: RESOLVED | Noted: 2017-03-07 | Resolved: 2021-09-28

## 2021-09-28 LAB
BACTERIA UR QL AUTO: ABNORMAL /HPF
BILIRUB UR QL STRIP: NEGATIVE
CLARITY UR: CLEAR
COLOR UR: YELLOW
GLUCOSE UR STRIP-MCNC: NEGATIVE MG/DL
HGB UR QL STRIP.AUTO: NEGATIVE
HYALINE CASTS UR QL AUTO: ABNORMAL /LPF
KETONES UR QL STRIP: NEGATIVE
LEUKOCYTE ESTERASE UR QL STRIP.AUTO: NEGATIVE
NITRITE UR QL STRIP: NEGATIVE
PH UR STRIP.AUTO: 7.5 [PH] (ref 5–8)
PROT UR QL STRIP: NEGATIVE
RBC # UR: ABNORMAL /HPF
REF LAB TEST METHOD: ABNORMAL
SP GR UR STRIP: 1.02 (ref 1–1.03)
SQUAMOUS #/AREA URNS HPF: ABNORMAL /HPF
UROBILINOGEN UR QL STRIP: NORMAL
WBC UR QL AUTO: ABNORMAL /HPF

## 2021-09-28 PROCEDURE — 99213 OFFICE O/P EST LOW 20 MIN: CPT | Performed by: FAMILY MEDICINE

## 2021-09-28 PROCEDURE — G0439 PPPS, SUBSEQ VISIT: HCPCS | Performed by: FAMILY MEDICINE

## 2021-09-28 PROCEDURE — 1126F AMNT PAIN NOTED NONE PRSNT: CPT | Performed by: FAMILY MEDICINE

## 2021-09-28 PROCEDURE — 81001 URINALYSIS AUTO W/SCOPE: CPT | Performed by: FAMILY MEDICINE

## 2021-09-28 PROCEDURE — 1170F FXNL STATUS ASSESSED: CPT | Performed by: FAMILY MEDICINE

## 2021-09-28 PROCEDURE — 96160 PT-FOCUSED HLTH RISK ASSMT: CPT | Performed by: FAMILY MEDICINE

## 2021-09-28 PROCEDURE — 1159F MED LIST DOCD IN RCRD: CPT | Performed by: FAMILY MEDICINE

## 2021-09-28 RX ORDER — DEXAMETHASONE SODIUM PHOSPHATE 1 MG/ML
1 SOLUTION/ DROPS OPHTHALMIC DAILY
COMMUNITY
Start: 2021-08-27 | End: 2022-01-28

## 2021-09-28 RX ORDER — ATROPINE SULFATE 10 MG/ML
1 SOLUTION/ DROPS OPHTHALMIC DAILY
Status: ON HOLD | COMMUNITY
Start: 2021-07-10 | End: 2023-01-11

## 2021-09-28 NOTE — PROGRESS NOTES
The ABCs of the Annual Wellness Visit  Subsequent Medicare Wellness Visit    Chief Complaint   Patient presents with   • Medicare Wellness-subsequent     awv      Subjective    History of Present Illness:  Marilia Leo is a 76 y.o. female who presents for a Subsequent Medicare Wellness Visit.    The following portions of the patient's history were reviewed and   updated as appropriate: allergies, current medications, past family history, past medical history, past social history, past surgical history and problem list.    Compared to one year ago, the patient feels her physical   health is the same except for Urinary frequency    Compared to one year ago, the patient feels her mental   health is the same.    Recent Hospitalizations:  She was not admitted to the hospital during the last year.       Current Medical Providers:  Patient Care Team:  Grover Garcias MD as PCP - General (Family Medicine)    Outpatient Medications Prior to Visit   Medication Sig Dispense Refill   • acetaminophen (TYLENOL) 325 MG tablet Take 325 mg by mouth As Needed.     • amLODIPine (NORVASC) 5 MG tablet Take 1 tablet by mouth Daily. 90 tablet 3   • Carboxymeth-Glycerin-Polysorb (REFRESH OPTIVE ALBERTO-3 OP) Apply 1 drop to eye(s) as directed by provider 2 (Two) Times a Day As Needed.     • dorzolamide-timolol (COSOPT) 22.3-6.8 MG/ML ophthalmic solution Administer 1 drop to both eyes 2 (Two) Times a Day. Patient uses PF Cosopt and wishes to use own supply of PF     • gabapentin (NEURONTIN) 100 MG capsule Take 1 capsule by mouth 3 (Three) Times a Day. Takes one in AM and 2 at night 270 capsule 1   • irbesartan (AVAPRO) 150 MG tablet Take 1 tablet by mouth Every Night. 90 tablet 3   • natamycin (Natacyn) 5 % ophthalmic solution Administer 1 drop to the right eye 2 (two) times a day. Cycles on for 14 days and off for 14 days  Patient does not need until 8/18/20, this comes from compound pharmacy     • Netarsudil Dimesylate (Rhopressa) 0.02 %  solution Administer 1 drop into the left eye Every Night.     • ofloxacin (OCUFLOX) 0.3 % ophthalmic solution Administer 1 drop into the left eye 2 (Two) Times a Day.     • difluprednate (Durezol) 0.05 % ophthalmic emulsion Administer 1 drop into the left eye Daily. Daily in Left eye and q2h while awake in right eye     • atropine 1 % ophthalmic solution Administer 1 drop to both eyes Daily.     • dexamethasone (DECADRON) 0.1 % ophthalmic solution Administer 1 drop to both eyes Daily.     • amLODIPine (NORVASC) 5 MG tablet        No facility-administered medications prior to visit.       No opioid medication identified on active medication list. I have reviewed chart for other potential  high risk medication/s and harmful drug interactions in the elderly.          Aspirin is not on active medication list.  Aspirin use is not indicated based on review of current medical condition/s. Risk of harm outweighs potential benefits.  .    Patient Active Problem List   Diagnosis   • Benign essential hypertension   • Cervical radiculopathy   • Glaucoma   • Pure hypercholesterolemia   • Menopausal symptom   • Atrophic vaginitis   • Health care maintenance   • Cervical radiculopathy   • Edema, lower extremity   • Elevated LFTs   • Fever of unknown origin (FUO)   • Nausea   • Sinus tachycardia     Advance Care Planning  Advance Directive is on file.  ACP discussion was held with the patient during this visit. Patient has an advance directive in EMR which is still valid.     Review of Systems   Constitutional: Negative for activity change, fatigue and fever.   Respiratory: Negative for cough and shortness of breath.    Cardiovascular: Negative for chest pain and palpitations.   Gastrointestinal: Negative for abdominal pain.        Objective    Vitals:    09/28/21 1007   BP: 123/68   BP Location: Left arm   Patient Position: Sitting   Cuff Size: Adult   Pulse: 88   Temp: 98 °F (36.7 °C)   TempSrc: Temporal   SpO2: 98%   Weight:  "63.1 kg (139 lb 3.2 oz)   Height: 162.6 cm (64.02\")   PainSc: 0-No pain     BMI Readings from Last 1 Encounters:   21 23.88 kg/m²   BMI is within normal parameters. No follow-up required.  Body mass index is 23.88 kg/m².  BMI has not been calculated during today's encounter.     Does the patient have evidence of cognitive impairment? No    Physical Exam  Vitals and nursing note reviewed.   Constitutional:       General: She is not in acute distress.     Appearance: Normal appearance.   Cardiovascular:      Rate and Rhythm: Normal rate and regular rhythm.      Heart sounds: Normal heart sounds. No murmur heard.     Pulmonary:      Effort: Pulmonary effort is normal.      Breath sounds: Normal breath sounds.   Neurological:      Mental Status: She is alert.                 HEALTH RISK ASSESSMENT    Smoking Status:  Social History     Tobacco Use   Smoking Status Former Smoker   • Packs/day: 1.00   • Years: 5.00   • Pack years: 5.00   • Types: Cigarettes   • Quit date:    • Years since quittin.7   Smokeless Tobacco Never Used     Alcohol Consumption:  Social History     Substance and Sexual Activity   Alcohol Use Yes    Comment: occasionally     Fall Risk Screen:    [facility] Fall Risk Assessment has not been completed.    Depression Screening:  PHQ-2/PHQ-9 Depression Screening 2021   Little interest or pleasure in doing things 0   Feeling down, depressed, or hopeless 0   Total Score 0       Health Habits and Functional and Cognitive Screening:  Functional & Cognitive Status 2021   Do you have difficulty preparing food and eating? Yes   Do you have difficulty bathing yourself, getting dressed or grooming yourself? No   Do you have difficulty using the toilet? No   Do you have difficulty moving around from place to place? Yes   Do you have trouble with steps or getting out of a bed or a chair? Yes   Current Diet Well Balanced Diet   Dental Exam Not up to date   Eye Exam Up to date   Exercise (times " per week) 3 times per week   Current Exercises Include Walking;House Cleaning   Do you need help using the phone?  No   Are you deaf or do you have serious difficulty hearing?  Yes   Do you need help with transportation? Yes   Do you need help shopping? Yes   Do you need help preparing meals?  Yes   Do you need help with housework?  Yes   Do you need help with laundry? No   Do you need help taking your medications? No   Do you need help managing money? No   Do you ever drive or ride in a car without wearing a seat belt? No   Have you felt unusual stress, anger or loneliness in the last month? No   Who do you live with? Spouse   If you need help, do you have trouble finding someone available to you? No   Have you been bothered in the last four weeks by sexual problems? No   Do you have difficulty concentrating, remembering or making decisions? Yes       Age-appropriate Screening Schedule:  Refer to the list below for future screening recommendations based on patient's age, sex and/or medical conditions. Orders for these recommended tests are listed in the plan section. The patient has been provided with a written plan.    Health Maintenance   Topic Date Due   • DXA SCAN  09/25/2020   • TDAP/TD VACCINES (1 - Tdap) 10/13/2026 (Originally 10/14/2016)   • INFLUENZA VACCINE  10/01/2021   • MAMMOGRAM  02/04/2022   • LIPID PANEL  03/01/2022   • ZOSTER VACCINE  Addressed              Assessment/Plan   CMS Preventative Services Quick Reference  Risk Factors Identified During Encounter  None Identified  The above risks/problems have been discussed with the patient.  Follow up actions/plans if indicated are seen below in the Assessment/Plan Section.  Pertinent information has been shared with the patient in the After Visit Summary.    Diagnoses and all orders for this visit:    1. Medicare annual wellness visit, subsequent (Primary)    2. Urinary frequency  -     Urine Culture - Urine, Urine, Clean Catch  -     Urinalysis With  Microscopic - Urine, Clean Catch    3. Pure hypercholesterolemia  -     Comprehensive Metabolic Panel; Future  -     CBC (No Diff); Future  -     Lipid Panel; Future    4. Postmenopausal  -     DEXA Bone Density Axial; Future    5. Screening for osteoporosis  -     DEXA Bone Density Axial; Future      AWV completed as above.    Follow Up:   Return in about 6 months (around 3/28/2022) for Recheck - HTN, HLD.     An After Visit Summary and PPPS were made available to the patient.    Additional code:    Recently had an E-visit for presumed UTI and treated with Cipro.  Note reviewed.  Still having itching and irritation around the labia.  Also having frequent nocturia and urinary frequency.  See Dr Medina for gynecology.  Will check UA and urine culture.  If no signs of UTI, will have her call her gyn for exam.

## 2021-09-30 LAB
BACTERIA UR CULT: NORMAL
BACTERIA UR CULT: NORMAL

## 2021-10-04 ENCOUNTER — CLINICAL SUPPORT (OUTPATIENT)
Dept: INTERNAL MEDICINE | Facility: CLINIC | Age: 76
End: 2021-10-04

## 2021-10-04 DIAGNOSIS — Z78.0 POSTMENOPAUSAL: ICD-10-CM

## 2021-10-04 DIAGNOSIS — Z13.820 SCREENING FOR OSTEOPOROSIS: ICD-10-CM

## 2021-10-04 PROCEDURE — 77080 DXA BONE DENSITY AXIAL: CPT | Performed by: FAMILY MEDICINE

## 2021-11-07 DIAGNOSIS — M54.16 LUMBAR RADICULOPATHY, CHRONIC: ICD-10-CM

## 2021-11-08 RX ORDER — GABAPENTIN 100 MG/1
CAPSULE ORAL
Qty: 270 CAPSULE | Refills: 1 | Status: SHIPPED | OUTPATIENT
Start: 2021-11-08 | End: 2022-08-17

## 2021-12-15 DIAGNOSIS — I10 BENIGN ESSENTIAL HYPERTENSION: ICD-10-CM

## 2021-12-16 RX ORDER — IRBESARTAN 150 MG/1
150 TABLET ORAL NIGHTLY
Qty: 90 TABLET | Refills: 3 | Status: SHIPPED | OUTPATIENT
Start: 2021-12-16 | End: 2022-03-18 | Stop reason: SDUPTHER

## 2022-01-03 ENCOUNTER — APPOINTMENT (OUTPATIENT)
Dept: GENERAL RADIOLOGY | Facility: HOSPITAL | Age: 77
End: 2022-01-03

## 2022-01-03 ENCOUNTER — HOSPITAL ENCOUNTER (EMERGENCY)
Facility: HOSPITAL | Age: 77
Discharge: HOME | End: 2022-01-04
Attending: EMERGENCY MEDICINE | Admitting: EMERGENCY MEDICINE

## 2022-01-03 ENCOUNTER — E-VISIT (OUTPATIENT)
Dept: FAMILY MEDICINE CLINIC | Facility: TELEHEALTH | Age: 77
End: 2022-01-03

## 2022-01-03 DIAGNOSIS — E78.5 HYPERLIPIDEMIA, UNSPECIFIED HYPERLIPIDEMIA TYPE: ICD-10-CM

## 2022-01-03 DIAGNOSIS — I10 HYPERTENSION, UNSPECIFIED TYPE: ICD-10-CM

## 2022-01-03 DIAGNOSIS — B34.2 CORONAVIRUS INFECTION: Primary | ICD-10-CM

## 2022-01-03 DIAGNOSIS — S39.011A STRAIN OF ABDOMINAL WALL, INITIAL ENCOUNTER: ICD-10-CM

## 2022-01-03 LAB
ALBUMIN SERPL-MCNC: 4.4 G/DL (ref 3.5–5.2)
ALBUMIN/GLOB SERPL: 1.4 G/DL
ALP SERPL-CCNC: 78 U/L (ref 39–117)
ALT SERPL W P-5'-P-CCNC: 13 U/L (ref 1–33)
ANION GAP SERPL CALCULATED.3IONS-SCNC: 11.1 MMOL/L (ref 5–15)
AST SERPL-CCNC: 18 U/L (ref 1–32)
BASOPHILS # BLD AUTO: 0.09 10*3/MM3 (ref 0–0.2)
BASOPHILS NFR BLD AUTO: 0.9 % (ref 0–1.5)
BILIRUB SERPL-MCNC: 0.3 MG/DL (ref 0–1.2)
BUN SERPL-MCNC: 16 MG/DL (ref 8–23)
BUN/CREAT SERPL: 16.7 (ref 7–25)
CALCIUM SPEC-SCNC: 9.5 MG/DL (ref 8.6–10.5)
CHLORIDE SERPL-SCNC: 104 MMOL/L (ref 98–107)
CO2 SERPL-SCNC: 24.9 MMOL/L (ref 22–29)
CREAT SERPL-MCNC: 0.96 MG/DL (ref 0.57–1)
DEPRECATED RDW RBC AUTO: 40.6 FL (ref 37–54)
EOSINOPHIL # BLD AUTO: 0.97 10*3/MM3 (ref 0–0.4)
EOSINOPHIL NFR BLD AUTO: 9.5 % (ref 0.3–6.2)
ERYTHROCYTE [DISTWIDTH] IN BLOOD BY AUTOMATED COUNT: 13.1 % (ref 12.3–15.4)
GFR SERPL CREATININE-BSD FRML MDRD: 68 ML/MIN/1.73
GLOBULIN UR ELPH-MCNC: 3.1 GM/DL
GLUCOSE SERPL-MCNC: 91 MG/DL (ref 65–99)
HCT VFR BLD AUTO: 43.1 % (ref 34–46.6)
HGB BLD-MCNC: 13.4 G/DL (ref 12–15.9)
HOLD SPECIMEN: NORMAL
HOLD SPECIMEN: NORMAL
IMM GRANULOCYTES # BLD AUTO: 0.01 10*3/MM3 (ref 0–0.05)
IMM GRANULOCYTES NFR BLD AUTO: 0.1 % (ref 0–0.5)
LYMPHOCYTES # BLD AUTO: 4.92 10*3/MM3 (ref 0.7–3.1)
LYMPHOCYTES NFR BLD AUTO: 48.3 % (ref 19.6–45.3)
MCH RBC QN AUTO: 27 PG (ref 26.6–33)
MCHC RBC AUTO-ENTMCNC: 31.1 G/DL (ref 31.5–35.7)
MCV RBC AUTO: 86.7 FL (ref 79–97)
MONOCYTES # BLD AUTO: 0.83 10*3/MM3 (ref 0.1–0.9)
MONOCYTES NFR BLD AUTO: 8.2 % (ref 5–12)
NEUTROPHILS NFR BLD AUTO: 3.36 10*3/MM3 (ref 1.7–7)
NEUTROPHILS NFR BLD AUTO: 33 % (ref 42.7–76)
NRBC BLD AUTO-RTO: 0 /100 WBC (ref 0–0.2)
PLATELET # BLD AUTO: 302 10*3/MM3 (ref 140–450)
PMV BLD AUTO: 9.7 FL (ref 6–12)
POTASSIUM SERPL-SCNC: 4.1 MMOL/L (ref 3.5–5.2)
PROT SERPL-MCNC: 7.5 G/DL (ref 6–8.5)
QT INTERVAL: 410 MS
RBC # BLD AUTO: 4.97 10*6/MM3 (ref 3.77–5.28)
SODIUM SERPL-SCNC: 140 MMOL/L (ref 136–145)
TROPONIN T SERPL-MCNC: <0.01 NG/ML (ref 0–0.03)
TROPONIN T SERPL-MCNC: <0.01 NG/ML (ref 0–0.03)
WBC NRBC COR # BLD: 10.18 10*3/MM3 (ref 3.4–10.8)
WHOLE BLOOD HOLD SPECIMEN: NORMAL
WHOLE BLOOD HOLD SPECIMEN: NORMAL

## 2022-01-03 PROCEDURE — 0202U NFCT DS 22 TRGT SARS-COV-2: CPT | Performed by: PHYSICIAN ASSISTANT

## 2022-01-03 PROCEDURE — 81001 URINALYSIS AUTO W/SCOPE: CPT | Performed by: EMERGENCY MEDICINE

## 2022-01-03 PROCEDURE — 84484 ASSAY OF TROPONIN QUANT: CPT

## 2022-01-03 PROCEDURE — 36415 COLL VENOUS BLD VENIPUNCTURE: CPT

## 2022-01-03 PROCEDURE — 71046 X-RAY EXAM CHEST 2 VIEWS: CPT

## 2022-01-03 PROCEDURE — 80053 COMPREHEN METABOLIC PANEL: CPT

## 2022-01-03 PROCEDURE — 85025 COMPLETE CBC W/AUTO DIFF WBC: CPT

## 2022-01-03 PROCEDURE — 99283 EMERGENCY DEPT VISIT LOW MDM: CPT

## 2022-01-03 PROCEDURE — 93010 ELECTROCARDIOGRAM REPORT: CPT | Performed by: INTERNAL MEDICINE

## 2022-01-03 PROCEDURE — 93005 ELECTROCARDIOGRAM TRACING: CPT | Performed by: EMERGENCY MEDICINE

## 2022-01-03 PROCEDURE — 93005 ELECTROCARDIOGRAM TRACING: CPT

## 2022-01-03 PROCEDURE — 87086 URINE CULTURE/COLONY COUNT: CPT | Performed by: EMERGENCY MEDICINE

## 2022-01-03 RX ORDER — ASPIRIN 325 MG
325 TABLET ORAL ONCE
Status: DISCONTINUED | OUTPATIENT
Start: 2022-01-03 | End: 2022-01-03

## 2022-01-03 RX ORDER — SODIUM CHLORIDE 0.9 % (FLUSH) 0.9 %
10 SYRINGE (ML) INJECTION AS NEEDED
Status: DISCONTINUED | OUTPATIENT
Start: 2022-01-03 | End: 2022-01-04 | Stop reason: HOSPADM

## 2022-01-03 RX ORDER — HYDROCODONE BITARTRATE AND ACETAMINOPHEN 5; 325 MG/1; MG/1
1 TABLET ORAL ONCE
Status: COMPLETED | OUTPATIENT
Start: 2022-01-03 | End: 2022-01-03

## 2022-01-03 RX ADMIN — HYDROCODONE BITARTRATE AND ACETAMINOPHEN 1 TABLET: 5; 325 TABLET ORAL at 23:36

## 2022-01-03 NOTE — E-VISIT ESCALATED
Patient escalated   Provider Caitlin Mcgovern chose to escalate patient to another level of care because: Diagnosis not available   Patient was sent the following message:   Based on the information you've provided us, you need to take another step to get care.   What to do now:    You should be seen within the next 24 hours. Please go to a walk-in clinic or urgent care, or make an appointment to be seen within the next 24 hours.   You won't be charged for your eVisit. If you paid with a credit card, the charge will be reversed.   Chief Complaint: Coronavirus (COVID-19), cold, sinus pain, allergy, or flu   Patient introduction   Patient is 76-year-old female who reports cough, congestion, rhinitis, voice hoarseness, headache, and myalgia that started 3-5 days ago.   Patient has not requested COVID testing.   Coronavirus Disease 2019 (COVID-19) exposure, testing history, and vaccination status:    No known exposure to a confirmed or suspected case of COVID-19.    No recent travel outside of their local community.    Patient had a viral test > 3 months ago. Test result was negative.    Reports receiving 3 doses.    Received the Pfizer vaccine for the first dose.    Received the Pfizer vaccine for the second dose.    Received the Pfizer vaccine for the third dose.    Received their most recent dose of the vaccine > 14 days ago.   Warning. The following may warrant further investigation:    Age 65 or older    Hypertension   When asked why they're seeking care online today, patient reports they want to know if they need to be seen by a provider.   Patient-submitted comments:   Patient writes: Symptoms began on Thurs of last wk. Throat was scratchy and real sore, better with Mucinex. Current issue is pain primarily under left breast, noticeably from coughing and sneezing. Head feels congested and pain is a 5 when coughing like ribs are cracked or bruised..   Patient did not request an excuse note.   General presentation    Symptoms came on gradually.   Fever:    Denies fever.   Sinus and nasal symptoms:    Reports rhinitis.    Reports congestion with sinus pain or pressure on or around their eyes, nose, and cheeks.    Patient first noticed sinus pain < 5 days ago.    Sinus pain is worse with Valsalva.    Denies itchy nose or sneezing.    Denies nasal drainage.    Denies postnasal drip.    Denies history of unhealed nasal septal ulcer/nasal wound.    Denies antibiotic treatment for sinus infection in the last year.    Denies history of deviated septum or nasal polyps.   Sore throat:    Reports mild hoarseness. Patient doesn't believe hoarseness is due to voice strain.    Denies sore throat.   Head and body aches:    Reports headache, described as mild (1-3 on a scale of 1-10).    Reports myalgia.    Denies sweats.    Denies chills.    Denies fatigue.   Dizziness:    Denies dizziness.   Cough:    Reports cough.    Cough is worse during the day.    Cough is mildly productive of sputum.    Describes color of mucus as clear.   Wheezing and SOB:    Denies COPD diagnosis.    Denies asthma diagnosis.    Denies wheezing.    Denies shortness of breath.    Denies previous albuterol inhaler use during URIs, bronchitis, or pneumonia.    Denies previous steroid inhaler use during URIs, bronchitis, or pneumonia.   Chest pain:    Reports chest pain, but only when coughing.    [place] Heart Score (MHS): 1, low risk of CAD. Assigning 1 point to each of 5 criteria (female >= 65 years old or male >= 55 years, known CAD, pain worse with exercise, pain not reproducible with palpation, and patient assumes pain is cardiac), the MHS is a validated clinical decision rule used to rule out coronary artery disease in primary care patients with chest pain.   Allergies:    Denies history of allergies.   Flu exposure:    Denies recent exposure to confirmed flu diagnosis.    Reports a flu vaccine in the last 1-3 months.   Patient denies the following red flags:     Changes in alertness or awareness    Symptoms suggesting intracranial hemorrhage    Decreased urination   Pregnancy/menstrual status/breastfeeding:    Patient is postmenopausal   Self-exam:    No difficulty moving their chin toward their chest    Swollen and tender neck lymph nodes   Denies antibiotic treatment for similar symptoms within the past month.   Current medications   Reports taking over-the-counter medication for current symptoms. Patient has taken acetaminophen, guaifenesin, and guaifenesin/dextromethorphan.   Denies taking other medications or supplements.   Medication allergies    Penicillins   Medication contraindication review   Reports history positive for hypertension. Therefore, the following medication(s) will not be prescribed:    Metoclopramide    Acetaminophen-diphenhydramine-phenylephrine    Aspirin-chlorpheniramine-phenylephrine   Denies history of metoclopramide-associated dystonic reaction and tardive dyskinesia.   No known history of azithromycin-associated cholestatic jaundice or hepatic impairment.   Past medical history   Immune conditions: Denies immunocompromising conditions. Denies history of cancer.   Social history   High-risk household contacts: Patient's household includes one or more members of a group with risk factors for influenza complications, including a person >= 65 years.   Former smoker.   Assessment:   Patient determined to need a level of care not appropriate to be delivered through eVisit.   Plan:   Patient informed of need to seek in-person care      ----------   Electronically signed by GISELL Lauren on 2022-01-03 at 08:31AM   ----------   Patient Interview Transcript:   Why are you getting care through eVisit today? We can't guarantee a specific treatment or test. Your provider will decide what's best for you. Select all that apply.    I want to know if I need to be seen by a provider   Not selected:    I want a specific treatment or medication    I want  to know if I have a cold or something more serious    I need a doctor's note    I want to be tested for COVID-19    I want to get the COVID-19 vaccine    I think I'm having side effects from the COVID-19 vaccine    I just want to feel better!    None of the above   Which of these symptoms are bothering you? Select all that apply.    Cough    Stuffed-up nose or sinuses    Runny nose    Hoarse voice or loss of voice    Headache    Muscle or body aches   Not selected:    Shortness of breath    Fever    Itchy or watery eyes    Itchy nose or sneezing    Loss of smell or taste    Sore throat    Sweats    Chills    Fatigue or tiredness    Nausea or vomiting    Diarrhea    I don't have any of these symptoms   Before we learn more about why you're here, we'll get some information related to COVID-19. We'll ask about risk factors, testing, vaccination status, and exposure. Do you have any of these conditions? If so, you may be at increased risk for complications from COVID-19. Select all that apply.    None of the above   Not selected:    Chronic lung disease, such as cystic fibrosis or interstitial fibrosis    Heart disease, such as congenital heart disease, congestive heart failure, or coronary artery disease    Disorder of the brain, spinal cord, or nerves and muscles, such as dementia, cerebral palsy, epilepsy, muscular dystrophy, or developmental delay    Metabolic disorder or mitochondrial disease    Cerebrovascular disease, such as stroke or another condition affecting the blood vessels or blood supply to the brain   Do you live in a group care setting? Examples include: - Nursing home - Residential care - Psychiatric treatment facility - Group home - Dormitory - Board and care home - Homeless shelter - Foster care setting Select one.    No   Not selected:    Yes   Have you ever been tested for COVID-19? Select one.    Yes   Not selected:    No   When was your most recent COVID-19 test? Select one.    More than 3  months ago   Not selected:    Within the last week    7 to 14 days ago    15 to 30 days ago    1 to 3 months ago   What type of COVID-19 test did you have? There are 2 types of COVID-19 tests: - Viral tests check if you're currently infected with COVID-19. - Antibody tests check if you've been infected in the past. Select one.    Viral test for current infection   Not selected:    Antibody test for past infection   What was the result of your most recent COVID-19 test? Select one.    Negative (no sign of infection)   Not selected:    Positive (signs of current or past infection)    I'm not sure   Have you gotten the COVID-19 vaccine? Select one.    Yes   Not selected:    No   How many doses of the COVID-19 vaccine have you gotten? This includes boosters. Select one.    3 doses   Not selected:    1 dose    2 doses   Which COVID-19 vaccine did you get for your first dose? Check your Vaccination Record Card under Product Name/. Select one.    Pfizer-BioNTech (Pfizer)   Not selected:    Srikanth & Srikanth's Steve Vaccine (J&J/Stvee)    Moderna   Which COVID-19 vaccine did you get for your second dose? Check your Vaccination Record Card under Product Name/. Select one.    Pfizer-BioNTech (Pfizer)   Not selected:    Srikanth & Srikanth's Steve Vaccine (J&J/Steve)    Moderna   Which COVID-19 vaccine did you get for your third dose? Check your Vaccination Record Card under Product Name/. Select one.    Pfizer-BioNTech (Pfizer)   Not selected:    Srikanth & Srikanth's Steve Vaccine (J&J/Steve)    Moderna   When did you get your most recent dose of the COVID-19 vaccine?    More than 14 days ago   Not selected:    Less than 48 hours (2 days) ago    48 to 72 hours (3 days) ago    3 to 5 days ago    5 to 7 days ago    7 to 14 days ago   In the last 14 days, have you traveled outside of your local community? This includes travel by car, RV, bus, train, or plane. Travel increases your  "chances of getting and spreading COVID-19. Select one.    No   Not selected:    Yes   In the last 14 days, have you had close contact with someone who has coronavirus (COVID-19)? \"Close contact\" means any of these: - Living in the same household as someone with COVID-19. - Caring for someone with COVID-19. - Being within 6 feet of someone with COVID-19 for a total of at least 15 minutes over a 24-hour period. For example, three 5-minute exposures for a total of 15 minutes. - Being in direct contact with respiratory droplets from someone with COVID-19 (being coughed on, kissing, sharing utensils). Select one.    No, not that I know of   Not selected:    Yes, a confirmed case    Yes, a suspected case   Thanks for completing our COVID-19 questions. Now we'll return to your symptoms. When did your symptoms start? If you know the exact date your symptoms started, choose Other and enter the month and day. Select one.    3 to 5 days ago   Not selected:    Less than 48 hours ago    6 to 9 days ago    10 to 14 days ago    2 to 4 weeks ago    More than a month ago    Other (specify)   Did your symptoms come on suddenly or gradually? Select one.    Gradually   Not selected:    Suddenly    I'm not sure   You mentioned having a headache. On a scale of 1 to 10, how severe is your headache pain? Select one.    Mild (1 to 3)   Not selected:    Moderate (4 to 6)    Severe (7 to 9)    Unbearable (10)    The worst headache of my life (10+)   Do you cough so hard that it's made you gag or vomit? By gag, we mean has your coughing made you choke or dry heave? Select all that apply.    No   Not selected:    Yes, my coughing has made me gag    Yes, my coughing has made me vomit   When is your cough the worst? Select all that apply.    During the day   Not selected:    In the morning, or when I wake up    At nighttime, or while I'm sleeping    I'm not sure   Are you coughing up mucus or phlegm? Select one.    Yes, a little   Not selected:   " " No, my cough is dry    Yes, a lot   What color is most of the mucus or phlegm that you're coughing up? Select one.    Clear   Not selected:    White/frothy    Yellow    Green    Red or pink    I'm not sure   You mentioned having a stuffy nose or sinus congestion. Do you feel pain or pressure in your sinuses?    Yes   Not selected:    No    I'm not sure   Where do you feel sinus pain or pressure?    Around my eyes    Behind my nose    In my cheeks   Not selected:    In my forehead    In my upper teeth or jaw    I'm not sure   When did you first notice your sinus pain or pressure? Select one.    Less than 5 days ago   Not selected:    5 to 9 days ago    10 to 14 days ago    2 to 4 weeks ago    1 month ago or longer   Does coughing, sneezing, or leaning forward make your sinuses feel worse? Select one.    Yes   Not selected:    No    I'm not sure   What color is your nasal drainage? Select one.    My nose is stuffed but not draining or running   Not selected:    Clear    White    Yellow    Green    I'm not sure   Is there any drainage (mucus) going down the back of your throat? This kind of drainage is also called \"postnasal drip.\" Select one.    No   Not selected:    Yes    I'm not sure   Since your symptoms started, have you felt dizzy? Select one.    No   Not selected:    Yes, but I can continue with my regular daily activities    Yes, and it makes it hard to stand, walk, or do daily activities   Do you have chest pain? You might also feel it as discomfort, aching, tightness, or squeezing in the chest. Select one.    Yes   Not selected:    No   Which of these is true of your chest pain? Select one.    My chest hurts only when I cough   Not selected:    My chest hurts even when I'm not coughing   Have you urinated at least 3 times in the last 24 hours? Select one.    Yes   Not selected:    No    I'm not sure   Changes in alertness or awareness may mean you need emergency care. Since your symptoms started, have you " had any of these? Select all that apply.    None of the above   Not selected:    Confusion    Slurred speech    Not knowing where you are or what day it is    Difficulty staying conscious    Fainting or passing out   Do your symptoms include a whistling sound, or wheezing, when you breathe? Select one.    No   Not selected:    Yes    I'm not sure   Early in this interview, you told us you were hoarse or you'd lost your voice. How would you describe the changes to your voice? Select one.    It just sounds a little raspy   Not selected:    It's harder than usual to talk    I can barely talk at all   Is it possible that you strained your voice? Singing, yelling, or talking more or louder than usual can cause voice strain. Select one.    No   Not selected:    Yes    I'm not sure   Do you have any of these symptoms in your ear(s)? Select all that apply.    Pressure    Crackling or popping   Not selected:    Pain    Fullness    Plugged or blocked sensation    None of the above   Can you move your chin toward your chest?    Yes   Not selected:    No, my neck is too stiff   Are your glands/lymph nodes swollen, or does it hurt when you touch them?    Yes   Not selected:    No    I'm not sure   People with a very high body mass index (BMI) are at higher risk for developing complications from the flu and severe illness from COVID-19. To determine your BMI, we need to know your weight and height. Please enter your weight (in pounds).    Weight   Please enter your height.    Height   In the past week, has anyone around you (such as at school, work, or home) had a confirmed diagnosis of the flu? A confirmed diagnosis means that a nose swab was done to verify a flu infection. Select all that apply.    No   Not selected:    I live with someone who has the flu    I've been within touching distance of someone who has the flu    I've walked by, or sat about 3 feet away from, someone who has the flu    I've been in the same building as  someone who has the flu    I'm not sure   Have you ever been diagnosed with asthma? Select one.    No   Not selected:    Yes   Have you ever been prescribed albuterol to use for wheezing, cough, or shortness of breath caused by a cold, bronchitis, or pneumonia? Albuterol (ProAir, Proventil, Ventolin) is prescribed as an inhaler or a solution to be used with a nebulizer machine. Select one.    No   Not selected:    Yes    I'm not sure   Have you ever been prescribed a steroid inhaler to use for wheezing, cough, or shortness of breath caused by a cold, bronchitis, or pneumonia? Some examples of steroid inhalers include Pulmicort, Flovent, Qvar, and Alvesco. Select one.    No   Not selected:    Yes    I'm not sure   Have you ever been diagnosed with chronic obstructive pulmonary disease (COPD)? Select one.    No   Not selected:    Yes    I'm not sure   In the last year, how many times were you treated with antibiotics for a sinus infection? Select one.    None   Not selected:    1 to 3 times    4 or more times   Have you been diagnosed with a deviated septum or nasal polyps? The nose is divided into two nostrils by the septum. A crooked septum is called a deviated septum. Nasal polyps are growths inside the nose or sinuses. Select one.    No   Not selected:    Yes, but I had surgery to treat them    Yes, I have a deviated septum    Yes, I have nasal polyps    Yes, I have a deviated septum and nasal polyps    I'm not sure   Do you have a sore inside your nose that won't heal? Select one.    No   Not selected:    Yes    I'm not sure   Do you have allergies (pollen, dust mites, mold, animal dander)? Select one.    No   Not selected:    Yes    I'm not sure   Have you had a flu shot this season? Select one.    Yes, 1 to 3 months ago   Not selected:    Yes, less than 2 weeks ago    Yes, 2 to 4 weeks ago    Yes, 3 to 6 months ago    Yes, more than 6 months ago    I'm not sure    No   The flu and COVID-19 can be more serious  for people with certain conditions or characteristics. These questions help us figure out if you or anyone you live with is at higher risk for complications from these infections. Do either of these statements apply to you? Select all that apply.    None of the above   Not selected:    I'm  or Native [place]    I'm a healthcare worker   Do you smoke tobacco? Select one.    No, I quit   Not selected:    Yes, every day    Yes, some days    No, never   Some conditions can put you at risk for more serious infections. Do any of these apply to you? Select all that apply.    None of the above   Not selected:    I've been hospitalized within the last 5 days    I have diabetes    I'm in close contact with a child in    Are you currently being treated for any of these conditions? Scroll to see all options. Select all that apply.    High blood pressure   Not selected:    Aspirin triad (also known as Samter's triad or ASA triad)    Asthma or hives from taking aspirin or other NSAIDs, such as ibuprofen or naproxen    Blockage or narrowing of the blood vessels of the heart    Blood dyscrasia, such anemia, leukemia, lymphoma, or myeloma    Bone marrow depression    Catecholamine-releasing paraganglioma    Blood clotting disorder    Congenital long QT syndrome    Depression    Difficulty urinating or completely emptying your bladder    Uncorrected electrolyte abnormalities    Fungal infection    Gastrointestinal (GI) bleeding    Gastrointestinal (GI) obstruction    G6PD deficiency    Recent heart attack    Irregular heartbeat or heart rhythm    Kidney disease or hemodialysis    Mononucleosis (mono)    Myasthenia gravis    Parkinson's disease    Pheochromocytoma    Reye syndrome    Seizure disorder    Ulcerative colitis    None of the above   Do you have any of these conditions that can affect the immune system? Scroll to see all options. Select all that apply.    None of these   Not selected:    History of  bone marrow transplant    Chronic kidney disease    Chronic liver disease (including cirrhosis)    HIV/AIDS    Inflammatory bowel disease (Crohn's disease or ulcerative colitis)    Lupus    Moderate to severe plaque psoriasis    Multiple sclerosis    Rheumatoid arthritis    Sickle cell anemia    Alpha or beta thalassemia    History of solid organ transplant (kidney, liver, or heart)    History of spleen removal    An autoimmune disorder not listed here    A condition requiring treatment with long-term use of oral steroids (such as prednisone, prednisolone, or dexamethasone)   Have you ever been diagnosed with cancer? Select one.    No   Not selected:    Yes, I have cancer now    Yes, but I'm in remission   Have you ever had either of these conditions? Select all that apply.    No   Not selected:    Metoclopramide-associated dystonic reaction    Tardive dyskinesia   Do any of these apply to the people who live with you? Select all that apply.    An adult 65 or older   Not selected:    A child under the age of 5    A person who is pregnant    A person who has given birth, had a miscarriage, had a pregnancy loss, or had an  in the last 2 weeks    An  or Native [place]    None of the above   Does any member of your household have any of these medical conditions? Select all that apply.    None of the above   Not selected:    Asthma    Disorders of the brain, spinal cord, or nerves and muscles, such as dementia, cerebral palsy, epilepsy, muscular dystrophy, or developmental delay    Chronic lung disease, such as COPD or cystic fibrosis    Heart disease, such as congenital heart disease, congestive heart failure, or coronary artery disease    Cerebrovascular disease, such as stroke or another condition affecting the blood vessels or blood supply to the brain    Blood disorders, such as sickle cell disease    Diabetes    Metabolic disorders such as inherited metabolic disorders or mitochondrial  disease    Kidney disorders    Liver disorders    Weakened immune system due to illness or medications such as chemotherapy or steroids    Children under the age of 19 who are on long-term aspirin therapy    Extreme obesity (BMI > 40)   Have you gone through menopause? Select one.    Yes   Not selected:    No   Just a few more questions about medications, and then you're finished. Have you used any non-prescription medications or nasal sprays for your current symptoms? Examples include saline sprays, decongestants, NyQuil, and Tylenol. Select one.    Yes   Not selected:    No   Which of these non-prescription medications have you tried? Scroll to see all options. Select all that apply.    Acetaminophen (Tylenol)    Guaifenesin (Mucinex)    Guaifenesin/dextromethorphan (Delsym DM, Mucinex DM, Robitussin DM)   Not selected:    Budesonide (Rhinocort)    Cetirizine (Zyrtec)    Chlorpheniramine (Aller-chlor, Chlor-Trimeton)    Cromolyn (NasalCrom)    Dextromethorphan (Delsym, Robitussin, Vicks DayQuil Cough)    Diphenhydramine (Benadryl)    Fexofenadine (Allegra)    Fluticasone (Flonase)    Ibuprofen (Advil, Motrin, Midol)    Ketotifen (Alaway, Zaditor)    Loratadine (Alavert, Claritin)    Naphazoline-pheniramine (Naphcon-A, Opcon-A, Visine-A)    Omeprazole (Prilosec)    Oxymetazoline (Afrin)    Phenylephrine (Sudafed)    Triamcinolone (Nasacort)    None of the above   In the past month, have you taken antibiotics for similar symptoms? Examples of antibiotics include amoxicillin, amoxicillin-clavulanate (Augmentin), penicillin, cefdinir (Omnicef), doxycycline, and clindamycin (Cleocin). Select one.    No   Not selected:    Yes    I'm not sure   Have you taken any monoamine oxidase inhibitor (MAOI) medications in the last 14 days? Examples include rasagiline (Azilect), selegiline (Eldepryl, Zelapar), isocarboxazid (Marplan), phenelzine (Nardil), and tranylcypromine (Parnate). Select one.    No   Not selected:    Yes    " I'm not sure   Do you take Kynmobi or Apokyn (apomorphine)? Select one.    No   Not selected:    Yes    I'm not sure   Are you taking any other medications or supplements? On the next screen, you need to list all vitamins, supplements, non-prescription medications (such as aspirin or Aleve), and prescription medications that you're taking. Select one.    No   Not selected:    Yes    Yes, but I'm not sure what they are   Have you ever had an allergic or bad reaction to any medication? Select one.    Yes   Not selected:    No   Have you had an allergic or bad reaction to any of these medications? Select all that apply.    None of the above   Not selected:    Baloxavir (Xofluza)    Benzonatate (Tessalon Perles)    Fluconazole, itraconazole, or terconazole (brands include Diflucan, Sporanox, Terazol)    Oseltamivir (Tamiflu) or zanamivir (Relenza)    I'm not sure   Have you had an allergic or bad reaction to any of these antibiotic medications? Select all that apply.    Penicillin or any \"-cillin\" antibiotic, such as amoxicillin, ampicillin, dicloxacillin, nafcillin, or piperacillin (Brands include Augmentin, Unasyn, and Zosyn)   Not selected:    Tetracycline or any \"-cycline\" antibiotic, such as doxycycline, demeclocycline, minocycline (Brands include Declomycin, Doryx, Dynacin, Oracea, Monodox, Panmycin, and Vibramycin)    Ciprofloxacin or any \"-floxacin\" antibiotic, such as gemifloxacin, levofloxacin, moxifloxacin, or ofloxacin (Brands include Factive, Cipro, Floxin, and Levaquin)    Cephalexin or any \"cef-\" antibiotic, such as cefazolin, cefdinir, cefuroxime, ceftriaxone, ceftazidime, or cefepime (Brands include Ancef, Ceftin, Fortaz, Keflex, Maxipime, Rocephin, and Simplicef)    Azithromycin or any \"-thromycin\" antibiotic, such as erythromycin or clarithromycin (Brands include Biaxin, Erythrocin, Z-dionne, and Zithromax)    Clindamycin or lincomycin (Brands include Cleocin and Lincocin)    None of the above    I'm " not sure   Have you had an allergic or bad reaction to any of these medications? Select all that apply.    None of the above   Not selected:    Albuterol or a similar medication    Corticosteroid (steroid) medication, including topical steroids, inhaled steroids, nasal steroids, or oral steroids (budesonide, ciclesonide, dexamethasone, flunisolide, fluticasone, methylprednisolone, triamcinolone, prednisone (or brand names Alvesco, Deltasone, Flovent, Medrol, Nasacort, Rhinocort, or Veramyst)    Metoclopramide (Reglan)    Ondansetron (Zuplenz, Zofran ODT, Zofran)    Prochlorperazine (Compazine)    I'm not sure   Have you had an allergic or bad reaction to any of these eye drops or nasal sprays? Scroll to see all options. Select all that apply.    None of the above   Not selected:    Azelastine (Astelin, Astepro, Optivar)    Cromolyn (Crolom, NasalCrom)    Ipratropium (Atrovent)    Ketotifen (Alaway, Zaditor)    Pheniramine/naphazoline (Naphcon-A, Opcon-A, Visine-A)    Olopatadine (Pataday, Patanol, Pazeo)    I'm not sure   Have you had an allergic or bad reaction to any of these non-prescription medications? Scroll to see all options. Select all that apply.    None of the above   Not selected:    Acetaminophen (Tylenol)    Aspirin    Cetirizine (Zyrtec)    Chlorpheniramine (Aller-chlor, Chlor-Trimeton)    Dextromethorphan (Delsym, Robitussin, Vicks DayQuil Cough)    Diphenhydramine (Benadryl)    Fexofenadine (Allegra)    Guaifenesin (Mucinex)    Guaifenesin/dextromethorphan (Delsym DM, Mucinex DM, Robitussin DM)    Ibuprofen (Advil, Motrin, Midol)    Loratadine (Alavert, Claritin)    Oxymetazoline (Afrin)    Phenylephrine (Sudafed)    I'm not sure   Are you allergic to milk or to the proteins found in milk (for example, whey or casein)? A milk allergy is different from lactose intolerance. Select one.    No   Not selected:    Yes    I'm not sure   Have you ever had jaundice or liver problems as a result of taking  azithromycin (Zithromax, Zmax)? Jaundice is a condition in which the skin and the whites of the eyes turn yellow. Select all that apply.    No, not that I know of   Not selected:    Yes, jaundice    Yes, liver problems   Do you need a doctor's note? A doctor's note confirms that you received care today and states when you can return to school or work. It does not contain information about your diagnosis or treatment plan. Your provider will make the final decision on whether to give you a doctor's note and for how long. Doctor's notes CANNOT be backdated. We can't provide medical leave paperwork through this type of visit. If more paperwork is needed to request time off, contact your primary care provider. Select one.    No   Not selected:    Today only (1 day)    Today and tomorrow (2 days)    3 days    7 days    10 days    14 days   Is there anything else you'd like to tell us about your symptoms?    Symptoms began on Thurs of last wk. Throat was scratchy and real sore, better with Mucinex. Current issue is pain primarily under left breast, noticeably from coughing and sneezing. Head feels congested and pain is a 5 when coughing like ribs are cracked or bruised.   ----------   Medical history   Medical history data does not currently exist for this patient.

## 2022-01-03 NOTE — ED TRIAGE NOTES
All triage performed with this RN wearing appropriate PPE.  Pt placed in mask upon arrival to ED.  Patient c/o coughing and sneezing for 5 days. She c/o pain under left breast and left flank pain for 4 days but only has the pain with coughing/sneezing.

## 2022-01-04 ENCOUNTER — PATIENT OUTREACH (OUTPATIENT)
Dept: CASE MANAGEMENT | Facility: OTHER | Age: 77
End: 2022-01-04

## 2022-01-04 VITALS
RESPIRATION RATE: 16 BRPM | BODY MASS INDEX: 23.73 KG/M2 | TEMPERATURE: 98.5 F | DIASTOLIC BLOOD PRESSURE: 98 MMHG | HEART RATE: 72 BPM | OXYGEN SATURATION: 100 % | WEIGHT: 139 LBS | SYSTOLIC BLOOD PRESSURE: 155 MMHG | HEIGHT: 64 IN

## 2022-01-04 LAB
B PARAPERT DNA SPEC QL NAA+PROBE: NOT DETECTED
B PERT DNA SPEC QL NAA+PROBE: NOT DETECTED
BACTERIA UR QL AUTO: ABNORMAL /HPF
BILIRUB UR QL STRIP: NEGATIVE
C PNEUM DNA NPH QL NAA+NON-PROBE: NOT DETECTED
CLARITY UR: CLEAR
COLOR UR: YELLOW
FLUAV SUBTYP SPEC NAA+PROBE: NOT DETECTED
FLUBV RNA ISLT QL NAA+PROBE: NOT DETECTED
GLUCOSE UR STRIP-MCNC: NEGATIVE MG/DL
HADV DNA SPEC NAA+PROBE: NOT DETECTED
HCOV 229E RNA SPEC QL NAA+PROBE: NOT DETECTED
HCOV HKU1 RNA SPEC QL NAA+PROBE: NOT DETECTED
HCOV NL63 RNA SPEC QL NAA+PROBE: NOT DETECTED
HCOV OC43 RNA SPEC QL NAA+PROBE: DETECTED
HGB UR QL STRIP.AUTO: NEGATIVE
HMPV RNA NPH QL NAA+NON-PROBE: NOT DETECTED
HPIV1 RNA ISLT QL NAA+PROBE: NOT DETECTED
HPIV2 RNA SPEC QL NAA+PROBE: NOT DETECTED
HPIV3 RNA NPH QL NAA+PROBE: NOT DETECTED
HPIV4 P GENE NPH QL NAA+PROBE: NOT DETECTED
HYALINE CASTS UR QL AUTO: ABNORMAL /LPF
KETONES UR QL STRIP: ABNORMAL
LEUKOCYTE ESTERASE UR QL STRIP.AUTO: ABNORMAL
M PNEUMO IGG SER IA-ACNC: NOT DETECTED
NITRITE UR QL STRIP: NEGATIVE
PH UR STRIP.AUTO: 7 [PH] (ref 5–8)
PROT UR QL STRIP: NEGATIVE
RBC # UR STRIP: ABNORMAL /HPF
REF LAB TEST METHOD: ABNORMAL
RHINOVIRUS RNA SPEC NAA+PROBE: NOT DETECTED
RSV RNA NPH QL NAA+NON-PROBE: NOT DETECTED
SARS-COV-2 RNA NPH QL NAA+NON-PROBE: NOT DETECTED
SP GR UR STRIP: 1.01 (ref 1–1.03)
SQUAMOUS #/AREA URNS HPF: ABNORMAL /HPF
UROBILINOGEN UR QL STRIP: ABNORMAL
WBC # UR STRIP: ABNORMAL /HPF

## 2022-01-04 NOTE — ED PROVIDER NOTES
EMERGENCY DEPARTMENT ENCOUNTER    Room Number:  04/04  Date of encounter:  1/4/2022  PCP: Grover Garcias MD  Historian: Patient      HPI:  Chief Complaint: Left side pain      Context: Marilia Leo is a 76 y.o. female with past medical history of HTN, HLD, glaucoma who presents to the ED c/o left side pain.  Patient states that she started feeling ill on Friday with cough with some mild mucus, sore throat, and runny nose.  Denies any associated chest pain, nausea, vomiting, diarrhea, UTI symptoms, or fever.  Patient states that the day after her cough started on Friday she developed side pain on Saturday that was worse with cough, sneezing, and certain movements.  Patient denies any shortness of breath.      PAST MEDICAL HISTORY  Active Ambulatory Problems     Diagnosis Date Noted   • Benign essential hypertension 04/18/2016   • Cervical radiculopathy 04/18/2016   • Glaucoma 04/18/2016   • Pure hypercholesterolemia 04/18/2016   • Menopausal symptom 04/18/2016   • Atrophic vaginitis 04/18/2016   • Health care maintenance 10/13/2016   • Cervical radiculopathy 04/18/2016   • Edema, lower extremity 01/16/2020   • Elevated LFTs 08/04/2020   • Fever of unknown origin (FUO) 08/04/2020   • Nausea 08/04/2020   • Sinus tachycardia 08/04/2020     Resolved Ambulatory Problems     Diagnosis Date Noted   • Atopic dermatitis 04/18/2016   • Neck pain 04/18/2016   • Trochanteric bursitis of left hip 10/13/2016   • Thoracic radiculopathy 03/07/2017   • Chronic fatigue 04/13/2017   • Impacted cerumen of right ear 10/16/2017   • Impaired fasting blood sugar 05/13/2019   • Palpitations 05/13/2019   • Sepsis (HCC) 08/04/2020   • Back pain 08/04/2020     Past Medical History:   Diagnosis Date   • Angioedema    • Cataract    • Diverticulosis    • Edema    • Esophageal reflux    • Hammer toe    • History of mammogram    • Hx of bone scan    • Hypercholesterolemia    • Medial epicondylitis    • Papanicolaou smear    • Trochanteric  bursitis          PAST SURGICAL HISTORY  Past Surgical History:   Procedure Laterality Date   • APPENDECTOMY     • CERVICAL FUSION      Dr.John Sexton   • COLONOSCOPY      2007 Dr Bradshaw Normal   • CORNEAL TRANSPLANT     • FETAL BLOOD TRANSFUSION     • FOOT SURGERY  2013    right bunion and hammer toe Dr Phelan   • SUBTOTAL HYSTERECTOMY      ovaries intact         FAMILY HISTORY  Family History   Problem Relation Age of Onset   • Hypertension Mother    • Diabetes Mother    • Hypertension Father    • Diabetes Sister    • Multiple sclerosis Sister    • Rheum arthritis Sister    • Lung cancer Sister         smoker   • Diabetes Brother    • Heart disease Brother    • Hypertension Brother    • Rheum arthritis Brother    • Sarcoidosis Brother    • Glaucoma Brother          SOCIAL HISTORY  Social History     Socioeconomic History   • Marital status: Single   Tobacco Use   • Smoking status: Former Smoker     Packs/day: 1.00     Years: 5.00     Pack years: 5.00     Types: Cigarettes     Quit date:      Years since quittin.0   • Smokeless tobacco: Never Used   Substance and Sexual Activity   • Alcohol use: Yes     Comment: occasionally   • Drug use: No         ALLERGIES  Nitrofurantoin, Codeine, Penicillins, and Sulfa antibiotics        REVIEW OF SYSTEMS  Review of Systems     All systems reviewed and negative except for those discussed in HPI.       PHYSICAL EXAM    I have reviewed the triage vital signs and nursing notes.    ED Triage Vitals   Temp Heart Rate Resp BP SpO2   22 1351 22 1351 22 1351 22 1414 22 1351   98.5 °F (36.9 °C) 85 18 (!) 192/102 97 %      Temp src Heart Rate Source Patient Position BP Location FiO2 (%)   22 1351 22 1351 22 1414 22 1414 --   Tympanic Monitor Sitting Left arm        Physical Exam  GENERAL: Alert and oriented x3, not distressed  HENT: nares patent  EYES: no scleral icterus  CV: regular rhythm, regular  rate  RESPIRATORY: normal effort  ABDOMEN: soft, mild left abdominal wall tenderness   MUSCULOSKELETAL: no deformity  NEURO: alert, moves all extremities, follows commands  SKIN: warm, dry, no rashes        LAB RESULTS  Recent Results (from the past 24 hour(s))   ECG 12 Lead    Collection Time: 01/03/22  2:07 PM   Result Value Ref Range    QT Interval 410 ms   Comprehensive Metabolic Panel    Collection Time: 01/03/22  4:31 PM    Specimen: Blood   Result Value Ref Range    Glucose 91 65 - 99 mg/dL    BUN 16 8 - 23 mg/dL    Creatinine 0.96 0.57 - 1.00 mg/dL    Sodium 140 136 - 145 mmol/L    Potassium 4.1 3.5 - 5.2 mmol/L    Chloride 104 98 - 107 mmol/L    CO2 24.9 22.0 - 29.0 mmol/L    Calcium 9.5 8.6 - 10.5 mg/dL    Total Protein 7.5 6.0 - 8.5 g/dL    Albumin 4.40 3.50 - 5.20 g/dL    ALT (SGPT) 13 1 - 33 U/L    AST (SGOT) 18 1 - 32 U/L    Alkaline Phosphatase 78 39 - 117 U/L    Total Bilirubin 0.3 0.0 - 1.2 mg/dL    eGFR  African Amer 68 >60 mL/min/1.73    Globulin 3.1 gm/dL    A/G Ratio 1.4 g/dL    BUN/Creatinine Ratio 16.7 7.0 - 25.0    Anion Gap 11.1 5.0 - 15.0 mmol/L   Troponin    Collection Time: 01/03/22  4:31 PM    Specimen: Blood   Result Value Ref Range    Troponin T <0.010 0.000 - 0.030 ng/mL   Green Top (Gel)    Collection Time: 01/03/22  4:31 PM   Result Value Ref Range    Extra Tube Hold for add-ons.    Lavender Top    Collection Time: 01/03/22  4:31 PM   Result Value Ref Range    Extra Tube hold for add-on    Gold Top - SST    Collection Time: 01/03/22  4:31 PM   Result Value Ref Range    Extra Tube Hold for add-ons.    Light Blue Top    Collection Time: 01/03/22  4:31 PM   Result Value Ref Range    Extra Tube hold for add-on    CBC Auto Differential    Collection Time: 01/03/22  4:31 PM    Specimen: Blood   Result Value Ref Range    WBC 10.18 3.40 - 10.80 10*3/mm3    RBC 4.97 3.77 - 5.28 10*6/mm3    Hemoglobin 13.4 12.0 - 15.9 g/dL    Hematocrit 43.1 34.0 - 46.6 %    MCV 86.7 79.0 - 97.0 fL    MCH  27.0 26.6 - 33.0 pg    MCHC 31.1 (L) 31.5 - 35.7 g/dL    RDW 13.1 12.3 - 15.4 %    RDW-SD 40.6 37.0 - 54.0 fl    MPV 9.7 6.0 - 12.0 fL    Platelets 302 140 - 450 10*3/mm3    Neutrophil % 33.0 (L) 42.7 - 76.0 %    Lymphocyte % 48.3 (H) 19.6 - 45.3 %    Monocyte % 8.2 5.0 - 12.0 %    Eosinophil % 9.5 (H) 0.3 - 6.2 %    Basophil % 0.9 0.0 - 1.5 %    Immature Grans % 0.1 0.0 - 0.5 %    Neutrophils, Absolute 3.36 1.70 - 7.00 10*3/mm3    Lymphocytes, Absolute 4.92 (H) 0.70 - 3.10 10*3/mm3    Monocytes, Absolute 0.83 0.10 - 0.90 10*3/mm3    Eosinophils, Absolute 0.97 (H) 0.00 - 0.40 10*3/mm3    Basophils, Absolute 0.09 0.00 - 0.20 10*3/mm3    Immature Grans, Absolute 0.01 0.00 - 0.05 10*3/mm3    nRBC 0.0 0.0 - 0.2 /100 WBC   Troponin    Collection Time: 01/03/22  8:11 PM    Specimen: Blood   Result Value Ref Range    Troponin T <0.010 0.000 - 0.030 ng/mL   Respiratory Panel PCR w/COVID-19(SARS-CoV-2) MICHAEL/DEVYN/CALLIE/PAD/COR/MAD/TATIANNA In-House, NP Swab in [facility], 3-4 HR TAT - Swab, Nasopharynx    Collection Time: 01/03/22 10:47 PM    Specimen: Nasopharynx; Swab   Result Value Ref Range    ADENOVIRUS, PCR Not Detected Not Detected    Coronavirus 229E Not Detected Not Detected    Coronavirus HKU1 Not Detected Not Detected    Coronavirus NL63 Not Detected Not Detected    Coronavirus OC43 Detected (A) Not Detected    COVID19 Not Detected Not Detected - Ref. Range    Human Metapneumovirus Not Detected Not Detected    Human Rhinovirus/Enterovirus Not Detected Not Detected    Influenza A PCR Not Detected Not Detected    Influenza B PCR Not Detected Not Detected    Parainfluenza Virus 1 Not Detected Not Detected    Parainfluenza Virus 2 Not Detected Not Detected    Parainfluenza Virus 3 Not Detected Not Detected    Parainfluenza Virus 4 Not Detected Not Detected    RSV, PCR Not Detected Not Detected    Bordetella pertussis pcr Not Detected Not Detected    Bordetella parapertussis PCR Not Detected Not Detected    Chlamydophila pneumoniae  PCR Not Detected Not Detected    Mycoplasma pneumo by PCR Not Detected Not Detected   Urinalysis With Culture If Indicated - Urine, Clean Catch    Collection Time: 01/03/22 11:42 PM    Specimen: Urine, Clean Catch   Result Value Ref Range    Color, UA Yellow Yellow, Straw    Appearance, UA Clear Clear    pH, UA 7.0 5.0 - 8.0    Specific Gravity, UA 1.012 1.005 - 1.030    Glucose, UA Negative Negative    Ketones, UA Trace (A) Negative    Bilirubin, UA Negative Negative    Blood, UA Negative Negative    Protein, UA Negative Negative    Leuk Esterase, UA Small (1+) (A) Negative    Nitrite, UA Negative Negative    Urobilinogen, UA 0.2 E.U./dL 0.2 - 1.0 E.U./dL   Urinalysis, Microscopic Only - Urine, Clean Catch    Collection Time: 01/03/22 11:42 PM    Specimen: Urine, Clean Catch   Result Value Ref Range    RBC, UA 0-2 None Seen, 0-2 /HPF    WBC, UA 6-12 (A) None Seen, 0-2 /HPF    Bacteria, UA None Seen None Seen /HPF    Squamous Epithelial Cells, UA 0-2 None Seen, 0-2 /HPF    Hyaline Casts, UA None Seen None Seen /LPF    Methodology Automated Microscopy        Ordered the above labs and independently reviewed the results.        RADIOLOGY  XR Chest 2 View    Result Date: 1/3/2022  PA AND LATERAL CHEST  CLINICAL HISTORY: Cough. Rib pain.  Compared to the previous chest x-ray dated 04/10/2021.  The lungs are well-expanded and appear free of infiltrates or masses. There are no pleural effusions. The cardiomediastinal silhouette is unremarkable. The bony thorax is somewhat osteopenic but otherwise unremarkable. No obvious rib fractures are identified.  IMPRESSIONS: No evidence of active disease within the chest.  This report was finalized on 1/3/2022 3:14 PM by Dr. Quintin Graves M.D.        I ordered the above noted radiological studies. Reviewed by me and discussed with radiologist.  See dictation for official radiology interpretation.      PROCEDURES    Procedures      MEDICATIONS GIVEN IN ER    Medications   sodium  chloride 0.9 % flush 10 mL (has no administration in time range)   HYDROcodone-acetaminophen (NORCO) 5-325 MG per tablet 1 tablet (1 tablet Oral Given 1/3/22 3563)         PROGRESS, DATA ANALYSIS, CONSULTS, AND MEDICAL DECISION MAKING    All labs have been independently reviewed by me.  All radiology studies have been reviewed by me and discussed with radiologist dictating the report.   EKG's independently viewed and interpreted by me.  Discussion below represents my analysis of pertinent findings related to patient's condition, differential diagnosis, treatment plan and final disposition.    DDx: Includes but not limited to pneumothorax, pneumonia, rib injury, chest wall pain, abdominal wall pain, muscle strain    ED Course as of 01/04/22 0048   Mon Jan 03, 2022 2250 EKG          EKG time: 14:07  Rhythm/Rate: Sinus rhythm at 68 bpm  P waves and RI: Borderline LAD  QRS, axis: Normal  ST and T waves: No acute ST/T wave changes    Interpreted Contemporaneously by me, independently viewed  Not significantly changed compared to prior EKG of 5/15/2015.   [MAURA]   2251 WBC: 10.18 [MAURA]   2251 Hemoglobin: 13.4 [MAURA]   2251 Hematocrit: 43.1 [MAURA]   2251 Platelets: 302 [MAURA]   2251 Glucose: 91 [MAURA]   2251 BUN: 16 [MAURA]   2251 Creatinine: 0.96 [MAURA]   2251 Sodium: 140 [MAURA]   2251 Potassium: 4.1 [MAURA]   2251 Chloride: 104 [MAURA]   2251 CO2: 24.9 [MAURA]   2251 Troponin T: <0.010 [MAURA]   Tue Jan 04, 2022   0044 Patient rechecked, resting comfortably in bed, no acute distress.  Discussed results, diagnosis, and treatment plan with patient and her daughter.  They expressed understanding and agree with plan.  Patient given strict return to ER precautions. [MAURA]      ED Course User Index  [MAURA] Cali Manrique PA       MDM: Patient's evaluation was positive for coronavirus, however her blood work and chest x-ray shows no evidence for active disease.  Suspect that the patient's left side pain was related to musculoskeletal pain from coughing due  to her coronavirus infection.  Patient did not have any evidence for pneumonia, pneumothorax, or other acute or emergent process.  Vital signs are stable, patient is safe for discharge home.    PPE: The patient wore a surgical mask throughout the entire patient encounter. I wore an N95.    AS OF 00:48 EST VITALS:    BP - 159/91  HR - 74  TEMP - 98.5 °F (36.9 °C) (Tympanic)  O2 SATS - 100%        DIAGNOSIS  Final diagnoses:   Coronavirus infection   Strain of abdominal wall, initial encounter   Hypertension, unspecified type   Hyperlipidemia, unspecified hyperlipidemia type         DISPOSITION  DISCHARGE    Patient discharged in stable condition.    Reviewed implications of results, diagnosis, meds, responsibility to follow up, warning signs and symptoms of possible worsening, potential complications and reasons to return to ER.    Patient/Family voiced understanding of above instructions.    Discussed plan for discharge, as there is no emergent indication for admission. Patient referred to primary care provider for BP management due to today's BP. Pt/family is agreeable and understands need for follow up and repeat testing.  Pt is aware that discharge does not mean that nothing is wrong but it indicates no emergency is present that requires admission and they must continue care with follow-up as given below or physician of their choice.     FOLLOW-UP  Grover Garcias MD  [address] [address]  [phone]    Schedule an appointment as soon as possible for a visit in 1 week           Medication List      No changes were made to your prescriptions during this visit.                    Cali Manrique PA  01/04/22 0048

## 2022-01-04 NOTE — ED NOTES
.RN wearing all appropriate PPE during entire encounter with patient.        Behringer, Catherine, MIQUEL  01/03/22 [id number]

## 2022-01-04 NOTE — DISCHARGE INSTRUCTIONS
Home, rest, over-the-counter cough medicine as needed, Tylenol for pain, home medicine as prescribed, follow up with PCP for recheck. Return to care if symptoms worsen or with further concerns.

## 2022-01-04 NOTE — ED PROVIDER NOTES
MD ATTESTATION NOTE    The RADHA and I have discussed this patient's history, physical exam, and treatment plan.  I have reviewed the documentation and personally had a face to face interaction with the patient. I affirm the documentation and agree with the treatment and plan.  The attached note describes my personal findings.      Marilia Leo is a 76 y.o. female who presents to the ED c/o cough, rhinorrhea, left upper quadrant abdominal pain. The abdominal pain occurs whenever she coughs or moves in certain directions.      On exam:  GENERAL: not distressed  HENT: nares patent  EYES: no scleral icterus  CV: regular rhythm, regular rate  RESPIRATORY: normal effort, clear to auscultation bilaterally  ABDOMEN: soft, reproducible left upper abdominal tenderness along the costal margin anteriorly and laterally  MUSCULOSKELETAL: no deformity  NEURO: alert, moves all extremities, follows commands  SKIN: warm, dry    Labs  Recent Results (from the past 24 hour(s))   ECG 12 Lead    Collection Time: 01/03/22  2:07 PM   Result Value Ref Range    QT Interval 410 ms   Comprehensive Metabolic Panel    Collection Time: 01/03/22  4:31 PM    Specimen: Blood   Result Value Ref Range    Glucose 91 65 - 99 mg/dL    BUN 16 8 - 23 mg/dL    Creatinine 0.96 0.57 - 1.00 mg/dL    Sodium 140 136 - 145 mmol/L    Potassium 4.1 3.5 - 5.2 mmol/L    Chloride 104 98 - 107 mmol/L    CO2 24.9 22.0 - 29.0 mmol/L    Calcium 9.5 8.6 - 10.5 mg/dL    Total Protein 7.5 6.0 - 8.5 g/dL    Albumin 4.40 3.50 - 5.20 g/dL    ALT (SGPT) 13 1 - 33 U/L    AST (SGOT) 18 1 - 32 U/L    Alkaline Phosphatase 78 39 - 117 U/L    Total Bilirubin 0.3 0.0 - 1.2 mg/dL    eGFR  African Amer 68 >60 mL/min/1.73    Globulin 3.1 gm/dL    A/G Ratio 1.4 g/dL    BUN/Creatinine Ratio 16.7 7.0 - 25.0    Anion Gap 11.1 5.0 - 15.0 mmol/L   Troponin    Collection Time: 01/03/22  4:31 PM    Specimen: Blood   Result Value Ref Range    Troponin T <0.010 0.000 - 0.030 ng/mL   Green Top (Gel)     Collection Time: 01/03/22  4:31 PM   Result Value Ref Range    Extra Tube Hold for add-ons.    Lavender Top    Collection Time: 01/03/22  4:31 PM   Result Value Ref Range    Extra Tube hold for add-on    Gold Top - SST    Collection Time: 01/03/22  4:31 PM   Result Value Ref Range    Extra Tube Hold for add-ons.    Light Blue Top    Collection Time: 01/03/22  4:31 PM   Result Value Ref Range    Extra Tube hold for add-on    CBC Auto Differential    Collection Time: 01/03/22  4:31 PM    Specimen: Blood   Result Value Ref Range    WBC 10.18 3.40 - 10.80 10*3/mm3    RBC 4.97 3.77 - 5.28 10*6/mm3    Hemoglobin 13.4 12.0 - 15.9 g/dL    Hematocrit 43.1 34.0 - 46.6 %    MCV 86.7 79.0 - 97.0 fL    MCH 27.0 26.6 - 33.0 pg    MCHC 31.1 (L) 31.5 - 35.7 g/dL    RDW 13.1 12.3 - 15.4 %    RDW-SD 40.6 37.0 - 54.0 fl    MPV 9.7 6.0 - 12.0 fL    Platelets 302 140 - 450 10*3/mm3    Neutrophil % 33.0 (L) 42.7 - 76.0 %    Lymphocyte % 48.3 (H) 19.6 - 45.3 %    Monocyte % 8.2 5.0 - 12.0 %    Eosinophil % 9.5 (H) 0.3 - 6.2 %    Basophil % 0.9 0.0 - 1.5 %    Immature Grans % 0.1 0.0 - 0.5 %    Neutrophils, Absolute 3.36 1.70 - 7.00 10*3/mm3    Lymphocytes, Absolute 4.92 (H) 0.70 - 3.10 10*3/mm3    Monocytes, Absolute 0.83 0.10 - 0.90 10*3/mm3    Eosinophils, Absolute 0.97 (H) 0.00 - 0.40 10*3/mm3    Basophils, Absolute 0.09 0.00 - 0.20 10*3/mm3    Immature Grans, Absolute 0.01 0.00 - 0.05 10*3/mm3    nRBC 0.0 0.0 - 0.2 /100 WBC   Troponin    Collection Time: 01/03/22  8:11 PM    Specimen: Blood   Result Value Ref Range    Troponin T <0.010 0.000 - 0.030 ng/mL   Respiratory Panel PCR w/COVID-19(SARS-CoV-2) MICHAEL/DEVYN/CALLIE/PAD/COR/MAD/TATIANNA In-House, NP Swab in UTM/VTM, 3-4 HR TAT - Swab, Nasopharynx    Collection Time: 01/03/22 10:47 PM    Specimen: Nasopharynx; Swab   Result Value Ref Range    ADENOVIRUS, PCR Not Detected Not Detected    Coronavirus 229E Not Detected Not Detected    Coronavirus HKU1 Not Detected Not Detected    Coronavirus  NL63 Not Detected Not Detected    Coronavirus OC43 Detected (A) Not Detected    COVID19 Not Detected Not Detected - Ref. Range    Human Metapneumovirus Not Detected Not Detected    Human Rhinovirus/Enterovirus Not Detected Not Detected    Influenza A PCR Not Detected Not Detected    Influenza B PCR Not Detected Not Detected    Parainfluenza Virus 1 Not Detected Not Detected    Parainfluenza Virus 2 Not Detected Not Detected    Parainfluenza Virus 3 Not Detected Not Detected    Parainfluenza Virus 4 Not Detected Not Detected    RSV, PCR Not Detected Not Detected    Bordetella pertussis pcr Not Detected Not Detected    Bordetella parapertussis PCR Not Detected Not Detected    Chlamydophila pneumoniae PCR Not Detected Not Detected    Mycoplasma pneumo by PCR Not Detected Not Detected   Urinalysis With Culture If Indicated - Urine, Clean Catch    Collection Time: 01/03/22 11:42 PM    Specimen: Urine, Clean Catch   Result Value Ref Range    Color, UA Yellow Yellow, Straw    Appearance, UA Clear Clear    pH, UA 7.0 5.0 - 8.0    Specific Gravity, UA 1.012 1.005 - 1.030    Glucose, UA Negative Negative    Ketones, UA Trace (A) Negative    Bilirubin, UA Negative Negative    Blood, UA Negative Negative    Protein, UA Negative Negative    Leuk Esterase, UA Small (1+) (A) Negative    Nitrite, UA Negative Negative    Urobilinogen, UA 0.2 E.U./dL 0.2 - 1.0 E.U./dL   Urinalysis, Microscopic Only - Urine, Clean Catch    Collection Time: 01/03/22 11:42 PM    Specimen: Urine, Clean Catch   Result Value Ref Range    RBC, UA 0-2 None Seen, 0-2 /HPF    WBC, UA 6-12 (A) None Seen, 0-2 /HPF    Bacteria, UA None Seen None Seen /HPF    Squamous Epithelial Cells, UA 0-2 None Seen, 0-2 /HPF    Hyaline Casts, UA None Seen None Seen /LPF    Methodology Automated Microscopy        Radiology  XR Chest 2 View    Result Date: 1/3/2022  PA AND LATERAL CHEST  CLINICAL HISTORY: Cough. Rib pain.  Compared to the previous chest x-ray dated 04/10/2021.   The lungs are well-expanded and appear free of infiltrates or masses. There are no pleural effusions. The cardiomediastinal silhouette is unremarkable. The bony thorax is somewhat osteopenic but otherwise unremarkable. No obvious rib fractures are identified.  IMPRESSIONS: No evidence of active disease within the chest.  This report was finalized on 1/3/2022 3:14 PM by Dr. Quintin Graves M.D.        Medical Decision Making:  ED Course as of 01/06/22 1505   Mon Jan 03, 2022   2250 EKG          EKG time: 14:07  Rhythm/Rate: Sinus rhythm at 68 bpm  P waves and SC: Borderline LAD  QRS, axis: Normal  ST and T waves: No acute ST/T wave changes    Interpreted Contemporaneously by me, independently viewed  Not significantly changed compared to prior EKG of 5/15/2015.   [MAURA]   2251 WBC: 10.18 [MAURA]   2251 Hemoglobin: 13.4 [MAURA]   2251 Hematocrit: 43.1 [MAURA]   2251 Platelets: 302 [MAURA]   2251 Glucose: 91 [MAURA]   2251 BUN: 16 [MAURA]   2251 Creatinine: 0.96 [MAURA]   2251 Sodium: 140 [MAURA]   2251 Potassium: 4.1 [MAURA]   2251 Chloride: 104 [MAURA]   2251 CO2: 24.9 [MAURA]   2251 Troponin T: <0.010 [MAURA]   e Jan 04, 2022   0044 Patient rechecked, resting comfortably in bed, no acute distress.  Discussed results, diagnosis, and treatment plan with patient and her daughter.  They expressed understanding and agree with plan.  Patient given strict return to ER precautions. [MAURA]      ED Course User Index  [MAURA] Cali Manrique PA           PPE: Both the patient and I wore a surgical mask throughout the entire patient encounter. I wore protective goggles.     Diagnosis  Final diagnoses:   Coronavirus infection   Strain of abdominal wall, initial encounter   Hypertension, unspecified type   Hyperlipidemia, unspecified hyperlipidemia type        Lexx Simental II, MD  01/06/22 9805

## 2022-01-04 NOTE — OUTREACH NOTE
Ambulatory Case Management Note    Patient Outreach    Contacted related to ER visit on 2/3/22, patient states she is still coughing and taking OTC medications to treat symptoms. Education done on hydration needs, resting and deep breathing/cough exercises. Patient plans to f/u with PCP and knows to seek help if symptoms worsens or return to the ER. Education done on use of Urgent Care facilities if appropriate.          Betty Kovacs RN  Ambulatory Case Management    1/4/2022, 13:17 EST

## 2022-01-05 LAB — BACTERIA SPEC AEROBE CULT: NO GROWTH

## 2022-01-28 ENCOUNTER — OFFICE VISIT (OUTPATIENT)
Dept: INTERNAL MEDICINE | Facility: CLINIC | Age: 77
End: 2022-01-28

## 2022-01-28 VITALS
HEART RATE: 92 BPM | TEMPERATURE: 98 F | SYSTOLIC BLOOD PRESSURE: 148 MMHG | OXYGEN SATURATION: 98 % | WEIGHT: 140 LBS | BODY MASS INDEX: 23.9 KG/M2 | DIASTOLIC BLOOD PRESSURE: 86 MMHG | HEIGHT: 64 IN

## 2022-01-28 DIAGNOSIS — M25.562 BILATERAL CHRONIC KNEE PAIN: Primary | ICD-10-CM

## 2022-01-28 DIAGNOSIS — M25.561 BILATERAL CHRONIC KNEE PAIN: Primary | ICD-10-CM

## 2022-01-28 DIAGNOSIS — G89.29 BILATERAL CHRONIC KNEE PAIN: Primary | ICD-10-CM

## 2022-01-28 PROCEDURE — 99213 OFFICE O/P EST LOW 20 MIN: CPT | Performed by: FAMILY MEDICINE

## 2022-01-28 NOTE — PROGRESS NOTES
"Chief Complaint  Knee Pain (Bilateral)    Subjective          Marilia Leo presents to [facility]  History of Present Illness     Bilateral knee pain-new problem to me.  Worse over time and left was worse than usual.  But now both hurts.  She has been getting down on her knees.  I see no x-rays or other imaging in the system of the knees.  Treating with tylenol 325mg Tylenol three times per day.  Also uses Biofreeze.  I saw that she did see an orthopedist in 2017 and had diagnosed her left prepatellar bursitis but I do not see that she ever got x-rays at the time of her knee.    Objective   Vital Signs:   /86 (BP Location: Left arm, Patient Position: Sitting, Cuff Size: Adult)   Pulse 92   Temp 98 °F (36.7 °C) (Temporal)   Ht 162.6 cm (64\")   Wt 63.5 kg (140 lb)   SpO2 98%   BMI 24.03 kg/m²     Physical Exam  Vitals and nursing note reviewed.   Constitutional:       Appearance: Normal appearance.   Musculoskeletal:      Right knee: No swelling or effusion. Tenderness present over the medial joint line and patellar tendon.      Left knee: No swelling or effusion. Tenderness present over the medial joint line and patellar tendon.      Comments: Seems like she has tenderness bilaterally along the prepatellar bursa, patellar tendon, and joint line as described above   Neurological:      Mental Status: She is alert.        Result Review :                 Assessment and Plan    Diagnoses and all orders for this visit:    1. Bilateral chronic knee pain (Primary)    Will try Tylenol 500mg 2 -3 x per day and add lidocaine or Voltaren gel.  Give 2 weeks.  If no better, they will contact me and I will order Xrays and injections if they are interested.          Follow Up   Return in about 3 weeks (around 2/18/2022) for Injections for knees.  Patient was given instructions and counseling regarding her condition or for health maintenance advice. Please see specific information pulled " into the AVS if appropriate.

## 2022-01-28 NOTE — PATIENT INSTRUCTIONS
Chronic Knee Pain, Adult  Knee pain that lasts longer than 3 months is called chronic knee pain. You may have pain in one or both knees. Symptoms of chronic knee pain may also include swelling and stiffness. The most common cause is age-related wear and tear (osteoarthritis) of your knee joint. Many conditions can cause chronic knee pain.  Treatment depends on the cause. The main treatments are physical therapy and weight loss. It may also be treated with medicines, injections, a knee sleeve or brace, and by using crutches. Rest, ice, pressure (compression), and elevation, also known as RICE therapy, may also be recommended.  Follow these instructions at home:  If you have a knee sleeve or brace:    · Wear the knee sleeve or brace as told by your doctor. Take it off only as told by your doctor.  · Loosen it if your toes:  ? Tingle.  ? Become numb.  ? Turn cold and blue.  · Keep it clean.  · If the sleeve or brace is not waterproof:  ? Do not let it get wet.  ? Ask your doctor if you may take it off when you take a bath or shower. If not, cover it with a watertight covering.    Managing pain, stiffness, and swelling         · If told, put heat on your knee. Do this as often as told by your doctor. Use the heat source that your doctor recommends, such as a moist heat pack or a heating pad.  ? If you have a removable knee sleeve or brace, take it off as told by your doctor.  ? Place a towel between your skin and the heat source.  ? Leave the heat on for 20-30 minutes.  ? Take off the heat if your skin turns bright red. This is very important. If you cannot feel pain, heat, or cold, you have a greater risk of getting burned.  · If told, put ice on your knee. To do this:  ? If you have a removable knee sleeve or brace, take it off as told by your doctor.  ? Put ice in a plastic bag.  ? Place a towel between your skin and the bag.  ? Leave the ice on for 20 minutes, 2-3 times a day.  ? Take off the ice if your skin turns  bright red. This is very important. If you cannot feel pain, heat, or cold, you have a greater risk of damage to the area.  · Move your toes often.  · Raise the injured area above the level of your heart while you are sitting or lying down.  Activity  · Avoid activities where both feet leave the ground at the same time (high-impact activities). Examples are running, jumping rope, and doing jumping jacks.  · Follow the exercise plan that your doctor makes for you. Your doctor may suggest that you:  ? Avoid activities that make knee pain worse. You may need to change the exercises that you do, the sports that you participate in, or your job duties.  ? Wear shoes with cushioned soles.  ? Avoid sports that require running and sudden changes in direction.  ? Do exercises or physical therapy. This is planned to match your needs and your abilities.  ? Do exercises that increase your balance and strength, such as stephen chi and yoga.  · Do not use your injured knee to support your body weight until your doctor says that you can. Use crutches as told by your doctor.  · Return to your normal activities when your doctor says that it is safe.  General instructions  · Take over-the-counter and prescription medicines only as told by your doctor.  · If you are overweight, work with your doctor and a food expert (dietitian) to set goals to lose weight. Being overweight can make your knee hurt more.  · Do not smoke or use any products that contain nicotine or tobacco. If you need help quitting, ask your doctor.  · Keep all follow-up visits.  Contact a doctor if:  · You have knee pain that is not getting better or gets worse.  · You are not able to do your exercises due to knee pain.  Get help right away if:  · Your knee swells and the swelling gets worse.  · You cannot move your knee.  · You have very bad knee pain.  Summary  · Knee pain that lasts more than 3 months is called chronic knee pain.  · The main treatments for chronic knee  pain are physical therapy and weight loss. You may also need to take medicines, wear a knee sleeve or brace, use crutches, and put ice or heat on your knee.  · Lose weight if you are overweight. Work with your doctor and a food expert (dietitian) to help you set goals to lose weight. Being overweight can make your knee hurt more.  · Follow the exercise plan that your doctor makes for you.  This information is not intended to replace advice given to you by your health care provider. Make sure you discuss any questions you have with your health care provider.  Document Revised: 06/02/2021 Document Reviewed: 06/02/2021  Elsevier Patient Education © 2021 [employer]

## 2022-02-14 DIAGNOSIS — M25.562 BILATERAL CHRONIC KNEE PAIN: Primary | ICD-10-CM

## 2022-02-14 DIAGNOSIS — M25.561 BILATERAL CHRONIC KNEE PAIN: Primary | ICD-10-CM

## 2022-02-14 DIAGNOSIS — G89.29 BILATERAL CHRONIC KNEE PAIN: Primary | ICD-10-CM

## 2022-02-16 ENCOUNTER — HOSPITAL ENCOUNTER (OUTPATIENT)
Dept: GENERAL RADIOLOGY | Facility: HOSPITAL | Age: 77
Discharge: HOME | End: 2022-02-16
Admitting: FAMILY MEDICINE

## 2022-02-16 DIAGNOSIS — G89.29 BILATERAL CHRONIC KNEE PAIN: ICD-10-CM

## 2022-02-16 DIAGNOSIS — M25.562 BILATERAL CHRONIC KNEE PAIN: ICD-10-CM

## 2022-02-16 DIAGNOSIS — M25.561 BILATERAL CHRONIC KNEE PAIN: ICD-10-CM

## 2022-02-16 PROCEDURE — 73562 X-RAY EXAM OF KNEE 3: CPT

## 2022-02-18 ENCOUNTER — OFFICE VISIT (OUTPATIENT)
Dept: INTERNAL MEDICINE | Facility: CLINIC | Age: 77
End: 2022-02-18

## 2022-02-18 VITALS
WEIGHT: 142.6 LBS | BODY MASS INDEX: 24.34 KG/M2 | HEIGHT: 64 IN | HEART RATE: 64 BPM | SYSTOLIC BLOOD PRESSURE: 142 MMHG | DIASTOLIC BLOOD PRESSURE: 76 MMHG | TEMPERATURE: 98.7 F | OXYGEN SATURATION: 98 %

## 2022-02-18 DIAGNOSIS — M17.0 PRIMARY OSTEOARTHRITIS OF BOTH KNEES: Primary | ICD-10-CM

## 2022-02-18 DIAGNOSIS — Z12.31 ENCOUNTER FOR SCREENING MAMMOGRAM FOR MALIGNANT NEOPLASM OF BREAST: Primary | ICD-10-CM

## 2022-02-18 PROCEDURE — 20610 DRAIN/INJ JOINT/BURSA W/O US: CPT | Performed by: FAMILY MEDICINE

## 2022-02-18 PROCEDURE — 99213 OFFICE O/P EST LOW 20 MIN: CPT | Performed by: FAMILY MEDICINE

## 2022-02-18 RX ORDER — TRIAMCINOLONE ACETONIDE 40 MG/ML
80 INJECTION, SUSPENSION INTRA-ARTICULAR; INTRAMUSCULAR ONCE
Status: COMPLETED | OUTPATIENT
Start: 2022-02-18 | End: 2022-02-18

## 2022-02-18 RX ORDER — TRIPROLIDINE/PSEUDOEPHEDRINE 2.5MG-60MG
TABLET ORAL
Status: ON HOLD | COMMUNITY
Start: 2022-02-11 | End: 2023-01-11

## 2022-02-18 RX ADMIN — TRIAMCINOLONE ACETONIDE 80 MG: 40 INJECTION, SUSPENSION INTRA-ARTICULAR; INTRAMUSCULAR at 16:11

## 2022-02-18 RX ADMIN — TRIAMCINOLONE ACETONIDE 80 MG: 40 INJECTION, SUSPENSION INTRA-ARTICULAR; INTRAMUSCULAR at 16:10

## 2022-02-18 NOTE — PATIENT INSTRUCTIONS
Knee Injection  A knee injection is a procedure to get medicine into your knee joint to relieve the pain, swelling, and stiffness of arthritis. Your health care provider uses a needle to inject medicine, which may also help to lubricate and cushion your knee joint. You may need more than one injection.  Tell a health care provider about:  · Any allergies you have.  · All medicines you are taking, including vitamins, herbs, eye drops, creams, and over-the-counter medicines.  · Any problems you or family members have had with anesthetic medicines.  · Any blood disorders you have.  · Any surgeries you have had.  · Any medical conditions you have.  · Whether you are pregnant or may be pregnant.  What are the risks?  Generally, this is a safe procedure. However, problems may occur, including:  · Infection.  · Bleeding.  · Symptoms that get worse.  · Damage to the area around your knee.  · Allergic reaction to any of the medicines.  · Skin reactions from repeated injections.  What happens before the procedure?  · Ask your health care provider about:  ? Changing or stopping your regular medicines. This is especially important if you are taking diabetes medicines or blood thinners.  ? Taking medicines such as aspirin and ibuprofen. These medicines can thin your blood. Do not take these medicines unless your health care provider tells you to take them.  ? Taking over-the-counter medicines, vitamins, herbs, and supplements.  · Plan to have a responsible adult take you home from the hospital or clinic.  What happens during the procedure?    · You will sit or lie down in a position for your knee to be treated.  · The skin over your kneecap will be cleaned with a germ-killing soap.  · You will be given a medicine that numbs the area (local anesthetic). You may feel some stinging.  · The medicine will be injected into your knee. The needle is carefully placed between your kneecap and your knee. The medicine is injected into the  joint space.  · The needle will be removed at the end of the procedure.  · A bandage (dressing) may be placed over the injection site.  The procedure may vary among health care providers and hospitals.  What can I expect after the procedure?  · Your blood pressure, heart rate, breathing rate, and blood oxygen level will be monitored until you leave the hospital or clinic.  · You may have to move your knee through its full range of motion. This helps to get all the medicine into your joint space.  · You will be watched to make sure that you do not have a reaction to the injected medicine.  · You may feel more pain, swelling, and warmth than you did before the injection. This reaction may last about 1-2 days.  Follow these instructions at home:  Medicines  · Take over-the-counter and prescription medicines only as told by your health care provider.  · Ask your health care provider if the medicine prescribed to you requires you to avoid driving or using machinery.  · Do not take medicines such as aspirin and ibuprofen unless your health care provider tells you to take them.  Injection site care  · Follow instructions from your health care provider about:  ? How to take care of your puncture site.  ? When and how you should change your dressing.  ? When you should remove your dressing.  · Check your injection area every day for signs of infection. Check for:  ? More redness, swelling, or pain after 2 days.  ? Fluid or blood.  ? Pus or a bad smell.  ? Warmth.  Managing pain, stiffness, and swelling    · If directed, put ice on the injection area. To do this:  ? Put ice in a plastic bag.  ? Place a towel between your skin and the bag.  ? Leave the ice on for 20 minutes, 2-3 times per day.  ? Remove the ice if your skin turns bright red. This is very important. If you cannot feel pain, heat, or cold, you have a greater risk of damage to the area.  · Do not apply heat to your knee.  · Raise (elevate) the injection area  above the level of your heart while you are sitting or lying down.    General instructions  · If you were given a dressing, keep it dry until your health care provider says it can be removed. Ask your health care provider when you can start showering or bathing.  · Avoid strenuous activities for as long as directed by your health care provider. Ask your health care provider when you can return to your normal activities.  · Keep all follow-up visits. This is important. You may need more injections.  Contact a health care provider if you have:  · A fever.  · Warmth in your injection area.  · Fluid, blood, or pus coming from your injection site.  · Symptoms at your injection site that last longer than 2 days after your procedure.  Get help right away if:  · Your knee turns very red.  · Your knee becomes very swollen.  · Your knee is in severe pain.  Summary  · A knee injection is a procedure to get medicine into your knee joint to relieve the pain, swelling, and stiffness of arthritis.  · A needle is carefully placed between your kneecap and your knee to inject medicine into the joint space.  · Before the procedure, ask your health care provider about changing or stopping your regular medicines, especially if you are taking diabetes medicines or blood thinners.  · Contact your health care provider if you have any problems or questions after your procedure.  This information is not intended to replace advice given to you by your health care provider. Make sure you discuss any questions you have with your health care provider.  Document Revised: 06/02/2021 Document Reviewed: 06/02/2021  [employer] Patient Education © 2021 [employer]

## 2022-02-18 NOTE — PROGRESS NOTES
"Chief Complaint  Knee Pain (injections )    Subjective          Marilia Leo presents to [facility]  History of Present Illness     Patient is presenting today for further evaluation of her bilateral knee pain.  Last time I had spoken with her she was using Tylenol 325 mg 3 times daily as well as Biofreeze.  I had recommending increasing the Tylenol to 500 mg 2-3 times per day as needed along with lidocaine or Voltaren gel.  I obtained x-rays of the knees which I reviewed with the patient today.  Showed mild narrowing of the medial lateral compartments of both knees consistent with minimal osteoarthritis as well as small bilateral effusions suspected.    Given the patient's continued pain and the review of the x-rays, I asked if the patient would be interested in injections to see if this would calm the pain down.  I obtained verbal consent and discussed the risks benefits and alternative treatments of injections and she wishes to proceed.    Objective   Vital Signs:   /76 (BP Location: Left arm, Patient Position: Sitting, Cuff Size: Adult)   Pulse 64   Temp 98.7 °F (37.1 °C) (Temporal)   Ht 162.6 cm (64\")   Wt 64.7 kg (142 lb 9.6 oz)   SpO2 98%   BMI 24.48 kg/m²     Physical Exam  Constitutional:       Appearance: Normal appearance.   Musculoskeletal:      Right knee: Normal range of motion. Tenderness present over the medial joint line and lateral joint line.      Left knee: Normal range of motion. Tenderness present over the medial joint line and lateral joint line.   Neurological:      Mental Status: She is alert.        Result Review :                    Knee Injection Procedure Note    Pre-operative Diagnosis: bilateral knee pain    Post-operative Diagnosis: same    Indications: Symptom relief from osteoarthritis    Anesthesia: Ethyl chloride spray     Procedure Details     Verbal consent was obtained for the procedure. The joint was prepped with alcohol and a 25 " gauge needle was inserted into the inferior lateral portion of the knee.  6 ml 1% lidocaine and 2 ml of triamcinolone (KENALOG) 40mg/ml was then injected into the joint. The needle was removed and the area cleansed and dressed.    Complications:  None; patient tolerated the procedure well.      Assessment and Plan    Diagnoses and all orders for this visit:    1. Primary osteoarthritis of both knees (Primary)      Therapeutic injection completed today.  I will continue her Tylenol scheduled for the arthritis.  If no improvement within a few weeks, I will refer to orthopedics for further evaluation.  I am hoping these injections will get her relief for at least 6 months to a year.        Follow Up   Return in about 6 weeks (around 4/4/2022) for Next scheduled follow up.  Patient was given instructions and counseling regarding her condition or for health maintenance advice. Please see specific information pulled into the AVS if appropriate.

## 2022-03-17 NOTE — PROGRESS NOTES
"Chief Complaint  Equipment (Needs Medical Equipments like cane and Scale for BP and weight) and Hypertension    Subjective          Marilia Leo presents to [facility]  History of Present Illness     Patient is here today to do a face-to-face for request for durable medical equipment.    Legally blind and looking for a scale to check her weight that will talk so she can hear the weight.  She has to monitor her weight due to a history of abnormal weight loss.  She needs a commode extender seat so she can more easily use the commode.  She has some weakness in her legs and that adapter makes it easier to sit.  Also needs a new single prong cane due to history of balance disorder to prevent falls.  I ha personally evalauated her and feel that these items are necessary for her well-being.    Hypertension - uncontrolled today.  Patient taking medication as prescribed.  Denies chest pain, shortness of breath, headache, lower extremity edema.  Patient is taking amlodipine 5mg daily and irbesartan 150mg daily.      Objective   Vital Signs:   BP (!) 165/110   Pulse 85   Ht 162.6 cm (64.02\")   Wt 62.6 kg (138 lb)   SpO2 95%   BMI 23.68 kg/m²     Physical Exam  Vitals and nursing note reviewed.   Constitutional:       General: She is not in acute distress.     Appearance: Normal appearance.   Cardiovascular:      Rate and Rhythm: Normal rate and regular rhythm.      Heart sounds: Normal heart sounds. No murmur heard.  Pulmonary:      Effort: Pulmonary effort is normal.      Breath sounds: Normal breath sounds.   Neurological:      Mental Status: She is alert.        Result Review :   The following data was reviewed by: Grover Garcias MD on 03/18/2022:  Common labs    Common Labsle 4/10/21 4/10/21 10/4/21 10/4/21 10/4/21 1/3/22 1/3/22    0922 0922 0921 0921 0921 1631 1631   Glucose  107 (A) 80    91   BUN  15 17    16   Creatinine  0.93 0.90    0.96   eGFR Non  Am   61       eGFR "  Am  71 74    68   Sodium  142 144    140   Potassium  3.4 (A) 3.9    4.1   Chloride  108 (A) 106    104   Calcium  8.8 9.4    9.5   Total Protein   6.9       Albumin  4.20 4.50    4.40   Total Bilirubin  0.5 0.5    0.3   Alkaline Phosphatase  74 80    78   AST (SGOT)  15 18    18   ALT (SGPT)  12 14    13   WBC 7.09   7.76  10.18    Hemoglobin 12.9   13.2  13.4    Hematocrit 39.5   40.1  43.1    Platelets 287   301  302    Total Cholesterol     209 (A)     Triglycerides     54     HDL Cholesterol     71 (A)     LDL Cholesterol      128 (A)     (A) Abnormal value       Comments are available for some flowsheets but are not being displayed.                     Assessment and Plan    Diagnoses and all orders for this visit:    1. Benign essential hypertension (Primary)  -     amLODIPine (NORVASC) 10 MG tablet; Take 1 tablet by mouth Daily.  Dispense: 90 tablet; Refill: 3  -     irbesartan (AVAPRO) 150 MG tablet; Take 1 tablet by mouth Every Night.  Dispense: 90 tablet; Refill: 3    2. Legally blind  -     Cane  -     Misc. Devices (Raised Toilet Seat) misc; 1 each Daily.  Dispense: 1 each; Refill: 0    3. History of balance disorder  -     Cane  -     Misc. Devices (Raised Toilet Seat) misc; 1 each Daily.  Dispense: 1 each; Refill: 0    4. Weakness of both lower extremities  -     Misc. Devices (Raised Toilet Seat) misc; 1 each Daily.  Dispense: 1 each; Refill: 0      Increased amlodipine to 10mg daily and keep irbesartan the same,  Close F/U in 1 week.    Ordered her DME.  Will need to talk to [facility] to see what the order is for the scale as I do not see in our system.          Follow Up   Return in about 1 week (around 3/25/2022) for Recheck.  Patient was given instructions and counseling regarding her condition or for health maintenance advice. Please see specific information pulled into the AVS if appropriate.

## 2022-03-18 ENCOUNTER — OFFICE VISIT (OUTPATIENT)
Dept: INTERNAL MEDICINE | Facility: CLINIC | Age: 77
End: 2022-03-18

## 2022-03-18 VITALS
DIASTOLIC BLOOD PRESSURE: 110 MMHG | HEART RATE: 85 BPM | WEIGHT: 138 LBS | OXYGEN SATURATION: 95 % | HEIGHT: 64 IN | SYSTOLIC BLOOD PRESSURE: 165 MMHG | BODY MASS INDEX: 23.56 KG/M2

## 2022-03-18 DIAGNOSIS — H54.8 LEGALLY BLIND: ICD-10-CM

## 2022-03-18 DIAGNOSIS — I10 BENIGN ESSENTIAL HYPERTENSION: Primary | ICD-10-CM

## 2022-03-18 DIAGNOSIS — R29.898 WEAKNESS OF BOTH LOWER EXTREMITIES: ICD-10-CM

## 2022-03-18 DIAGNOSIS — R63.4 ABNORMAL WEIGHT LOSS: ICD-10-CM

## 2022-03-18 DIAGNOSIS — Z87.898 HISTORY OF BALANCE DISORDER: ICD-10-CM

## 2022-03-18 PROCEDURE — 99214 OFFICE O/P EST MOD 30 MIN: CPT | Performed by: FAMILY MEDICINE

## 2022-03-18 RX ORDER — IRBESARTAN 150 MG/1
150 TABLET ORAL NIGHTLY
Qty: 90 TABLET | Refills: 3 | Status: SHIPPED | OUTPATIENT
Start: 2022-03-18 | End: 2022-03-25 | Stop reason: SDUPTHER

## 2022-03-18 RX ORDER — AMLODIPINE BESYLATE 10 MG/1
10 TABLET ORAL DAILY
Qty: 90 TABLET | Refills: 3 | Status: SHIPPED | OUTPATIENT
Start: 2022-03-18 | End: 2023-02-10

## 2022-03-21 DIAGNOSIS — R63.4 ABNORMAL WEIGHT LOSS: ICD-10-CM

## 2022-03-21 DIAGNOSIS — H54.8 LEGALLY BLIND: Primary | ICD-10-CM

## 2022-03-21 DIAGNOSIS — R29.898 WEAKNESS OF BOTH LOWER EXTREMITIES: ICD-10-CM

## 2022-03-25 ENCOUNTER — OFFICE VISIT (OUTPATIENT)
Dept: INTERNAL MEDICINE | Facility: CLINIC | Age: 77
End: 2022-03-25

## 2022-03-25 VITALS
OXYGEN SATURATION: 99 % | SYSTOLIC BLOOD PRESSURE: 146 MMHG | DIASTOLIC BLOOD PRESSURE: 88 MMHG | TEMPERATURE: 97.8 F | HEIGHT: 64 IN | WEIGHT: 139 LBS | HEART RATE: 98 BPM | BODY MASS INDEX: 23.73 KG/M2

## 2022-03-25 DIAGNOSIS — I10 BENIGN ESSENTIAL HYPERTENSION: Primary | ICD-10-CM

## 2022-03-25 PROCEDURE — 99213 OFFICE O/P EST LOW 20 MIN: CPT | Performed by: FAMILY MEDICINE

## 2022-03-25 RX ORDER — IRBESARTAN 150 MG/1
150 TABLET ORAL NIGHTLY
Qty: 90 TABLET | Refills: 3 | Status: SHIPPED | OUTPATIENT
Start: 2022-03-25

## 2022-03-25 NOTE — PROGRESS NOTES
"Chief Complaint  Hypertension (One week follow up on HTN)    Subjective          Marilia Leo presents to [facility]  History of Present Illness     This patient is here today to follow-up on her uncontrolled blood pressure from last visit.  Last visit we had increased her amlodipine to 10 mg daily and continued her irbesartan 150 mg daily.  Tolerating okay overall with dividing out the dosing.  Much improved    Objective   Vital Signs:   /88   Pulse 98   Temp 97.8 °F (36.6 °C)   Ht 162.6 cm (64.02\")   Wt 63 kg (139 lb)   SpO2 99%   BMI 23.85 kg/m²     BMI is within normal parameters. No follow-up required.      Physical Exam  Vitals and nursing note reviewed.   Constitutional:       General: She is not in acute distress.     Appearance: Normal appearance.   Cardiovascular:      Rate and Rhythm: Normal rate and regular rhythm.      Heart sounds: Normal heart sounds. No murmur heard.  Pulmonary:      Effort: Pulmonary effort is normal.      Breath sounds: Normal breath sounds.   Neurological:      Mental Status: She is alert.        Result Review :   The following data was reviewed by: Grover Garcias MD on 03/25/2022:  Common labs    Common Labsle 4/10/21 4/10/21 10/4/21 10/4/21 10/4/21 1/3/22 1/3/22    0922 0922 0921 0921 0921 1631 1631   Glucose  107 (A) 80    91   BUN  15 17    16   Creatinine  0.93 0.90    0.96   eGFR Non  Am   61       eGFR  Am  71 74    68   Sodium  142 144    140   Potassium  3.4 (A) 3.9    4.1   Chloride  108 (A) 106    104   Calcium  8.8 9.4    9.5   Total Protein   6.9       Albumin  4.20 4.50    4.40   Total Bilirubin  0.5 0.5    0.3   Alkaline Phosphatase  74 80    78   AST (SGOT)  15 18    18   ALT (SGPT)  12 14    13   WBC 7.09   7.76  10.18    Hemoglobin 12.9   13.2  13.4    Hematocrit 39.5   40.1  43.1    Platelets 287   301  302    Total Cholesterol     209 (A)     Triglycerides     54     HDL Cholesterol     71 (A)     LDL " Cholesterol      128 (A)     (A) Abnormal value       Comments are available for some flowsheets but are not being displayed.                     Assessment and Plan    Diagnoses and all orders for this visit:    1. Benign essential hypertension (Primary)  -     irbesartan (AVAPRO) 150 MG tablet; Take 1 tablet by mouth Every Night.  Dispense: 90 tablet; Refill: 3      BP much improved and will continue on the amlodpine and irbesartan.  See back in 3 months.,        Follow Up   Return in about 3 months (around 6/20/2022) for Next scheduled follow up.  Patient was given instructions and counseling regarding her condition or for health maintenance advice. Please see specific information pulled into the AVS if appropriate.

## 2022-04-19 ENCOUNTER — TELEPHONE (OUTPATIENT)
Dept: INTERNAL MEDICINE | Facility: CLINIC | Age: 77
End: 2022-04-19

## 2022-04-19 NOTE — TELEPHONE ENCOUNTER
Not sure what this is about? Don't see anything in her notes?  Please advise  Not sure where she gets?

## 2022-04-19 NOTE — TELEPHONE ENCOUNTER
Caller: Marilia Leo    Relationship: Self    Best call back number: [phone]    What was the call regarding: PATIENT SPOKE TO DR. JONES OVER A MONTH AGO REGARDING THE SCALES THAT WILL DO BLOOD PRESSURE AND WEIGHT FOR VISION IMPAIRED PATIENTS.     SHE IS CHECKING STATUS TO SEE IF IT WAS CLEARED THRU INSURANCE.     Do you require a callback: YES

## 2022-04-21 NOTE — TELEPHONE ENCOUNTER
[email or web address] Provided phone number, she will contact [facility] to check on the status

## 2022-07-25 ENCOUNTER — OFFICE VISIT (OUTPATIENT)
Dept: INTERNAL MEDICINE | Facility: CLINIC | Age: 77
End: 2022-07-25

## 2022-07-25 ENCOUNTER — TELEPHONE (OUTPATIENT)
Dept: INTERNAL MEDICINE | Facility: CLINIC | Age: 77
End: 2022-07-25

## 2022-07-25 VITALS
WEIGHT: 139 LBS | DIASTOLIC BLOOD PRESSURE: 93 MMHG | HEIGHT: 64 IN | HEART RATE: 77 BPM | OXYGEN SATURATION: 94 % | RESPIRATION RATE: 18 BRPM | SYSTOLIC BLOOD PRESSURE: 171 MMHG | BODY MASS INDEX: 23.73 KG/M2

## 2022-07-25 DIAGNOSIS — I10 BENIGN ESSENTIAL HYPERTENSION: ICD-10-CM

## 2022-07-25 DIAGNOSIS — R35.0 URINARY FREQUENCY: Primary | ICD-10-CM

## 2022-07-25 DIAGNOSIS — R10.9 BILATERAL FLANK PAIN: ICD-10-CM

## 2022-07-25 LAB
BACTERIA UR QL AUTO: ABNORMAL /HPF
BILIRUB UR QL STRIP: NEGATIVE
CLARITY UR: CLEAR
COLOR UR: YELLOW
GLUCOSE UR STRIP-MCNC: NEGATIVE MG/DL
HGB UR QL STRIP.AUTO: ABNORMAL
HYALINE CASTS UR QL AUTO: ABNORMAL /LPF
KETONES UR QL STRIP: NEGATIVE
LEUKOCYTE ESTERASE UR QL STRIP.AUTO: ABNORMAL
MUCOUS THREADS URNS QL MICRO: ABNORMAL /HPF
NITRITE UR QL STRIP: NEGATIVE
PH UR STRIP.AUTO: 7.5 [PH] (ref 5–8)
PROT UR QL STRIP: NEGATIVE
RBC # UR STRIP: ABNORMAL /HPF
REF LAB TEST METHOD: ABNORMAL
SP GR UR STRIP: 1.02 (ref 1–1.03)
SQUAMOUS #/AREA URNS HPF: ABNORMAL /HPF
TRANS CELLS #/AREA URNS HPF: ABNORMAL /HPF
UROBILINOGEN UR QL STRIP: ABNORMAL
WBC # UR STRIP: ABNORMAL /HPF
WBC CLUMPS # UR AUTO: ABNORMAL /HPF

## 2022-07-25 PROCEDURE — 81001 URINALYSIS AUTO W/SCOPE: CPT

## 2022-07-25 PROCEDURE — 99214 OFFICE O/P EST MOD 30 MIN: CPT

## 2022-07-25 RX ORDER — CIPROFLOXACIN 500 MG/1
500 TABLET, FILM COATED ORAL 2 TIMES DAILY
Qty: 14 TABLET | Refills: 0 | Status: SHIPPED | OUTPATIENT
Start: 2022-07-25 | End: 2022-08-01

## 2022-07-25 NOTE — TELEPHONE ENCOUNTER
Caller: Talon Day    Relationship: Emergency Contact    Best call back number: [phone]    What medication are you requesting: ANTIBIOTIC   What are your current symptoms: FREQUENT URINATION AND LOWER BACK PAIN     How long have you been experiencing symptoms: 3 DAYS     Have you had these symptoms before:    [x] Yes  [] No    Have you been treated for these symptoms before:   [x] Yes  [] No    If a prescription is needed, what is your preferred pharmacy and phone number: [id number] - [facility] [address] AT [facility] - [phone] [facility] [phone] FX     Additional notes: TOOK AN AT HOME UTI TEST AND LEUKOCYTES ARE HIGH ON TEST

## 2022-07-25 NOTE — PATIENT INSTRUCTIONS
Start Cipro today. Stay hydrated. Will call when culture is resulted and verify appropriateness of antibiotic choice.

## 2022-07-26 LAB
BACTERIA UR CULT: NO GROWTH
BACTERIA UR CULT: NORMAL

## 2022-07-26 NOTE — PROGRESS NOTES
"Chief Complaint  Urinary Tract Infection (Pt presents here today for a possible UTI )    Subjective        Marilia Leo presents to [facility]  77-year-old female presenting with bilateral low back pain.  History of UTI with similar symptoms.  Previous UTI resulted in hospitalization due to antibiotic reaction causing kidney injury.  Patient wanted to get treatment before having to go to the hospital.  Denies dysuria, discoloration of urine and fever.      Objective   Vital Signs:  /93 (BP Location: Left arm, Patient Position: Sitting, Cuff Size: Large Adult)   Pulse 77   Resp 18   Ht 162.6 cm (64\")   Wt 63 kg (139 lb)   SpO2 94%   BMI 23.86 kg/m²   Estimated body mass index is 23.86 kg/m² as calculated from the following:    Height as of this encounter: 162.6 cm (64\").    Weight as of this encounter: 63 kg (139 lb).    BMI is within normal parameters. No other follow-up for BMI required.      Physical Exam  Vitals and nursing note reviewed.   Constitutional:       Appearance: Normal appearance.   Cardiovascular:      Rate and Rhythm: Normal rate.      Pulses: Normal pulses.      Heart sounds: Normal heart sounds.   Pulmonary:      Effort: Pulmonary effort is normal.      Breath sounds: Normal breath sounds.   Musculoskeletal:         General: Normal range of motion.   Skin:     General: Skin is warm and dry.      Capillary Refill: Capillary refill takes less than 2 seconds.   Neurological:      General: No focal deficit present.      Mental Status: She is alert and oriented to person, place, and time.   Psychiatric:         Mood and Affect: Mood normal.         Behavior: Behavior normal.         Thought Content: Thought content normal.         Judgment: Judgment normal.        Result Review :  The following data was reviewed by: GISELL German on 07/25/2022:  Common labs    Common Labsle 10/4/21 10/4/21 10/4/21 1/3/22 1/3/22    0921 0921 0921 1631 1631   Glucose 80    " 91   BUN 17    16   Creatinine 0.90    0.96   eGFR Non  Am 61       eGFR African Am 74    68   Sodium 144    140   Potassium 3.9    4.1   Chloride 106    104   Calcium 9.4    9.5   Total Protein 6.9       Albumin 4.50    4.40   Total Bilirubin 0.5    0.3   Alkaline Phosphatase 80    78   AST (SGOT) 18    18   ALT (SGPT) 14    13   WBC  7.76  10.18    Hemoglobin  13.2  13.4    Hematocrit  40.1  43.1    Platelets  301  302    Total Cholesterol   209 (A)     Triglycerides   54     HDL Cholesterol   71 (A)     LDL Cholesterol    128 (A)     (A) Abnormal value       Comments are available for some flowsheets but are not being displayed.           Current Outpatient Medications on File Prior to Visit   Medication Sig Dispense Refill   • acetaminophen (TYLENOL) 325 MG tablet Take 325 mg by mouth As Needed.     • amLODIPine (NORVASC) 10 MG tablet Take 1 tablet by mouth Daily. 90 tablet 3   • atropine 1 % ophthalmic solution Administer 1 drop to both eyes Daily.     • Carboxymeth-Glycerin-Polysorb (REFRESH OPTIVE ALBERTO-3 OP) Apply 1 drop to eye(s) as directed by provider 2 (Two) Times a Day As Needed.     • Durezol 0.05 % ophthalmic emulsion      • gabapentin (NEURONTIN) 100 MG capsule TAKE 1 CAPSULE BY MOUTH EVERY MORNING AND 2 EVERY NIGHT AT BEDTIME 270 capsule 1   • irbesartan (AVAPRO) 150 MG tablet Take 1 tablet by mouth Every Night. 90 tablet 3   • Misc. Devices (Precision Scale) misc Use to check weight daily 1 each 0   • Misc. Devices (Raised Toilet Seat) misc 1 each Daily. 1 each 0   • Misc. Devices (Raised Toilet Seat) misc Use daily 1 each 0   • natamycin (NATACYN) 5 % ophthalmic solution Administer 1 drop to the right eye 2 (two) times a day. Cycles on for 14 days and off for 14 days  Patient does not need until 8/18/20, this comes from compound pharmacy     • Netarsudil Dimesylate 0.02 % solution Administer 1 drop into the left eye Every Night.     • ofloxacin (OCUFLOX) 0.3 % ophthalmic solution Administer  1 drop into the left eye 2 (Two) Times a Day.       No current facility-administered medications on file prior to visit.                 Assessment and Plan   Diagnoses and all orders for this visit:    1. Urinary frequency (Primary)  -     Cancel: Urinalysis With Culture If Indicated - Urine, Clean Catch  -     ciprofloxacin (Cipro) 500 MG tablet; Take 1 tablet by mouth 2 (Two) Times a Day for 7 days.  Dispense: 14 tablet; Refill: 0  -     Urinalysis With Microscopic If Indicated (No Culture) - Urine, Clean Catch; Future  -     Urinalysis With Microscopic If Indicated (No Culture) - Urine, Clean Catch  -     Urinalysis, Microscopic Only - Urine, Clean Catch  -     Urine Culture - Urine, Urine, Clean Catch    2. Bilateral flank pain  -     Cancel: Urinalysis With Culture If Indicated - Urine, Clean Catch  -     ciprofloxacin (Cipro) 500 MG tablet; Take 1 tablet by mouth 2 (Two) Times a Day for 7 days.  Dispense: 14 tablet; Refill: 0  -     Urinalysis With Microscopic If Indicated (No Culture) - Urine, Clean Catch; Future  -     Urinalysis With Microscopic If Indicated (No Culture) - Urine, Clean Catch  -     Urinalysis, Microscopic Only - Urine, Clean Catch  -     Urine Culture - Urine, Urine, Clean Catch    3. Benign essential hypertension    Start Cipro today. Stay hydrated. Will call when culture is resulted and verify appropriateness of antibiotic choice.     Discussed with the patient and all questioned fully answered. She will call me if any problems arise.           Follow Up   Return if symptoms worsen or fail to improve.  Patient was given instructions and counseling regarding her condition or for health maintenance advice. Please see specific information pulled into the AVS if appropriate.

## 2022-07-27 ENCOUNTER — DOCUMENTATION (OUTPATIENT)
Dept: INTERNAL MEDICINE | Facility: CLINIC | Age: 77
End: 2022-07-27

## 2022-07-28 NOTE — PROGRESS NOTES
On call:  Spoke with patient concern antibotic is causing her to itch. She has notice some itching, little red bumps.    Advise to stop the antibiotic.   I reviewed the culture report.  Advise no growth no additional antibiotic indicated at this time.    Benadryl as needed for the itching.

## 2022-07-28 NOTE — PROGRESS NOTES
No growth in culture. Due to severe symptoms, continue antibiotics as prescribed. Stay hydrated with water.

## 2022-07-28 NOTE — PROGRESS NOTES
Patient stated she had a rash on her neck that started last night and has been using hydrocortisone cream on it. She is asking if this rash could be from the antibiotic.

## 2022-08-16 DIAGNOSIS — M54.16 LUMBAR RADICULOPATHY, CHRONIC: ICD-10-CM

## 2022-08-17 RX ORDER — GABAPENTIN 100 MG/1
CAPSULE ORAL
Qty: 270 CAPSULE | Refills: 0 | Status: SHIPPED | OUTPATIENT
Start: 2022-08-17 | End: 2022-12-21

## 2022-08-29 ENCOUNTER — OFFICE VISIT (OUTPATIENT)
Dept: ORTHOPEDIC SURGERY | Facility: CLINIC | Age: 77
End: 2022-08-29

## 2022-08-29 VITALS — HEIGHT: 65 IN | BODY MASS INDEX: 22.66 KG/M2 | WEIGHT: 136 LBS | TEMPERATURE: 98.4 F

## 2022-08-29 DIAGNOSIS — M25.532 LEFT WRIST PAIN: ICD-10-CM

## 2022-08-29 DIAGNOSIS — H54.7 IMPAIRED VISION: ICD-10-CM

## 2022-08-29 DIAGNOSIS — S52.592A OTHER CLOSED FRACTURE OF DISTAL END OF LEFT RADIUS, INITIAL ENCOUNTER: ICD-10-CM

## 2022-08-29 DIAGNOSIS — M18.12 ARTHRITIS OF CARPOMETACARPAL (CMC) JOINT OF LEFT THUMB: ICD-10-CM

## 2022-08-29 DIAGNOSIS — R52 PAIN: Primary | ICD-10-CM

## 2022-08-29 PROCEDURE — 73130 X-RAY EXAM OF HAND: CPT | Performed by: NURSE PRACTITIONER

## 2022-08-29 PROCEDURE — 99214 OFFICE O/P EST MOD 30 MIN: CPT | Performed by: NURSE PRACTITIONER

## 2022-08-29 PROCEDURE — 73100 X-RAY EXAM OF WRIST: CPT | Performed by: NURSE PRACTITIONER

## 2022-08-29 RX ORDER — DORZOLAMIDE HYDROCHLORIDE AND TIMOLOL MALEATE 20; 5 MG/ML; MG/ML
1 SOLUTION/ DROPS OPHTHALMIC 2 TIMES DAILY
Status: ON HOLD | COMMUNITY
End: 2023-01-11

## 2022-08-29 RX ORDER — GABAPENTIN 100 MG/1
CAPSULE ORAL
COMMUNITY
Start: 2022-08-17 | End: 2023-02-10

## 2022-08-29 RX ORDER — METHYLPREDNISOLONE 4 MG/1
TABLET ORAL
Qty: 21 TABLET | Refills: 0 | Status: SHIPPED | OUTPATIENT
Start: 2022-08-29 | End: 2022-10-06 | Stop reason: SDUPTHER

## 2022-08-29 NOTE — PROGRESS NOTES
Patient Name: Marilia Leo   YOB: 1945  Referring Primary Care Physician: Grover Garcias MD  BMI: Body mass index is 22.63 kg/m².    Chief Complaint:    Chief Complaint   Patient presents with   • Left Wrist - Pain   • Left Hand - Pain        HPI: New pt here to me with her daughter. She is visually impaired from glaucoma recently and has been navigating her home and may have hit wrist on wall etc. Denies fall or acute injury. Pt has had pain in wrist for about 10 days. Pt is right hand dominant and ambulates with a cane. She has been using her hands and wrist to navigate her house. Pt takes gabapentin for cervical nerve pain from a fusion.  Pt is a retired Labor and  from [employer]    Marilia Leo is a 77 y.o. female who presents today for evaluation of   Chief Complaint   Patient presents with   • Left Wrist - Pain   • Left Hand - Pain         Subjective   Medications:   Home Medications:  Current Outpatient Medications on File Prior to Visit   Medication Sig   • acetaminophen (TYLENOL) 325 MG tablet Take 325 mg by mouth As Needed.   • amLODIPine (NORVASC) 10 MG tablet Take 1 tablet by mouth Daily.   • Carboxymeth-Glycerin-Polysorb (REFRESH OPTIVE ALBERTO-3 OP) Apply 1 drop to eye(s) as directed by provider 2 (Two) Times a Day As Needed.   • dorzolamide-timolol (COSOPT) 22.3-6.8 MG/ML ophthalmic solution 1 drop 2 (Two) Times a Day.   • Durezol 0.05 % ophthalmic emulsion    • gabapentin (NEURONTIN) 100 MG capsule TAKE ONE CAPSULE BY MOUTH EVERY AM AND TWO CAPSULES EVERY NIGHT AT BEDTIME   • gabapentin (NEURONTIN) 100 MG capsule Up to TID   • irbesartan (AVAPRO) 150 MG tablet Take 1 tablet by mouth Every Night.   • Misc. Devices (Precision Scale) misc Use to check weight daily   • Misc. Devices (Raised Toilet Seat) misc 1 each Daily.   • Misc. Devices (Raised Toilet Seat) misc Use daily   • natamycin (NATACYN) 5 % ophthalmic solution Administer 1 drop to the right eye 2 (two) times a day.  Cycles on for 14 days and off for 14 days  Patient does not need until 8/18/20, this comes from compound pharmacy   • Netarsudil Dimesylate 0.02 % solution Administer 1 drop into the left eye Every Night.   • ofloxacin (OCUFLOX) 0.3 % ophthalmic solution Administer 1 drop into the left eye 2 (Two) Times a Day.   • atropine 1 % ophthalmic solution Administer 1 drop to both eyes Daily.     No current facility-administered medications on file prior to visit.     Current Medications:  Scheduled Meds:  Continuous Infusions:No current facility-administered medications for this visit.    PRN Meds:.    I have reviewed the patient's medical history in detail and updated the computerized patient record.  Review and summarization of old records includes:    Past Medical History:   Diagnosis Date   • Angioedema     due to ACEI 8/2002   • Atopic dermatitis 4/18/2016   • Cataract    • Diverticulosis    • Edema     due to Norvasc   • Esophageal reflux    • Glaucoma     right eye open angle Dr astorga s/p lazar Sx x3   • Hammer toe    • History of mammogram     3/11/14 Quick 4/10/15   • Hx of bone scan     DEXA normal 2/2010, 09/2015   • Hypercholesterolemia    • Impaired fasting blood sugar 5/13/2019   • Medial epicondylitis     L 2005   • Palpitations 5/13/2019   • Papanicolaou smear     reported pos. pap smear and previous pos 2 or more years ago  s/p hysterectomy   • Sepsis (HCC) 8/4/2020   • Trochanteric bursitis    • Trochanteric bursitis of left hip 10/13/2016        Past Surgical History:   Procedure Laterality Date   • APPENDECTOMY     • BILATERAL BREAST REDUCTION     • CERVICAL FUSION      Dr.John Sexton   • COLONOSCOPY      4/2007 Dr Bradshaw Normal   • CORNEAL TRANSPLANT     • FETAL BLOOD TRANSFUSION  1960   • FOOT SURGERY  02/2013    right bunion and hammer toe Dr Phelan   • SUBTOTAL HYSTERECTOMY      ovaries intact        Social History     Occupational History   • Not on file   Tobacco Use   • Smoking status: Former  Smoker     Packs/day: 1.00     Years: 5.00     Pack years: 5.00     Types: Cigarettes     Quit date:      Years since quittin.6   • Smokeless tobacco: Never Used   Substance and Sexual Activity   • Alcohol use: Yes     Comment: occasionally   • Drug use: No   • Sexual activity: Not on file      Social History     Social History Narrative   • Not on file        Family History   Problem Relation Age of Onset   • Hypertension Mother    • Diabetes Mother    • Hypertension Father    • Diabetes Sister    • Multiple sclerosis Sister    • Rheum arthritis Sister    • Lung cancer Sister         smoker   • Diabetes Brother    • Heart disease Brother    • Hypertension Brother    • Rheum arthritis Brother    • Sarcoidosis Brother    • Glaucoma Brother        ROS: 14 point review of systems was performed and all other systems were reviewed and are negative except for documented findings in HPI and today's encounter.     Allergies:   Allergies   Allergen Reactions   • Nitrofurantoin Unknown - High Severity and Anaphylaxis   • Codeine    • Penicillins    • Sulfa Antibiotics      Constitutional:  Denies fever, shaking or chills   Eyes:  Denies change in visual acuity   HENT:  Denies nasal congestion or sore throat   Respiratory:  Denies cough or shortness of breath   Cardiovascular:  Denies chest pain or severe LE edema   GI:  Denies abdominal pain, nausea, vomiting, bloody stools or diarrhea   Musculoskeletal:  Numbness, tingling, pain, or loss of motor function only as noted above in history of present illness.  : Denies painful urination or hematuria  Integument:  Denies rash, lesion or ulceration   Neurologic:  Denies headache or focal weakness  Endocrine:  Denies lymphadenopathy  Psych:  Denies confusion or change in mental status   Hem:  Denies active bleeding    OBJECTIVE:  Physical Exam: 77 y.o. female  Wt Readings from Last 3 Encounters:   22 61.7 kg (136 lb)   22 63 kg (139 lb)   22 63 kg (139  "lb)     Ht Readings from Last 1 Encounters:   08/29/22 165.1 cm (65\")     Body mass index is 22.63 kg/m².  Vitals:    08/29/22 1510   Temp: 98.4 °F (36.9 °C)     Vital signs reviewed.     General Appearance:    Alert, cooperative, in no acute distress                  Eyes: conjunctiva clear  ENT: external ears and nose atraumatic  CV: no peripheral edema  Resp: normal respiratory effort  Skin: no rashes or wounds; normal turgor  Psych: mood and affect appropriate  Lymph: no nodes appreciated  Neuro: gross sensation intact  Vascular:  Palpable peripheral pulse in noted extremity  Musculoskeletal Extremities: skin warm, dry and intact with ttp to distal radius and first metacarpal base, NVI and capp refill     Radiology: left wrist 2 views done for pain with questionable distal radius   Left hand 3 views done for pain with arthritis to CMC and DJD     Assessment:     ICD-10-CM ICD-9-CM   1. Pain  R52 780.96   2. Impaired vision  H54.7 369.9   3. Left wrist pain  M25.532 719.43   4. Arthritis of carpometacarpal (CMC) joint of left thumb  M18.12 716.94   5. Other closed fracture of distal end of left radius, initial encounter  S52.592A 813.42        Procedures  exos splint with thumb   Has taken medrol dose pack for arthritis in hip and wanted to try today    MDM/Plan:   The diagnosis(es), natural history, pathophysiology and treatment for diagnosis(es) were discussed. Opportunity given and questions answered.  MEDICATIONS:  The risks, benefits, warnings,side effects and alternatives of medications discussed.  Inflammation/pain control; with cold, heat, elevation and/or liniments discussed as appropriate  MEDICAL RECORDS reviewed from other provider(s) for past and current medical history pertinent to this complaint.      8/31/2022    Much of this encounter note is an electronic transcription/translation of spoken language to printed text. The electronic translation of spoken language may permit erroneous, or at times, " nonsensical words or phrases to be inadvertently transcribed; Although I have reviewed the note for such errors, some may still exist

## 2022-09-02 ENCOUNTER — PATIENT ROUNDING (BHMG ONLY) (OUTPATIENT)
Dept: ORTHOPEDIC SURGERY | Facility: CLINIC | Age: 77
End: 2022-09-02

## 2022-09-02 NOTE — PROGRESS NOTES
A [employer] Message has been sent to the patient for PATIENT ROUNDING with [facility]

## 2022-09-09 ENCOUNTER — OFFICE VISIT (OUTPATIENT)
Dept: ORTHOPEDIC SURGERY | Facility: CLINIC | Age: 77
End: 2022-09-09

## 2022-09-09 ENCOUNTER — OFFICE VISIT (OUTPATIENT)
Dept: INTERNAL MEDICINE | Facility: CLINIC | Age: 77
End: 2022-09-09

## 2022-09-09 VITALS
BODY MASS INDEX: 23.6 KG/M2 | HEART RATE: 70 BPM | DIASTOLIC BLOOD PRESSURE: 85 MMHG | HEIGHT: 64 IN | WEIGHT: 138.2 LBS | OXYGEN SATURATION: 97 % | TEMPERATURE: 97.3 F | SYSTOLIC BLOOD PRESSURE: 135 MMHG

## 2022-09-09 VITALS — HEIGHT: 64 IN | WEIGHT: 138 LBS | BODY MASS INDEX: 23.56 KG/M2 | TEMPERATURE: 97.8 F

## 2022-09-09 DIAGNOSIS — R52 PAIN: Primary | ICD-10-CM

## 2022-09-09 DIAGNOSIS — M25.532 LEFT WRIST PAIN: ICD-10-CM

## 2022-09-09 DIAGNOSIS — R35.0 URINARY FREQUENCY: Primary | ICD-10-CM

## 2022-09-09 LAB
BACTERIA UR QL AUTO: ABNORMAL /HPF
BILIRUB UR QL STRIP: NEGATIVE
CLARITY UR: CLEAR
COLOR UR: YELLOW
GLUCOSE UR STRIP-MCNC: NEGATIVE MG/DL
HGB UR QL STRIP.AUTO: ABNORMAL
HYALINE CASTS UR QL AUTO: ABNORMAL /LPF
KETONES UR QL STRIP: NEGATIVE
LEUKOCYTE ESTERASE UR QL STRIP.AUTO: ABNORMAL
MUCOUS THREADS URNS QL MICRO: ABNORMAL /HPF
NITRITE UR QL STRIP: NEGATIVE
PH UR STRIP.AUTO: 7.5 [PH] (ref 5–8)
PROT UR QL STRIP: NEGATIVE
RBC # UR STRIP: ABNORMAL /HPF
REF LAB TEST METHOD: ABNORMAL
SP GR UR STRIP: 1.02 (ref 1–1.03)
SQUAMOUS #/AREA URNS HPF: ABNORMAL /HPF
TRANS CELLS #/AREA URNS HPF: ABNORMAL /HPF
UROBILINOGEN UR QL STRIP: ABNORMAL
WBC # UR STRIP: ABNORMAL /HPF

## 2022-09-09 PROCEDURE — 99213 OFFICE O/P EST LOW 20 MIN: CPT | Performed by: ORTHOPAEDIC SURGERY

## 2022-09-09 PROCEDURE — 81001 URINALYSIS AUTO W/SCOPE: CPT | Performed by: NURSE PRACTITIONER

## 2022-09-09 PROCEDURE — 99214 OFFICE O/P EST MOD 30 MIN: CPT | Performed by: NURSE PRACTITIONER

## 2022-09-09 PROCEDURE — 73110 X-RAY EXAM OF WRIST: CPT | Performed by: ORTHOPAEDIC SURGERY

## 2022-09-09 RX ORDER — TRAMADOL HYDROCHLORIDE 50 MG/1
50 TABLET ORAL EVERY 4 HOURS PRN
Qty: 60 TABLET | Refills: 0 | Status: SHIPPED | OUTPATIENT
Start: 2022-09-09 | End: 2022-09-30

## 2022-09-09 NOTE — PROGRESS NOTES
"Chief Complaint  Urinary Tract Infection    Subjective        Marilia Leo presents to [facility]  History of Present Illness  This is a 78 y/o female presenting to office for f/u with increased urinary frequency. Patient reports odiferous urine. Patient reports she had taken UTI at home test which indication of positive. Patient reports she is not experiencing dysuria. Patient denies any fever. Patient denies any nausea or diarrhea. Patient reports symptoms have been ongoing x 1 week.     Objective   Vital Signs:  /85 (BP Location: Right arm, Patient Position: Sitting, Cuff Size: Adult)   Pulse 70   Temp 97.3 °F (36.3 °C) (Temporal)   Ht 162.6 cm (64.02\")   Wt 62.7 kg (138 lb 3.2 oz)   SpO2 97%   BMI 23.71 kg/m²   Estimated body mass index is 23.71 kg/m² as calculated from the following:    Height as of this encounter: 162.6 cm (64.02\").    Weight as of this encounter: 62.7 kg (138 lb 3.2 oz).    BMI is within normal parameters. No other follow-up for BMI required.      Physical Exam  Constitutional:       Appearance: Normal appearance.   HENT:      Head: Normocephalic and atraumatic.   Eyes:      Pupils: Pupils are equal, round, and reactive to light.   Cardiovascular:      Rate and Rhythm: Normal rate and regular rhythm.      Pulses: Normal pulses.      Heart sounds: Normal heart sounds. No murmur heard.    No friction rub. No gallop.   Pulmonary:      Effort: Pulmonary effort is normal. No respiratory distress.      Breath sounds: Normal breath sounds. No stridor. No wheezing, rhonchi or rales.   Abdominal:      General: Bowel sounds are normal. There is no distension.      Tenderness: There is no abdominal tenderness.   Musculoskeletal:         General: Normal range of motion.      Cervical back: Normal range of motion and neck supple.   Skin:     General: Skin is warm.   Neurological:      General: No focal deficit present.      Mental Status: She is alert and " oriented to person, place, and time. Mental status is at baseline.   Psychiatric:         Mood and Affect: Mood normal.        Result Review :  The following data was reviewed by: GISELL Rosas on 09/09/2022:  Common labs    Common Labsle 10/4/21 10/4/21 10/4/21 1/3/22 1/3/22    0921 0921 0921 1631 1631   Glucose 80    91   BUN 17    16   Creatinine 0.90    0.96   eGFR Non  Am 61       eGFR African Am 74    68   Sodium 144    140   Potassium 3.9    4.1   Chloride 106    104   Calcium 9.4    9.5   Total Protein 6.9       Albumin 4.50    4.40   Total Bilirubin 0.5    0.3   Alkaline Phosphatase 80    78   AST (SGOT) 18    18   ALT (SGPT) 14    13   WBC  7.76  10.18    Hemoglobin  13.2  13.4    Hematocrit  40.1  43.1    Platelets  301  302    Total Cholesterol   209 (A)     Triglycerides   54     HDL Cholesterol   71 (A)     LDL Cholesterol    128 (A)     (A) Abnormal value       Comments are available for some flowsheets but are not being displayed.                     Assessment and Plan   Diagnoses and all orders for this visit:    1. Urinary frequency (Primary)  Assessment & Plan:  UA today.   Will f/u with results.   Continue with goal hydration 64 ounces water daily.     Orders:  -     Urinalysis With Microscopic If Indicated (No Culture) - Urine, Clean Catch  -     Urinalysis, Microscopic Only - Urine, Clean Catch  -     Urine Culture - Urine, Urine, Clean Catch; Future  -     Cancel: Urine Culture - Urine, Urine, Clean Catch    Other orders  -     Urine Culture - ,         I spent 30 minutes caring for Marilia on this date of service. This time includes time spent by me in the following activities:preparing for the visit, reviewing tests, obtaining and/or reviewing a separately obtained history, performing a medically appropriate examination and/or evaluation , counseling and educating the patient/family/caregiver, ordering medications, tests, or procedures, documenting information in the medical  record and care coordination  Follow Up   Return if symptoms worsen or fail to improve.  Patient was given instructions and counseling regarding her condition or for health maintenance advice. Please see specific information pulled into the AVS if appropriate.

## 2022-09-09 NOTE — PROGRESS NOTES
Patient:Marilia Leo    YOB: 1945     Medical Record Number:[id number]    Chief Complaints:  :Left wrist pain    History of Present Illness:     77 y.o. female patient who presents for her left wrist.  She is referred by Brandie.  The patient tells me that her wrist for started to bother her about 2 to 3 weeks ago.  She does not recall any specific inciting event or factor.  She is visually impaired and has to use her hands to help her navigate throughout her home.  She thinks that she may have at some stumbled and struck her wrist against the wall but she is not certain about that.  She does not remember any specific injury to the wrist.  The pain started out fairly mild but in just a day or so progressed to severe pain to the point where she could hardly move it or use it.  She describes his pain as severe, throbbing and burning.  The pain was radiating up into her forearm up to the elbow.  She did not notice any swelling but she is visually impaired and she thinks that she easily could have missed this.  Her daughter has accompanied her today and says that she did not notice anything visually wrong with the wrist.  They saw Brandie about a week and a half ago and was given a Dosepak and a brace.  The wrist is now much better.  The pain is not resolved but she says there has been significant improvement.  She feels like the brace is really helping.  Unfortunately, the brace has a thumb spica extension and this limits her.  Because of her visual impairment, she frequently accidentally poked herself in the face with the brace.    Allergies:  Allergies   Allergen Reactions   • Nitrofurantoin Unknown - High Severity and Anaphylaxis   • Codeine    • Penicillins    • Sulfa Antibiotics        Home Medications:    Current Outpatient Medications:   •  acetaminophen (TYLENOL) 325 MG tablet, Take 325 mg by mouth As Needed., Disp: , Rfl:   •  amLODIPine (NORVASC) 10 MG tablet, Take 1 tablet by mouth Daily., Disp:  90 tablet, Rfl: 3  •  Carboxymeth-Glycerin-Polysorb (REFRESH OPTIVE ALBERTO-3 OP), Apply 1 drop to eye(s) as directed by provider 2 (Two) Times a Day As Needed., Disp: , Rfl:   •  dorzolamide-timolol (COSOPT) 22.3-6.8 MG/ML ophthalmic solution, 1 drop 2 (Two) Times a Day., Disp: , Rfl:   •  Durezol 0.05 % ophthalmic emulsion, , Disp: , Rfl:   •  gabapentin (NEURONTIN) 100 MG capsule, TAKE ONE CAPSULE BY MOUTH EVERY AM AND TWO CAPSULES EVERY NIGHT AT BEDTIME, Disp: 270 capsule, Rfl: 0  •  irbesartan (AVAPRO) 150 MG tablet, Take 1 tablet by mouth Every Night., Disp: 90 tablet, Rfl: 3  •  natamycin (NATACYN) 5 % ophthalmic solution, Administer 1 drop to the right eye 2 (two) times a day. Cycles on for 14 days and off for 14 days Patient does not need until 8/18/20, this comes from compound pharmacy, Disp: , Rfl:   •  Netarsudil Dimesylate 0.02 % solution, Administer 1 drop into the left eye Every Night., Disp: , Rfl:   •  ofloxacin (OCUFLOX) 0.3 % ophthalmic solution, Administer 1 drop into the left eye 2 (Two) Times a Day., Disp: , Rfl:   •  atropine 1 % ophthalmic solution, Administer 1 drop to both eyes Daily., Disp: , Rfl:   •  gabapentin (NEURONTIN) 100 MG capsule, Up to TID, Disp: , Rfl:   •  methylPREDNISolone (MEDROL) 4 MG dose pack, Take as directed on package instructions., Disp: 21 tablet, Rfl: 0  •  Misc. Devices (Precision Scale) misc, Use to check weight daily, Disp: 1 each, Rfl: 0  •  Misc. Devices (Raised Toilet Seat) misc, 1 each Daily., Disp: 1 each, Rfl: 0  •  Misc. Devices (Raised Toilet Seat) misc, Use daily, Disp: 1 each, Rfl: 0    Past Medical History:   Diagnosis Date   • Angioedema     due to ACEI 8/2002   • Atopic dermatitis 4/18/2016   • Cataract    • Diverticulosis    • Edema     due to Norvasc   • Esophageal reflux    • Glaucoma     right eye open angle Dr astorga s/p lazar Sx x3   • Hammer toe    • History of mammogram     3/11/14 Quick 4/10/15   • Hx of bone scan     DEXA normal 2/2010,  2015   • Hypercholesterolemia    • Impaired fasting blood sugar 2019   • Medial epicondylitis     L    • Palpitations 2019   • Papanicolaou smear     reported pos. pap smear and previous pos 2 or more years ago  s/p hysterectomy   • Sepsis (HCC) 2020   • Trochanteric bursitis    • Trochanteric bursitis of left hip 10/13/2016       Past Surgical History:   Procedure Laterality Date   • APPENDECTOMY     • BILATERAL BREAST REDUCTION     • CERVICAL FUSION      Dr.John Sexton   • COLONOSCOPY      2007 Dr Bradshaw Normal   • CORNEAL TRANSPLANT     • FETAL BLOOD TRANSFUSION     • FOOT SURGERY  2013    right bunion and hammer toe Dr Phelan   • SUBTOTAL HYSTERECTOMY      ovaries intact       Social History     Occupational History   • Not on file   Tobacco Use   • Smoking status: Former Smoker     Packs/day: 1.00     Years: 5.00     Pack years: 5.00     Types: Cigarettes     Quit date:      Years since quittin.7   • Smokeless tobacco: Never Used   Vaping Use   • Vaping Use: Never used   Substance and Sexual Activity   • Alcohol use: Yes     Comment: occasionally   • Drug use: No   • Sexual activity: Defer      Social History     Social History Narrative   • Not on file       Family History   Problem Relation Age of Onset   • Hypertension Mother    • Diabetes Mother    • Hypertension Father    • Diabetes Sister    • Multiple sclerosis Sister    • Rheum arthritis Sister    • Lung cancer Sister         smoker   • Diabetes Brother    • Heart disease Brother    • Hypertension Brother    • Rheum arthritis Brother    • Sarcoidosis Brother    • Glaucoma Brother        Review of Systems:      Constitutional: Denies fever, shaking or chills   Eyes: Denies change in visual acuity   HEENT: Denies nasal congestion or sore throat   Respiratory: Denies cough or shortness of breath   Cardiovascular: Denies chest pain or edema  Endocrine: Denies tremors, palpitations, intolerance of heat or cold,  "polyuria, polydipsia.  GI: Denies abdominal pain, nausea, vomiting, bloody stools or diarrhea  : Denies frequency, urgency, incontinence, retention, or nocturia.  Musculoskeletal: Denies numbness, tingling or loss of motor function except as above  Integument: Denies rash, lesion or ulceration   Neurologic: Denies headache or focal weakness, deficits  Heme: Denies spontaneous or excessive bleeding, epistaxis, hematuria, melena, fatigue, enlarged or tender lymph nodes.      All other pertinent positives and negatives as noted above in HPI.    Physical Exam:77 y.o. female  Vitals:    09/09/22 1113   Temp: 97.8 °F (36.6 °C)   Weight: 62.6 kg (138 lb)   Height: 162.6 cm (64\")       General:  Patient is awake and alert.  Appears in no acute distress or discomfort.    Psych:  Affect and demeanor are appropriate.    Extremities: The brace was then placed on her wrist and this was removed.  It does look like she has some edema at the wrist.  There is no palpable effusion.  No erythema.  The wrist is warm though.  She is rather diffusely tender.  She is tender over the carpus, the wrist and both the distal radius and ulna.  This tenderness is what I would characterize as mild to moderate.  The more exquisite tenderness seems to be over the wrist joint itself as opposed to the distal radius as best I can tell.  Her wrist motion is limited and uncomfortable.  Pronation and supination of the forearm are also limited and uncomfortable.  She can weakly  and pinch.  Her motor function seems to be intact but testing of this is uncomfortable for her.  She has normal sensation.  She has a palpable radial pulse.    Imaging: AP, oblique and lateral views of the left wrist are ordered and reviewed.  These are compared to previous x-rays from August 29.  I do not clearly see a fracture on the x-rays.  There is some irregularity of the radial styloid but there is no clear fracture line.  She does have some degenerative changes but I " do not see any acute abnormalities or other concerning findings.    Assessment/Plan: Left wrist inflammatory arthritis versus occult distal radius fracture    I was jerzy with him about the fact that I cannot tell if she has a fracture or not.  Based on her exam, I am inclined to think this may be an inflammatory arthritis such as gout or pseudogout.  She has gotten better since wearing the brace but I wonder if that is not from the Medrol Dosepak as opposed to the brace.  I had a long conversation with her and her daughter.  Our plan at this point is just to give it a bit more time.  The wrist seems to be getting better day by day and were going to give it another couple of weeks in the brace.  She can use over-the-counter anti-inflammatories or Tylenol as needed.  I fitted her for a better brace today without the thumb spica so hopefully that will be more functional for her.  We discussed appropriate activity modifications and restrictions.  I want her to come back and see me again in 2 weeks and we will get repeat x-rays.  If she is not significantly improved at that point, I think we will have to consider further work-up.  She and her daughter are in agreement with this plan.    Raul Sweeney MD    09/09/2022

## 2022-09-10 LAB
BACTERIA UR CULT: NO GROWTH
BACTERIA UR CULT: NORMAL

## 2022-09-30 ENCOUNTER — OFFICE VISIT (OUTPATIENT)
Dept: ORTHOPEDIC SURGERY | Facility: CLINIC | Age: 77
End: 2022-09-30

## 2022-09-30 VITALS — WEIGHT: 138 LBS | TEMPERATURE: 97.9 F | BODY MASS INDEX: 23.56 KG/M2 | HEIGHT: 64 IN

## 2022-09-30 DIAGNOSIS — M25.532 LEFT WRIST PAIN: Primary | ICD-10-CM

## 2022-09-30 PROCEDURE — 73110 X-RAY EXAM OF WRIST: CPT | Performed by: ORTHOPAEDIC SURGERY

## 2022-09-30 PROCEDURE — 99213 OFFICE O/P EST LOW 20 MIN: CPT | Performed by: ORTHOPAEDIC SURGERY

## 2022-09-30 NOTE — PROGRESS NOTES
"CC:  Left wrist pain     Ms. Leo is seen today for follow-up of her left wrist.  She reports minimal improvement since I last saw her.  She has been wearing the brace.  She feels like the brace helps only slightly.  She reports feeling like the wrist is \"cut in 2\" over the dorsum of the wrist crease.  She reports burning pain.  She reports constant throbbing.  She reports very limited range of motion.  She denies any numbness or tingling.  Gabapentin seems to help somewhat.  Tylenol helps a little bit as well.  The tramadol caused a reaction and she had to quit taking that.  She is apprehensive about taking anti-inflammatories due to a history of kidney problems in her family.    Left wrist is examined.  She has slight edema but no significant effusion.  A little bit of increased warmth at the wrist and diffuse tenderness.  No palpable swellings or masses.  Wrist motion is limited and uncomfortable.  She can  but it is painful.  She has intact sensation in her hand and wrist.  Palpable radial pulse.    AP, oblique and lateral views of the left wrist are ordered and reviewed to evaluate her complaint.  These are compared to previous x-rays.  She has perhaps a subtle fracture line seen dorsally.  I may be overreading the films.  I do not see any other significant findings.    Assessment: Persistent left wrist pain, possible occult fracture    Plan: As stated in my previous note, she does not recall any specific injury.  She thinks she may have stumbled at 1 point and struck the wrist but she is not exactly sure.  If this was a fracture, 6 weeks in I would have expected her to be feeling at least a little bit better.  Her pain seems to be diffuse and more severe than what I would expect for just a fracture.  There could be a component of inflammatory arthritis or this may even be a neuropathic issue.  At this point I think it is prudent to get an MRI to better evaluate the source of her pain.  I told her I will " call her once I see the results and then we will come up with a plan.    Raul Sweeney MD

## 2022-10-06 ENCOUNTER — HOSPITAL ENCOUNTER (EMERGENCY)
Facility: HOSPITAL | Age: 77
Discharge: HOME | End: 2022-10-06
Attending: EMERGENCY MEDICINE | Admitting: EMERGENCY MEDICINE

## 2022-10-06 ENCOUNTER — APPOINTMENT (OUTPATIENT)
Dept: CT IMAGING | Facility: HOSPITAL | Age: 77
End: 2022-10-06

## 2022-10-06 VITALS
RESPIRATION RATE: 18 BRPM | DIASTOLIC BLOOD PRESSURE: 87 MMHG | HEART RATE: 64 BPM | SYSTOLIC BLOOD PRESSURE: 153 MMHG | TEMPERATURE: 97.1 F | OXYGEN SATURATION: 100 %

## 2022-10-06 DIAGNOSIS — R93.7 ABNORMAL CT SCAN, LUMBAR SPINE: ICD-10-CM

## 2022-10-06 DIAGNOSIS — M54.16 LUMBAR RADICULOPATHY, ACUTE: Primary | ICD-10-CM

## 2022-10-06 PROCEDURE — 72131 CT LUMBAR SPINE W/O DYE: CPT

## 2022-10-06 PROCEDURE — 99283 EMERGENCY DEPT VISIT LOW MDM: CPT

## 2022-10-06 PROCEDURE — 96372 THER/PROPH/DIAG INJ SC/IM: CPT

## 2022-10-06 PROCEDURE — 25010000002 KETOROLAC TROMETHAMINE PER 15 MG: Performed by: PHYSICIAN ASSISTANT

## 2022-10-06 RX ORDER — METHYLPREDNISOLONE 4 MG/1
1 TABLET ORAL DAILY
Qty: 21 TABLET | Refills: 0 | Status: ON HOLD | OUTPATIENT
Start: 2022-10-06 | End: 2023-01-11

## 2022-10-06 RX ORDER — KETOROLAC TROMETHAMINE 30 MG/ML
30 INJECTION, SOLUTION INTRAMUSCULAR; INTRAVENOUS ONCE
Status: COMPLETED | OUTPATIENT
Start: 2022-10-06 | End: 2022-10-06

## 2022-10-06 RX ORDER — METHOCARBAMOL 750 MG/1
750 TABLET, FILM COATED ORAL ONCE
Status: COMPLETED | OUTPATIENT
Start: 2022-10-06 | End: 2022-10-06

## 2022-10-06 RX ORDER — LIDOCAINE 50 MG/G
1 PATCH TOPICAL ONCE
Status: DISCONTINUED | OUTPATIENT
Start: 2022-10-06 | End: 2022-10-06 | Stop reason: HOSPADM

## 2022-10-06 RX ORDER — METHOCARBAMOL 750 MG/1
750 TABLET, FILM COATED ORAL 3 TIMES DAILY PRN
Qty: 15 TABLET | Refills: 0 | Status: ON HOLD | OUTPATIENT
Start: 2022-10-06 | End: 2023-01-11

## 2022-10-06 RX ORDER — LIDOCAINE 50 MG/G
1 PATCH TOPICAL EVERY 24 HOURS
Qty: 6 EACH | Refills: 0 | Status: SHIPPED | OUTPATIENT
Start: 2022-10-06 | End: 2022-11-09

## 2022-10-06 RX ADMIN — KETOROLAC TROMETHAMINE 30 MG: 30 INJECTION, SOLUTION INTRAMUSCULAR at 12:50

## 2022-10-06 RX ADMIN — LIDOCAINE 1 PATCH: 50 PATCH TOPICAL at 12:52

## 2022-10-06 RX ADMIN — METHOCARBAMOL TABLETS 750 MG: 750 TABLET, COATED ORAL at 12:51

## 2022-10-06 NOTE — ED NOTES
Pt c/o left left hip/leg pain that started last week. Pt was up in [place] visiting and slept in a bed. Pt usually sleeps in recliner at home. Pt was able to control pain with home meds but over the last few days, pain became worse. Pt reports decrease sensation to left, lower, inner leg. No numbness or change in sensation to upper leg or saddle area. No loss of control of bowel or bladder function. Pt able to ambulate to bed with little assistance    This RN wore mask and goggles during time of contact

## 2022-10-06 NOTE — ED PROVIDER NOTES
EMERGENCY DEPARTMENT ENCOUNTER    Room Number:  28/28  Date of encounter:  10/6/2022  PCP: Grover Garcias MD  Historian: Patient  Full history not obtainable due to: None    HPI:  Chief Complaint: Hip pain/leg numbness    Context: Marilia Leo is a 77 y.o. female with a PMH significant for hypertension, cervical radiculopathy, chronic knee pain who presents to the ED c/o left-sided posterior hip pain which is aching in nature and radiating down the lateral thigh and across the knee into the medial lower leg.  States that she has had low back pain for many years and has received epidurals in the past.  She has been diagnosed with sciatica.  No recent injuries or exercises that could have exacerbated her symptoms.  She denies numbness or weakness of the legs, bowel or bladder incontinence, saddle paresthesias.  She is able to ambulate without difficulty.  No fever, chills.      MEDICAL RECORD REVIEW:    Upon review of the medical record it appears the patient was most recently evaluated September 30, 2022 in the office with orthopedics for left wrist pain      PAST MEDICAL HISTORY    Active Ambulatory Problems     Diagnosis Date Noted   • Benign essential hypertension 04/18/2016   • Cervical radiculopathy 04/18/2016   • Glaucoma 04/18/2016   • Pure hypercholesterolemia 04/18/2016   • Menopausal symptom 04/18/2016   • Atrophic vaginitis 04/18/2016   • Health care maintenance 10/13/2016   • Cervical radiculopathy 04/18/2016   • Edema, lower extremity 01/16/2020   • Elevated LFTs 08/04/2020   • Fever of unknown origin (FUO) 08/04/2020   • Nausea 08/04/2020   • Sinus tachycardia 08/04/2020   • Bilateral chronic knee pain 01/28/2022   • Urinary frequency 09/09/2022     Resolved Ambulatory Problems     Diagnosis Date Noted   • Atopic dermatitis 04/18/2016   • Neck pain 04/18/2016   • Trochanteric bursitis of left hip 10/13/2016   • Thoracic radiculopathy 03/07/2017   • Chronic fatigue 04/13/2017   • Impacted cerumen of  right ear 10/16/2017   • Impaired fasting blood sugar 2019   • Palpitations 2019   • Sepsis (HCC) 2020   • Back pain 2020     Past Medical History:   Diagnosis Date   • Angioedema    • Cataract    • Diverticulosis    • Edema    • Esophageal reflux    • Hammer toe    • History of mammogram    • Hx of bone scan    • Hypercholesterolemia    • Medial epicondylitis    • Papanicolaou smear    • Trochanteric bursitis          PAST SURGICAL HISTORY  Past Surgical History:   Procedure Laterality Date   • APPENDECTOMY     • BILATERAL BREAST REDUCTION     • CERVICAL FUSION      Dr.John Sexton   • COLONOSCOPY      2007 Dr Bradshaw Normal   • CORNEAL TRANSPLANT     • FETAL BLOOD TRANSFUSION     • FOOT SURGERY  2013    right bunion and hammer toe Dr Phelan   • SUBTOTAL HYSTERECTOMY      ovaries intact         FAMILY HISTORY  Family History   Problem Relation Age of Onset   • Hypertension Mother    • Diabetes Mother    • Hypertension Father    • Diabetes Sister    • Multiple sclerosis Sister    • Rheum arthritis Sister    • Lung cancer Sister         smoker   • Diabetes Brother    • Heart disease Brother    • Hypertension Brother    • Rheum arthritis Brother    • Sarcoidosis Brother    • Glaucoma Brother          SOCIAL HISTORY  Social History     Socioeconomic History   • Marital status: Single   Tobacco Use   • Smoking status: Former Smoker     Packs/day: 1.00     Years: 5.00     Pack years: 5.00     Types: Cigarettes     Quit date:      Years since quittin.7   • Smokeless tobacco: Never Used   Vaping Use   • Vaping Use: Never used   Substance and Sexual Activity   • Alcohol use: Yes     Comment: occasionally   • Drug use: No   • Sexual activity: Defer         ALLERGIES  Nitrofurantoin, Codeine, Penicillins, Sulfa antibiotics, and Tramadol        REVIEW OF SYSTEMS    All systems reviewed and marked as negative except as listed in HPI     PHYSICAL EXAM    I have reviewed the triage vital  signs and nursing notes.    ED Triage Vitals   Temp Heart Rate Resp BP SpO2   10/06/22 1119 10/06/22 1117 10/06/22 1117 10/06/22 1156 10/06/22 1117   97.1 °F (36.2 °C) 72 16 (!) 200/107 98 %      Temp src Heart Rate Source Patient Position BP Location FiO2 (%)   10/06/22 1119 10/06/22 1117 -- -- --   Tympanic Monitor          Physical Exam  Constitutional:       General: She is not in acute distress.     Appearance: She is well-developed.   HENT:      Head: Normocephalic and atraumatic.   Eyes:      General: No scleral icterus.     Conjunctiva/sclera: Conjunctivae normal.   Neck:      Trachea: No tracheal deviation.   Cardiovascular:      Rate and Rhythm: Normal rate and regular rhythm.   Pulmonary:      Effort: Pulmonary effort is normal.      Breath sounds: Normal breath sounds.   Abdominal:      Palpations: Abdomen is soft.      Tenderness: There is no abdominal tenderness.   Musculoskeletal:         General: No deformity.      Cervical back: Normal range of motion.      Comments: Mild diffuse tenderness to the left-sided lumbar spine and posterior left hip near the SI joint.  Negative for midline lumbar spinal tenderness or lumbar spinal deformity.  No rash.   Lymphadenopathy:      Cervical: No cervical adenopathy.   Skin:     General: Skin is warm and dry.   Neurological:      Mental Status: She is alert and oriented to person, place, and time.      Sensory: Sensation is intact.      Motor: Motor function is intact.   Psychiatric:         Behavior: Behavior normal.         Vital signs and nursing notes reviewed.            LAB RESULTS  No results found for this or any previous visit (from the past 24 hour(s)).    Ordered the above labs and independently reviewed the results.        RADIOLOGY  CT Lumbar Spine Without Contrast    Result Date: 10/6/2022  CT SCAN OF THE LUMBAR SPINE WITHOUT CONTRAST  CLINICAL HISTORY: Low back pain for years.  TECHNIQUE: CT scan of the lumbar spine was obtained with 3 mm axial  images. Sagittal and coronal reconstructed images were obtained.  FINDINGS:  There is a mild dextroconvex scoliotic curvature of the lumbar spine with its apex centered at L1-2.  At T9-10, T10-11, T11-12, T12-L1, and L1-2, there is no significant canal or foraminal stenosis.  At L2-3, there is a disc osteophyte complex which results in a mild degree of canal narrowing and a mild degree of bilateral foraminal narrowing.  At L3-4, there is no significant degree of canal or foraminal stenosis.  At L4-5, there is a disc bulge which mildly narrows the left neural foramen. There is mild-to-moderate facet hypertrophic change.  At L5-S1, there is mild left and moderate right facet hypertrophy. There is mild right foraminal narrowing secondary to bulging disc material.  There is partial osseous fusion across the right SI joint.       There is mild-to-moderate dextroconvex scoliotic curvature of the lumbar spine centered at L1-2.  Multilevel degenerative phenomena are noted within the lumbar spine resulting in only up to mild degrees of canal and foraminal narrowing as discussed in detail above.  Radiation dose reduction techniques were utilized, including automated exposure control and exposure modulation based on body size.         I ordered the above noted radiological studies. Independently reviewed by me and discussed with radiologist.  See dictation above for official radiology interpretation.      PROCEDURES    Procedures        MEDICATIONS GIVEN IN ER    Medications   lidocaine (LIDODERM) 5 % 1 patch (1 patch Transdermal Medication Applied 10/6/22 1252)   ketorolac (TORADOL) injection 30 mg (30 mg Intramuscular Given 10/6/22 1250)   methocarbamol (ROBAXIN) tablet 750 mg (750 mg Oral Given 10/6/22 1251)         PROGRESS, DATA ANALYSIS, CONSULTS, AND MEDICAL DECISION MAKING    All labs have been independently reviewed by me.  All radiology studies have been reviewed by me.   EKG's independently reviewed by me.   Discussion below represents my analysis of pertinent findings related to patient's condition, differential diagnosis, treatment plan and final disposition.    DIFFERENTIAL DIAGNOSIS INCLUDE BUT NOT LIMITED TO:     Lumbar radiculopathy, lumbar spinal stenosis, lumbar bulging disc, lumbar compression fracture         AS OF 15:57 EDT VITALS:    BP - 153/87  HR - 64  TEMP - 97.1 °F (36.2 °C) (Tympanic)  02 SATS - 100%    1557 I rechecked the patient.  Symptoms are improved with medications here in the ED.  She is able to ambulate unassisted with a steady gait.  I discussed the patient's radiology findings (including all incidental findings), diagnosis, and plan for discharge.  A repeat exam reveals no new worrisome changes from my initial exam findings.  The patient understands that the fact that they are being discharged does not denote that nothing is abnormal, it indicates that no clinical emergency is present and that they must follow-up as directed in order to properly maintain their health.  Follow-up instructions (specifically listed below) and return to ER precautions were given at this time.  I specifically instructed the patient to follow-up with their PCP.  The patient understands and agrees with the plan, and is ready for discharge.  All questions answered.      DIAGNOSIS  Final diagnoses:   Lumbar radiculopathy, acute   Abnormal CT scan, lumbar spine         DISPOSITION  D/c    Pt masked in first look. I wore a surgical mask throughout my encounters with the pt. I performed hand hygiene on entry into the pt room and upon exit.     Dictated utilizing Dragon dictation     Note Disclaimer: At [facility], we believe that sharing information builds trust and better relationships. You are receiving this note because you recently visited [facility]. It is possible you will see health information before a provider has talked with you about it. This kind of information can be easy to misunderstand. To help  you fully understand what it means for your health, we urge you to discuss this note with your provider.      Quintin Lazo PA  10/06/22 1559

## 2022-10-06 NOTE — ED TRIAGE NOTES
C/o left hip pain that radiates down left leg - pain started 3 weeks ago off and on.   She reports today the leg feels numb.  nki    Patient was placed in face mask during first look triage.  Patient was wearing a face mask throughout encounter.  I wore personal protective equipment throughout the encounter.  Hand hygiene was performed before and after patient encounter.

## 2022-10-07 ENCOUNTER — TELEPHONE (OUTPATIENT)
Dept: ORTHOPEDIC SURGERY | Facility: CLINIC | Age: 77
End: 2022-10-07

## 2022-10-07 DIAGNOSIS — M25.532 LEFT WRIST PAIN: Primary | ICD-10-CM

## 2022-10-07 NOTE — ED PROVIDER NOTES
The RADHA and I have discussed this patient's history, physical exam and treatment plan.  I provided a substantive portion of the care of this patient.  I have reviewed the documentation and personally had a face to face interaction with the patient and personally performed the physical exam, in its entirety.  I affirm the documentation and agree with the treatment and plan.  The following describes my personal findings.      The patient presents complaining of left buttock, thigh and medial knee pain and numbness worse than usual for her over the past 3 weeks.  Patient reports she was out of town and slept in an unfamiliar bed and her pain has been worse since that time.  Patient denies weakness, incontinence, reports she walks with a cane without difficulty at home.      Comprehensive Physical exam:  Patient is nontoxic appearing oriented, conversant awake, alert  HEENT: normocephalic, atraumatic  Neck: No JVD no goiter, no pain with ROM  Pulmonary: Nontachypneic, breath sounds heard well bilaterally  cardiovascular: Nontachycardic  Abdomen: Soft, nontender  musculoskeletal: Good range of motion, pulses, sensation x4, negative straight leg raise bilateral lower extremities, dorsiflexion, plantarflexion, knee flexion, knee extension and hip flexion intact bilaterally reflexes 2+ equal bilaterally patellar and Achilles  Neuro/psychiatric:calm, appropriate, cooperative  Skin:warm, dry    Anticipate outpatient follow-up for recheck, further testing, treatment as needed.    Patient was wearing facemask when I entered the room and throughout our encounter. Full protective equipment was worn throughout this patient encounter including a face mask, eye protection and gloves. Hand hygiene was performed before donning protective equipment and after removal when leaving the room.           Alejandra Michele MD  10/06/22 [id number]

## 2022-10-07 NOTE — TELEPHONE ENCOUNTER
GONZALO LOYA RETD CALL AND WOULD LIKE CALL BACK ASAP  PH: [phone]      UNABLE TO WARM TRANSFER

## 2022-10-07 NOTE — TELEPHONE ENCOUNTER
Left message.  No answer.  I told her I will be ordering a hand surgery consult and that has been entered.  I also told her I will have my assistant Anne Marie call her with the results.

## 2022-10-07 NOTE — TELEPHONE ENCOUNTER
Left message requesting a return call.  Attempting to provide patient with left wrist MRI results.

## 2022-10-11 ENCOUNTER — TELEPHONE (OUTPATIENT)
Dept: INTERNAL MEDICINE | Facility: CLINIC | Age: 77
End: 2022-10-11

## 2022-10-11 PROBLEM — M54.16 LUMBAR RADICULOPATHY, CHRONIC: Status: ACTIVE | Noted: 2022-10-11

## 2022-10-11 PROBLEM — M54.16 LUMBAR RADICULOPATHY, CHRONIC: Chronic | Status: ACTIVE | Noted: 2022-10-11

## 2022-10-11 NOTE — TELEPHONE ENCOUNTER
UNABLE TO WARM TRANSFER    Caller: Talon Day    Relationship to patient: Emergency Contact    Best call back number:    [phone]        New or established patient?  [] New  [x] Established    Date of discharge: 10/6/2022    Facility discharged from: [facility]     Diagnosis/Symptoms: HIP AND WRIST PAIN     Length of stay (If applicable): EMERGENCY ROOM     Specialty Only: Did you see a [facility]   TCM: [facility] ER 10/6/2022,  HIP, WRIST PAIN

## 2022-10-12 NOTE — TELEPHONE ENCOUNTER
I spoke to Ms. Leo's daughter, Talon Day.  I provided her with the left wrist MRI results.  I explained Dr. Sweeney has entered a referral for the patient to see Dr. Esqueda.  She verbalized understanding of all we discussed and appreciated the call.

## 2022-10-13 NOTE — TELEPHONE ENCOUNTER
Scheduled at Dr Garcias first available per their request  Patient already has appts with specialties regarding wrist/hip

## 2022-11-08 ENCOUNTER — OFFICE VISIT (OUTPATIENT)
Dept: INTERNAL MEDICINE | Facility: CLINIC | Age: 77
End: 2022-11-08

## 2022-11-08 VITALS
DIASTOLIC BLOOD PRESSURE: 87 MMHG | TEMPERATURE: 98.4 F | BODY MASS INDEX: 23.05 KG/M2 | SYSTOLIC BLOOD PRESSURE: 160 MMHG | HEIGHT: 64 IN | WEIGHT: 135 LBS

## 2022-11-08 DIAGNOSIS — M51.36 DDD (DEGENERATIVE DISC DISEASE), LUMBAR: ICD-10-CM

## 2022-11-08 DIAGNOSIS — M54.16 LUMBAR RADICULOPATHY, CHRONIC: Primary | ICD-10-CM

## 2022-11-08 DIAGNOSIS — I10 BENIGN ESSENTIAL HYPERTENSION: ICD-10-CM

## 2022-11-08 DIAGNOSIS — R05.8 POST-VIRAL COUGH SYNDROME: ICD-10-CM

## 2022-11-08 PROBLEM — M51.369 DDD (DEGENERATIVE DISC DISEASE), LUMBAR: Chronic | Status: ACTIVE | Noted: 2022-11-08

## 2022-11-08 PROBLEM — M51.369 DDD (DEGENERATIVE DISC DISEASE), LUMBAR: Status: ACTIVE | Noted: 2022-11-08

## 2022-11-08 PROCEDURE — 99214 OFFICE O/P EST MOD 30 MIN: CPT | Performed by: FAMILY MEDICINE

## 2022-11-08 RX ORDER — AMLODIPINE BESYLATE 5 MG/1
5 TABLET ORAL DAILY
Qty: 90 TABLET | Refills: 3 | Status: CANCELLED | OUTPATIENT
Start: 2022-11-08

## 2022-11-08 RX ORDER — AMLODIPINE BESYLATE 5 MG/1
5 TABLET ORAL DAILY
Qty: 90 TABLET | Refills: 3 | Status: SHIPPED | OUTPATIENT
Start: 2022-11-08

## 2022-11-08 RX ORDER — IRBESARTAN 150 MG/1
150 TABLET ORAL NIGHTLY
Qty: 90 TABLET | Refills: 3 | Status: CANCELLED | OUTPATIENT
Start: 2022-11-08

## 2022-11-08 RX ORDER — BENZONATATE 100 MG/1
100 CAPSULE ORAL 3 TIMES DAILY PRN
Qty: 30 CAPSULE | Refills: 1 | Status: SHIPPED | OUTPATIENT
Start: 2022-11-08 | End: 2023-02-10

## 2022-11-08 NOTE — PROGRESS NOTES
"Chief Complaint  Wrist Pain and Hip Pain    Subjective        Marilia Leo presents to [facility]  History of Present Illness     Hypertension - a little elevated today but she is in hip and neck pain.  Patient taking medication as prescribed.  Denies chest pain, shortness of breath, headache, lower extremity edema.  Patient is taking irbesartan.    She has been seen for her left wrist by orthpedics and was sent to [facility] and got shots in her hand    She says left hip has been flared up since causing pain and then numbness down the left leg.  She got toradol in the ER with muscle relaxer.  She was in the ER 10/6 for that episode.  She was diagnosed with lumbar radiculopathy.  It was likley triggered by the MRI.  CT lumbar multilevel degenerative disc disease.     She has been dealing with her chronic lingering cough since she had a viral infection which sounds like it happened right around the time she had the flu shot.    Objective   Vital Signs:  /87 (BP Location: Left arm, Patient Position: Sitting, Cuff Size: Small Adult)   Temp 98.4 °F (36.9 °C)   Ht 162.6 cm (64\")   Wt 61.2 kg (135 lb)   BMI 23.17 kg/m²   Estimated body mass index is 23.17 kg/m² as calculated from the following:    Height as of this encounter: 162.6 cm (64\").    Weight as of this encounter: 61.2 kg (135 lb).    BMI is within normal parameters. No other follow-up for BMI required.      Physical Exam  Vitals and nursing note reviewed.   Constitutional:       General: She is not in acute distress.     Appearance: Normal appearance.   Cardiovascular:      Rate and Rhythm: Normal rate and regular rhythm.      Heart sounds: Normal heart sounds. No murmur heard.  Pulmonary:      Effort: Pulmonary effort is normal.      Breath sounds: Normal breath sounds.   Neurological:      Mental Status: She is alert.        Result Review :  The following data was reviewed by: Grover Garcias MD on 11/08/2022:  Common labs  "   Common Labs 1/3/22 1/3/22    1631 1631   Glucose  91   BUN  16   Creatinine  0.96   eGFR African Am  68   Sodium  140   Potassium  4.1   Chloride  104   Calcium  9.5   Albumin  4.40   Total Bilirubin  0.3   Alkaline Phosphatase  78   AST (SGOT)  18   ALT (SGPT)  13   WBC 10.18    Hemoglobin 13.4    Hematocrit 43.1    Platelets 302                      Assessment and Plan   Diagnoses and all orders for this visit:    1. Lumbar radiculopathy, chronic (Primary)  -     Ambulatory Referral to Pain Management    2. Benign essential hypertension  -     amLODIPine (NORVASC) 5 MG tablet; Take 1 tablet by mouth Daily.  Dispense: 90 tablet; Refill: 3    3. DDD (degenerative disc disease), lumbar  -     Ambulatory Referral to Pain Management    4. Post-viral cough syndrome  -     benzonatate (Tessalon Perles) 100 MG capsule; Take 1 capsule by mouth 3 (Three) Times a Day As Needed for Cough.  Dispense: 30 capsule; Refill: 1      Plan as above         Follow Up   Return in about 3 months (around 2/8/2023) for Medicare Wellness.  Patient was given instructions and counseling regarding her condition or for health maintenance advice. Please see specific information pulled into the AVS if appropriate.

## 2022-11-09 RX ORDER — LIDOCAINE 50 MG/G
PATCH TOPICAL
Qty: 6 PATCH | Refills: 3 | Status: SHIPPED | OUTPATIENT
Start: 2022-11-09

## 2022-11-09 NOTE — TELEPHONE ENCOUNTER
I will prescribe but if insurance does not cover, she will need to purchase over the counter.  PA is not something we can win.

## 2022-11-30 ENCOUNTER — PREP FOR SURGERY (OUTPATIENT)
Dept: SURGERY | Facility: SURGERY CENTER | Age: 77
End: 2022-11-30

## 2022-11-30 ENCOUNTER — OFFICE VISIT (OUTPATIENT)
Dept: PAIN MEDICINE | Facility: CLINIC | Age: 77
End: 2022-11-30

## 2022-11-30 VITALS
HEART RATE: 86 BPM | RESPIRATION RATE: 12 BRPM | BODY MASS INDEX: 23.66 KG/M2 | TEMPERATURE: 98 F | WEIGHT: 138.6 LBS | DIASTOLIC BLOOD PRESSURE: 92 MMHG | SYSTOLIC BLOOD PRESSURE: 150 MMHG | OXYGEN SATURATION: 98 % | HEIGHT: 64 IN

## 2022-11-30 DIAGNOSIS — M48.061 LUMBAR FORAMINAL STENOSIS: ICD-10-CM

## 2022-11-30 DIAGNOSIS — G89.29 CHRONIC BILATERAL LOW BACK PAIN, UNSPECIFIED WHETHER SCIATICA PRESENT: ICD-10-CM

## 2022-11-30 DIAGNOSIS — M53.3 SACROILIAC JOINT DYSFUNCTION OF BOTH SIDES: Primary | ICD-10-CM

## 2022-11-30 DIAGNOSIS — M54.50 CHRONIC BILATERAL LOW BACK PAIN, UNSPECIFIED WHETHER SCIATICA PRESENT: ICD-10-CM

## 2022-11-30 PROCEDURE — 99215 OFFICE O/P EST HI 40 MIN: CPT | Performed by: NURSE PRACTITIONER

## 2022-11-30 RX ORDER — SODIUM CHLORIDE 0.9 % (FLUSH) 0.9 %
10 SYRINGE (ML) INJECTION AS NEEDED
Status: CANCELLED | OUTPATIENT
Start: 2022-11-30

## 2022-11-30 RX ORDER — SODIUM CHLORIDE 0.9 % (FLUSH) 0.9 %
10 SYRINGE (ML) INJECTION EVERY 12 HOURS SCHEDULED
Status: CANCELLED | OUTPATIENT
Start: 2022-11-30

## 2022-11-30 NOTE — PROGRESS NOTES
CHIEF COMPLAINT  New patient ref by  Grover Garcias MD.M54.16 (ICD-10-CM) - Lumbar radiculopathy, chronic,M51.36 (ICD-10-CM) - DDD (degenerative disc disease), lumbar.  Patient in office today to evaluate lumbar pain onset years ago,had an MRI about a month ago on her wrist and position must have affected her back states she ended up in the ER. Pain is throbbing, constant aching and stabbing, localized lower back worse on the left with shooting pain down into left thigh.  Hx of several lumbar epidurals with at least 50% pain relief @ [facility]. States she tried PT years ago for back pain, 2010 was treated by chiropractor up to 2014, she started having issues with her vision so she stopped driving and couldn't get to his office anymore states treatment was helpful. Denies sx over the last year, hx of cervical fusion 2014 due to a pinched nerve, hx of prosthetic cornea sx 3 years ago. Not familiarized with a tens unit device. Imaging CT LUMBAR 10/06/2022 currently takes Tylenol 500 mg but prefers to takes 325 mg because 500 mg interferes with her vision, gabapentin 100 mg daily.    Subjective   Marilia Leo is a 77 y.o. female.   She presents to the office for evaluation of back pain. She was referred here by back pain.      Her back pain started years ago.  She completed a MRI on her wrist ~ 1 month ago. The position that she was in for this MRI caused an increase in her back pain. She was evaluated in the ED was prescribed toradol, a muscle relaxer, and lidocaine patches.      Today her pain is 5/10VAS in severity. Bilateral low back (L>R).  Her pain will radiate into her left buttock with pain radiating into her left lateral thigh stopping at the knee. Her right low back is localized over the right low back near the SI joint. She describes her pain as continuous pain that is shooting and burning pain.  Her symptoms do wax and wane in severity.  Her pain is worsened by prolonged sitting, bending, prolonged  walking, lying down; She has trialed using a low back support brace which is somewhat helpful. She also utilizes heat for her back.     Hx of multiple eye surgeries on her right eye. This required her to lay supine in her bed for multiple days at a time with each surgery. Since this time she has not tolerated sleeping in a bed due to back pain, she is more comfortable in a recliner position.    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     Completed a steroid pack in October, this was helpful    She is legally blind, her remaining vision is the peripheral vision in her left eye.     Past pain medications: Tramadol--caused nausea. Codeine--caused nausea. States she has previously tolerated Norco. Robaxin--didn't like how she made this feel.      Current pain medications: OTC Tylenol and Gabapentin     Past therapies:  Physical Therapy: Completed PT for her low back years ago, was not helpful at that time.   Chiropractor: Yes, years ago. This was helpful, she stopped going in ~2016/2017.   Massage Therapy: Yes, at the chiropractor.   TENS: None  Neck or back surgery: Cervical fusion in 2014 performed by Dr. Sexton  Past pain management: None     Previous Injections:   Completed LESI many years ago (~2007/2008). She received these in series of 3. They were initially helpful, she states the third set did not help.     Back Pain  This is a chronic problem. The current episode started more than 1 year ago. The problem occurs constantly. The problem has been gradually worsening since onset. The pain is present in the lumbar spine and sacro-iliac. The quality of the pain is described as shooting and burning. The pain radiates to the left thigh. The pain is at a severity of 5/10. The symptoms are aggravated by bending, standing and sitting (walking). Associated symptoms include headaches, numbness (left leg) and weakness (left leg). Pertinent negatives include no abdominal pain,  chest pain, dysuria or fever. She has tried heat for the symptoms.      PEG Assessment   What number best describes your pain on average in the past week?7  What number best describes how, during the past week, pain has interfered with your enjoyment of life?7  What number best describes how, during the past week, pain has interfered with your general activity?  7      Current Outpatient Medications:   •  acetaminophen (TYLENOL) 325 MG tablet, Take 325 mg by mouth As Needed., Disp: , Rfl:   •  amLODIPine (NORVASC) 10 MG tablet, Take 1 tablet by mouth Daily., Disp: 90 tablet, Rfl: 3  •  amLODIPine (NORVASC) 5 MG tablet, Take 1 tablet by mouth Daily., Disp: 90 tablet, Rfl: 3  •  atropine 1 % ophthalmic solution, Administer 1 drop to both eyes Daily., Disp: , Rfl:   •  benzonatate (Tessalon Perles) 100 MG capsule, Take 1 capsule by mouth 3 (Three) Times a Day As Needed for Cough., Disp: 30 capsule, Rfl: 1  •  Carboxymeth-Glycerin-Polysorb (REFRESH OPTIVE ALBERTO-3 OP), Apply 1 drop to eye(s) as directed by provider 2 (Two) Times a Day As Needed., Disp: , Rfl:   •  dorzolamide-timolol (COSOPT) 22.3-6.8 MG/ML ophthalmic solution, 1 drop 2 (Two) Times a Day., Disp: , Rfl:   •  Durezol 0.05 % ophthalmic emulsion, , Disp: , Rfl:   •  gabapentin (NEURONTIN) 100 MG capsule, TAKE ONE CAPSULE BY MOUTH EVERY AM AND TWO CAPSULES EVERY NIGHT AT BEDTIME, Disp: 270 capsule, Rfl: 0  •  gabapentin (NEURONTIN) 100 MG capsule, Up to TID, Disp: , Rfl:   •  irbesartan (AVAPRO) 150 MG tablet, Take 1 tablet by mouth Every Night., Disp: 90 tablet, Rfl: 3  •  lidocaine (LIDODERM) 5 %, PLACE 1 PATCH ON THE SKIN AS DIRECTED BY PROVIDER DAILY. REMOVE AND DISCARD PATCH WITHIN 12 HRS OR AS DIRECTED BY MD, Disp: 6 patch, Rfl: 3  •  methocarbamol (ROBAXIN) 750 MG tablet, Take 1 tablet by mouth 3 (Three) Times a Day As Needed for Muscle Spasms., Disp: 15 tablet, Rfl: 0  •  methylPREDNISolone (MEDROL) 4 MG dose pack, Take 1 tablet by mouth Daily. Take as  directed on package instructions., Disp: 21 tablet, Rfl: 0  •  Misc. Devices (Precision Scale) misc, Use to check weight daily, Disp: 1 each, Rfl: 0  •  Misc. Devices (Raised Toilet Seat) misc, 1 each Daily., Disp: 1 each, Rfl: 0  •  Misc. Devices (Raised Toilet Seat) misc, Use daily, Disp: 1 each, Rfl: 0  •  natamycin (NATACYN) 5 % ophthalmic solution, Administer 1 drop to the right eye 2 (two) times a day. Cycles on for 14 days and off for 14 days Patient does not need until 8/18/20, this comes from compound pharmacy, Disp: , Rfl:   •  Netarsudil Dimesylate 0.02 % solution, Administer 1 drop into the left eye Every Night., Disp: , Rfl:   •  ofloxacin (OCUFLOX) 0.3 % ophthalmic solution, Administer 1 drop into the left eye 2 (Two) Times a Day., Disp: , Rfl:     The following portions of the patient's history were reviewed and updated as appropriate: allergies, current medications, past family history, past medical history, past social history, past surgical history and problem list.      REVIEW OF PERTINENT MEDICAL DATA    Office visit from 11/8/2022 with Dr. Garcias reviewed.  Patient complains of left hip pain which flared causing pain and numbness down the left leg.  She received Toradol in the ED with a muscle relaxer.  She was evaluated on 10/6/2022 for this episode and was diagnosed with lumbar radiculopathy.  Refer to pain management for lumbar radiculopathy.    CT SCAN OF THE LUMBAR SPINE WITHOUT CONTRAST--10/6/2022     CLINICAL HISTORY: Low back pain for years.     TECHNIQUE: CT scan of the lumbar spine was obtained with 3 mm axial  images. Sagittal and coronal reconstructed images were obtained.     FINDINGS:     There is a mild dextroconvex scoliotic curvature of the lumbar spine  with its apex centered at L1-2.     At T9-10, T10-11, T11-12, T12-L1, and L1-2, there is no significant  canal or foraminal stenosis.     At L2-3, there is a disc osteophyte complex which results in a mild  degree of canal  narrowing and a mild degree of bilateral foraminal  narrowing.     At L3-4, there is no significant degree of canal or foraminal stenosis.     At L4-5, there is a disc bulge which mildly narrows the left neural  foramen. There is mild-to-moderate facet hypertrophic change.     At L5-S1, there is mild left and moderate right facet hypertrophy. There  is mild right foraminal narrowing secondary to bulging disc material.     There is partial osseous fusion across the right SI joint.     IMPRESSION:     There is mild-to-moderate dextroconvex scoliotic curvature of the lumbar  spine centered at L1-2.     Multilevel degenerative phenomena are noted within the lumbar spine  resulting in only up to mild degrees of canal and foraminal narrowing as  discussed in detail above.     Radiation dose reduction techniques were utilized, including automated  exposure control and exposure modulation based on body size.     This report was finalized on 10/7/2022 6:32 AM by Dr. Ignacio Aguirre M.D.    Review of Systems   Constitutional: Negative for activity change (less), fatigue and fever.   HENT: Negative for congestion.    Eyes: Positive for visual disturbance (OS eye only has peripheral vision , no vision OD eye).   Respiratory: Negative for cough and chest tightness.    Cardiovascular: Negative for chest pain.   Gastrointestinal: Negative for abdominal pain, constipation and diarrhea.   Genitourinary: Negative for difficulty urinating and dysuria.   Musculoskeletal: Positive for back pain.   Neurological: Positive for weakness (left leg), numbness (left leg) and headaches. Negative for dizziness and light-headedness.   Psychiatric/Behavioral: Positive for sleep disturbance. Negative for agitation and suicidal ideas. The patient is not nervous/anxious.      --  The aforementioned information the Chief Complaint section and above subjective data including any HPI data, and also the Review of Systems data, has been personally reviewed  "and affirmed.  --    Vitals:    11/30/22 1421   BP: 150/92   BP Location: Left arm   Patient Position: Sitting   Cuff Size: Large Adult   Pulse: 86   Resp: 12   Temp: 98 °F (36.7 °C)   TempSrc: Temporal   SpO2: 98%   Weight: 62.9 kg (138 lb 9.6 oz)   Height: 162.6 cm (64\")   PainSc:   5   PainLoc: Comment: back     Objective   Physical Exam  Vitals and nursing note reviewed.   Constitutional:       Appearance: Normal appearance. She is well-developed.   Cardiovascular:      Rate and Rhythm: Normal rate.   Pulmonary:      Effort: Pulmonary effort is normal.   Musculoskeletal:      Cervical back: Normal range of motion.      Lumbar back: Tenderness and bony tenderness present. Decreased range of motion. Negative right straight leg raise test and negative left straight leg raise test.      Comments:   +Jonathan SI Compression (R>L)  +Jonathan Moe (R>L)  +Jonathan Gaenslen's (R>L)   Neurological:      Mental Status: She is alert and oriented to person, place, and time.      Gait: Gait abnormal (cane).   Psychiatric:         Attention and Perception: Attention normal.         Mood and Affect: Mood normal.         Speech: Speech normal.         Behavior: Behavior normal.         Judgment: Judgment normal.       Assessment & Plan   Diagnoses and all orders for this visit:    1. Sacroiliac joint dysfunction of both sides (Primary)    2. Chronic bilateral low back pain, unspecified whether sciatica present    3. Lumbar foraminal stenosis      --- I spent 47 minutes caring for Marilia on this date of service. This time includes time spent by me in the following activities: preparing for the visit, reviewing tests, obtaining and/or reviewing a separately obtained history, performing a medically appropriate examination and/or evaluation, counseling and educating the patient/family/caregiver, ordering medications, tests, or procedures and documenting information in the medical record     --- Bilateral SI joint injections  Reviewed the " procedure at length with the patient.  Included in the review was expectations, complications, risk and benefits.The procedure was described in detail and the risks, benefits and alternatives were discussed with the patient (including but not limited to: bleeding, infection, nerve damage, worsening of pain, inability to perform injection, paralysis, seizures, coma, no pain relief and death) who agreed to proceed.  Discussed the potential for sedation if warranted/wanted.  The procedure will plan to be performed at [facility] with fluoroscopic guidance(unless ultrasound is indicated) and could potentially have steroids and contrast dye used. Questions were answered and in a way the patient could understand.  Patient verbalized understanding and wishes to proceed.  This intervention will be ordered.  Discussed with patient that all procedures are part of a multimodal plan of care and include either formal PT or a home exercise program.  Patient has no evidence of coagulopathy or current infection.    --- If no relief from bilateral SI injections then consider LESI.   --- May consider acute supply of  Norco 5/325 if pain becomes severe. Patient hopes to avoid this if possible.   --- Follow-up after procedure     CHASE REPORT    CHASE report has been reviewed and scanned into the patient's chart.    As the clinician, I personally reviewed the CHASE from 11/30/2022 while the patient was in the office today.    Dictated utilizing Dragon dictation.

## 2022-12-16 ENCOUNTER — TRANSCRIBE ORDERS (OUTPATIENT)
Dept: SURGERY | Facility: SURGERY CENTER | Age: 77
End: 2022-12-16

## 2022-12-16 DIAGNOSIS — Z41.9 SURGERY, ELECTIVE: Primary | ICD-10-CM

## 2022-12-21 DIAGNOSIS — M54.16 LUMBAR RADICULOPATHY, CHRONIC: ICD-10-CM

## 2022-12-21 RX ORDER — GABAPENTIN 100 MG/1
CAPSULE ORAL
Qty: 270 CAPSULE | Refills: 0 | Status: SHIPPED | OUTPATIENT
Start: 2022-12-21 | End: 2023-03-31

## 2022-12-21 NOTE — TELEPHONE ENCOUNTER
Rx Refill Note  Requested Prescriptions     Pending Prescriptions Disp Refills   • gabapentin (NEURONTIN) 100 MG capsule [Pharmacy Med Name: GABAPENTIN 100MG CAPSULES] 270 capsule      Sig: TAKE ONE CAPSULE BY MOUTH EVERY MORNING AND 2 AT BEDTIME      Last office visit with prescribing clinician: 11/8/2022   Last telemedicine visit with prescribing clinician: 2/24/2023   Next office visit with prescribing clinician: 2/24/2023         Lali Huang MA  12/21/22, 12:52 EST

## 2023-01-09 NOTE — SIGNIFICANT NOTE
Patient educated on the following :    - If you are receiving Sedation for your procedure Nothing to Eat 6 hours and only clear liquids for 2 hours prior to your procedure.    -You will need to have someone drive you home after your PROCEDURE and remain with you for 24 hours after the PROCEDURE  - The date of your procedure, your are welcome to have one visitor at bedside or remain within 10-15 minutes of [facility]  -You will need to arrive at 1300 on 1/11PROCEDURE  -Please contact [facility] at: [phone] with any questions and/or concerns

## 2023-01-10 NOTE — SIGNIFICANT NOTE
Patient educated on the following :    - If you are receiving Sedation for your procedure Nothing to Eat 6 hours and only clear liquids for 2 hours prior to your procedure.     -You will need to have someone drive you home after your PROCEDURE and remain with you for 24 hours after the PROCEDURE  - The date of your procedure, your are welcome to have one visitor at bedside or remain within 10-15 minutes of [facility]  -You will need to arrive at 1300 on 1-11-23 for your PROCEDURE  -Please contact [facility] at: [phone] with any questions and/or concerns

## 2023-01-11 ENCOUNTER — HOSPITAL ENCOUNTER (OUTPATIENT)
Facility: SURGERY CENTER | Age: 78
Setting detail: HOSPITAL OUTPATIENT SURGERY
Discharge: HOME | End: 2023-01-11
Attending: ANESTHESIOLOGY | Admitting: ANESTHESIOLOGY
Payer: MEDICARE

## 2023-01-11 ENCOUNTER — HOSPITAL ENCOUNTER (OUTPATIENT)
Dept: GENERAL RADIOLOGY | Facility: SURGERY CENTER | Age: 78
Setting detail: HOSPITAL OUTPATIENT SURGERY
End: 2023-01-11
Payer: MEDICARE

## 2023-01-11 VITALS
WEIGHT: 140 LBS | DIASTOLIC BLOOD PRESSURE: 92 MMHG | HEART RATE: 89 BPM | HEIGHT: 64 IN | RESPIRATION RATE: 16 BRPM | OXYGEN SATURATION: 98 % | TEMPERATURE: 99.1 F | BODY MASS INDEX: 23.9 KG/M2 | SYSTOLIC BLOOD PRESSURE: 153 MMHG

## 2023-01-11 DIAGNOSIS — Z41.9 SURGERY, ELECTIVE: ICD-10-CM

## 2023-01-11 DIAGNOSIS — M53.3 SACROILIAC JOINT DYSFUNCTION OF BOTH SIDES: ICD-10-CM

## 2023-01-11 PROCEDURE — 27096 INJECT SACROILIAC JOINT: CPT | Performed by: ANESTHESIOLOGY

## 2023-01-11 PROCEDURE — 25010000002 METHYLPREDNISOLONE PER 80 MG: Performed by: ANESTHESIOLOGY

## 2023-01-11 PROCEDURE — 76000 FLUOROSCOPY <1 HR PHYS/QHP: CPT

## 2023-01-11 PROCEDURE — 77002 NEEDLE LOCALIZATION BY XRAY: CPT

## 2023-01-11 PROCEDURE — G0260 INJ FOR SACROILIAC JT ANESTH: HCPCS | Performed by: ANESTHESIOLOGY

## 2023-01-11 PROCEDURE — S0260 H&P FOR SURGERY: HCPCS | Performed by: ANESTHESIOLOGY

## 2023-01-11 PROCEDURE — 25010000002 IOPAMIDOL 61 % SOLUTION 30 ML VIAL: Performed by: ANESTHESIOLOGY

## 2023-01-11 RX ORDER — SODIUM CHLORIDE 0.9 % (FLUSH) 0.9 %
10 SYRINGE (ML) INJECTION AS NEEDED
Status: DISCONTINUED | OUTPATIENT
Start: 2023-01-11 | End: 2023-01-11 | Stop reason: HOSPADM

## 2023-01-11 RX ORDER — SODIUM CHLORIDE 0.9 % (FLUSH) 0.9 %
10 SYRINGE (ML) INJECTION EVERY 12 HOURS SCHEDULED
Status: DISCONTINUED | OUTPATIENT
Start: 2023-01-11 | End: 2023-01-11 | Stop reason: HOSPADM

## 2023-01-11 RX ORDER — DIAZEPAM 5 MG/1
10 TABLET ORAL ONCE AS NEEDED
Status: COMPLETED | OUTPATIENT
Start: 2023-01-11 | End: 2023-01-11

## 2023-01-11 RX ADMIN — DIAZEPAM 10 MG: 5 TABLET ORAL at 13:21

## 2023-01-11 NOTE — H&P
"Brief Pre-procedural / Pre-operative H&P        -----    Pertinent Diagnosis:   Somatic dysfunction of the sacroiliac joints    Proposed Procedure: Bilateral sacroiliac joint injections      Subjective   Marilia Leo is a 77 y.o. female  who presents for intervention.  She has a history of secondary pain.      History of Present Illness     Has lumbosacral area pain and evidence of sacroiliac dysfunction on history and physical exam among other painful problems.  She does have some known lumbar foraminal stenosis.  She has aching and stabbing pain bilaterally left greater than right in the low back and there is some radiation into the thighs especially the left thigh.  She has been dealing with back pain for years.Pain increases with prolonged sitting along with bending and prolonged walking and lying down.  She has some mild relief from heat and back bracing.  She has displayed positive bilateral Gaenslen's and Moe's and sacroiliac compression maneuvers on exam  -------    The following portions of the patient's history were reviewed and updated as appropriate: allergies, current medications, past family history, past medical history, past social history, past surgical history and problem list.    Allergies   Allergen Reactions   • Nitrofurantoin Unknown - High Severity and Anaphylaxis   • Codeine    • Penicillins    • Sulfa Antibiotics    • Tramadol Nausea Only     Feels like \"jumping out of skin\"         Current Facility-Administered Medications:   •  sodium chloride 0.9 % flush 10 mL, 10 mL, Intravenous, Q12H, Gigi Boateng MD  •  sodium chloride 0.9 % flush 10 mL, 10 mL, Intravenous, PRN, Gigi Boateng MD    No current facility-administered medications on file prior to encounter.     Current Outpatient Medications on File Prior to Encounter   Medication Sig Dispense Refill   • acetaminophen (TYLENOL) 325 MG tablet Take 325 mg by mouth As Needed.     • amLODIPine (NORVASC) 10 MG tablet Take 1 tablet " by mouth Daily. 90 tablet 3   • amLODIPine (NORVASC) 5 MG tablet Take 1 tablet by mouth Daily. 90 tablet 3   • Carboxymeth-Glycerin-Polysorb (REFRESH OPTIVE ALBERTO-3 OP) Apply 1 drop to eye(s) as directed by provider 2 (Two) Times a Day As Needed.     • gabapentin (NEURONTIN) 100 MG capsule Up to TID     • irbesartan (AVAPRO) 150 MG tablet Take 1 tablet by mouth Every Night. 90 tablet 3   • Misc. Devices (Raised Toilet Seat) misc 1 each Daily. 1 each 0   • Misc. Devices (Raised Toilet Seat) misc Use daily 1 each 0   • ofloxacin (OCUFLOX) 0.3 % ophthalmic solution Administer 1 drop into the left eye 2 (Two) Times a Day.     • benzonatate (Tessalon Perles) 100 MG capsule Take 1 capsule by mouth 3 (Three) Times a Day As Needed for Cough. 30 capsule 1   • lidocaine (LIDODERM) 5 % PLACE 1 PATCH ON THE SKIN AS DIRECTED BY PROVIDER DAILY. REMOVE AND DISCARD PATCH WITHIN 12 HRS OR AS DIRECTED BY MD 6 patch 3   • Misc. Devices (Precision Scale) misc Use to check weight daily 1 each 0   • natamycin (NATACYN) 5 % ophthalmic solution Administer 1 drop to the right eye 2 (two) times a day. Cycles on for 14 days and off for 14 days  Patient does not need until 8/18/20, this comes from compound pharmacy     • [DISCONTINUED] atropine 1 % ophthalmic solution Administer 1 drop to both eyes Daily.     • [DISCONTINUED] dorzolamide-timolol (COSOPT) 22.3-6.8 MG/ML ophthalmic solution 1 drop 2 (Two) Times a Day.     • [DISCONTINUED] Durezol 0.05 % ophthalmic emulsion      • [DISCONTINUED] methocarbamol (ROBAXIN) 750 MG tablet Take 1 tablet by mouth 3 (Three) Times a Day As Needed for Muscle Spasms. 15 tablet 0   • [DISCONTINUED] methylPREDNISolone (MEDROL) 4 MG dose pack Take 1 tablet by mouth Daily. Take as directed on package instructions. 21 tablet 0   • [DISCONTINUED] Netarsudil Dimesylate 0.02 % solution Administer 1 drop into the left eye Every Night.         Patient Active Problem List   Diagnosis   • Benign essential hypertension    • Cervical radiculopathy   • Glaucoma   • Pure hypercholesterolemia   • Menopausal symptom   • Atrophic vaginitis   • Health care maintenance   • Cervical radiculopathy   • Edema, lower extremity   • Elevated LFTs   • Fever of unknown origin (FUO)   • Nausea   • Sinus tachycardia   • Bilateral chronic knee pain   • Urinary frequency   • Lumbar radiculopathy, chronic   • DDD (degenerative disc disease), lumbar   • Sacroiliac joint dysfunction of both sides       Past Medical History:   Diagnosis Date   • Angioedema     due to ACEI 8/2002   • Atopic dermatitis 04/18/2016   • Cataract    • Diverticulosis    • Edema     due to Norvasc   • Esophageal reflux    • Glaucoma     right eye open angle Dr astorga s/p lazar Sx x3   • Hammer toe    • History of mammogram     3/11/14 Quick 4/10/15   • Hx of bone scan     DEXA normal 2/2010, 09/2015   • Hypercholesterolemia    • Impaired fasting blood sugar 05/13/2019   • Medial epicondylitis     L 2005   • Palpitations 05/13/2019   • Papanicolaou smear     reported pos. pap smear and previous pos 2 or more years ago  s/p hysterectomy   • PONV (postoperative nausea and vomiting)    • Sepsis (HCC) 08/04/2020   • Trochanteric bursitis    • Trochanteric bursitis of left hip 10/13/2016       Past Surgical History:   Procedure Laterality Date   • APPENDECTOMY     • BILATERAL BREAST REDUCTION     • CERVICAL FUSION      Dr.John Sexton   • COLONOSCOPY      4/2007 Dr Bradshaw Normal   • CORNEAL TRANSPLANT     • FETAL BLOOD TRANSFUSION  1960   • FOOT SURGERY  02/2013    right bunion and hammer toe Dr Phelan   • SUBTOTAL HYSTERECTOMY      ovaries intact       Family History   Problem Relation Age of Onset   • Hypertension Mother    • Diabetes Mother    • Hypertension Father    • Diabetes Sister    • Multiple sclerosis Sister    • Rheum arthritis Sister    • Lung cancer Sister         smoker   • Diabetes Brother    • Heart disease Brother    • Hypertension Brother    • Rheum arthritis Brother   "  • Sarcoidosis Brother    • Glaucoma Brother        Social History     Socioeconomic History   • Marital status: Single   Tobacco Use   • Smoking status: Former     Packs/day: 1.00     Years: 5.00     Pack years: 5.00     Types: Cigarettes     Start date:      Quit date:      Years since quittin.0   • Smokeless tobacco: Never   Vaping Use   • Vaping Use: Never used   Substance and Sexual Activity   • Alcohol use: Yes     Comment: occasionally   • Drug use: No   • Sexual activity: Defer       -------       Review of Systems  No Fever, No Chills, No ear pain, No sinus pressure or drainage, No eye pain or drainage, No cough, No SOB, No chest tightness nor chest pain, no palpitations.          Vitals:    23 1335   BP: 158/95   BP Location: Left arm   Patient Position: Sitting   Pulse: 95   Resp: 16   Temp: 98.6 °F (37 °C)   TempSrc: Temporal   SpO2: 96%   Weight: 63.5 kg (140 lb)   Height: 162.6 cm (64\")         Objective   Physical Exam  VSS, NNR, NCAT, NMNA, NRD, AAOx3.      -------    Assessment & Plan:  - as noted above, the stated intervention is indicated  - Follow-up plan will be noted in the operative report        Has a follow-up February 10      EMR Dragon/Transcription disclaimer:   Typed items in this encounter note may have been created by electronic transcription/translation software which converts spoken language to printed text. The electronic translation of spoken language may permit erroneous, or at times, nonsensical words or phrases to be inadvertently transcribed; Although I have reviewed the note for such errors, some may still exist.      "

## 2023-01-11 NOTE — OP NOTE
Bilateral Sacroiliac Joint Injection  [facility]      PREOPERATIVE DIAGNOSIS:   Sacroiliac joint dysfunction, bilateral    POSTOPERATIVE DIAGNOSIS:  Sacroiliac joint dysfunction, bilateral    PROCEDURE:  Sacroiliac Joint Injection, Bilaterally, with fluoroscopic guidance    PRE-PROCEDURE DISCUSSION WITH PATIENT:    Risks and complications were discussed with the patient prior to starting the procedure and informed consent was obtained.  We discussed various topics including but not limited to bleeding, infection, injury, postprocedural site soreness, painful flareup, worsening of clinical picture, paralysis, coma, and death.     SURGEON:  Gigi Boateng MD    REASON FOR PROCEDURE:    Patient has pain consistent with SI pathology on history and physical exam. Positive sacroiliac provocation maneuvers noted.      SEDATION:  She received oral diazepam 10 mg in the preoperative area and The patient had higher than average levels of procedural anxiety and the need to provide additional procedural sedation was needed to safely proceed.  ANESTHETIC AGENT:  Marcaine 0.5%  STEROID AGENT:  Methylprednisolone (DEPO MEDROL) 80mg/ml    DESCRIPTON OF PROCEDURE:  After obtaining informed consent, IV access was not obtained in the preoperative area.  The patient was transported to the operative suite and placed in the prone position with a pillow under the pelvic area. EKG, blood pressure, and pulse oximeter were monitored. The lumbosacral area was prepped with Chloraprep and draped in a sterile fashion. Under fluoroscopic guidance the inferior most portion of the right SI joint was identified. The overlying skin and subcutaneous tissue was anesthetized with 1% lidocaine. A 22-gauge spinal needle was introduced from the inferior most portion of the joint into the RIGHT SI joint under fluoroscopic guidance in the AP dimension with slight oblique rotation to the contralateral side.  Aspiration was negative.   After confirming the position of the needle with fluoroscopy, 1 mL of Omnipaque was injected and after seeing appropriate spread into the joint a total of 2mL Marcaine, with the steroid, was injected very slowly.  Needle was removed intact.  A similar procedure was performed on the LEFT side.   Vital signs remained stable.  The onset of analgesia was noted.      ESTIMATED BLOOD LOSS:  minimal  SPECIMENS:  None  COMPLICATIONS:  No complications were noted., There was no indication of vascular uptake on live injection of contrast dye., There was no indication of intrathecal uptake on live injection of contrast dye., There was not any evidence of dural puncture.   and The patient did not have any signs of postprocedure numbness nor weakness.    TOLERANCE & DISCHARGE CONDITION:    The patient tolerated the procedure well.  The patient was transported to the recovery area without difficulties.  The patient was discharged to home under the care of family in stable and satisfactory condition.    PLAN OF CARE:  1. The patient was given our standard instruction sheet and will resume all medications as per the medication reconciliation sheet.  2. The patient will Return to clinic 4-6 wks.  3. The patient is instructed to keep a pain log hourly for 8 hours after the procedure.

## 2023-01-11 NOTE — DISCHARGE INSTRUCTIONS
[facility] - Post-procedure Instructions          --  While there are no absolute restrictions, it is recommended that you do not perform strenuous activity today. In the morning, you may resume your level of activity as before your block.    --  If you have a band-aid at your injection site, please remove it later today. Observe the area for any redness, swelling, pus-like drainage, or a temperature over 101°. If any of these symptoms occur, please call your doctor at [phone]. If after office hours, leave a message and the on-call provider will return your call.    --  Ice may be applied to your injection site. It is recommended you avoid direct heat (heating pad; hot tub) for 1-2 days.    --  Call [facility] at [phone] if you experience persistent headache, persistent bleeding from the injection site, or severe pain not relieved by heat or oral medication.    --  Do not make important decisions today.    --  Due to the effects of the block and/or the I.V. Sedation, DO NOT drive or operate hazardous machinery for 12 hours.  Local anesthetics may cause numbness after procedure and precautions must be taken with regards to operating equipment as well as with walking, even if ambulating with assistance of another person or with an assistive device.    --  Do not drink alcohol for 12 hours.    -- You may return to work tomorrow, or as directed by your referring doctor.    --  Occasionally you may notice a slight increase in your pain after the procedure. This should start to improve within the next 24-48 hours. Radiofrequency ablation procedure pain may last 3-4 weeks.    --  It may take as long as 3-4 days before you notice a gradual improvement in your pain and/or other symptoms.    -- You may continue to take your prescribed pain medication as needed.    --  Some normal possible side effects of steroid use could include fluid retention, increased blood sugar, dull headache,  increased sweating, increased appetite, mood swings and flushing.    --  Diabetics are recommended to watch their blood glucose level closely for 24-48 hours after the injection.    --  Must stay in PACU for 20 min upon arrival and prove no leg weakness before being discharged.    --  IN THE EVENT OF A LIFE THREATENING EMERGENCY, (CHEST PAIN, BREATHING DIFFICULTIES, PARALYSIS…) YOU SHOULD GO TO YOUR NEAREST EMERGENCY ROOM.    --  You should be contacted by our office within 2-3 days to schedule follow up or next appointment date.  If not contacted within 7 days, please call the office at [phone]

## 2023-01-12 ENCOUNTER — PATIENT MESSAGE (OUTPATIENT)
Dept: INTERNAL MEDICINE | Facility: CLINIC | Age: 78
End: 2023-01-12
Payer: MEDICARE

## 2023-01-13 NOTE — TELEPHONE ENCOUNTER
From: Marilia Leo  To: Grover Garcias  Sent: 1/12/2023 11:38 AM EST  Subject: Reoccurring rash and itching    Pt has continually used hydrocortisone or Benedryl cream for refief of constant breakout rash and itching around neck and chest. She received steroid injection by Dr. Boateng 1/11, and the rash has instantly cleared for now. Why would that be? She’d like to know if there is a topical steroid for return symptoms and how we can detect the cause of this reoccurring issue. Would also like your opinion if this could possibly be due to a nerve issue.

## 2023-02-10 ENCOUNTER — TRANSCRIBE ORDERS (OUTPATIENT)
Dept: SURGERY | Facility: SURGERY CENTER | Age: 78
End: 2023-02-10
Payer: MEDICARE

## 2023-02-10 ENCOUNTER — OFFICE VISIT (OUTPATIENT)
Dept: PAIN MEDICINE | Facility: CLINIC | Age: 78
End: 2023-02-10
Payer: MEDICARE

## 2023-02-10 ENCOUNTER — PREP FOR SURGERY (OUTPATIENT)
Dept: SURGERY | Facility: SURGERY CENTER | Age: 78
End: 2023-02-10
Payer: MEDICARE

## 2023-02-10 VITALS
SYSTOLIC BLOOD PRESSURE: 143 MMHG | HEIGHT: 64 IN | OXYGEN SATURATION: 98 % | RESPIRATION RATE: 18 BRPM | TEMPERATURE: 97.7 F | WEIGHT: 139.8 LBS | BODY MASS INDEX: 23.87 KG/M2 | DIASTOLIC BLOOD PRESSURE: 89 MMHG | HEART RATE: 98 BPM

## 2023-02-10 DIAGNOSIS — Z41.9 SURGERY, ELECTIVE: Primary | ICD-10-CM

## 2023-02-10 DIAGNOSIS — M54.16 LUMBAR RADICULOPATHY, CHRONIC: Primary | ICD-10-CM

## 2023-02-10 DIAGNOSIS — G89.29 CHRONIC BILATERAL LOW BACK PAIN, UNSPECIFIED WHETHER SCIATICA PRESENT: ICD-10-CM

## 2023-02-10 DIAGNOSIS — M48.061 LUMBAR FORAMINAL STENOSIS: ICD-10-CM

## 2023-02-10 DIAGNOSIS — M54.16 LUMBAR RADICULOPATHY, CHRONIC: Primary | Chronic | ICD-10-CM

## 2023-02-10 DIAGNOSIS — M54.50 CHRONIC BILATERAL LOW BACK PAIN, UNSPECIFIED WHETHER SCIATICA PRESENT: ICD-10-CM

## 2023-02-10 PROCEDURE — 99214 OFFICE O/P EST MOD 30 MIN: CPT

## 2023-02-10 RX ORDER — DIAZEPAM 5 MG/1
10 TABLET ORAL ONCE
Status: CANCELLED | OUTPATIENT
Start: 2023-02-10

## 2023-02-10 NOTE — H&P (VIEW-ONLY)
CHIEF COMPLAINT  Back pain    Subjective   Marilia Leo is a 77 y.o. female  who presents to the office for follow-up of procedure.  She completed a Bilateral Sacroiliac Joint Injection  on 1/11/2023 performed by Dr. Boateng for management of back pain. Patient reports 100% ongoing relief from the procedure on the right side and 45-50% relief from the procedure on the left side for 1 week.     Today pain is 6/10VAS in severity. Pain is located in her low back and radiates into left buttock and down left posterior/lateral thigh. Pain does not go past knee. Denies pain to right leg at this time. Describes this pain as an intermittent aching and burning. Pain is worsened by lying flat, prolonged standing or sitting, and walking long distances. Pain improves with rest/reposition, heat/ice, and medications. Patient continues with Gabapentin 100mg QID and Tylenol 500mg PRN.     History of glaucoma. Patient is legally blind. Remaining vision is peripheral vision in her left eye.     Unable to tolerate Tramadol and Codeine - both caused nausea, Robaxin - patient did not like how medication made her feel    Procedures:  1/11/23 - Bilateral SI joint injections- 100% relief to right side, 45-50% relief to left side x 1 week    History of previous epidurals at [facility] - she reports that these injections were initially helpful but the last injections she had there were unsuccessful.     Back Pain  This is a chronic problem. The current episode started more than 1 year ago. The problem occurs intermittently. The problem has been waxing and waning since onset. The pain is present in the lumbar spine. The quality of the pain is described as aching and burning. The pain radiates to the left thigh (radiates down left buttock in posterior/lateral thigh - does not pass knee). The pain is at a severity of 6/10. The pain is moderate. The symptoms are aggravated by standing, bending, sitting, position and lying down (walking long  distances, increased physical activity). Stiffness is present in the morning. Pertinent negatives include no chest pain, numbness or weakness. She has tried heat and ice (Gabapentin, ) for the symptoms. The treatment provided mild relief.      PEG Assessment   What number best describes your pain on average in the past week?5  What number best describes how, during the past week, pain has interfered with your enjoyment of life?5  What number best describes how, during the past week, pain has interfered with your general activity?  0    Review of Pertinent Medical Data ---  Office note from GISELL Hayward from 10/30/2022.  Patient presents as a new patient for evaluation of back pain.  She was referred by Dr. Grover Garcias.  Patient reports her back pain started about a year ago started worsening about a month ago due to prolonged position we will get a MRI on her wrist.  She was evaluated in the ED and was prescribed Toradol, muscle relaxant, lidocaine patches.  Patient reports pain is in her low back, side greater than right.  Pain radiates into her left buttock and left lateral thigh stopping at the knee.  Right-sided back pain is localized near the right SI joint.  Patient is legally blind with remaining peripheral vision in her left eye.  Conservative treatments for back pain include physical therapy, chiropractor, and massage therapy.  She is a history of a cervical fusion in 2014 that was performed by Dr. Sexton.  Also has a history of epidural steroid injections many years ago.  She reports that these were initially helpful but the last that did not help.  She currently takes over-the-counter Tylenol and gabapentin for pain relief.  Past pain medications include tramadol and codeine which caused nausea, and Robaxin (did not like how medication made her feel).  She has also taken Norco which she tolerated well.  Plan at this appointment was to schedule bilateral SI joint injections.  If no relief from these  injections may consider lumbar epidural steroid injection.  May consider an acute supply of Norco if pain were to become severe.  Patient was instructed to follow-up for procedure.    CT SCAN OF THE LUMBAR SPINE WITHOUT CONTRAST   CLINICAL HISTORY: Low back pain for years.     TECHNIQUE: CT scan of the lumbar spine was obtained with 3 mm axial  images. Sagittal and coronal reconstructed images were obtained.     FINDINGS:     There is a mild dextroconvex scoliotic curvature of the lumbar spine  with its apex centered at L1-2.     At T9-10, T10-11, T11-12, T12-L1, and L1-2, there is no significant  canal or foraminal stenosis.     At L2-3, there is a disc osteophyte complex which results in a mild  degree of canal narrowing and a mild degree of bilateral foraminal  narrowing.     At L3-4, there is no significant degree of canal or foraminal stenosis.     At L4-5, there is a disc bulge which mildly narrows the left neural  foramen. There is mild-to-moderate facet hypertrophic change.     At L5-S1, there is mild left and moderate right facet hypertrophy. There  is mild right foraminal narrowing secondary to bulging disc material.     There is partial osseous fusion across the right SI joint.     IMPRESSION:   There is mild-to-moderate dextroconvex scoliotic curvature of the lumbar  spine centered at L1-2.     Multilevel degenerative phenomena are noted within the lumbar spine  resulting in only up to mild degrees of canal and foraminal narrowing as  discussed in detail above.     Radiation dose reduction techniques were utilized, including automated  exposure control and exposure modulation based on body size.     This report was finalized on 10/7/2022 6:32 AM by Dr. Ignacio Aguirre M.D.    The following portions of the patient's history were reviewed and updated as appropriate: allergies, current medications, past family history, past medical history, past social history, past surgical history and problem  "list.    Review of Systems   Constitutional: Negative for fatigue.   HENT: Negative for congestion.    Eyes: Positive for visual disturbance.   Respiratory: Negative for shortness of breath.    Cardiovascular: Negative for chest pain.   Gastrointestinal: Positive for constipation. Negative for diarrhea.   Genitourinary: Negative for difficulty urinating.   Musculoskeletal: Positive for back pain.   Neurological: Negative for weakness and numbness.   Psychiatric/Behavioral: Positive for sleep disturbance. Negative for suicidal ideas. The patient is not nervous/anxious.      I have reviewed and confirmed the accuracy of the ROS as documented by the MA/LPN/RN GISELL Ma     Vitals:    02/10/23 1442   BP: 143/89   Pulse: 98   Resp: 18   Temp: 97.7 °F (36.5 °C)   SpO2: 98%   Weight: 63.4 kg (139 lb 12.8 oz)   Height: 162.6 cm (64\")   PainSc:   6   PainLoc: Back     Objective   Physical Exam  Constitutional:       Appearance: Normal appearance.   HENT:      Head: Normocephalic.   Cardiovascular:      Rate and Rhythm: Normal rate and regular rhythm.   Pulmonary:      Effort: Pulmonary effort is normal.      Breath sounds: Normal breath sounds.   Musculoskeletal:      Cervical back: Normal range of motion.      Lumbar back: Tenderness and bony tenderness present. Decreased range of motion. Positive left straight leg raise test.      Comments: + left SI joint tenderness  - left FABERs   Skin:     General: Skin is warm and dry.      Capillary Refill: Capillary refill takes less than 2 seconds.   Neurological:      General: No focal deficit present.      Mental Status: She is alert and oriented to person, place, and time.      Gait: Gait abnormal (slowed, ambulates with cane).   Psychiatric:         Mood and Affect: Mood normal.         Behavior: Behavior normal.         Thought Content: Thought content normal.         Cognition and Memory: Cognition normal.       Assessment & Plan   Diagnoses and all orders for " this visit:    1. Lumbar radiculopathy, chronic (Primary)    2. Chronic bilateral low back pain, unspecified whether sciatica present    3. Lumbar foraminal stenosis    --- Left L5 & S1 TF ROQUE  ---  Indications for epidural injection:  Plan is to proceed with epidural at the appropriate level.  If the patient receives significant pain reduction and improvement in function and the plan will be to repeat the epidural when the pain worsens.  If a second epidural provides at least 6 weeks of sustained improvement that includes both pain reduction and improvement in function then an epidural injection could be repeated once again at the same level.  This is a mutual decision between the clinician and the patient that includes discussions including risks and benefits in detail as well as alternative therapies.  Patient's questions were answered to their satisfaction and to their understanding.  ---  Discussed with the patient that sedation is optional for this procedure.  The sedation offered is called conscious sedation which is different from general anesthesia that is utilized in surgical procedures. The dosing of the sedation is determined by the physician and they will be monitored throughout the procedure. With conscious sedation it is possible to remember parts or all of the procedure, this is normal. They will need to have a  with them as driving is prohibited following conscious sedation.      NPO instructions for conscious sedation:  --- Do not eat 6 hours prior to the procedure.   --- Do not drink any dairy or citrus 4 hours prior to the procedure.   --- Do not drink anything, including clear liquids, 2 hours prior to procedure.      If the NPO instructions are not followed then the procedure may be performed without sedation or the procedure will need to be rescheduled.   --- Follow-up for procedure     CHASE REPORT  As the clinician, I personally reviewed the CHASE from 2/10/23 while the patient was  in the office today.    Dictated utilizing Dragon dictation.      Patient remained masked during entire encounter. No cough present. I donned a mask and eye protection throughout entire visit. Prior to donning mask and eye protection, hand hygiene was performed, as well as when it was doffed.  I was closer than 6 feet, but not for an extended period of time. No obvious exposure to any bodily fluids.

## 2023-02-10 NOTE — PROGRESS NOTES
CHIEF COMPLAINT  Back pain    Subjective   Marilia Leo is a 77 y.o. female  who presents to the office for follow-up of procedure.  She completed a Bilateral Sacroiliac Joint Injection  on 1/11/2023 performed by Dr. Boateng for management of back pain. Patient reports 100% ongoing relief from the procedure on the right side and 45-50% relief from the procedure on the left side for 1 week.     Today pain is 6/10VAS in severity. Pain is located in her low back and radiates into left buttock and down left posterior/lateral thigh. Pain does not go past knee. Denies pain to right leg at this time. Describes this pain as an intermittent aching and burning. Pain is worsened by lying flat, prolonged standing or sitting, and walking long distances. Pain improves with rest/reposition, heat/ice, and medications. Patient continues with Gabapentin 100mg QID and Tylenol 500mg PRN.     History of glaucoma. Patient is legally blind. Remaining vision is peripheral vision in her left eye.     Unable to tolerate Tramadol and Codeine - both caused nausea, Robaxin - patient did not like how medication made her feel    Procedures:  1/11/23 - Bilateral SI joint injections- 100% relief to right side, 45-50% relief to left side x 1 week    History of previous epidurals at [facility] - she reports that these injections were initially helpful but the last injections she had there were unsuccessful.     Back Pain  This is a chronic problem. The current episode started more than 1 year ago. The problem occurs intermittently. The problem has been waxing and waning since onset. The pain is present in the lumbar spine. The quality of the pain is described as aching and burning. The pain radiates to the left thigh (radiates down left buttock in posterior/lateral thigh - does not pass knee). The pain is at a severity of 6/10. The pain is moderate. The symptoms are aggravated by standing, bending, sitting, position and lying down (walking long  distances, increased physical activity). Stiffness is present in the morning. Pertinent negatives include no chest pain, numbness or weakness. She has tried heat and ice (Gabapentin, ) for the symptoms. The treatment provided mild relief.      PEG Assessment   What number best describes your pain on average in the past week?5  What number best describes how, during the past week, pain has interfered with your enjoyment of life?5  What number best describes how, during the past week, pain has interfered with your general activity?  0    Review of Pertinent Medical Data ---  Office note from GISELL Hayward from 10/30/2022.  Patient presents as a new patient for evaluation of back pain.  She was referred by Dr. Grover Garcias.  Patient reports her back pain started about a year ago started worsening about a month ago due to prolonged position we will get a MRI on her wrist.  She was evaluated in the ED and was prescribed Toradol, muscle relaxant, lidocaine patches.  Patient reports pain is in her low back, side greater than right.  Pain radiates into her left buttock and left lateral thigh stopping at the knee.  Right-sided back pain is localized near the right SI joint.  Patient is legally blind with remaining peripheral vision in her left eye.  Conservative treatments for back pain include physical therapy, chiropractor, and massage therapy.  She is a history of a cervical fusion in 2014 that was performed by Dr. Sexton.  Also has a history of epidural steroid injections many years ago.  She reports that these were initially helpful but the last that did not help.  She currently takes over-the-counter Tylenol and gabapentin for pain relief.  Past pain medications include tramadol and codeine which caused nausea, and Robaxin (did not like how medication made her feel).  She has also taken Norco which she tolerated well.  Plan at this appointment was to schedule bilateral SI joint injections.  If no relief from these  injections may consider lumbar epidural steroid injection.  May consider an acute supply of Norco if pain were to become severe.  Patient was instructed to follow-up for procedure.    CT SCAN OF THE LUMBAR SPINE WITHOUT CONTRAST   CLINICAL HISTORY: Low back pain for years.     TECHNIQUE: CT scan of the lumbar spine was obtained with 3 mm axial  images. Sagittal and coronal reconstructed images were obtained.     FINDINGS:     There is a mild dextroconvex scoliotic curvature of the lumbar spine  with its apex centered at L1-2.     At T9-10, T10-11, T11-12, T12-L1, and L1-2, there is no significant  canal or foraminal stenosis.     At L2-3, there is a disc osteophyte complex which results in a mild  degree of canal narrowing and a mild degree of bilateral foraminal  narrowing.     At L3-4, there is no significant degree of canal or foraminal stenosis.     At L4-5, there is a disc bulge which mildly narrows the left neural  foramen. There is mild-to-moderate facet hypertrophic change.     At L5-S1, there is mild left and moderate right facet hypertrophy. There  is mild right foraminal narrowing secondary to bulging disc material.     There is partial osseous fusion across the right SI joint.     IMPRESSION:   There is mild-to-moderate dextroconvex scoliotic curvature of the lumbar  spine centered at L1-2.     Multilevel degenerative phenomena are noted within the lumbar spine  resulting in only up to mild degrees of canal and foraminal narrowing as  discussed in detail above.     Radiation dose reduction techniques were utilized, including automated  exposure control and exposure modulation based on body size.     This report was finalized on 10/7/2022 6:32 AM by Dr. Ignacio Aguirre M.D.    The following portions of the patient's history were reviewed and updated as appropriate: allergies, current medications, past family history, past medical history, past social history, past surgical history and problem  "list.    Review of Systems   Constitutional: Negative for fatigue.   HENT: Negative for congestion.    Eyes: Positive for visual disturbance.   Respiratory: Negative for shortness of breath.    Cardiovascular: Negative for chest pain.   Gastrointestinal: Positive for constipation. Negative for diarrhea.   Genitourinary: Negative for difficulty urinating.   Musculoskeletal: Positive for back pain.   Neurological: Negative for weakness and numbness.   Psychiatric/Behavioral: Positive for sleep disturbance. Negative for suicidal ideas. The patient is not nervous/anxious.      I have reviewed and confirmed the accuracy of the ROS as documented by the MA/LPN/RN GISELL Ma     Vitals:    02/10/23 1442   BP: 143/89   Pulse: 98   Resp: 18   Temp: 97.7 °F (36.5 °C)   SpO2: 98%   Weight: 63.4 kg (139 lb 12.8 oz)   Height: 162.6 cm (64\")   PainSc:   6   PainLoc: Back     Objective   Physical Exam  Constitutional:       Appearance: Normal appearance.   HENT:      Head: Normocephalic.   Cardiovascular:      Rate and Rhythm: Normal rate and regular rhythm.   Pulmonary:      Effort: Pulmonary effort is normal.      Breath sounds: Normal breath sounds.   Musculoskeletal:      Cervical back: Normal range of motion.      Lumbar back: Tenderness and bony tenderness present. Decreased range of motion. Positive left straight leg raise test.      Comments: + left SI joint tenderness  - left FABERs   Skin:     General: Skin is warm and dry.      Capillary Refill: Capillary refill takes less than 2 seconds.   Neurological:      General: No focal deficit present.      Mental Status: She is alert and oriented to person, place, and time.      Gait: Gait abnormal (slowed, ambulates with cane).   Psychiatric:         Mood and Affect: Mood normal.         Behavior: Behavior normal.         Thought Content: Thought content normal.         Cognition and Memory: Cognition normal.       Assessment & Plan   Diagnoses and all orders for " this visit:    1. Lumbar radiculopathy, chronic (Primary)    2. Chronic bilateral low back pain, unspecified whether sciatica present    3. Lumbar foraminal stenosis    --- Left L5 & S1 TF ROQUE  ---  Indications for epidural injection:  Plan is to proceed with epidural at the appropriate level.  If the patient receives significant pain reduction and improvement in function and the plan will be to repeat the epidural when the pain worsens.  If a second epidural provides at least 6 weeks of sustained improvement that includes both pain reduction and improvement in function then an epidural injection could be repeated once again at the same level.  This is a mutual decision between the clinician and the patient that includes discussions including risks and benefits in detail as well as alternative therapies.  Patient's questions were answered to their satisfaction and to their understanding.  ---  Discussed with the patient that sedation is optional for this procedure.  The sedation offered is called conscious sedation which is different from general anesthesia that is utilized in surgical procedures. The dosing of the sedation is determined by the physician and they will be monitored throughout the procedure. With conscious sedation it is possible to remember parts or all of the procedure, this is normal. They will need to have a  with them as driving is prohibited following conscious sedation.      NPO instructions for conscious sedation:  --- Do not eat 6 hours prior to the procedure.   --- Do not drink any dairy or citrus 4 hours prior to the procedure.   --- Do not drink anything, including clear liquids, 2 hours prior to procedure.      If the NPO instructions are not followed then the procedure may be performed without sedation or the procedure will need to be rescheduled.   --- Follow-up for procedure     CHASE REPORT  As the clinician, I personally reviewed the CHASE from 2/10/23 while the patient was  in the office today.    Dictated utilizing Dragon dictation.      Patient remained masked during entire encounter. No cough present. I donned a mask and eye protection throughout entire visit. Prior to donning mask and eye protection, hand hygiene was performed, as well as when it was doffed.  I was closer than 6 feet, but not for an extended period of time. No obvious exposure to any bodily fluids.

## 2023-02-10 NOTE — PATIENT INSTRUCTIONS
---  Indications for epidural injection:  Plan is to proceed with epidural at the appropriate level.  If the patient receives significant pain reduction and improvement in function and the plan will be to repeat the epidural when the pain worsens.  If a second epidural provides at least 6 weeks of sustained improvement that includes both pain reduction and improvement in function then an epidural injection could be repeated once again at the same level.  This is a mutual decision between the clinician and the patient that includes discussions including risks and benefits in detail as well as alternative therapies.  Patient's questions were answered to their satisfaction and to their understanding.  ---   Discussed with the patient that sedation is optional for this procedure.  The sedation offered is called conscious sedation which is different from general anesthesia that is utilized in surgical procedures. The dosing of the sedation is determined by the physician and they will be monitored throughout the procedure. With conscious sedation it is possible to remember parts or all of the procedure, this is normal. They will need to have a  with them as driving is prohibited following conscious sedation.      NPO instructions for conscious sedation:  --- Do not eat 6 hours prior to the procedure.   --- Do not drink any dairy or citrus 4 hours prior to the procedure.   --- Do not drink anything, including clear liquids, 2 hours prior to procedure.      If the NPO instructions are not followed then the procedure may be performed without sedation or the procedure will need to be rescheduled.

## 2023-02-16 ENCOUNTER — HOSPITAL ENCOUNTER (OUTPATIENT)
Dept: GENERAL RADIOLOGY | Facility: SURGERY CENTER | Age: 78
Setting detail: HOSPITAL OUTPATIENT SURGERY
End: 2023-02-16
Payer: MEDICARE

## 2023-02-16 ENCOUNTER — HOSPITAL ENCOUNTER (OUTPATIENT)
Facility: SURGERY CENTER | Age: 78
Setting detail: HOSPITAL OUTPATIENT SURGERY
Discharge: HOME | End: 2023-02-16
Attending: ANESTHESIOLOGY | Admitting: ANESTHESIOLOGY
Payer: MEDICARE

## 2023-02-16 VITALS
BODY MASS INDEX: 23.73 KG/M2 | DIASTOLIC BLOOD PRESSURE: 89 MMHG | HEART RATE: 90 BPM | HEIGHT: 64 IN | RESPIRATION RATE: 16 BRPM | WEIGHT: 139 LBS | OXYGEN SATURATION: 96 % | TEMPERATURE: 98.3 F | SYSTOLIC BLOOD PRESSURE: 147 MMHG

## 2023-02-16 DIAGNOSIS — Z41.9 SURGERY, ELECTIVE: ICD-10-CM

## 2023-02-16 DIAGNOSIS — M54.16 LUMBAR RADICULOPATHY, CHRONIC: ICD-10-CM

## 2023-02-16 PROCEDURE — 64484 NJX AA&/STRD TFRM EPI L/S EA: CPT | Performed by: ANESTHESIOLOGY

## 2023-02-16 PROCEDURE — 77002 NEEDLE LOCALIZATION BY XRAY: CPT

## 2023-02-16 PROCEDURE — 25010000002 IOPAMIDOL 61 % SOLUTION 30 ML VIAL: Performed by: ANESTHESIOLOGY

## 2023-02-16 PROCEDURE — 76000 FLUOROSCOPY <1 HR PHYS/QHP: CPT

## 2023-02-16 PROCEDURE — 64483 NJX AA&/STRD TFRM EPI L/S 1: CPT | Performed by: ANESTHESIOLOGY

## 2023-02-16 PROCEDURE — 25010000002 METHYLPREDNISOLONE PER 80 MG: Performed by: ANESTHESIOLOGY

## 2023-02-16 RX ORDER — DIAZEPAM 5 MG/1
10 TABLET ORAL ONCE
Status: COMPLETED | OUTPATIENT
Start: 2023-02-16 | End: 2023-02-16

## 2023-02-16 RX ADMIN — DIAZEPAM 10 MG: 5 TABLET ORAL at 09:18

## 2023-02-16 NOTE — DISCHARGE INSTRUCTIONS
[facility] - Post-procedure Instructions          --  While there are no absolute restrictions, it is recommended that you do not perform strenuous activity today. In the morning, you may resume your level of activity as before your block.    --  If you have a band-aid at your injection site, please remove it later today. Observe the area for any redness, swelling, pus-like drainage, or a temperature over 101°. If any of these symptoms occur, please call your doctor at [phone]. If after office hours, leave a message and the on-call provider will return your call.    --  Ice may be applied to your injection site. It is recommended you avoid direct heat (heating pad; hot tub) for 1-2 days.    --  Call [facility] at [phone] if you experience persistent headache, persistent bleeding from the injection site, or severe pain not relieved by heat or oral medication.    --  Do not make important decisions today.    --  Due to the effects of the block and/or the I.V. Sedation, DO NOT drive or operate hazardous machinery for 12 hours.  Local anesthetics may cause numbness after procedure and precautions must be taken with regards to operating equipment as well as with walking, even if ambulating with assistance of another person or with an assistive device.    --  Do not drink alcohol for 12 hours.    -- You may return to work tomorrow, or as directed by your referring doctor.    --  Occasionally you may notice a slight increase in your pain after the procedure. This should start to improve within the next 24-48 hours. Radiofrequency ablation procedure pain may last 3-4 weeks.    --  It may take as long as 3-4 days before you notice a gradual improvement in your pain and/or other symptoms.    -- You may continue to take your prescribed pain medication as needed.    --  Some normal possible side effects of steroid use could include fluid retention, increased blood sugar, dull headache,  increased sweating, increased appetite, mood swings and flushing.    --  Diabetics are recommended to watch their blood glucose level closely for 24-48 hours after the injection.    --  Must stay in PACU for 20 min upon arrival and prove no leg weakness before being discharged.    --  IN THE EVENT OF A LIFE THREATENING EMERGENCY, (CHEST PAIN, BREATHING DIFFICULTIES, PARALYSIS…) YOU SHOULD GO TO YOUR NEAREST EMERGENCY ROOM.    --  You should be contacted by our office within 2-3 days to schedule follow up or next appointment date.  If not contacted within 7 days, please call the office at [phone]

## 2023-02-16 NOTE — OP NOTE
LTFESI with S1 TFESI  [facility]    PREOPERATIVE DIAGNOSIS:   left Lumbar Radiculopathy    POSTOPERATIVE DIAGNOSIS: Same as preop dx    PROCEDURE:    1. 64483 --  Diagnostic  Transforaminal Epidural Steroid Injection at the  Left  L5 Level  2. 64484 -- S1 Transforaminal Epidural Steroid Injection on the Left    PRE-PROCEDURE DISCUSSION WITH PATIENT:    Risks and complications were discussed with the patient prior to starting the procedure and informed consent was obtained.    SURGEON:  Gigi Boateng MD    REASON FOR PROCEDURE:     Degenerative changes are noted in the area., Stenotic area is noted, and is likely contributing to this chronic &/or recurrent pain. , Radiating pattern of pain is likely consistent with degenerative changes in the area. and Radicular pain pattern seems consistent with this dermatome.    SEDATION:  The patient had higher than average levels of procedural anxiety and the need to provide additional procedural sedation was needed to safely proceed. and Therefore she had oral diazepam 10 mg in the preoperative area  ANESTHETIC: Marcaine 0.25%  STEROID:     Methylprednisolone (DEPO MEDROL) 80mg/ml  TOTAL VOLUME OF SOLUTION:   4mL    DESCRIPTON OF PROCEDURE:  After obtaining informed consent, an I.V. was not started in the preoperative area. The patient taken to the operating room and was placed in the prone position with a pillow under the abdomen.  All pressure points were well padded.  EKG, blood pressure, and pulse oximeter were monitored.  The lumbosacral area was prepped with Chloraprep and draped in a sterile fashion. Under fluoroscopic guidance in an oblique dimension, the transverse process of the above mentioned Lumbar vertebra at the junction of the body at 6 o'clock position respective to the pedicle on the aforementioned side was identified. Skin and subcutaneous tissue was anesthetized with 2-3mL of 1% lidocaine. A 22-gauge spinal needle was introduced under  fluoroscopic guidance at the above junction into the foramen without parasthesias and into the epidural space. After confirming the position of the needle with PA, lateral, and oblique fluoroscopic views, aspiration was checked and was clear of blood or CSF.  Next, 1 mL of contrast dye was injected. After seeing adequate spread on the corresponding nerve root, a total volume 2 mL of injectate containing half of the previously mentioned local anesthetic solution was slowly and easily injected into the space.    The needle was removed intact.  Vital signs were stable.      Next, the S1 posterior foramen was identified on the above mentioned side with fluoroscopy in a PA dimension, and a spinal needle was introduced to just caudad to the 6 o’clock position of the foramen until contact with os; then the needle was walked off cephalad and into the foramen.   After confirming the position of the needle with PA and lateral fluoroscopic views, aspiration was checked and was clear of blood or CSF.  Next, 1 mL of contrast dye was injected. After seeing adequate spread on the S1 nerve root and into the caudal epidural space, a total volume 2mL of injectate containing the other half of the local anesthetic solution was easily and slowly injected into the epidural space.   The needle was removed intact.  Vital signs were stable.  Onset of analgesia was noted prior to transport to the recovery area.      ESTIMATED BLOOD LOSS:  <5 mL  SPECIMENS:  none    COMPLICATIONS:   No complications were noted., There was no indication of vascular uptake on live injection of contrast dye., There was no indication of intrathecal uptake on live injection of contrast dye., There was not any evidence of dural puncture.   and The patient did not have any signs of postprocedure numbness nor weakness.    TOLERANCE & DISCHARGE CONDITION:    The patient tolerated the procedure well.  The patient was transported to the recovery area without  difficulties.  The patient was discharged to home under the care of family in stable and satisfactory condition.    PLAN OF CARE:  1. The patient was given our standard instruction sheet.  2. The patient will Return to clinic 2-3 wks  3. The patient will resume all medications as per the medication reconciliation sheet.

## 2023-02-24 ENCOUNTER — TELEPHONE (OUTPATIENT)
Dept: INTERNAL MEDICINE | Facility: CLINIC | Age: 78
End: 2023-02-24

## 2023-02-24 ENCOUNTER — OFFICE VISIT (OUTPATIENT)
Dept: INTERNAL MEDICINE | Facility: CLINIC | Age: 78
End: 2023-02-24
Payer: MEDICARE

## 2023-02-24 VITALS
BODY MASS INDEX: 23.73 KG/M2 | SYSTOLIC BLOOD PRESSURE: 130 MMHG | RESPIRATION RATE: 18 BRPM | TEMPERATURE: 97.8 F | HEART RATE: 97 BPM | DIASTOLIC BLOOD PRESSURE: 82 MMHG | WEIGHT: 139 LBS | OXYGEN SATURATION: 98 % | HEIGHT: 64 IN

## 2023-02-24 DIAGNOSIS — Z12.31 ENCOUNTER FOR SCREENING MAMMOGRAM FOR MALIGNANT NEOPLASM OF BREAST: Primary | ICD-10-CM

## 2023-02-24 DIAGNOSIS — M54.16 LUMBAR RADICULOPATHY, CHRONIC: ICD-10-CM

## 2023-02-24 DIAGNOSIS — L29.9 PRURITIC DERMATITIS: ICD-10-CM

## 2023-02-24 DIAGNOSIS — R35.0 URINARY FREQUENCY: ICD-10-CM

## 2023-02-24 DIAGNOSIS — M62.838 MUSCLE SPASMS OF NECK: ICD-10-CM

## 2023-02-24 DIAGNOSIS — E78.00 PURE HYPERCHOLESTEROLEMIA: ICD-10-CM

## 2023-02-24 DIAGNOSIS — Z00.00 MEDICARE ANNUAL WELLNESS VISIT, SUBSEQUENT: Primary | ICD-10-CM

## 2023-02-24 DIAGNOSIS — G44.229 CHRONIC TENSION-TYPE HEADACHE, NOT INTRACTABLE: ICD-10-CM

## 2023-02-24 PROCEDURE — 96160 PT-FOCUSED HLTH RISK ASSMT: CPT | Performed by: FAMILY MEDICINE

## 2023-02-24 PROCEDURE — G0439 PPPS, SUBSEQ VISIT: HCPCS | Performed by: FAMILY MEDICINE

## 2023-02-24 PROCEDURE — 1159F MED LIST DOCD IN RCRD: CPT | Performed by: FAMILY MEDICINE

## 2023-02-24 PROCEDURE — 99214 OFFICE O/P EST MOD 30 MIN: CPT | Performed by: FAMILY MEDICINE

## 2023-02-24 PROCEDURE — 1126F AMNT PAIN NOTED NONE PRSNT: CPT | Performed by: FAMILY MEDICINE

## 2023-02-24 PROCEDURE — 1170F FXNL STATUS ASSESSED: CPT | Performed by: FAMILY MEDICINE

## 2023-02-24 NOTE — PROGRESS NOTES
The ABCs of the Annual Wellness Visit  Subsequent Medicare Wellness Visit    Subjective    {Wrapup  Review (Popup)  Advance Care Planning  Labs  CC  Problem List  Visit Diagnosis  Medications  Result Review  Imaging  [facility]  [facility]  SnapShot  Encounters  Notes  Media  Procedures :23}  Marilia Leo is a 77 y.o. female who presents for a Subsequent Medicare Wellness Visit.    The following portions of the patient's history were reviewed and   updated as appropriate: allergies, current medications, past family history, past medical history, past social history, past surgical history and problem list.    Compared to one year ago, the patient feels her physical   health is the same.  Blood pressure controlled and receiving treatment for her back through pain management.     Compared to one year ago, the patient feels her mental   health is the same.    Recent Hospitalizations:  She was not admitted to the hospital during the last year.       Current Medical Providers:  Patient Care Team:  Grover Garcias MD as PCP - General (Family Medicine)    Outpatient Medications Prior to Visit   Medication Sig Dispense Refill   • acetaminophen (TYLENOL) 325 MG tablet Take 325 mg by mouth As Needed.     • amLODIPine (NORVASC) 5 MG tablet Take 1 tablet by mouth Daily. 90 tablet 3   • Carboxymeth-Glycerin-Polysorb (REFRESH OPTIVE ALBERTO-3 OP) Apply 1 drop to eye(s) as directed by provider 2 (Two) Times a Day As Needed.     • gabapentin (NEURONTIN) 100 MG capsule TAKE ONE CAPSULE BY MOUTH EVERY MORNING AND 2 AT BEDTIME 270 capsule 0   • irbesartan (AVAPRO) 150 MG tablet Take 1 tablet by mouth Every Night. 90 tablet 3   • lidocaine (LIDODERM) 5 % PLACE 1 PATCH ON THE SKIN AS DIRECTED BY PROVIDER DAILY. REMOVE AND DISCARD PATCH WITHIN 12 HRS OR AS DIRECTED BY MD 6 patch 3   • Misc. Devices (Raised Toilet Seat) misc 1 each Daily. 1 each 0   • natamycin (NATACYN) 5 % ophthalmic  "solution Administer 1 drop to the right eye 2 (two) times a day. Cycles on for 14 days and off for 14 days  Patient does not need until 8/18/20, this comes from compound pharmacy     • ofloxacin (OCUFLOX) 0.3 % ophthalmic solution Administer 1 drop into the left eye 2 (Two) Times a Day.       No facility-administered medications prior to visit.       No opioid medication identified on active medication list. I have reviewed chart for other potential  high risk medication/s and harmful drug interactions in the elderly.          Aspirin is not on active medication list.  Aspirin use is not indicated based on review of current medical condition/s. Risk of harm outweighs potential benefits.  .    Patient Active Problem List   Diagnosis   • Benign essential hypertension   • Cervical radiculopathy   • Glaucoma   • Pure hypercholesterolemia   • Menopausal symptom   • Atrophic vaginitis   • Health care maintenance   • Cervical radiculopathy   • Edema, lower extremity   • Elevated LFTs   • Fever of unknown origin (FUO)   • Nausea   • Sinus tachycardia   • Bilateral chronic knee pain   • Urinary frequency   • Lumbar radiculopathy, chronic   • DDD (degenerative disc disease), lumbar   • Sacroiliac joint dysfunction of both sides     Advance Care Planning  Advance Directive is on file.  ACP discussion was held with the patient during this visit. Patient has an advance directive in EMR which is still valid.      Objective    Vitals:    02/24/23 1039   BP: 130/82   BP Location: Left arm   Patient Position: Sitting   Cuff Size: Small Adult   Pulse: 97   Resp: 18   Temp: 97.8 °F (36.6 °C)   SpO2: 98%   Weight: 63 kg (139 lb)   Height: 162.6 cm (64\")   PainSc: 0-No pain     Estimated body mass index is 23.86 kg/m² as calculated from the following:    Height as of this encounter: 162.6 cm (64\").    Weight as of this encounter: 63 kg (139 lb).    BMI is within normal parameters. No other follow-up for BMI required.    Physical " Exam  Vitals and nursing note reviewed.   Constitutional:       General: She is not in acute distress.     Appearance: Normal appearance.   Cardiovascular:      Rate and Rhythm: Normal rate and regular rhythm.      Heart sounds: Normal heart sounds. No murmur heard.  Pulmonary:      Effort: Pulmonary effort is normal.      Breath sounds: Normal breath sounds.           Does the patient have evidence of cognitive impairment?   No            HEALTH RISK ASSESSMENT    Smoking Status:  Social History     Tobacco Use   Smoking Status Former   • Packs/day: 1.00   • Years: 5.00   • Pack years: 5.00   • Types: Cigarettes   • Start date:    • Quit date:    • Years since quittin.1   Smokeless Tobacco Never     Alcohol Consumption:  Social History     Substance and Sexual Activity   Alcohol Use Yes    Comment: occasionally     Fall Risk Screen:    [facility] Fall Risk Assessment was completed, and patient is at LOW risk for falls.Assessment completed on:2023    Depression Screening:  PHQ-2/PHQ-9 Depression Screening 2023   Little Interest or Pleasure in Doing Things 0-->not at all   Feeling Down, Depressed or Hopeless 0-->not at all   PHQ-9: Brief Depression Severity Measure Score 0       Health Habits and Functional and Cognitive Screening:  Functional & Cognitive Status 2023   Do you have difficulty preparing food and eating? No   Do you have difficulty bathing yourself, getting dressed or grooming yourself? No   Do you have difficulty using the toilet? No   Do you have difficulty moving around from place to place? No   Do you have trouble with steps or getting out of a bed or a chair? Yes   Current Diet Well Balanced Diet   Dental Exam Up to date   Eye Exam Up to date   Exercise (times per week) 3 times per week   Current Exercises Include Walking   Do you need help using the phone?  No   Are you deaf or do you have serious difficulty hearing?  Yes   Do you need help with transportation? No   Do you  need help shopping? Yes   Do you need help preparing meals?  Yes   Do you need help with housework?  Yes   Do you need help with laundry? No   Do you need help taking your medications? No   Do you need help managing money? No   Do you ever drive or ride in a car without wearing a seat belt? No   Have you felt unusual stress, anger or loneliness in the last month? No   Who do you live with? Spouse   If you need help, do you have trouble finding someone available to you? No   Have you been bothered in the last four weeks by sexual problems? No   Do you have difficulty concentrating, remembering or making decisions? No       Age-appropriate Screening Schedule:  Refer to the list below for future screening recommendations based on patient's age, sex and/or medical conditions. Orders for these recommended tests are listed in the plan section. The patient has been provided with a written plan.    Health Maintenance   Topic Date Due   • Pneumococcal Vaccine 65+ (2 - PCV) 01/16/2021   • ANNUAL WELLNESS VISIT  09/28/2022   • LIPID PANEL  10/04/2022   • TDAP/TD VACCINES (1 - Tdap) 10/13/2026 (Originally 10/14/2016)   • MAMMOGRAM  02/25/2023   • DXA SCAN  10/04/2026   • COLORECTAL CANCER SCREENING  01/10/2028   • HEPATITIS C SCREENING  Completed   • COVID-19 Vaccine  Completed   • INFLUENZA VACCINE  Completed   • ZOSTER VACCINE  Addressed                CMS Preventative Services Quick Reference  Risk Factors Identified During Encounter:    Chronic Pain: Pain Management Referral Ordered  taking gabapentin as expected  Immunizations Discussed/Encouraged: Influenza, Prevnar 20 (Pneumococcal 20-valent conjugate), Shingrix and COVID19    The above risks/problems have been discussed with the patient.  Pertinent information has been shared with the patient in the After Visit Summary.    Diagnoses and all orders for this visit:    1. Medicare annual wellness visit, subsequent (Primary)    2. Pure hypercholesterolemia  -     CBC (No  Diff)  -     Comprehensive Metabolic Panel  -     Lipid Panel With LDL / HDL Ratio    3. Pruritic dermatitis  -     Ambulatory Referral to Dermatology    4. Chronic tension-type headache, not intractable  -     Ambulatory Referral to Physical Therapy Evaluate and treat    5. Muscle spasms of neck  -     Ambulatory Referral to Physical Therapy Evaluate and treat    6. Urinary frequency  -     Urinalysis With Microscopic - Urine, Clean Catch  -     Urine Culture - Urine, Urine, Clean Catch        Follow Up:   Next Medicare Wellness visit to be scheduled in 1 year.      An After Visit Summary and PPPS were made available to the patient.

## 2023-02-24 NOTE — PROGRESS NOTES
The ABCs of the Annual Wellness Visit  Subsequent Medicare Wellness Visit    Subjective    Marilia Leo is a 77 y.o. female who presents for a Subsequent Medicare Wellness Visit.    The following portions of the patient's history were reviewed and   updated as appropriate: allergies, current medications, past family history, past medical history, past social history, past surgical history and problem list.    Compared to one year ago, the patient feels her physical   health is the same.    Compared to one year ago, the patient feels her mental   health is the same.    Recent Hospitalizations:  She was not admitted to the hospital during the last year.       Current Medical Providers:  Patient Care Team:  Grover Garcias MD as PCP - General (Family Medicine)    Outpatient Medications Prior to Visit   Medication Sig Dispense Refill   • acetaminophen (TYLENOL) 325 MG tablet Take 325 mg by mouth As Needed.     • amLODIPine (NORVASC) 5 MG tablet Take 1 tablet by mouth Daily. 90 tablet 3   • Carboxymeth-Glycerin-Polysorb (REFRESH OPTIVE ALBERTO-3 OP) Apply 1 drop to eye(s) as directed by provider 2 (Two) Times a Day As Needed.     • gabapentin (NEURONTIN) 100 MG capsule TAKE ONE CAPSULE BY MOUTH EVERY MORNING AND 2 AT BEDTIME 270 capsule 0   • irbesartan (AVAPRO) 150 MG tablet Take 1 tablet by mouth Every Night. 90 tablet 3   • lidocaine (LIDODERM) 5 % PLACE 1 PATCH ON THE SKIN AS DIRECTED BY PROVIDER DAILY. REMOVE AND DISCARD PATCH WITHIN 12 HRS OR AS DIRECTED BY MD 6 patch 3   • Misc. Devices (Raised Toilet Seat) misc 1 each Daily. 1 each 0   • natamycin (NATACYN) 5 % ophthalmic solution Administer 1 drop to the right eye 2 (two) times a day. Cycles on for 14 days and off for 14 days  Patient does not need until 8/18/20, this comes from compound pharmacy     • ofloxacin (OCUFLOX) 0.3 % ophthalmic solution Administer 1 drop into the left eye 2 (Two) Times a Day.       No facility-administered medications prior to visit.  "      No opioid medication identified on active medication list. I have reviewed chart for other potential  high risk medication/s and harmful drug interactions in the elderly.          Aspirin is not on active medication list.  Aspirin use is not indicated based on review of current medical condition/s. Risk of harm outweighs potential benefits.  .    Patient Active Problem List   Diagnosis   • Benign essential hypertension   • Cervical radiculopathy   • Glaucoma   • Pure hypercholesterolemia   • Menopausal symptom   • Atrophic vaginitis   • Health care maintenance   • Cervical radiculopathy   • Edema, lower extremity   • Elevated LFTs   • Fever of unknown origin (FUO)   • Nausea   • Sinus tachycardia   • Bilateral chronic knee pain   • Urinary frequency   • Lumbar radiculopathy, chronic   • DDD (degenerative disc disease), lumbar   • Sacroiliac joint dysfunction of both sides     Advance Care Planning  Advance Directive is on file.  ACP discussion was held with the patient during this visit. Patient has an advance directive in EMR which is still valid.      Objective    Vitals:    23 1039   BP: 130/82   BP Location: Left arm   Patient Position: Sitting   Cuff Size: Small Adult   Pulse: 97   Resp: 18   Temp: 97.8 °F (36.6 °C)   SpO2: 98%   Weight: 63 kg (139 lb)   Height: 162.6 cm (64\")   PainSc: 0-No pain     Estimated body mass index is 23.86 kg/m² as calculated from the following:    Height as of this encounter: 162.6 cm (64\").    Weight as of this encounter: 63 kg (139 lb).    BMI is within normal parameters. No other follow-up for BMI required.      Does the patient have evidence of cognitive impairment? No          HEALTH RISK ASSESSMENT    Smoking Status:  Social History     Tobacco Use   Smoking Status Former   • Packs/day: 1.00   • Years: 5.00   • Pack years: 5.00   • Types: Cigarettes   • Start date:    • Quit date:    • Years since quittin.1   Smokeless Tobacco Never     Alcohol " Consumption:  Social History     Substance and Sexual Activity   Alcohol Use Yes    Comment: occasionally     Fall Risk Screen:    [facility] Fall Risk Assessment was completed, and patient is at LOW risk for falls.Assessment completed on:2/24/2023    Depression Screening:  PHQ-2/PHQ-9 Depression Screening 2/24/2023   Little Interest or Pleasure in Doing Things 0-->not at all   Feeling Down, Depressed or Hopeless 0-->not at all   PHQ-9: Brief Depression Severity Measure Score 0       Health Habits and Functional and Cognitive Screening:  Functional & Cognitive Status 2/24/2023   Do you have difficulty preparing food and eating? No   Do you have difficulty bathing yourself, getting dressed or grooming yourself? No   Do you have difficulty using the toilet? No   Do you have difficulty moving around from place to place? No   Do you have trouble with steps or getting out of a bed or a chair? Yes   Current Diet Well Balanced Diet   Dental Exam Up to date   Eye Exam Up to date   Exercise (times per week) 3 times per week   Current Exercises Include Walking   Do you need help using the phone?  No   Are you deaf or do you have serious difficulty hearing?  Yes   Do you need help with transportation? No   Do you need help shopping? Yes   Do you need help preparing meals?  Yes   Do you need help with housework?  Yes   Do you need help with laundry? No   Do you need help taking your medications? No   Do you need help managing money? No   Do you ever drive or ride in a car without wearing a seat belt? No   Have you felt unusual stress, anger or loneliness in the last month? No   Who do you live with? Spouse   If you need help, do you have trouble finding someone available to you? No   Have you been bothered in the last four weeks by sexual problems? No   Do you have difficulty concentrating, remembering or making decisions? No       Age-appropriate Screening Schedule:  Refer to the list below for future screening recommendations  based on patient's age, sex and/or medical conditions. Orders for these recommended tests are listed in the plan section. The patient has been provided with a written plan.    Health Maintenance   Topic Date Due   • Pneumococcal Vaccine 65+ (2 - PCV) 01/16/2021   • ANNUAL WELLNESS VISIT  09/28/2022   • LIPID PANEL  10/04/2022   • TDAP/TD VACCINES (1 - Tdap) 10/13/2026 (Originally 10/14/2016)   • MAMMOGRAM  02/25/2023   • DXA SCAN  10/04/2026   • COLORECTAL CANCER SCREENING  01/10/2028   • HEPATITIS C SCREENING  Completed   • COVID-19 Vaccine  Completed   • INFLUENZA VACCINE  Completed   • ZOSTER VACCINE  Addressed                CMS Preventative Services Quick Reference  Risk Factors Identified During Encounter  Immunizations Discussed/Encouraged: Influenza, Prevnar 20 (Pneumococcal 20-valent conjugate), Shingrix and COVID19  The above risks/problems have been discussed with the patient.  Pertinent information has been shared with the patient in the After Visit Summary.  An After Visit Summary and PPPS were made available to the patient.    Follow Up:   Next Medicare Wellness visit to be scheduled in 1 year.       Additional E&M Note during same encounter follows:  Patient has multiple medical problems which are significant and separately identifiable that require additional work above and beyond the Medicare Wellness Visit.      Chief Complaint  Medicare Wellness-subsequent, Headache, Urinary Frequency, and Itching (Neck )    Subjective        HPI  Marilia Leo is also being seen today for urinary frequency, chronic tension headache, pruritic dermatitis, lumbar radiculopathy.  We have talked about the pruritic dermatitis before and she mentions that she had a steroid shot recently for her joint and noticed that the itching got better.  Given that this has been going on for several months, I think it would be prudent to refer her to a specialist for further evaluation.  It appears to be steroid responsive.    She is  "having chronic headaches especially on the right side which can be temporal but many times come from her neck and around her occipital area.  She has a history of cervical disc disease.  She has noticed some spasms and tightness in the neck.  Not treating with anything specifically.    She would also like to be evaluated for urinary frequency especially at night.  No medications that she is on currently would explain the frequency of urine.  She is watching her fluids as it approaches nighttime.  Denies bedtime caffeine.    She is still using the gabapentin for her chronic lumbar radiculopathy and tolerating.  She does not take it all the time through the day but sometimes she can take up to about 3 a day.  PDMP reviewed and are filling appropriately.    She is also due for her labs for her cholesterol which I have ordered.  Monitoring with diet.         Objective   Vital Signs:  /82 (BP Location: Left arm, Patient Position: Sitting, Cuff Size: Small Adult)   Pulse 97   Temp 97.8 °F (36.6 °C)   Resp 18   Ht 162.6 cm (64\")   Wt 63 kg (139 lb)   SpO2 98%   BMI 23.86 kg/m²     Physical Exam  Vitals and nursing note reviewed.   Constitutional:       General: She is not in acute distress.     Appearance: Normal appearance.   Cardiovascular:      Rate and Rhythm: Normal rate and regular rhythm.      Heart sounds: Normal heart sounds. No murmur heard.  Pulmonary:      Effort: Pulmonary effort is normal.      Breath sounds: Normal breath sounds.   Musculoskeletal:      Cervical back: Spasms present. No deformity, tenderness or bony tenderness. Decreased range of motion.   Neurological:      Mental Status: She is alert.          The following data was reviewed by: Grover Garcias MD on 02/24/2023:                 Assessment and Plan   Diagnoses and all orders for this visit:    1. Medicare annual wellness visit, subsequent (Primary)    2. Pure hypercholesterolemia  -     CBC (No Diff)  -     Comprehensive " Metabolic Panel  -     Lipid Panel With LDL / HDL Ratio    3. Pruritic dermatitis  -     Ambulatory Referral to Dermatology    4. Chronic tension-type headache, not intractable  -     Ambulatory Referral to Physical Therapy Evaluate and treat    5. Muscle spasms of neck  -     Ambulatory Referral to Physical Therapy Evaluate and treat    6. Urinary frequency  -     Urinalysis With Microscopic - Urine, Clean Catch  -     Urine Culture - Urine, Urine, Clean Catch    7. Lumbar radiculopathy, chronic      I will refer to dermatology for the pruritic dermatitis.  Refer for physical therapy for the chronic tension type headache and recommended some stretching of the neck.  I will get a urinalysis and urine culture for the urinary frequency to determine if she is infected or if this could be an overactive bladder.    Continue gabapentin for the lumbar radiculopathy and be careful for daytime sedation and cautioned of fall risk.  We will check cholesterol today.         Follow Up   Return in about 6 months (around 8/24/2023) for Next scheduled follow up.  Patient was given instructions and counseling regarding her condition or for health maintenance advice. Please see specific information pulled into the AVS if appropriate.

## 2023-02-25 LAB
ALBUMIN SERPL-MCNC: 4.7 G/DL (ref 3.5–5.2)
ALBUMIN/GLOB SERPL: 1.3 G/DL
ALP SERPL-CCNC: 90 U/L (ref 39–117)
ALT SERPL-CCNC: 19 U/L (ref 1–33)
APPEARANCE UR: CLEAR
AST SERPL-CCNC: 15 U/L (ref 1–32)
BACTERIA #/AREA URNS HPF: NORMAL /HPF
BACTERIA UR CULT: NO GROWTH
BACTERIA UR CULT: NORMAL
BILIRUB SERPL-MCNC: 0.4 MG/DL (ref 0–1.2)
BILIRUB UR QL STRIP: NEGATIVE
BUN SERPL-MCNC: 20 MG/DL (ref 8–23)
BUN/CREAT SERPL: 21.1 (ref 7–25)
CALCIUM SERPL-MCNC: 10.2 MG/DL (ref 8.6–10.5)
CASTS URNS MICRO: NORMAL
CHLORIDE SERPL-SCNC: 103 MMOL/L (ref 98–107)
CHOLEST SERPL-MCNC: 267 MG/DL (ref 0–200)
CO2 SERPL-SCNC: 29.3 MMOL/L (ref 22–29)
COLOR UR: YELLOW
CREAT SERPL-MCNC: 0.95 MG/DL (ref 0.57–1)
EGFRCR SERPLBLD CKD-EPI 2021: 61.8 ML/MIN/1.73
EPI CELLS #/AREA URNS HPF: NORMAL /HPF
ERYTHROCYTE [DISTWIDTH] IN BLOOD BY AUTOMATED COUNT: 13.1 % (ref 12.3–15.4)
GLOBULIN SER CALC-MCNC: 3.5 GM/DL
GLUCOSE SERPL-MCNC: 91 MG/DL (ref 65–99)
GLUCOSE UR QL STRIP: NEGATIVE
HCT VFR BLD AUTO: 41.4 % (ref 34–46.6)
HDLC SERPL-MCNC: 122 MG/DL (ref 40–60)
HGB BLD-MCNC: 13 G/DL (ref 12–15.9)
HGB UR QL STRIP: NEGATIVE
KETONES UR QL STRIP: NEGATIVE
LDLC SERPL CALC-MCNC: 131 MG/DL (ref 0–100)
LDLC/HDLC SERPL: 1.05 {RATIO}
LEUKOCYTE ESTERASE UR QL STRIP: NEGATIVE
MCH RBC QN AUTO: 27.3 PG (ref 26.6–33)
MCHC RBC AUTO-ENTMCNC: 31.4 G/DL (ref 31.5–35.7)
MCV RBC AUTO: 86.8 FL (ref 79–97)
NITRITE UR QL STRIP: NEGATIVE
PH UR STRIP: 8 [PH] (ref 5–8)
PLATELET # BLD AUTO: 473 10*3/MM3 (ref 140–450)
POTASSIUM SERPL-SCNC: 4.8 MMOL/L (ref 3.5–5.2)
PROT SERPL-MCNC: 8.2 G/DL (ref 6–8.5)
PROT UR QL STRIP: ABNORMAL
RBC # BLD AUTO: 4.77 10*6/MM3 (ref 3.77–5.28)
RBC #/AREA URNS HPF: NORMAL /HPF
SODIUM SERPL-SCNC: 142 MMOL/L (ref 136–145)
SP GR UR STRIP: 1.01 (ref 1–1.03)
TRIGL SERPL-MCNC: 84 MG/DL (ref 0–150)
UROBILINOGEN UR STRIP-MCNC: ABNORMAL MG/DL
VLDLC SERPL CALC-MCNC: 14 MG/DL (ref 5–40)
WBC # BLD AUTO: 14.73 10*3/MM3 (ref 3.4–10.8)
WBC #/AREA URNS HPF: NORMAL /HPF

## 2023-03-10 ENCOUNTER — OFFICE VISIT (OUTPATIENT)
Dept: PAIN MEDICINE | Facility: CLINIC | Age: 78
End: 2023-03-10
Payer: MEDICARE

## 2023-03-10 ENCOUNTER — TREATMENT (OUTPATIENT)
Dept: PHYSICAL THERAPY | Facility: CLINIC | Age: 78
End: 2023-03-10
Payer: MEDICARE

## 2023-03-10 VITALS
OXYGEN SATURATION: 97 % | RESPIRATION RATE: 12 BRPM | WEIGHT: 143.2 LBS | TEMPERATURE: 97.8 F | DIASTOLIC BLOOD PRESSURE: 85 MMHG | BODY MASS INDEX: 24.45 KG/M2 | SYSTOLIC BLOOD PRESSURE: 148 MMHG | HEIGHT: 64 IN | HEART RATE: 94 BPM

## 2023-03-10 DIAGNOSIS — M48.061 LUMBAR FORAMINAL STENOSIS: ICD-10-CM

## 2023-03-10 DIAGNOSIS — G89.29 CHRONIC BILATERAL LOW BACK PAIN, UNSPECIFIED WHETHER SCIATICA PRESENT: ICD-10-CM

## 2023-03-10 DIAGNOSIS — M54.50 CHRONIC BILATERAL LOW BACK PAIN, UNSPECIFIED WHETHER SCIATICA PRESENT: ICD-10-CM

## 2023-03-10 DIAGNOSIS — M54.16 LUMBAR RADICULOPATHY, CHRONIC: Primary | ICD-10-CM

## 2023-03-10 DIAGNOSIS — M54.2 PAIN, NECK: ICD-10-CM

## 2023-03-10 DIAGNOSIS — R29.898 DECREASED ROM OF NECK: Primary | ICD-10-CM

## 2023-03-10 PROCEDURE — 97530 THERAPEUTIC ACTIVITIES: CPT | Performed by: PHYSICAL THERAPIST

## 2023-03-10 PROCEDURE — 99212 OFFICE O/P EST SF 10 MIN: CPT

## 2023-03-10 PROCEDURE — 3077F SYST BP >= 140 MM HG: CPT

## 2023-03-10 PROCEDURE — 97161 PT EVAL LOW COMPLEX 20 MIN: CPT | Performed by: PHYSICAL THERAPIST

## 2023-03-10 PROCEDURE — 3079F DIAST BP 80-89 MM HG: CPT

## 2023-03-10 PROCEDURE — 1159F MED LIST DOCD IN RCRD: CPT

## 2023-03-10 PROCEDURE — 1125F AMNT PAIN NOTED PAIN PRSNT: CPT

## 2023-03-10 PROCEDURE — 97140 MANUAL THERAPY 1/> REGIONS: CPT | Performed by: PHYSICAL THERAPIST

## 2023-03-10 PROCEDURE — 1160F RVW MEDS BY RX/DR IN RCRD: CPT

## 2023-03-10 NOTE — PROGRESS NOTES
CHIEF COMPLAINT  Back     Subjective   Marilia Leo is a 77 y.o. female  who presents to the office for follow-up of procedure.  She completed a Left L5 & S1 TF ROQUE on 2/16/23 performed by Dr. Boateng for management of low back and left leg pain. Patient reports 80% ongoing relief from the procedure.     Today pain is 6/10VAS in severity. Pain is located in her low back and radiates into left buttock and down left posterior/lateral thigh. Pain does not go past knee. Denies pain to right leg at this time. Describes this pain as an intermittent aching and burning. Pain is worsened by lying flat, prolonged standing or sitting, and walking long distances. Pain improves with rest/reposition, heat/ice, and medications. Patient continues with Gabapentin 100mg QID (managed by PCP) and Tylenol 500mg PRN.     She starts PT today for her neck and shoulder pain.      History of glaucoma. Patient is legally blind. Remaining vision is peripheral vision in her left eye.      Unable to tolerate Tramadol and Codeine - both caused nausea, Robaxin - patient did not like how medication made her feel     Procedures:  2/16/23 - Left L5 & S1 TF ROQUE - 80% ongoing relief  1/11/23 - Bilateral SI joint injections- 100% relief to right side, 45-50% relief to left side x 1 week     History of previous epidurals at [facility] - she reports that these injections were initially helpful but the last injections she had there were unsuccessful.     Back Pain  This is a chronic problem. The current episode started more than 1 year ago. The problem occurs intermittently. The problem has been gradually improving since onset. The pain is present in the lumbar spine. The quality of the pain is described as aching and burning. The pain radiates to the left thigh (radiates down left buttock in posterior/lateral thigh - does not pass knee). The pain is at a severity of 4/10. The pain is moderate. The symptoms are aggravated by standing, bending,  sitting, position and lying down (walking long distances, increased physical activity). Stiffness is present in the morning. Associated symptoms include headaches and weakness. Pertinent negatives include no abdominal pain, chest pain, dysuria, fever or numbness. She has tried heat and ice (Gabapentin, ) for the symptoms. The treatment provided mild relief.      PEG Assessment   What number best describes your pain on average in the past week?5  What number best describes how, during the past week, pain has interfered with your enjoyment of life?0  What number best describes how, during the past week, pain has interfered with your general activity?  0    The following portions of the patient's history were reviewed and updated as appropriate: allergies, current medications, past family history, past medical history, past social history, past surgical history and problem list.    Review of Systems   Constitutional: Negative for activity change, fatigue and fever.   HENT: Negative for congestion.    Eyes: Positive for visual disturbance (legally blind OS eye).   Respiratory: Positive for cough.    Cardiovascular: Negative for chest pain.   Gastrointestinal: Positive for constipation. Negative for abdominal pain and diarrhea.   Genitourinary: Negative for difficulty urinating and dysuria.   Musculoskeletal: Positive for back pain.   Neurological: Positive for weakness and headaches. Negative for dizziness, light-headedness and numbness.   Psychiatric/Behavioral: Positive for sleep disturbance. Negative for agitation and suicidal ideas. The patient is not nervous/anxious.      I have reviewed and confirmed the accuracy of the ROS as documented by the MA/LPN/RN GISELL Ma     Vitals:    03/10/23 1445   BP: 148/85   BP Location: Left arm   Patient Position: Sitting   Cuff Size: Adult   Pulse: 94   Resp: 12   Temp: 97.8 °F (36.6 °C)   TempSrc: Temporal   SpO2: 97%   Weight: 65 kg (143 lb 3.2 oz)   Height: 162.6  "cm (64\")   PainSc:   4     Objective   Physical Exam  Constitutional:       Appearance: Normal appearance.   HENT:      Head: Normocephalic.   Cardiovascular:      Rate and Rhythm: Normal rate and regular rhythm.   Pulmonary:      Effort: Pulmonary effort is normal.      Breath sounds: Normal breath sounds.   Musculoskeletal:      Cervical back: Normal range of motion.      Lumbar back: Tenderness and bony tenderness present. Decreased range of motion. Negative left straight leg raise test.   Skin:     General: Skin is warm and dry.      Capillary Refill: Capillary refill takes less than 2 seconds.   Neurological:      General: No focal deficit present.      Mental Status: She is alert and oriented to person, place, and time.      Gait: Gait abnormal (slowed, ambulates with cane).   Psychiatric:         Mood and Affect: Mood normal.         Behavior: Behavior normal.         Thought Content: Thought content normal.         Cognition and Memory: Cognition normal.       Assessment & Plan   Diagnoses and all orders for this visit:    1. Lumbar radiculopathy, chronic (Primary)    2. Chronic bilateral low back pain, unspecified whether sciatica present    3. Lumbar foraminal stenosis    --- Follow-up for increased pain or to repeat procedures     CHASE REPORT  As the clinician, I personally reviewed the CHASE from 3/10/23 while the patient was in the office today.    Dictated utilizing Dragon dictation.      Patient remained masked during entire encounter. No cough present. I donned a mask and eye protection throughout entire visit. Prior to donning mask and eye protection, hand hygiene was performed, as well as when it was doffed.  I was closer than 6 feet, but not for an extended period of time. No obvious exposure to any bodily fluids.  "

## 2023-03-10 NOTE — PROGRESS NOTES
Physical Therapy Initial Evaluation and Plan of Care  [address]  [facility] [address]  [phone]    Patient: Marilia Leo   : 1945  Diagnosis/ICD-10 Code:  Decreased ROM of neck [R29.898]  Referring practitioner: Grover Garcias MD  Date of Initial Visit: 3/10/2023  Today's Date: 3/14/2023  Patient seen for 1 session         Visit Diagnoses:    ICD-10-CM ICD-9-CM   1. Decreased ROM of neck  R29.898 723.8   2. Pain, neck  M54.2 723.1         Subjective Questionnaire: NDI:28%      Subjective Evaluation    History of Present Illness  Mechanism of injury:  cervical fusion by Cali Sexton MD.   Scoliosis  Vision impaired/nearly completely blind R eye.   Daughter states she turns her head to R to see TV and others      Patient Occupation: retired from [employer] Quality of life: good    Pain  Location: post neck, R neck, upper trap  Quality: dull ache, discomfort, tight and pressure  Relieving factors: change in position, rest, relaxation and support  Aggravating factors: movement, repetitive movement and prolonged positioning  Progression: worsening    Social Support  Lives in: apartment  Lives with: significant other    Treatments  No previous or current treatments  Patient Goals  Patient goals for therapy: decreased pain, increased motion and independence with ADLs/IADLs             Objective          Static Posture     Head  Tilted right.    Cervical Spine  Tilted right and rotated right.    Shoulders  Rounded.    Active Range of Motion   Cervical/Thoracic Spine   Cervical    Flexion: 25 degrees   Extension: 25 degrees   Left lateral flexion: 30 degrees   Right lateral flexion: 30 degrees   Left rotation: 35 degrees   Right rotation: 60 degrees   Left Shoulder   Normal active range of motion    Right Shoulder   Normal active range of motion    Additional Active Range of Motion Details  Decreased LROT C 4-6  C1 stuck L  DecreasedOA R    Strength/Myotome Testing     Left Shoulder     Planes of  Motion   Flexion: 4   Abduction: 4+     Isolated Muscles   Biceps: 5   Triceps: 4     Right Shoulder     Planes of Motion   Flexion: 4   Abduction: 4+     Isolated Muscles   Biceps: 5   Triceps: 4           Assessment & Plan     Assessment  Impairments: abnormal muscle tone, abnormal or restricted ROM, activity intolerance, lacks appropriate home exercise program and pain with function  Functional Limitations: uncomfortable because of pain, sitting and unable to perform repetitive tasks  Assessment details: Pt is a good candidate for skilled PT intervention, tanner manual therapy for spinal joint mobility, alignment, postural restoration and core strengthening to restore functional AROM and strength to return to previous level of ADL's.  Pt's R eye vision impairment  Causing restricted mobility due to holding/turning neck to R in order to see out of L eye  Prognosis: good    Goals  Plan Goals: Short Term Goals ( 3 weeks)  1. Pt to be independent with HEP  2. Pt to exhibit increased cervical segmental mobility to allow for increased AROM  3. Pt to demonstrate proper posture without verbal cues  4. Pt to report decreased pain with ADL's    Long Term Goals (  6 weeks)  1. Pt to exhibit 50  degrees of cervical LROT with less pain  2. Pt to exhibit 5/5 strength for DCF to allow forprolonged daily activities  3. Pt to report less pain with prolonged sitting  4. Pt able to perform ADL's and recreational activities without pain     Plan  Therapy options: will be seen for skilled therapy services  Planned modality interventions: thermotherapy (hydrocollator packs)  Planned therapy interventions: body mechanics training, functional ROM exercises, home exercise program, joint mobilization, manual therapy, postural training, neuromuscular re-education, soft tissue mobilization, spinal/joint mobilization, strengthening, stretching and therapeutic activities  Frequency: 1x week  Duration in weeks: 6  Treatment plan discussed with:  patient            Timed:         Manual Therapy:    15     mins  08092;     Therapeutic Exercise:         mins  85843;     Neuromuscular Carlos Alberto:        mins  09255;    Therapeutic Activity:     8     mins  28631;     Gait Training:           mins  84338;     Ultrasound:          mins  48608;    Ionto                                   mins   01618  Self Care                            mins   03043      Un-Timed:  Electrical Stimulation:         mins  94293 ( );  Dry Needling          mins self-pay  Traction          mins 98593  Canalith Repos         mins 92875      Timed Treatment:   23   mins   Total Treatment:     50   mins          PT: Brenna Salter, PT     License Number: [id number]  Electronically signed by Brenna Salter PT, 03/10/23, 11:37 AM EST    Certification Period: 3/14/2023 thru 6/11/2023  I certify that the therapy services are furnished while this patient is under my care.  The services outlined above are required by this patient, and will be reviewed every 90 days.         Physician Signature:__________________________________________________    PHYSICIAN: Grover Garcias MD  NPI: [id number]                                      DATE:      Please sign and return via fax to [email or web address] . Thank you, [facility].

## 2023-03-16 ENCOUNTER — TREATMENT (OUTPATIENT)
Dept: PHYSICAL THERAPY | Facility: CLINIC | Age: 78
End: 2023-03-16
Payer: MEDICARE

## 2023-03-16 DIAGNOSIS — M54.2 PAIN, NECK: ICD-10-CM

## 2023-03-16 DIAGNOSIS — R29.898 DECREASED ROM OF NECK: Primary | ICD-10-CM

## 2023-03-16 PROCEDURE — 97140 MANUAL THERAPY 1/> REGIONS: CPT | Performed by: PHYSICAL THERAPIST

## 2023-03-16 NOTE — PROGRESS NOTES
Physical Therapy Daily Treatment Note  [address]  [facility] [address]  [phone]    Patient: Marilia Leo   : 1945  Referring practitioner: Grover Garcias MD  Date of Initial Visit: Type: THERAPY  Noted: 3/10/2023  Today's Date: 3/17/2023  Patient seen for 2 sessions       Visit Diagnoses:    ICD-10-CM ICD-9-CM   1. Decreased ROM of neck  R29.898 723.8   2. Pain, neck  M54.2 723.1       Marilia Leo reports: I felt better after our first session. Headaches are less. Have not rearranged any furniture yet      Objective   See Exercise, Manual, and Modality Logs for complete treatment.       Assessment/Plan  Improved segmental mobility and less tenderness today    Progress per Plan of Care        Timed:         Manual Therapy:    25     mins  53096;     Therapeutic Exercise:    5     mins  78629;     Neuromuscular Carlos Alberto:        mins  13204;    Therapeutic Activity:          mins  66351;     Gait Training:           mins  51715;     Ultrasound:          mins  60323;    Ionto                                   mins   96768  Self Care                            mins   07265      Un-Timed:  Electrical Stimulation:         mins  54907 ( );  Dry Needling          mins self-pay  Traction          mins 01752  Canalith Repos         mins 60433      Timed Treatment:   30   mins   Total Treatment:     40   mins    Brenna Salter PT  Physical Therapist  [place] License # [id number]

## 2023-03-23 ENCOUNTER — TREATMENT (OUTPATIENT)
Dept: PHYSICAL THERAPY | Facility: CLINIC | Age: 78
End: 2023-03-23
Payer: MEDICARE

## 2023-03-23 DIAGNOSIS — M54.2 PAIN, NECK: ICD-10-CM

## 2023-03-23 DIAGNOSIS — R29.898 DECREASED ROM OF NECK: Primary | ICD-10-CM

## 2023-03-23 PROCEDURE — 97140 MANUAL THERAPY 1/> REGIONS: CPT | Performed by: PHYSICAL THERAPIST

## 2023-03-23 NOTE — PROGRESS NOTES
Physical Therapy Daily Treatment Note  [address]  [facility] [address]  [phone]    Patient: Marilia Leo   : 1945  Referring practitioner: Grover Garcias MD  Date of Initial Visit: Type: THERAPY  Noted: 3/10/2023  Today's Date: 3/24/2023  Patient seen for 3 sessions       Visit Diagnoses:    ICD-10-CM ICD-9-CM   1. Decreased ROM of neck  R29.898 723.8   2. Pain, neck  M54.2 723.1       Marilia Leo reports: We moved my chair so I am not always turning my head same direction. Minimal headaches and much less neck pain      Objective   See Exercise, Manual, and Modality Logs for complete treatment.       Assessment/Plan  Improving segmental mobility and PROM with less pain. Added doorway stretch to HEP    Progress per Plan of Care        Timed:         Manual Therapy:    25     mins  82949;     Therapeutic Exercise:    5     mins  20837;     Neuromuscular Carlos Alberto:        mins  22016;    Therapeutic Activity:          mins  08968;     Gait Training:           mins  10508;     Ultrasound:          mins  76812;    Ionto                                   mins   47504  Self Care                            mins   74686      Un-Timed:  Electrical Stimulation:         mins  02222 ( );  Dry Needling          mins self-pay  Traction          mins 22383  Canalith Repos         mins 71189      Timed Treatment:   30   mins   Total Treatment:     40   mins    Brenna Salter PT  Physical Therapist  [place] License # [id number]

## 2023-03-30 ENCOUNTER — TELEPHONE (OUTPATIENT)
Dept: PHYSICAL THERAPY | Facility: CLINIC | Age: 78
End: 2023-03-30

## 2023-03-30 NOTE — TELEPHONE ENCOUNTER
PATIENT IS GOING TO DO HER HEP AND WILL BE BACK ON 4/11/23.  SHE WANTS TO SEE HOW SHE DOES NOT COMING.

## 2023-03-31 DIAGNOSIS — M54.16 LUMBAR RADICULOPATHY, CHRONIC: ICD-10-CM

## 2023-03-31 RX ORDER — GABAPENTIN 100 MG/1
CAPSULE ORAL
Qty: 270 CAPSULE | Refills: 1 | Status: SHIPPED | OUTPATIENT
Start: 2023-03-31

## 2023-03-31 NOTE — TELEPHONE ENCOUNTER
Rx Refill Note  Requested Prescriptions     Pending Prescriptions Disp Refills   • gabapentin (NEURONTIN) 100 MG capsule [Pharmacy Med Name: GABAPENTIN 100MG CAPSULES] 270 capsule      Sig: TAKE 1 CAPSULE BY MOUTH EVERY MORNING AND TAKE 2 CAPSULES BY MOUTH EVERY NIGHT AT BEDTIME      Last office visit with prescribing clinician: 2/24/2023   Last telemedicine visit with prescribing clinician: 3/31/2023   Next office visit with prescribing clinician: 3/31/2023     Arnaud Fulton MA  03/31/23, 11:39 EDT

## 2023-04-10 DIAGNOSIS — I10 BENIGN ESSENTIAL HYPERTENSION: ICD-10-CM

## 2023-04-10 RX ORDER — IRBESARTAN 150 MG/1
150 TABLET ORAL NIGHTLY
Qty: 90 TABLET | Refills: 3 | Status: SHIPPED | OUTPATIENT
Start: 2023-04-10

## 2023-04-11 ENCOUNTER — TREATMENT (OUTPATIENT)
Dept: PHYSICAL THERAPY | Facility: CLINIC | Age: 78
End: 2023-04-11
Payer: MEDICARE

## 2023-04-11 DIAGNOSIS — R29.898 DECREASED ROM OF NECK: Primary | ICD-10-CM

## 2023-04-11 DIAGNOSIS — M54.2 PAIN, NECK: ICD-10-CM

## 2023-04-11 NOTE — PROGRESS NOTES
Physical Therapy Progress Note  [address]  [facility] [address]  [phone]    Patient: Marilia Leo   : 1945  Referring practitioner: Grover Garcias MD  Date of Initial Visit: Type: THERAPY  Noted: 3/10/2023  Today's Date: 2023  Patient seen for 4 sessions       Visit Diagnoses:    ICD-10-CM ICD-9-CM   1. Decreased ROM of neck  R29.898 723.8   2. Pain, neck  M54.2 723.1       Marilia Leo reports: I am better with no headaches. Still some discomfort L neck but due to my eyes I have to turn my head to the right more often      Objective   Active Range of Motion   Cervical/Thoracic Spine   Cervical     Flexion: 35 degrees   Extension: 50 degrees   Left lateral flexion: 30 degrees   Right lateral flexion: 30 degrees   Left rotation: 42 degrees   Right rotation: 60 degrees   Left Shoulder   See Exercise, Manual, and Modality Logs for complete treatment.       Assessment/Plan  Improved ROM with less pain  Short Term Goals ( 3 weeks) MET  1. Pt to be independent with HEP  2. Pt to exhibit increased cervical segmental mobility to allow for increased AROM  3. Pt to demonstrate proper posture without verbal cues  4. Pt to report decreased pain with ADL's    Long Term Goals (  6 weeks)  1. Pt to exhibit 50  degrees of cervical LROT with less pain PROGRESSING  2. Pt to exhibit 5/5 strength for DCF to allow forprolonged daily activities PROGRESSING  3. Pt to report less pain with prolonged sitting MET  4. Pt able to perform ADL's and recreational activities without pain PROGRESSING    Progress per Plan of Care        Timed:         Manual Therapy:    15     mins  91514;     Therapeutic Exercise:    8     mins  48639; review and reassess    Neuromuscular Carlos Alberto:        mins  12720;    Therapeutic Activity:          mins  35258;     Gait Training:           mins  59817;     Ultrasound:          mins  86297;    Ionto                                   mins   34033  Self Care                             mins   42111      Un-Timed:  Electrical Stimulation:    23     mins  97104 ( );  Dry Needling          mins self-pay  Traction          mins 82609  Canalith Repos         mins 11618      Timed Treatment:   38   mins   Total Treatment:     38   mins    Brenna Salter PT  Physical Therapist  [place] License # [id number]   Pt admitted for GIB. Eliquis on hold.

## 2023-04-25 ENCOUNTER — TREATMENT (OUTPATIENT)
Dept: PHYSICAL THERAPY | Facility: CLINIC | Age: 78
End: 2023-04-25
Payer: MEDICARE

## 2023-04-25 DIAGNOSIS — R29.898 DECREASED ROM OF NECK: Primary | ICD-10-CM

## 2023-04-25 DIAGNOSIS — M54.2 PAIN, NECK: ICD-10-CM

## 2023-04-25 NOTE — PROGRESS NOTES
Physical Therapy Discharge Summary  [address]  [place] [address]  [phone]  Patient: Marilia Leo   : 1945  Diagnosis/ICD-10 Code:  Decreased ROM of neck [R29.898]  Referring practitioner: Grover Garcias MD  Date of Initial Visit: Type: THERAPY  Noted: 3/10/2023  Today's Date: 2023  Patient seen for 5 sessions         Visit Diagnoses:    ICD-10-CM ICD-9-CM   1. Decreased ROM of neck  R29.898 723.8   2. Pain, neck  M54.2 723.1         Marilia Leo reports: I am much better. No headaches and nec pain tolerable. I know I need to live with some pain because of turning my head due to vision. Pt arrived 18 minutes late  Subjective Questionnaire: NDI:24%( due to blindness not neck pain)  Clinical Progress: improved  Home Program Compliance: Yes  Treatment has included: therapeutic exercise, manual therapy and moist heat      Subjective       Objective   Active Range of Motion   Cervical/Thoracic Spine   Cervical     Flexion: 35 degrees   Extension: 50 degrees   Left lateral flexion: 30 degrees   Right lateral flexion: 30 degrees   Left rotation: 50 degrees   Right rotation: 60 degrees     Assessment/Plan  Short Term Goals ( 3 weeks) MET  1. Pt to be independent with HEP  2. Pt to exhibit increased cervical segmental mobility to allow for increased AROM  3. Pt to demonstrate proper posture without verbal cues  4. Pt to report decreased pain with ADL's    Long Term Goals (  6 weeks)  1. Pt to exhibit 50  degrees of cervical LROT with less pain MET  2. Pt to exhibit 5/5 strength for DCF to allow forprolonged daily activities MET  3. Pt to report less pain with prolonged sitting MET  4. Pt able to perform ADL's and recreational activities without pain PROGRESSING     Recommendations: Discharge      Timed:         Manual Therapy:    15     mins  85834;     Therapeutic Exercise:         mins  49921;     Neuromuscular Carlos Alberto:        mins  77133;    Therapeutic Activity:          mins  57283;      Gait Training:           mins  34577;     Ultrasound:          mins  80835;    Ionto                                   mins   95198  Self Care                            mins   37926  Canalith Repos         mins 33593      Un-Timed:  Electrical Stimulation:         mins  59147 ( );  Dry Needling          mins self-pay  Traction          mins 30444  Re-Eval                               mins  80301      Timed Treatment:   15   mins   Total Treatment:     30   mins          PT: Brenna Salter PT     [place] License:  [id number]    Electronically signed by Brenna Salter PT, 04/25/23, 7:24 AM EDT    Certification Period: 4/26/2023 thru 7/24/2023  I certify that the therapy services are furnished while this patient is under my care.  The services outlined above are required by this patient, and will be reviewed every 90 days.         Physician Signature:__________________________________________________    PHYSICIAN: Grover Garcias MD  NPI: [id number]                                      DATE:  :     Please sign and return via fax to [email or web address] . Thank you, [facility]

## 2023-06-16 ENCOUNTER — PREP FOR SURGERY (OUTPATIENT)
Dept: PAIN MEDICINE | Facility: CLINIC | Age: 78
End: 2023-06-16

## 2023-06-16 ENCOUNTER — OFFICE VISIT (OUTPATIENT)
Dept: PAIN MEDICINE | Facility: CLINIC | Age: 78
End: 2023-06-16
Payer: MEDICARE

## 2023-06-16 VITALS
DIASTOLIC BLOOD PRESSURE: 70 MMHG | HEIGHT: 64 IN | OXYGEN SATURATION: 95 % | BODY MASS INDEX: 23.8 KG/M2 | HEART RATE: 83 BPM | RESPIRATION RATE: 12 BRPM | TEMPERATURE: 97.5 F | SYSTOLIC BLOOD PRESSURE: 140 MMHG | WEIGHT: 139.4 LBS

## 2023-06-16 DIAGNOSIS — M25.512 CHRONIC PAIN OF BOTH SHOULDERS: ICD-10-CM

## 2023-06-16 DIAGNOSIS — M54.50 CHRONIC BILATERAL LOW BACK PAIN, UNSPECIFIED WHETHER SCIATICA PRESENT: ICD-10-CM

## 2023-06-16 DIAGNOSIS — M25.552 BILATERAL HIP PAIN: ICD-10-CM

## 2023-06-16 DIAGNOSIS — M48.061 LUMBAR FORAMINAL STENOSIS: ICD-10-CM

## 2023-06-16 DIAGNOSIS — M25.551 BILATERAL HIP PAIN: ICD-10-CM

## 2023-06-16 DIAGNOSIS — G89.29 CHRONIC PAIN OF BOTH SHOULDERS: ICD-10-CM

## 2023-06-16 DIAGNOSIS — M54.16 LUMBAR RADICULOPATHY, CHRONIC: Primary | ICD-10-CM

## 2023-06-16 DIAGNOSIS — M25.511 CHRONIC PAIN OF BOTH SHOULDERS: ICD-10-CM

## 2023-06-16 DIAGNOSIS — G89.29 CHRONIC BILATERAL LOW BACK PAIN, UNSPECIFIED WHETHER SCIATICA PRESENT: ICD-10-CM

## 2023-06-16 DIAGNOSIS — M53.3 SACROILIAC JOINT DYSFUNCTION OF BOTH SIDES: ICD-10-CM

## 2023-06-16 RX ORDER — KETOROLAC TROMETHAMINE 30 MG/ML
30 INJECTION, SOLUTION INTRAMUSCULAR; INTRAVENOUS ONCE
Status: COMPLETED | OUTPATIENT
Start: 2023-06-16 | End: 2023-06-16

## 2023-06-16 RX ORDER — DIAZEPAM 10 MG/1
10 TABLET ORAL ONCE
OUTPATIENT
Start: 2023-06-16

## 2023-06-16 RX ADMIN — KETOROLAC TROMETHAMINE 30 MG: 30 INJECTION, SOLUTION INTRAMUSCULAR; INTRAVENOUS at 12:22

## 2023-06-16 NOTE — PROGRESS NOTES
CHIEF COMPLAINT  Back and leg pain    Subjective   Marilia Leo is a 78 y.o. female  who presents for follow-up.  She has a history of chronic low back. She reports that her pain has worsened since her last office visit. She reports increased pain to BLE.    Today pain is 8/10VAS in severity. Pain is located in her low back and radiates into left buttock and down left lateral thigh. New complaint of right sided groin pain and bilateral hip pain. Describes this pain as an intermittent aching and burning. Pain is worsened by lying flat, prolonged standing or sitting, and walking long distances. Pain improves with rest/reposition, heat/ice, and medications. Patient continues with Gabapentin 100mg QID (managed by PCP) and Tylenol 500mg PRN. She has completed PT for her neck pain and reports that this was somewhat helpful for her pain.      History of glaucoma. Patient is legally blind. Remaining vision is peripheral vision in her left eye.      Unable to tolerate Tramadol and Codeine - both caused nausea, Robaxin - patient did not like how medication made her feel    Procedures:  2/16/23 - Left L5 & S1 TF ROQUE - 80% relief x 2.5 months  1/11/23 - Bilateral SI joint injections- 100% relief to right side, 45-50% relief to left side x 1 week     History of previous epidurals at [facility] - she reports that these injections were initially helpful but the last injections she had there were unsuccessful.     Back Pain  This is a chronic problem. The current episode started more than 1 year ago. The problem occurs intermittently. The problem has been gradually worsening since onset. The pain is present in the lumbar spine. The quality of the pain is described as aching and burning. The pain radiates to the left thigh (radiates down left buttock in posterior/lateral thigh - does not pass knee). The pain is at a severity of 7/10. The pain is moderate. The symptoms are aggravated by standing, bending, sitting, position and  lying down (walking long distances, increased physical activity). Stiffness is present In the morning. Associated symptoms include headaches and weakness (legs). Pertinent negatives include no abdominal pain, chest pain, dysuria, fever or numbness. She has tried heat and ice (Gabapentin, ) for the symptoms. The treatment provided mild relief.      PEG Assessment   What number best describes your pain on average in the past week?7  What number best describes how, during the past week, pain has interfered with your enjoyment of life?7  What number best describes how, during the past week, pain has interfered with your general activity?  7    Review of Pertinent Medical Data ---  CT SCAN OF THE LUMBAR SPINE WITHOUT CONTRAST     CLINICAL HISTORY: Low back pain for years.     TECHNIQUE: CT scan of the lumbar spine was obtained with 3 mm axial  images. Sagittal and coronal reconstructed images were obtained.     FINDINGS:     There is a mild dextroconvex scoliotic curvature of the lumbar spine  with its apex centered at L1-2.     At T9-10, T10-11, T11-12, T12-L1, and L1-2, there is no significant  canal or foraminal stenosis.     At L2-3, there is a disc osteophyte complex which results in a mild  degree of canal narrowing and a mild degree of bilateral foraminal  narrowing.     At L3-4, there is no significant degree of canal or foraminal stenosis.     At L4-5, there is a disc bulge which mildly narrows the left neural  foramen. There is mild-to-moderate facet hypertrophic change.     At L5-S1, there is mild left and moderate right facet hypertrophy. There  is mild right foraminal narrowing secondary to bulging disc material.     There is partial osseous fusion across the right SI joint.     IMPRESSION:     There is mild-to-moderate dextroconvex scoliotic curvature of the lumbar  spine centered at L1-2.     Multilevel degenerative phenomena are noted within the lumbar spine  resulting in only up to mild degrees of canal  "and foraminal narrowing as  discussed in detail above.     Radiation dose reduction techniques were utilized, including automated  exposure control and exposure modulation based on body size.     This report was finalized on 10/7/2022 6:32 AM by Dr. Ignacio Aguirre M.D.    The following portions of the patient's history were reviewed and updated as appropriate: allergies, current medications, past family history, past medical history, past social history, past surgical history, and problem list.    Review of Systems   Constitutional:  Negative for activity change, fatigue and fever.   HENT:  Negative for congestion.    Eyes:  Positive for visual disturbance (blind).   Respiratory:  Negative for cough and chest tightness.    Cardiovascular:  Negative for chest pain.   Gastrointestinal:  Positive for constipation. Negative for abdominal pain and diarrhea.   Genitourinary:  Negative for difficulty urinating and dysuria.   Musculoskeletal:  Positive for back pain.   Neurological:  Positive for weakness (legs) and headaches. Negative for dizziness, light-headedness and numbness.   Psychiatric/Behavioral:  Positive for agitation (pain) and sleep disturbance. Negative for suicidal ideas. The patient is nervous/anxious.      I have reviewed and confirmed the accuracy of the ROS as documented by the MA/LPN/RN Pearl Terrazas, APRN    Vitals:    06/16/23 1138   BP: 140/70   BP Location: Left arm   Patient Position: Sitting   Cuff Size: Adult   Pulse: 83   Resp: 12   Temp: 97.5 °F (36.4 °C)   TempSrc: Temporal   SpO2: 95%   Weight: 63.2 kg (139 lb 6.4 oz)   Height: 162.6 cm (64\")   PainSc:   8     Objective   Physical Exam  Constitutional:       Appearance: Normal appearance.   HENT:      Head: Normocephalic.   Cardiovascular:      Rate and Rhythm: Normal rate and regular rhythm.   Pulmonary:      Effort: Pulmonary effort is normal.      Breath sounds: Normal breath sounds.   Musculoskeletal:      Cervical back: Normal range of " motion.      Lumbar back: Tenderness and bony tenderness present. Decreased range of motion. Positive right straight leg raise test and positive left straight leg raise test.      Right hip: Tenderness present. Decreased range of motion.      Left hip: Tenderness present. Decreased range of motion.   Skin:     General: Skin is warm and dry.      Capillary Refill: Capillary refill takes less than 2 seconds.   Neurological:      General: No focal deficit present.      Mental Status: She is alert and oriented to person, place, and time.      Gait: Gait abnormal (slowed, ambulates with cane).   Psychiatric:         Mood and Affect: Mood normal.         Behavior: Behavior normal.         Thought Content: Thought content normal.         Cognition and Memory: Cognition normal.     Assessment & Plan   Diagnoses and all orders for this visit:    1. Lumbar radiculopathy, chronic (Primary)  -     ketorolac (TORADOL) injection 30 mg    2. Chronic bilateral low back pain, unspecified whether sciatica present  -     ketorolac (TORADOL) injection 30 mg    3. Lumbar foraminal stenosis    4. Sacroiliac joint dysfunction of both sides    5. Bilateral hip pain  -     Ambulatory Referral to Orthopedic Surgery    6. Chronic pain of both shoulders  -     Ambulatory Referral to Orthopedic Surgery    --- Right L2 & Left L5 TF LESI (patient complains of right sided groin pain and left lateral leg pain - please verify these areas under fluro)  ---  Indications for epidural injection:  Plan is to proceed with epidural at the appropriate level.  If the patient receives significant pain reduction and improvement in function and the plan will be to repeat the epidural when the pain worsens.  If a second epidural provides at least 6 weeks of sustained improvement that includes both pain reduction and improvement in function then an epidural injection could be repeated once again at the same level.  This is a mutual decision between the clinician  and the patient that includes discussions including risks and benefits in detail as well as alternative therapies.  Patient's questions were answered to their satisfaction and to their understanding.  ---   Discussed with the patient that sedation is optional for this procedure.  The sedation offered is called conscious sedation which is different from general anesthesia that is utilized in surgical procedures. The dosing of the sedation is determined by the physician and they will be monitored throughout the procedure. With conscious sedation it is possible to remember parts or all of the procedure, this is normal. They will need to have a  with them as driving is prohibited following conscious sedation.      NPO instructions for conscious sedation:  --- Do not eat 6 hours prior to the procedure.   --- Do not drink any dairy or citrus 4 hours prior to the procedure.   --- Do not drink anything, including clear liquids, 2 hours prior to procedure.      If the NPO instructions are not followed then the procedure may be performed without sedation or the procedure will need to be rescheduled.   --- Referral to Orthopedics for bilateral hip and shoulder pain  --- IM Toradol 30mg given in office for acute flare in pain  --- May consider Lumbar MBB/RFA if back pain were to worsen  --- Follow-up for procedure     CHASE REPORT  As the clinician, I personally reviewed the CHASE from 6/16/23 while the patient was in the office today.    Dictated utilizing Dragon dictation.

## 2023-06-23 ENCOUNTER — TELEPHONE (OUTPATIENT)
Dept: PAIN MEDICINE | Facility: CLINIC | Age: 78
End: 2023-06-23

## 2023-06-23 NOTE — TELEPHONE ENCOUNTER
Caller: GONZALO LOYA    Relationship to patient: DAUGHTER    Best call back number: [phone]    Patient is needing: PATIENT'S DAUGHTER, GONZALO WAS CALLING TO GET AN UPDATE ON HER MOTHER GETTING SCHEDULED WITH DR. HERNANDEZ FOR AN INJECTION IN HER RIGHT HIP. GONZALO DOES KNOW WHAT TYPE OF INJECTION IT IS BUT WOULD LIKE A CALL BACK TO DISCUSS SCHEDULING THIS INJECTION. THANK YOU!

## 2023-08-04 DIAGNOSIS — M54.16 LUMBAR RADICULOPATHY, CHRONIC: ICD-10-CM

## 2023-08-04 RX ORDER — GABAPENTIN 100 MG/1
CAPSULE ORAL
Qty: 270 CAPSULE | Refills: 1 | Status: SHIPPED | OUTPATIENT
Start: 2023-08-04

## 2023-08-11 RX ORDER — LIDOCAINE 50 MG/G
PATCH TOPICAL
Qty: 6 PATCH | Refills: 3 | Status: SHIPPED | OUTPATIENT
Start: 2023-08-11

## 2023-08-15 ENCOUNTER — HOSPITAL ENCOUNTER (OUTPATIENT)
Dept: GENERAL RADIOLOGY | Facility: SURGERY CENTER | Age: 78
Setting detail: HOSPITAL OUTPATIENT SURGERY
End: 2023-08-15
Payer: MEDICARE

## 2023-08-15 ENCOUNTER — HOSPITAL ENCOUNTER (OUTPATIENT)
Facility: SURGERY CENTER | Age: 78
Setting detail: HOSPITAL OUTPATIENT SURGERY
Discharge: HOME | End: 2023-08-15
Attending: ANESTHESIOLOGY | Admitting: ANESTHESIOLOGY
Payer: MEDICARE

## 2023-08-15 VITALS
WEIGHT: 139 LBS | DIASTOLIC BLOOD PRESSURE: 89 MMHG | TEMPERATURE: 98 F | BODY MASS INDEX: 23.73 KG/M2 | OXYGEN SATURATION: 99 % | HEART RATE: 93 BPM | RESPIRATION RATE: 16 BRPM | HEIGHT: 64 IN | SYSTOLIC BLOOD PRESSURE: 127 MMHG

## 2023-08-15 DIAGNOSIS — Z41.9 SURGERY, ELECTIVE: ICD-10-CM

## 2023-08-15 DIAGNOSIS — M54.16 LUMBAR RADICULOPATHY, CHRONIC: ICD-10-CM

## 2023-08-15 PROCEDURE — S0260 H&P FOR SURGERY: HCPCS | Performed by: ANESTHESIOLOGY

## 2023-08-15 PROCEDURE — 64483 NJX AA&/STRD TFRM EPI L/S 1: CPT | Performed by: ANESTHESIOLOGY

## 2023-08-15 PROCEDURE — 25510000001 IOPAMIDOL 61 % SOLUTION 30 ML VIAL: Performed by: ANESTHESIOLOGY

## 2023-08-15 PROCEDURE — 25010000002 METHYLPREDNISOLONE PER 80 MG: Performed by: ANESTHESIOLOGY

## 2023-08-15 PROCEDURE — 64484 NJX AA&/STRD TFRM EPI L/S EA: CPT | Performed by: ANESTHESIOLOGY

## 2023-08-15 PROCEDURE — 76000 FLUOROSCOPY <1 HR PHYS/QHP: CPT

## 2023-08-15 PROCEDURE — 77002 NEEDLE LOCALIZATION BY XRAY: CPT

## 2023-08-15 PROCEDURE — 25010000002 BUPIVACAINE (PF) 0.5 % SOLUTION 10 ML VIAL: Performed by: ANESTHESIOLOGY

## 2023-08-15 RX ORDER — TIMOLOL MALEATE 5 MG/ML
1 SOLUTION/ DROPS OPHTHALMIC 2 TIMES DAILY
COMMUNITY

## 2023-08-15 RX ORDER — DIAZEPAM 5 MG/1
10 TABLET ORAL ONCE
Status: COMPLETED | OUTPATIENT
Start: 2023-08-15 | End: 2023-08-15

## 2023-08-15 RX ADMIN — DIAZEPAM 10 MG: 5 TABLET ORAL at 13:14

## 2023-08-15 NOTE — H&P
"Brief Pre-procedural / Pre-operative H&P        -----    Pertinent Diagnosis:   Lumbar radiculopathy bilaterally    Proposed Procedure: Lumbar transforaminal epidural steroid injection      Subjective   Marilia Leo is a 78 y.o. female  who presents for intervention.  She has a history of back pain and leg pain.      History of Present Illness     She has back pain and she has radicular symptoms.  The dermatomal distributions are in the L2 dermatome on the right and the L5 dermatome on the left.  Therefore those are the intended targets.  She has had significant therapeutic relief from epidural injections previously.  Intermittent aching and there is burning and increasing burning shooting sensations worsen with prolonged standing prolonged sitting walking long distance and also with lying flat.  Intermittent pain gradually worsening.    -------    The following portions of the patient's history were reviewed and updated as appropriate: allergies, current medications, past family history, past medical history, past social history, past surgical history and problem list.    Allergies   Allergen Reactions    Nitrofurantoin Unknown - High Severity and Anaphylaxis    Codeine     Penicillins     Sulfa Antibiotics     Tramadol Nausea Only     Feels like \"jumping out of skin\"       No current facility-administered medications for this encounter.    No current facility-administered medications on file prior to encounter.     Current Outpatient Medications on File Prior to Encounter   Medication Sig Dispense Refill    acetaminophen (TYLENOL) 325 MG tablet Take 1 tablet by mouth As Needed.      amLODIPine (NORVASC) 5 MG tablet Take 1 tablet by mouth Daily. 90 tablet 3    Carboxymeth-Glycerin-Polysorb (REFRESH OPTIVE ALBERTO-3 OP) Apply 1 drop to eye(s) as directed by provider 2 (Two) Times a Day As Needed.      irbesartan (AVAPRO) 150 MG tablet TAKE 1 TABLET BY MOUTH EVERY NIGHT. 90 tablet 3    Misc. Devices (Raised Toilet Seat) " misc 1 each Daily. 1 each 0    natamycin (NATACYN) 5 % ophthalmic solution Administer 1 drop to the right eye 2 (two) times a day. Cycles on for 14 days and off for 14 days  Patient does not need until 8/18/20, this comes from compound pharmacy      ofloxacin (OCUFLOX) 0.3 % ophthalmic solution Administer 1 drop into the left eye 2 (Two) Times a Day.      timolol (TIMOPTIC) 0.5 % ophthalmic solution Administer 1 drop into the left eye 2 (Two) Times a Day.         Patient Active Problem List   Diagnosis    Benign essential hypertension    Cervical radiculopathy    Glaucoma    Pure hypercholesterolemia    Menopausal symptom    Atrophic vaginitis    Health care maintenance    Cervical radiculopathy    Edema, lower extremity    Elevated LFTs    Fever of unknown origin (FUO)    Nausea    Sinus tachycardia    Bilateral chronic knee pain    Urinary frequency    Lumbar radiculopathy, chronic    DDD (degenerative disc disease), lumbar    Sacroiliac joint dysfunction of both sides       Past Medical History:   Diagnosis Date    Angioedema     due to ACEI 8/2002    Atopic dermatitis 04/18/2016    Cataract     Diverticulosis     Edema     due to Norvasc    Esophageal reflux     Glaucoma     right eye open angle Dr astorga s/p lazar Sx x3    Hammer toe     History of mammogram     3/11/14 Quick 4/10/15    Hx of bone scan     DEXA normal 2/2010, 09/2015    Hypercholesterolemia     Impaired fasting blood sugar 05/13/2019    Medial epicondylitis     L 2005    Palpitations 05/13/2019    Papanicolaou smear     reported pos. pap smear and previous pos 2 or more years ago  s/p hysterectomy    PONV (postoperative nausea and vomiting)     Sepsis 08/04/2020    Trochanteric bursitis     Trochanteric bursitis of left hip 10/13/2016       Past Surgical History:   Procedure Laterality Date    APPENDECTOMY      BILATERAL BREAST REDUCTION      CERVICAL FUSION      Dr.John Sexton    COLONOSCOPY      4/2007 Dr Bradshaw Normal    CORNEAL TRANSPLANT   "    EPIDURAL Left 2023    Procedure: LEFT L5 & S1 TRANSFORAMINAL LUMBAR EPIDURAL STEROID INJECTION CPT: 93441, 59968;  Surgeon: Gigi Boateng MD;  Location: [facility];  Service: Pain Management;  Laterality: Left;    FETAL BLOOD TRANSFUSION  1960    FOOT SURGERY  2013    right bunion and hammer toe Dr Phelan    SACROILIAC JOINT INJECTION Bilateral 2023    Procedure: SACROILIAC INJECTION;  Surgeon: Gigi Boateng MD;  Location: [facility];  Service: Pain Management;  Laterality: Bilateral;    SUBTOTAL HYSTERECTOMY      ovaries intact       Family History   Problem Relation Age of Onset    Hypertension Mother     Diabetes Mother     Hypertension Father     Diabetes Sister     Multiple sclerosis Sister     Rheum arthritis Sister     Lung cancer Sister         smoker    Diabetes Brother     Heart disease Brother     Hypertension Brother     Rheum arthritis Brother     Sarcoidosis Brother     Glaucoma Brother        Social History     Socioeconomic History    Marital status: Single   Tobacco Use    Smoking status: Former     Packs/day: 1.00     Years: 5.00     Pack years: 5.00     Types: Cigarettes     Start date:      Quit date:      Years since quittin.6    Smokeless tobacco: Never   Vaping Use    Vaping Use: Never used   Substance and Sexual Activity    Alcohol use: Yes     Comment: occasionally    Drug use: No    Sexual activity: Defer       -------       Review of Systems  No Fever, No Chills, No ear pain, No sinus pressure or drainage, No eye pain or drainage, No cough, No SOB, No chest tightness nor chest pain, no palpitations.          Vitals:    08/15/23 1312   BP: 147/85   BP Location: Left arm   Patient Position: Lying   Resp: 16   Temp: 98.4 øF (36.9 øC)   TempSrc: Temporal   SpO2: 99%   Weight: 63 kg (139 lb)   Height: 162.6 cm (64\")         Objective   Physical Exam  VSS, NNR, NCAT, NMNA, NRD, AAOx3.      -------    Assessment & Plan:  - as noted above, the stated " intervention is indicated  - Follow-up plan will be noted in the operative report        She should schedule to follow-up in a few weeks      EMR Dragon/Transcription disclaimer:   Typed items in this encounter note may have been created by electronic transcription/translation software which converts spoken language to printed text. The electronic translation of spoken language may permit erroneous, or at times, nonsensical words or phrases to be inadvertently transcribed; Although I have reviewed the note for such errors, some may still exist.

## 2023-08-15 NOTE — OP NOTE
Lumbar Transforaminal Epidural Steroid Injection (two levels)  [facility]      PREOPERATIVE DIAGNOSIS:  bilateral Lumbar Radiculopathy    POSTOPERATIVE DIAGNOSIS:  Same as preop diagnosis    PROCEDURE:    1. CPT 50538 --  Diagnostic Transforaminal Epidural Steroid Injection at the L2 level, on the right   2. CPT 48232 --  Diagnostic Transforaminal Epidural Steroid Injection at the L5 level, on the left     PRE-PROCEDURE DISCUSSION WITH PATIENT:    Risks and complications were discussed with the patient prior to starting the procedure and informed consent was obtained.  We discussed various topics including but not limited to bleeding, infection, injury, nerve injury, paralysis, coma, death, postprocedural painful flare-up, postprocedural site soreness, and a lack of pain relief.  We discussed the diagnostic aspect of transforaminal epidural / selective nerve root blockade.    SURGEON:  Gigi Boateng MD    REASON FOR PROCEDURE:    Degenerative changes are noted in the area., Radiating pattern of pain is likely consistent with degenerative changes in the area., and Radicular pain pattern seems consistent with this dermatome.    SEDATION:  The patient had higher than average levels of procedural anxiety and the need to provide additional procedural sedation was needed to safely proceed. and she had diazepam 10 mg po in preop area  ANESTHETIC:  Marcaine 0.25%  STEROID:  Methylprednisolone (DEPO MEDROL) 80mg/ml    DESCRIPTON OF PROCEDURE:  After obtaining informed consent, an I.V. was not started in the preoperative area. The patient taken to the operating room and was placed in the prone position with a pillow under the abdomen.  All pressure points were well padded.  EKG, blood pressure, and pulse oximeter were monitored.  The lumbar area was prepped with Chloraprep and draped in a sterile fashion. Under fluoroscopic guidance in an oblique dimension on the above mentioned side, the transverse  process of the first aforementioned vertebra at the junction of the body at 6 o'clock position was identified. Skin and subcutaneous tissue was anesthetized with 1% lidocaine. A 22-gauge spinal needle was introduced under fluoroscopic guidance at the above junction into the foramen without parasthesias and into the epidural space. After confirming the position of the needle with PA, lateral, and oblique fluoroscopic views, aspiration was checked and was clear of blood or CSF.  Next, 1 mL of Omnipaque was injected. After seeing adequate spread on the corresponding nerve root, a total volume 2mL of injectate containing local anesthetic as above and half of the above mentioned corticosteroid was injected into the epidural space.  The needle was removed intact.      Next, in similar fashion, the second level mentioned above was addressed and a similar amount of injectate was delivered after similar imaging was achieved.      Vital signs were stable throughout.          ESTIMATED BLOOD LOSS:  <5 mL  SPECIMENS:  none    COMPLICATIONS:   No complications were noted., There was no indication of vascular uptake on live injection of contrast dye., There was no indication of intrathecal uptake on live injection of contrast dye., There was not any evidence of dural puncture.  , and The patient did not have any signs of postprocedure numbness nor weakness.    TOLERANCE & DISCHARGE CONDITION:    The patient tolerated the procedure well.  The patient was transported to the recovery area without difficulties.  The patient was discharged to home under the care of family in stable and satisfactory condition.    PLAN OF CARE:  The patient was given our standard instruction sheet.  The patient will Return to clinic 2-3 wks.  The patient will resume all medications as per the medication reconciliation sheet.

## 2023-08-15 NOTE — DISCHARGE INSTRUCTIONS
[facility] - Post-procedure Instructions          --  While there are no absolute restrictions, it is recommended that you do not perform strenuous activity today. In the morning, you may resume your level of activity as before your block.    --  If you have a band-aid at your injection site, please remove it later today. Observe the area for any redness, swelling, pus-like drainage, or a temperature over 101ø. If any of these symptoms occur, please call your doctor at [phone]. If after office hours, leave a message and the on-call provider will return your call.    --  Ice may be applied to your injection site. It is recommended you avoid direct heat (heating pad; hot tub) for 1-2 days.    --  Call [facility] at [phone] if you experience persistent headache, persistent bleeding from the injection site, or severe pain not relieved by heat or oral medication.    --  Do not make important decisions today.    --  Due to the effects of the block and/or the I.V. Sedation, DO NOT drive or operate hazardous machinery for 12 hours.  Local anesthetics may cause numbness after procedure and precautions must be taken with regards to operating equipment as well as with walking, even if ambulating with assistance of another person or with an assistive device.    --  Do not drink alcohol for 12 hours.    -- You may return to work tomorrow, or as directed by your referring doctor.    --  Occasionally you may notice a slight increase in your pain after the procedure. This should start to improve within the next 24-48 hours. Radiofrequency ablation procedure pain may last 3-4 weeks.    --  It may take as long as 3-4 days before you notice a gradual improvement in your pain and/or other symptoms.    -- You may continue to take your prescribed pain medication as needed.    --  Some normal possible side effects of steroid use could include fluid retention, increased blood sugar, dull headache,  increased sweating, increased appetite, mood swings and flushing.    --  Diabetics are recommended to watch their blood glucose level closely for 24-48 hours after the injection.    --  Must stay in PACU for 20 min upon arrival and prove no leg weakness before being discharged.    --  IN THE EVENT OF A LIFE THREATENING EMERGENCY, (CHEST PAIN, BREATHING DIFFICULTIES, PARALYSIS.) YOU SHOULD GO TO YOUR NEAREST EMERGENCY ROOM.    --  You should be contacted by our office within 2-3 days to schedule follow up or next appointment date.  If not contacted within 7 days, please call the office at [phone]

## 2023-09-01 ENCOUNTER — OFFICE VISIT (OUTPATIENT)
Dept: INTERNAL MEDICINE | Facility: CLINIC | Age: 78
End: 2023-09-01
Payer: MEDICARE

## 2023-09-01 VITALS
HEIGHT: 64 IN | BODY MASS INDEX: 23.05 KG/M2 | DIASTOLIC BLOOD PRESSURE: 82 MMHG | HEART RATE: 84 BPM | SYSTOLIC BLOOD PRESSURE: 140 MMHG | RESPIRATION RATE: 18 BRPM | WEIGHT: 135 LBS | OXYGEN SATURATION: 98 %

## 2023-09-01 DIAGNOSIS — M54.16 LUMBAR RADICULOPATHY, CHRONIC: Chronic | ICD-10-CM

## 2023-09-01 DIAGNOSIS — R63.4 ABNORMAL WEIGHT LOSS: ICD-10-CM

## 2023-09-01 DIAGNOSIS — Z23 NEED FOR VACCINATION FOR STREP PNEUMONIAE: ICD-10-CM

## 2023-09-01 DIAGNOSIS — K21.9 GASTROESOPHAGEAL REFLUX DISEASE WITHOUT ESOPHAGITIS: ICD-10-CM

## 2023-09-01 DIAGNOSIS — I10 BENIGN ESSENTIAL HYPERTENSION: Primary | Chronic | ICD-10-CM

## 2023-09-01 PROBLEM — Z02.89 MEDICATION MANAGEMENT CONTRACT AGREEMENT: Status: ACTIVE | Noted: 2023-09-01

## 2023-09-01 RX ORDER — AMLODIPINE BESYLATE 5 MG/1
5 TABLET ORAL DAILY
Qty: 90 TABLET | Refills: 4 | Status: SHIPPED | OUTPATIENT
Start: 2023-09-01

## 2023-09-01 RX ORDER — PANTOPRAZOLE SODIUM 20 MG/1
20 TABLET, DELAYED RELEASE ORAL DAILY
Qty: 90 TABLET | Refills: 1 | Status: SHIPPED | OUTPATIENT
Start: 2023-09-01

## 2023-09-01 NOTE — PATIENT INSTRUCTIONS
"MyChart Tips:    MyChart is a useful part of our patient care program and is a great way for us to communicate lab results and for you to request refills.  Not all medical questions are appropriate for MyChart such as new medical issues that require taking a history, performing an exam, getting labs/studies or researching medical questions needed to be addressed in the office.    Examples of medical issues that are APPROPRIATE for MyChart:  -Follow-up on problems we have already addressed in a visit such as home testing results, blood pressure readings, glucose readings  -Questions that can be answered with a simple \"yes\" or \"no\"    Communication that is NOT APPROPRIATE for MyChart:  -MyChart is not for new problems, serious problems or urgent problems.  Urgent matters should be addressed by phone, in the office, urgent care or the ER.  -[employer] messages are not email.  Staff will check messages each weekday.  We strive for a 48-hour turnaround on messaging.    -[email or web address] is not for private issues.  Messages are received first by our office staff.    Please allow up to 7 business days for lab results to be sent through [employer] to you before contacting your provider.  Sometimes we are waiting for results to get back from the lab and also your provider needs time to analyze them thoroughly before making recommendations.  While the internet has great resources, it is not a substitute for interpreting lab results.    We also ask that you not send [employer] messages and telephone calls regarding the same issue simultaneously.  This slows down the process of returning your call/message and confuses staff.    Be mindful that [employer] messages and telephone calls become part of your permanent medical record.    Lastly, your provider cannot access your [employer].  It is a service which communicates with the EHR (Electronic Health Record).  Sometimes there are errors in [employer] that staff and providers cannot see and these errors " are not part of your EHR.  Vaccines and screening reminders have been incorrect in MyChart.    We appreciate your respect for the limitations and boundaries of [employer].

## 2023-09-01 NOTE — PROGRESS NOTES
"Chief Complaint  Follow-up, Hypertension, and Weight Loss    Subjective        Marilia Leo presents to [facility]  History of Present Illness    Hypertension - stable today in the office.  Patient taking medication as prescribed.    Patient is taking irbesartan, amlodipine.  Denies side effects of the medication.    She is remaining on the gabapentin at a low dose for lumbar radiculopathy.  She takes 1 in the morning and 2 at night of 300 mg.  PDMP reviewed and appropriate.    She has lost a few pounds due to low appetite.  She also has a known stomach issue and is interested in trying something other than Mylanta to control it.      Objective   Vital Signs:  /82 (BP Location: Left arm, Patient Position: Sitting, Cuff Size: Small Adult)   Pulse 84   Resp 18   Ht 162.6 cm (64\")   Wt 61.2 kg (135 lb)   SpO2 98%   BMI 23.17 kg/mý   Estimated body mass index is 23.17 kg/mý as calculated from the following:    Height as of this encounter: 162.6 cm (64\").    Weight as of this encounter: 61.2 kg (135 lb).       BMI is within normal parameters. No other follow-up for BMI required.      Physical Exam  Vitals and nursing note reviewed.   Constitutional:       General: She is not in acute distress.     Appearance: Normal appearance.   Cardiovascular:      Rate and Rhythm: Normal rate and regular rhythm.      Heart sounds: Normal heart sounds. No murmur heard.  Pulmonary:      Effort: Pulmonary effort is normal.      Breath sounds: Normal breath sounds.   Neurological:      Mental Status: She is alert.      Result Review :  The following data was reviewed by: Grover Garcias MD on 09/01/2023:  CMP          2/24/2023    11:39   CMP   Glucose 91    BUN 20    Creatinine 0.95    Sodium 142    Potassium 4.8    Chloride 103    Calcium 10.2    Total Protein 8.2    Albumin 4.7    Globulin 3.5    Total Bilirubin 0.4    Alkaline Phosphatase 90    AST (SGOT) 15    ALT (SGPT) 19  "   BUN/Creatinine Ratio 21.1                   Assessment and Plan   Diagnoses and all orders for this visit:    1. Benign essential hypertension (Primary)  -     amLODIPine (NORVASC) 5 MG tablet; Take 1 tablet by mouth Daily.  Dispense: 90 tablet; Refill: 4    2. Lumbar radiculopathy, chronic    3. Abnormal weight loss    4. Gastroesophageal reflux disease without esophagitis  -     pantoprazole (Protonix) 20 MG EC tablet; Take 1 tablet by mouth Daily.  Dispense: 90 tablet; Refill: 1    5. Need for vaccination for Strep pneumoniae  -     Pneumococcal Conjugate Vaccine 20-Valent (PCV20)        Clinically stable chronic conditions as above.  Continue all medications as above.  Labs reviewed/ordered as above.           Follow Up   Return in about 6 months (around 3/1/2024) for Medicare Wellness.  Patient was given instructions and counseling regarding her condition or for health maintenance advice. Please see specific information pulled into the AVS if appropriate.

## 2023-09-08 ENCOUNTER — OFFICE VISIT (OUTPATIENT)
Dept: PAIN MEDICINE | Facility: CLINIC | Age: 78
End: 2023-09-08
Payer: MEDICARE

## 2023-09-08 VITALS
HEART RATE: 85 BPM | DIASTOLIC BLOOD PRESSURE: 70 MMHG | WEIGHT: 135 LBS | TEMPERATURE: 97.3 F | RESPIRATION RATE: 12 BRPM | HEIGHT: 64 IN | SYSTOLIC BLOOD PRESSURE: 120 MMHG | BODY MASS INDEX: 23.05 KG/M2 | OXYGEN SATURATION: 99 %

## 2023-09-08 DIAGNOSIS — M48.061 LUMBAR FORAMINAL STENOSIS: ICD-10-CM

## 2023-09-08 DIAGNOSIS — M25.551 BILATERAL HIP PAIN: ICD-10-CM

## 2023-09-08 DIAGNOSIS — M54.50 CHRONIC BILATERAL LOW BACK PAIN, UNSPECIFIED WHETHER SCIATICA PRESENT: ICD-10-CM

## 2023-09-08 DIAGNOSIS — M25.552 BILATERAL HIP PAIN: ICD-10-CM

## 2023-09-08 DIAGNOSIS — M54.16 LUMBAR RADICULOPATHY, CHRONIC: Primary | ICD-10-CM

## 2023-09-08 DIAGNOSIS — G89.29 CHRONIC BILATERAL LOW BACK PAIN, UNSPECIFIED WHETHER SCIATICA PRESENT: ICD-10-CM

## 2023-09-08 DIAGNOSIS — M53.3 SACROILIAC JOINT DYSFUNCTION OF BOTH SIDES: ICD-10-CM

## 2023-09-08 PROCEDURE — 1160F RVW MEDS BY RX/DR IN RCRD: CPT

## 2023-09-08 PROCEDURE — 3074F SYST BP LT 130 MM HG: CPT

## 2023-09-08 PROCEDURE — 99212 OFFICE O/P EST SF 10 MIN: CPT

## 2023-09-08 PROCEDURE — 3078F DIAST BP <80 MM HG: CPT

## 2023-09-08 PROCEDURE — 1125F AMNT PAIN NOTED PAIN PRSNT: CPT

## 2023-09-08 PROCEDURE — 1159F MED LIST DOCD IN RCRD: CPT

## 2023-09-08 NOTE — PROGRESS NOTES
CHIEF COMPLAINT  Back and bilateral leg pain    Subjective   Marilia Leo is a 78 y.o. female  who presents to the office for follow-up of procedure.  She completed a Right L2 & L5 TF LESI on 8/15/23 performed by Dr. Boateng for management of low back and right leg pain. Patient reports 50% ongoing relief from the procedure. She reports that she has been able to be more active around her house since the procedure.     Today pain is 4/10VAS in severity. Pain is located in her low back. She reports intermittent pain to her left lateral thight. Right leg pain has improved and she is no longer experiencing groin pain. Describes this pain as an intermittent aching and burning. Pain is worsened by lying flat, prolonged standing or sitting, and walking long distances. Pain improves with rest/reposition, heat/ice, and medications. Patient continues with Gabapentin 100mg QID (managed by PCP) and Tylenol 500mg PRN. She has completed PT for her neck pain and reports that this was somewhat helpful for her pain.      History of glaucoma. Patient is legally blind. Remaining vision is peripheral vision in her left eye.      Unable to tolerate Tramadol and Codeine - both caused nausea, Robaxin - patient did not like how medication made her feel     Patient followed up with Dr. Toribio on 6/28/23 in regards to bilateral hip pain.  In review of his note he noted that he believes a lot of the pain on her left side may be coming from her back.  He notes no obvious arthritis in her hips.  Discussed getting a MRI of her hip.  Encouraged patient to discuss with pain management about getting a injection to the right hip to see if this is helpful with her pain.  Patient to follow-up with as needed.    Procedures:  8/15/23 - Right L2 & L5 TF LESI - 50% ongoing relief  2/16/23 - Left L5 & S1 TF ROQUE - 80% relief x 2.5 months  1/11/23 - Bilateral SI joint injections- 100% relief to right side, 45-50% relief to left side x 1 week     History of  previous epidurals at [facility] - she reports that these injections were initially helpful but the last injections she had there were unsuccessful.     Back Pain  This is a chronic problem. The current episode started more than 1 year ago. The problem occurs intermittently. The problem has been gradually worsening since onset. The pain is present in the lumbar spine. The quality of the pain is described as aching and burning. The pain radiates to the left thigh (radiates down left buttock in posterior/lateral thigh - does not pass knee). The pain is at a severity of 7/10. The pain is moderate. The symptoms are aggravated by standing, bending, sitting, position and lying down (walking long distances, increased physical activity). Stiffness is present In the morning. Pertinent negatives include no abdominal pain, chest pain, dysuria, fever, headaches, numbness or weakness. She has tried heat and ice (Gabapentin, ) for the symptoms. The treatment provided mild relief.      PEG Assessment   What number best describes your pain on average in the past week?4  What number best describes how, during the past week, pain has interfered with your enjoyment of life?4  What number best describes how, during the past week, pain has interfered with your general activity?  4    The following portions of the patient's history were reviewed and updated as appropriate: allergies, current medications, past family history, past medical history, past social history, past surgical history, and problem list.    Review of Systems   Constitutional:  Negative for activity change, fatigue and fever.   HENT:  Negative for congestion.    Respiratory:  Negative for cough and chest tightness.    Cardiovascular:  Negative for chest pain.   Gastrointestinal:  Positive for diarrhea. Negative for abdominal pain and constipation.   Genitourinary:  Negative for difficulty urinating and dysuria.   Musculoskeletal:  Positive for back pain.  "  Neurological:  Negative for dizziness, weakness, numbness and headaches.   Psychiatric/Behavioral:  Positive for agitation and sleep disturbance. Negative for suicidal ideas. The patient is not nervous/anxious.      I have reviewed and confirmed the accuracy of the ROS as documented by the MA/LPN/RN GISELL Ma     Vitals:    09/08/23 1314 09/08/23 1632   BP: 120/70    BP Location: Left arm    Patient Position: Sitting    Cuff Size: Large Adult    Pulse: 85    Resp: 12    Temp: 97.3 °F (36.3 °C)    TempSrc: Temporal    SpO2: 99%    Weight: 62.3 kg (137 lb 6.4 oz) 61.2 kg (135 lb)  Comment: patient asked to recheck weight following office visit   Height: 162.6 cm (64\")    PainSc:   4      Objective   Physical Exam  Constitutional:       Appearance: Normal appearance.   HENT:      Head: Normocephalic.   Cardiovascular:      Rate and Rhythm: Normal rate and regular rhythm.   Pulmonary:      Effort: Pulmonary effort is normal.      Breath sounds: Normal breath sounds.   Musculoskeletal:      Cervical back: Normal range of motion.      Lumbar back: Tenderness and bony tenderness present. Decreased range of motion. Positive right straight leg raise test and positive left straight leg raise test.      Right hip: No tenderness. Normal range of motion.      Left hip: Tenderness present. Normal range of motion.   Skin:     General: Skin is warm and dry.      Capillary Refill: Capillary refill takes less than 2 seconds.   Neurological:      General: No focal deficit present.      Mental Status: She is alert and oriented to person, place, and time.      Gait: Gait abnormal (slowed, ambulates with cane).   Psychiatric:         Mood and Affect: Mood normal.         Behavior: Behavior normal.         Thought Content: Thought content normal.         Cognition and Memory: Cognition normal.     Assessment & Plan   Diagnoses and all orders for this visit:    1. Lumbar radiculopathy, chronic (Primary)    2. Chronic " bilateral low back pain, unspecified whether sciatica present    3. Lumbar foraminal stenosis    4. Sacroiliac joint dysfunction of both sides    5. Bilateral hip pain      --- Offered referral to PT for back ROM and stretching exercises - patient will think about this and contact our office if needed   --- May consider lumbar MBB/RFA if axial back pain were to worsen  --- Follow-up as needed for increased pain and/or to repeat injections    Marilia Leo reports a pain score of 4.  Given her pain assessment as noted, treatment options were discussed and the following options were decided upon as a follow-up plan to address the patient's pain: continuation of current treatment plan for pain. Follow up as needed.         CHASE REPORT  As the clinician, I personally reviewed the CHASE from 9/8/23 while the patient was in the office today.    Dictated utilizing Dragon dictation.

## 2023-10-15 NOTE — ED NOTES
Pt c/o cough, left rib pain, sore throat that started on Friday. Pain is worse with coughing and movement. Denies fever.    This RN wore mask and goggles during time of contact       Cristiane Du RN  01/03/22 [id number]     PAST SURGICAL HISTORY:  [place] teeth extracted

## 2023-11-13 ENCOUNTER — PREP FOR SURGERY (OUTPATIENT)
Dept: SURGERY | Facility: SURGERY CENTER | Age: 78
End: 2023-11-13
Payer: MEDICARE

## 2023-11-13 ENCOUNTER — OFFICE VISIT (OUTPATIENT)
Dept: PAIN MEDICINE | Facility: CLINIC | Age: 78
End: 2023-11-13
Payer: MEDICARE

## 2023-11-13 ENCOUNTER — HOSPITAL ENCOUNTER (OUTPATIENT)
Dept: GENERAL RADIOLOGY | Facility: HOSPITAL | Age: 78
Discharge: HOME | End: 2023-11-13
Payer: MEDICARE

## 2023-11-13 VITALS
OXYGEN SATURATION: 98 % | RESPIRATION RATE: 18 BRPM | HEIGHT: 64 IN | SYSTOLIC BLOOD PRESSURE: 144 MMHG | DIASTOLIC BLOOD PRESSURE: 90 MMHG | WEIGHT: 137.6 LBS | HEART RATE: 95 BPM | BODY MASS INDEX: 23.49 KG/M2 | TEMPERATURE: 96.6 F

## 2023-11-13 DIAGNOSIS — M25.551 BILATERAL HIP PAIN: ICD-10-CM

## 2023-11-13 DIAGNOSIS — M54.50 CHRONIC BILATERAL LOW BACK PAIN, UNSPECIFIED WHETHER SCIATICA PRESENT: ICD-10-CM

## 2023-11-13 DIAGNOSIS — M25.552 BILATERAL HIP PAIN: ICD-10-CM

## 2023-11-13 DIAGNOSIS — G89.29 CHRONIC BILATERAL LOW BACK PAIN, UNSPECIFIED WHETHER SCIATICA PRESENT: ICD-10-CM

## 2023-11-13 DIAGNOSIS — M25.551 BILATERAL HIP PAIN: Primary | ICD-10-CM

## 2023-11-13 DIAGNOSIS — M53.3 SACROILIAC JOINT DYSFUNCTION OF BOTH SIDES: ICD-10-CM

## 2023-11-13 DIAGNOSIS — M48.061 LUMBAR FORAMINAL STENOSIS: ICD-10-CM

## 2023-11-13 DIAGNOSIS — M25.552 BILATERAL HIP PAIN: Primary | ICD-10-CM

## 2023-11-13 DIAGNOSIS — M54.16 LUMBAR RADICULOPATHY, CHRONIC: Primary | ICD-10-CM

## 2023-11-13 DIAGNOSIS — M54.16 LUMBAR RADICULOPATHY, CHRONIC: ICD-10-CM

## 2023-11-13 PROCEDURE — 3077F SYST BP >= 140 MM HG: CPT

## 2023-11-13 PROCEDURE — 1160F RVW MEDS BY RX/DR IN RCRD: CPT

## 2023-11-13 PROCEDURE — 73521 X-RAY EXAM HIPS BI 2 VIEWS: CPT

## 2023-11-13 PROCEDURE — 1125F AMNT PAIN NOTED PAIN PRSNT: CPT

## 2023-11-13 PROCEDURE — 3080F DIAST BP >= 90 MM HG: CPT

## 2023-11-13 PROCEDURE — 1159F MED LIST DOCD IN RCRD: CPT

## 2023-11-13 PROCEDURE — 99214 OFFICE O/P EST MOD 30 MIN: CPT

## 2023-11-13 RX ORDER — KETOROLAC TROMETHAMINE 10 MG/1
10 TABLET, FILM COATED ORAL EVERY 6 HOURS PRN
Qty: 20 TABLET | Refills: 0 | Status: SHIPPED | OUTPATIENT
Start: 2023-11-13

## 2023-11-13 RX ORDER — KETOROLAC TROMETHAMINE 30 MG/ML
30 INJECTION, SOLUTION INTRAMUSCULAR; INTRAVENOUS ONCE
Status: COMPLETED | OUTPATIENT
Start: 2023-11-13 | End: 2023-11-13

## 2023-11-13 RX ORDER — PREDNISOLONE ACETATE 10 MG/ML
SUSPENSION/ DROPS OPHTHALMIC
COMMUNITY
Start: 2023-09-27

## 2023-11-13 RX ORDER — DIAZEPAM 5 MG/1
10 TABLET ORAL ONCE
Status: CANCELLED | OUTPATIENT
Start: 2023-11-13

## 2023-11-13 RX ADMIN — KETOROLAC TROMETHAMINE 30 MG: 30 INJECTION, SOLUTION INTRAMUSCULAR; INTRAVENOUS at 10:46

## 2023-11-13 NOTE — H&P (VIEW-ONLY)
CHIEF COMPLAINT  Back and hip pain    Subjective   Marilia Leo is a 78 y.o. female  who presents for follow-up.  She has a history of chronic low back pain. She reports that her pain has worsened since her last office visit.     Today pain is 10/10VAS in severity (patient appears uncomfortable, rubbing legs). Pain is located in her low back and radiates into bilateral groind and down anterior thighs. Describes this pain as a nearly continuous aching and burning. Pain is worsened by lying flat, prolonged standing or sitting, and walking long distances. Pain improves with rest/reposition, heat/ice, and medications. Patient continues with Gabapentin 100mg QID (managed by PCP) and Tylenol 500mg PRN. She has completed PT for her neck pain and reports that this was somewhat helpful for her pain.      History of glaucoma. Patient is legally blind. Remaining vision is peripheral vision in her left eye.      Unable to tolerate Tramadol and Codeine - both caused nausea, Robaxin - patient did not like how medication made her feel     Patient followed up with Dr. Toribio on 6/28/23 in regards to bilateral hip pain.  In review of his note he noted that he believes a lot of the pain on her left side may be coming from her back.  He notes no obvious arthritis in her hips. Discussed getting a MRI of her hip.  Encouraged patient to discuss with pain management about getting a injection to the right hip to see if this is helpful with her pain.  Patient to follow-up with as needed.     Procedures:  8/15/23 - Right L2 & L5 TF LESI - 50% relief x 2 months  2/16/23 - Left L5 & S1 TF ROQUE - 80% relief x 2.5 months  1/11/23 - Bilateral SI joint injections- 100% relief to right side, 45-50% relief to left side x 1 week     History of previous epidurals at [facility] - she reports that these injections were initially helpful but the last injections she had there were unsuccessful.     Back Pain  This is a chronic problem. The current  episode started more than 1 year ago. The problem occurs intermittently. The problem has been gradually worsening since onset. The pain is present in the lumbar spine. The quality of the pain is described as aching and burning. The pain radiates to the left thigh (radiates down left buttock in posterior/lateral thigh - does not pass knee). The pain is at a severity of 7/10. The pain is moderate. The symptoms are aggravated by standing, bending, sitting, position and lying down (walking long distances, increased physical activity). Stiffness is present In the morning. Pertinent negatives include no chest pain or weakness. She has tried heat and ice (Gabapentin, ) for the symptoms. The treatment provided mild relief.   Hip Pain   The incident occurred more than 1 week ago. The injury mechanism is unknown. The pain is present in the right hip and left hip. The quality of the pain is described as aching. The pain is at a severity of 10/10. The pain is severe. The pain has been Worsening since onset. The symptoms are aggravated by movement and weight bearing. She has tried acetaminophen, non-weight bearing and rest (Lidocaine patches, Gabapentin) for the symptoms. The treatment provided mild relief.     PEG Assessment   What number best describes your pain on average in the past week?8  What number best describes how, during the past week, pain has interfered with your enjoyment of life?10  What number best describes how, during the past week, pain has interfered with your general activity?  10    Review of Pertinent Medical Data ---      The following portions of the patient's history were reviewed and updated as appropriate: allergies, current medications, past family history, past medical history, past social history, past surgical history, and problem list.    Review of Systems   Constitutional:  Positive for activity change. Negative for fatigue.   Cardiovascular:  Negative for chest pain.   Genitourinary:  Negative  "for difficulty urinating.   Musculoskeletal:  Positive for back pain.   Neurological:  Negative for dizziness, weakness and light-headedness.   Psychiatric/Behavioral:  Negative for agitation, sleep disturbance and suicidal ideas. The patient is nervous/anxious.      I have reviewed and confirmed the accuracy of the ROS as documented by the MA/LPN/RN GISELL Ma    Vitals:    11/13/23 1012   BP: 144/90   BP Location: Left arm   Patient Position: Sitting   Cuff Size: Adult   Pulse: 95   Resp: 18   Temp: 96.6 °F (35.9 °C)   SpO2: 98%   Weight: 62.4 kg (137 lb 9.6 oz)   Height: 162.6 cm (64\")   PainSc: 10-Worst pain ever     Objective   Physical Exam  Constitutional:       Appearance: Normal appearance.   HENT:      Head: Normocephalic.   Cardiovascular:      Rate and Rhythm: Normal rate and regular rhythm.   Pulmonary:      Effort: Pulmonary effort is normal.      Breath sounds: Normal breath sounds.   Musculoskeletal:      Cervical back: Normal range of motion.      Lumbar back: Tenderness and bony tenderness present. Decreased range of motion. Positive right straight leg raise test and positive left straight leg raise test.      Right hip: No tenderness. Normal range of motion.      Left hip: Tenderness present. Normal range of motion.   Skin:     General: Skin is warm and dry.      Capillary Refill: Capillary refill takes less than 2 seconds.   Neurological:      General: No focal deficit present.      Mental Status: She is alert and oriented to person, place, and time.      Gait: Gait abnormal (slowed, ambulates with cane).   Psychiatric:         Mood and Affect: Mood normal.         Behavior: Behavior normal.         Thought Content: Thought content normal.         Cognition and Memory: Cognition normal.       Assessment & Plan   Diagnoses and all orders for this visit:    1. Bilateral hip pain (Primary)  -     XR Hips Bilateral With or Without Pelvis 2 View; Future  -     ketorolac (TORADOL) injection " 30 mg    2. Lumbar radiculopathy, chronic  -     ketorolac (TORADOL) 10 MG tablet; Take 1 tablet by mouth Every 6 (Six) Hours As Needed for Moderate Pain.  Dispense: 20 tablet; Refill: 0    3. Chronic bilateral low back pain, unspecified whether sciatica present  -     ketorolac (TORADOL) injection 30 mg  -     ketorolac (TORADOL) 10 MG tablet; Take 1 tablet by mouth Every 6 (Six) Hours As Needed for Moderate Pain.  Dispense: 20 tablet; Refill: 0    4. Lumbar foraminal stenosis    5. Sacroiliac joint dysfunction of both sides    --- L2/L3 LESI (please evaluate painful level under fluro)   ---  Indications for epidural injection:  Plan is to proceed with epidural at the appropriate level.  If the patient receives significant pain reduction and improvement in function and the plan will be to repeat the epidural when the pain worsens.  If a second epidural provides at least 6 weeks of sustained improvement that includes both pain reduction and improvement in function then an epidural injection could be repeated once again at the same level.  This is a mutual decision between the clinician and the patient that includes discussions including risks and benefits in detail as well as alternative therapies.  Patient's questions were answered to their satisfaction and to their understanding.  ---  Discussed with the patient that sedation is optional for this procedure.  The sedation offered is called conscious sedation which is different from general anesthesia that is utilized in surgical procedures. The dosing of the sedation is determined by the physician and they will be monitored throughout the procedure. With conscious sedation it is possible to remember parts or all of the procedure, this is normal. They will need to have a  with them as driving is prohibited following conscious sedation.      NPO instructions for conscious sedation:  --- Do not eat 6 hours prior to the procedure.   --- Do not drink any dairy or  citrus 4 hours prior to the procedure.   --- Do not drink anything, including clear liquids, 2 hours prior to procedure.      If the NPO instructions are not followed then the procedure may be performed without sedation or the procedure will need to be rescheduled.     --- X-ray bilateral hips due to worsening   --- IM Toradol 30mg in office for sever flare in pain. Will also prescribe Toradol 10mg every 6 hours for 5 days. Patient instructed not to take NSAID medication during this time. Patient verbalized understanding.   --- Follow-up for procedure     Marilia Leo reports a pain score of 10.  Given her pain assessment as noted, treatment options were discussed and the following options were decided upon as a follow-up plan to address the patient's pain: IM Toradol injection for severe flare in pain. Patient also scheduled for OVI STONE REPORT  As the clinician, I personally reviewed the CHASE from 11/13/23 while the patient was in the office today.    Dictated utilizing Dragon dictation.

## 2023-11-13 NOTE — PROGRESS NOTES
CHIEF COMPLAINT  Back and hip pain    Subjective   Marilia Leo is a 78 y.o. female  who presents for follow-up.  She has a history of chronic low back pain. She reports that her pain has worsened since her last office visit.     Today pain is 10/10VAS in severity (patient appears uncomfortable, rubbing legs). Pain is located in her low back and radiates into bilateral groind and down anterior thighs. Describes this pain as a nearly continuous aching and burning. Pain is worsened by lying flat, prolonged standing or sitting, and walking long distances. Pain improves with rest/reposition, heat/ice, and medications. Patient continues with Gabapentin 100mg QID (managed by PCP) and Tylenol 500mg PRN. She has completed PT for her neck pain and reports that this was somewhat helpful for her pain.      History of glaucoma. Patient is legally blind. Remaining vision is peripheral vision in her left eye.      Unable to tolerate Tramadol and Codeine - both caused nausea, Robaxin - patient did not like how medication made her feel     Patient followed up with Dr. Toribio on 6/28/23 in regards to bilateral hip pain.  In review of his note he noted that he believes a lot of the pain on her left side may be coming from her back.  He notes no obvious arthritis in her hips. Discussed getting a MRI of her hip.  Encouraged patient to discuss with pain management about getting a injection to the right hip to see if this is helpful with her pain.  Patient to follow-up with as needed.     Procedures:  8/15/23 - Right L2 & L5 TF LESI - 50% relief x 2 months  2/16/23 - Left L5 & S1 TF ROQUE - 80% relief x 2.5 months  1/11/23 - Bilateral SI joint injections- 100% relief to right side, 45-50% relief to left side x 1 week     History of previous epidurals at [facility] - she reports that these injections were initially helpful but the last injections she had there were unsuccessful.     Back Pain  This is a chronic problem. The current  episode started more than 1 year ago. The problem occurs intermittently. The problem has been gradually worsening since onset. The pain is present in the lumbar spine. The quality of the pain is described as aching and burning. The pain radiates to the left thigh (radiates down left buttock in posterior/lateral thigh - does not pass knee). The pain is at a severity of 7/10. The pain is moderate. The symptoms are aggravated by standing, bending, sitting, position and lying down (walking long distances, increased physical activity). Stiffness is present In the morning. Pertinent negatives include no chest pain or weakness. She has tried heat and ice (Gabapentin, ) for the symptoms. The treatment provided mild relief.   Hip Pain   The incident occurred more than 1 week ago. The injury mechanism is unknown. The pain is present in the right hip and left hip. The quality of the pain is described as aching. The pain is at a severity of 10/10. The pain is severe. The pain has been Worsening since onset. The symptoms are aggravated by movement and weight bearing. She has tried acetaminophen, non-weight bearing and rest (Lidocaine patches, Gabapentin) for the symptoms. The treatment provided mild relief.     PEG Assessment   What number best describes your pain on average in the past week?8  What number best describes how, during the past week, pain has interfered with your enjoyment of life?10  What number best describes how, during the past week, pain has interfered with your general activity?  10    Review of Pertinent Medical Data ---      The following portions of the patient's history were reviewed and updated as appropriate: allergies, current medications, past family history, past medical history, past social history, past surgical history, and problem list.    Review of Systems   Constitutional:  Positive for activity change. Negative for fatigue.   Cardiovascular:  Negative for chest pain.   Genitourinary:  Negative  "for difficulty urinating.   Musculoskeletal:  Positive for back pain.   Neurological:  Negative for dizziness, weakness and light-headedness.   Psychiatric/Behavioral:  Negative for agitation, sleep disturbance and suicidal ideas. The patient is nervous/anxious.      I have reviewed and confirmed the accuracy of the ROS as documented by the MA/LPN/RN IGSELL Ma    Vitals:    11/13/23 1012   BP: 144/90   BP Location: Left arm   Patient Position: Sitting   Cuff Size: Adult   Pulse: 95   Resp: 18   Temp: 96.6 °F (35.9 °C)   SpO2: 98%   Weight: 62.4 kg (137 lb 9.6 oz)   Height: 162.6 cm (64\")   PainSc: 10-Worst pain ever     Objective   Physical Exam  Constitutional:       Appearance: Normal appearance.   HENT:      Head: Normocephalic.   Cardiovascular:      Rate and Rhythm: Normal rate and regular rhythm.   Pulmonary:      Effort: Pulmonary effort is normal.      Breath sounds: Normal breath sounds.   Musculoskeletal:      Cervical back: Normal range of motion.      Lumbar back: Tenderness and bony tenderness present. Decreased range of motion. Positive right straight leg raise test and positive left straight leg raise test.      Right hip: No tenderness. Normal range of motion.      Left hip: Tenderness present. Normal range of motion.   Skin:     General: Skin is warm and dry.      Capillary Refill: Capillary refill takes less than 2 seconds.   Neurological:      General: No focal deficit present.      Mental Status: She is alert and oriented to person, place, and time.      Gait: Gait abnormal (slowed, ambulates with cane).   Psychiatric:         Mood and Affect: Mood normal.         Behavior: Behavior normal.         Thought Content: Thought content normal.         Cognition and Memory: Cognition normal.       Assessment & Plan   Diagnoses and all orders for this visit:    1. Bilateral hip pain (Primary)  -     XR Hips Bilateral With or Without Pelvis 2 View; Future  -     ketorolac (TORADOL) injection " 30 mg    2. Lumbar radiculopathy, chronic  -     ketorolac (TORADOL) 10 MG tablet; Take 1 tablet by mouth Every 6 (Six) Hours As Needed for Moderate Pain.  Dispense: 20 tablet; Refill: 0    3. Chronic bilateral low back pain, unspecified whether sciatica present  -     ketorolac (TORADOL) injection 30 mg  -     ketorolac (TORADOL) 10 MG tablet; Take 1 tablet by mouth Every 6 (Six) Hours As Needed for Moderate Pain.  Dispense: 20 tablet; Refill: 0    4. Lumbar foraminal stenosis    5. Sacroiliac joint dysfunction of both sides    --- L2/L3 LESI (please evaluate painful level under fluro)   ---  Indications for epidural injection:  Plan is to proceed with epidural at the appropriate level.  If the patient receives significant pain reduction and improvement in function and the plan will be to repeat the epidural when the pain worsens.  If a second epidural provides at least 6 weeks of sustained improvement that includes both pain reduction and improvement in function then an epidural injection could be repeated once again at the same level.  This is a mutual decision between the clinician and the patient that includes discussions including risks and benefits in detail as well as alternative therapies.  Patient's questions were answered to their satisfaction and to their understanding.  ---  Discussed with the patient that sedation is optional for this procedure.  The sedation offered is called conscious sedation which is different from general anesthesia that is utilized in surgical procedures. The dosing of the sedation is determined by the physician and they will be monitored throughout the procedure. With conscious sedation it is possible to remember parts or all of the procedure, this is normal. They will need to have a  with them as driving is prohibited following conscious sedation.      NPO instructions for conscious sedation:  --- Do not eat 6 hours prior to the procedure.   --- Do not drink any dairy or  citrus 4 hours prior to the procedure.   --- Do not drink anything, including clear liquids, 2 hours prior to procedure.      If the NPO instructions are not followed then the procedure may be performed without sedation or the procedure will need to be rescheduled.     --- X-ray bilateral hips due to worsening   --- IM Toradol 30mg in office for sever flare in pain. Will also prescribe Toradol 10mg every 6 hours for 5 days. Patient instructed not to take NSAID medication during this time. Patient verbalized understanding.   --- Follow-up for procedure     Marilia Leo reports a pain score of 10.  Given her pain assessment as noted, treatment options were discussed and the following options were decided upon as a follow-up plan to address the patient's pain: IM Toradol injection for severe flare in pain. Patient also scheduled for OVI STONE REPORT  As the clinician, I personally reviewed the CHASE from 11/13/23 while the patient was in the office today.    Dictated utilizing Dragon dictation.

## 2023-11-14 NOTE — H&P (VIEW-ONLY)
Please let her know that her hip xray shows mild osteoarthritis. No fractures noted. We can discuss a hip injection at her next office visit. This injection uses a steroid and may help with some of her arthritic pain.

## 2023-11-17 ENCOUNTER — HOSPITAL ENCOUNTER (OUTPATIENT)
Dept: GENERAL RADIOLOGY | Facility: SURGERY CENTER | Age: 78
Setting detail: HOSPITAL OUTPATIENT SURGERY
End: 2023-11-17
Payer: MEDICARE

## 2023-11-17 ENCOUNTER — HOSPITAL ENCOUNTER (OUTPATIENT)
Facility: SURGERY CENTER | Age: 78
Setting detail: HOSPITAL OUTPATIENT SURGERY
Discharge: HOME | End: 2023-11-17
Attending: ANESTHESIOLOGY | Admitting: ANESTHESIOLOGY
Payer: MEDICARE

## 2023-11-17 ENCOUNTER — TRANSCRIBE ORDERS (OUTPATIENT)
Dept: SURGERY | Facility: SURGERY CENTER | Age: 78
End: 2023-11-17
Payer: MEDICARE

## 2023-11-17 VITALS
SYSTOLIC BLOOD PRESSURE: 150 MMHG | TEMPERATURE: 98.2 F | OXYGEN SATURATION: 99 % | RESPIRATION RATE: 16 BRPM | HEART RATE: 77 BPM | DIASTOLIC BLOOD PRESSURE: 77 MMHG

## 2023-11-17 DIAGNOSIS — Z41.9 SURGERY, ELECTIVE: Primary | ICD-10-CM

## 2023-11-17 DIAGNOSIS — M54.16 LUMBAR RADICULOPATHY, CHRONIC: ICD-10-CM

## 2023-11-17 DIAGNOSIS — Z41.9 SURGERY, ELECTIVE: ICD-10-CM

## 2023-11-17 PROCEDURE — 76000 FLUOROSCOPY <1 HR PHYS/QHP: CPT

## 2023-11-17 PROCEDURE — 62323 NJX INTERLAMINAR LMBR/SAC: CPT | Performed by: ANESTHESIOLOGY

## 2023-11-17 PROCEDURE — 25010000002 METHYLPREDNISOLONE PER 80 MG: Performed by: ANESTHESIOLOGY

## 2023-11-17 PROCEDURE — 25510000001 IOPAMIDOL 61 % SOLUTION 30 ML VIAL: Performed by: ANESTHESIOLOGY

## 2023-11-17 PROCEDURE — 77002 NEEDLE LOCALIZATION BY XRAY: CPT

## 2023-11-17 PROCEDURE — 25010000002 BUPIVACAINE (PF) 0.5 % SOLUTION 10 ML VIAL: Performed by: ANESTHESIOLOGY

## 2023-11-17 RX ORDER — DIAZEPAM 5 MG/1
10 TABLET ORAL ONCE
Status: COMPLETED | OUTPATIENT
Start: 2023-11-17 | End: 2023-11-17

## 2023-11-17 RX ADMIN — DIAZEPAM 10 MG: 5 TABLET ORAL at 07:50

## 2023-11-17 NOTE — DISCHARGE INSTRUCTIONS
[facility] - Post-procedure Instructions          --  While there are no absolute restrictions, it is recommended that you do not perform strenuous activity today. In the morning, you may resume your level of activity as before your block.    --  If you have a band-aid at your injection site, please remove it later today. Observe the area for any redness, swelling, pus-like drainage, or a temperature over 101°. If any of these symptoms occur, please call your doctor at [phone]. If after office hours, leave a message and the on-call provider will return your call.    --  Ice may be applied to your injection site. It is recommended you avoid direct heat (heating pad; hot tub) for 1-2 days.    --  Call [facility] at [phone] if you experience persistent headache, persistent bleeding from the injection site, or severe pain not relieved by heat or oral medication.    --  Do not make important decisions today.    --  Due to the effects of the block and/or the I.V. Sedation, DO NOT drive or operate hazardous machinery for 12 hours.  Local anesthetics may cause numbness after procedure and precautions must be taken with regards to operating equipment as well as with walking, even if ambulating with assistance of another person or with an assistive device.    --  Do not drink alcohol for 12 hours.    -- You may return to work tomorrow, or as directed by your referring doctor.    --  Occasionally you may notice a slight increase in your pain after the procedure. This should start to improve within the next 24-48 hours. Radiofrequency ablation procedure pain may last 3-4 weeks.    --  It may take as long as 3-4 days before you notice a gradual improvement in your pain and/or other symptoms.    -- You may continue to take your prescribed pain medication as needed.    --  Some normal possible side effects of steroid use could include fluid retention, increased blood sugar, dull headache,  increased sweating, increased appetite, mood swings and flushing.    --  Diabetics are recommended to watch their blood glucose level closely for 24-48 hours after the injection.    --  Must stay in PACU for 20 min upon arrival and prove no leg weakness before being discharged.    --  IN THE EVENT OF A LIFE THREATENING EMERGENCY, (CHEST PAIN, BREATHING DIFFICULTIES, PARALYSIS…) YOU SHOULD GO TO YOUR NEAREST EMERGENCY ROOM.    --  You should be contacted by our office within 2-3 days to schedule follow up or next appointment date.  If not contacted within 7 days, please call the office at [phone]

## 2023-11-17 NOTE — OP NOTE
Lumbar Epidural Steroid Injection  [facility]    PREOPERATIVE DIAGNOSIS:   Lumbar Degenerative Disc Disease, Lumbar Spinal Stenosis unspecified regarding Neurogenic Claudication, and bilateral Lumbar Radiculopathy  POSTOPERATIVE DIAGNOSIS:  Same as preop diagnosis    PROCEDURE:   Lumbar Epidural Steroid Injection, Therapeutic Translaminar Injection, with epidurogram, at  L2/L3 level    PRE-PROCEDURE DISCUSSION WITH PATIENT:    Risks and complications were discussed with the patient prior to starting the procedure and informed consent was obtained.  We discussed various topics including but not limited to bleeding, infection, injury, paralysis, nerve injury, dural puncture, coma, death, worsening of clinical picture, lack of pain relief, and postprocedural soreness.    SURGEON:  Gigi Boateng MD    REASON FOR PROCEDURE:    Previous clinically significant therapeutic effect is noted., Degenerative changes are noted in the area., Stenotic area is noted, and is likely contributing to this chronic &/or recurrent pain. , and Radiating pattern of pain is likely consistent with degenerative changes in the area.    SEDATION:  The patient had higher than average levels of procedural anxiety and the need to provide additional procedural sedation was needed to safely proceed. and she received diazepam 10 mg by mouth in the preoperative area  ANESTHETIC:  Marcaine 0.25%  STEROID:   Methylprednisolone (DEPO MEDROL) 80mg/ml    DESCRIPTON OF PROCEDURE:    After obtaining informed consent, I.V. was not started in the preop area.   The patient was taken to the operating room and placed in the prone position.  EKG, blood pressure, and pulse oximeter were monitored throughout, and sedation was provided as needed by the RN under my guidance. All pressure points were well padded.  The lumbar spine area was prepped with Chloraprep and draped in a sterile fashion.  Under fluoroscopic guidance, the above mentioned  interlaminar space was identified. Skin and subcutaneous tissues were anesthetized with 1% lidocaine in the middle of the space. A Tuohy needle was introduced through the skin and advanced to this interlaminar space and into the epidural space under fluoroscopic guidance and verified with loss-of-resistance technique to air.  After confirming the position of the needle with the fluoroscope with all the views, and after aspiration was confirmed negative for blood and CSF, 1.5 mL of Omnipaque was injected.  After seeing appropriate epidurogram with lateral and PA views, a total of 3 cc solution was injected, consisting of 1cc of local anesthetic as above, with normal saline and injectable steroid as above.     ESTIMATED BLOOD LOSS:  <5 mL  SPECIMENS:  None    COMPLICATIONS:     No complications were noted., There was no indication of vascular uptake on live injection of contrast dye., There was no indication of intrathecal uptake on live injection of contrast dye., There was not any evidence of dural puncture.  , and The patient did not have any signs of postprocedure numbness nor weakness.    TOLERANCE & DISCHARGE CONDITION:    The patient tolerated the procedure well.  The patient was transported to the recovery area without difficulties.  The patient was discharged to home under the care of family in stable and satisfactory condition.    PLAN OF CARE:  The patient was given our standard instruction sheet.  The patient will Return to clinic 4-6 wks  The patient will resume all medications as per the medication reconciliation sheet.

## 2023-12-15 ENCOUNTER — OFFICE VISIT (OUTPATIENT)
Dept: PAIN MEDICINE | Facility: CLINIC | Age: 78
End: 2023-12-15
Payer: MEDICARE

## 2023-12-15 VITALS
OXYGEN SATURATION: 97 % | TEMPERATURE: 97.1 F | WEIGHT: 137.6 LBS | SYSTOLIC BLOOD PRESSURE: 140 MMHG | HEART RATE: 92 BPM | HEIGHT: 64 IN | RESPIRATION RATE: 12 BRPM | BODY MASS INDEX: 23.49 KG/M2 | DIASTOLIC BLOOD PRESSURE: 80 MMHG

## 2023-12-15 DIAGNOSIS — M25.511 CHRONIC PAIN OF BOTH SHOULDERS: ICD-10-CM

## 2023-12-15 DIAGNOSIS — M25.512 CHRONIC PAIN OF BOTH SHOULDERS: ICD-10-CM

## 2023-12-15 DIAGNOSIS — M53.3 SACROILIAC JOINT DYSFUNCTION OF BOTH SIDES: ICD-10-CM

## 2023-12-15 DIAGNOSIS — G89.29 CHRONIC BILATERAL LOW BACK PAIN, UNSPECIFIED WHETHER SCIATICA PRESENT: ICD-10-CM

## 2023-12-15 DIAGNOSIS — M48.061 LUMBAR FORAMINAL STENOSIS: ICD-10-CM

## 2023-12-15 DIAGNOSIS — M25.552 BILATERAL HIP PAIN: Primary | ICD-10-CM

## 2023-12-15 DIAGNOSIS — G89.29 CHRONIC PAIN OF BOTH SHOULDERS: ICD-10-CM

## 2023-12-15 DIAGNOSIS — M54.16 LUMBAR RADICULOPATHY, CHRONIC: ICD-10-CM

## 2023-12-15 DIAGNOSIS — M25.551 BILATERAL HIP PAIN: Primary | ICD-10-CM

## 2023-12-15 DIAGNOSIS — M54.50 CHRONIC BILATERAL LOW BACK PAIN, UNSPECIFIED WHETHER SCIATICA PRESENT: ICD-10-CM

## 2023-12-15 PROCEDURE — 99213 OFFICE O/P EST LOW 20 MIN: CPT

## 2023-12-15 NOTE — PROGRESS NOTES
CHIEF COMPLAINT  Back pain    Subjective   Marilia Leo is a 78 y.o. female  who presents to the office for follow-up of procedure.  She completed a L2/L3 LESI on  11/17/23 performed by Dr. Boateng for management of low back and groin pain. Patient reports 90% ongoing relief from the procedure. She states she has felt better overall since the procedure but continues to report increased stiffness in the morning.     Today pain is 4/10VAS in severity (patient appears uncomfortable, rubbing legs). Pain is located in her low back. She reports that groin pain has improved since her procedure. Describes this pain as an intermittent aching and burning. Pain is worsened by lying flat, prolonged standing or sitting, and walking long distances. Pain improves with rest/reposition, heat/ice, and medications. Patient continues with Gabapentin 100mg QID (managed by PCP) and Tylenol 500mg PRN. She has completed PT for her neck pain and reports that this was somewhat helpful for her pain.      History of glaucoma. Patient is legally blind. Remaining vision is peripheral vision in her left eye.      Unable to tolerate Tramadol and Codeine - both caused nausea, Robaxin - patient did not like how medication made her feel     Procedures:  11/17/23 - L2/L3 LESI - 90% ongoing relief  8/15/23 - Right L2 & L5 TF LESI - 50% relief x 2 months  2/16/23 - Left L5 & S1 TF ROQUE - 80% relief x 2.5 months  1/11/23 - Bilateral SI joint injections- 100% relief to right side, 45-50% relief to left side x 1 week     History of previous epidurals at [facility] - she reports that these injections were initially helpful but the last injections she had there were unsuccessful.     Back Pain  This is a chronic problem. The current episode started more than 1 year ago. The problem occurs intermittently. The problem has been waxing and waning since onset. The pain is present in the lumbar spine. The quality of the pain is described as aching and burning.  The pain radiates to the left thigh (radiates down left buttock in posterior/lateral thigh - does not pass knee). The pain is at a severity of 4/10. The pain is moderate. The symptoms are aggravated by standing, bending, sitting, position and lying down (walking long distances, increased physical activity). Stiffness is present In the morning. Pertinent negatives include no abdominal pain, chest pain, dysuria, fever, headaches, numbness or weakness. She has tried heat and ice (Gabapentin, ) for the symptoms. The treatment provided mild relief.   Hip Pain   The incident occurred more than 1 week ago. The injury mechanism is unknown. The pain is present in the right hip and left hip. The quality of the pain is described as aching. The pain is at a severity of 4/10. The pain is severe. The pain has been Fluctuating since onset. Pertinent negatives include no numbness. The symptoms are aggravated by movement and weight bearing. She has tried acetaminophen, non-weight bearing and rest (Lidocaine patches, Gabapentin) for the symptoms. The treatment provided mild relief.      PEG Assessment   What number best describes your pain on average in the past week?6  What number best describes how, during the past week, pain has interfered with your enjoyment of life?6  What number best describes how, during the past week, pain has interfered with your general activity?  5    Review of Pertinent Medical Data ---      The following portions of the patient's history were reviewed and updated as appropriate: allergies, current medications, past family history, past medical history, past social history, past surgical history, and problem list.    Review of Systems   Constitutional:  Negative for activity change, fatigue and fever.   HENT:  Negative for congestion.    Respiratory:  Negative for cough and chest tightness.    Cardiovascular:  Negative for chest pain.   Gastrointestinal:  Positive for constipation and diarrhea. Negative  "for abdominal pain.   Genitourinary:  Negative for difficulty urinating and dysuria.   Musculoskeletal:  Positive for back pain.   Neurological:  Negative for dizziness, weakness, light-headedness, numbness and headaches.   Psychiatric/Behavioral:  Positive for agitation and sleep disturbance. Negative for suicidal ideas. The patient is nervous/anxious.      I have reviewed and confirmed the accuracy of the ROS as documented by the MA/LPN/RN GISELL Ma     Vitals:    12/15/23 1303   BP: 140/80   BP Location: Left arm   Patient Position: Sitting   Cuff Size: Adult   Pulse: 92   Resp: 12   Temp: 97.1 °F (36.2 °C)   TempSrc: Temporal   SpO2: 97%   Weight: 62.4 kg (137 lb 9.6 oz)   Height: 162.6 cm (64\")   PainSc:   4     Objective   Physical Exam  Constitutional:       Appearance: Normal appearance.   HENT:      Head: Normocephalic.   Cardiovascular:      Rate and Rhythm: Normal rate and regular rhythm.   Pulmonary:      Effort: Pulmonary effort is normal.      Breath sounds: Normal breath sounds.   Musculoskeletal:      Cervical back: Normal range of motion.      Lumbar back: Tenderness and bony tenderness present. Decreased range of motion. Positive right straight leg raise test and positive left straight leg raise test.      Right hip: No tenderness. Normal range of motion.      Left hip: Tenderness present. Normal range of motion.   Skin:     General: Skin is warm and dry.      Capillary Refill: Capillary refill takes less than 2 seconds.   Neurological:      General: No focal deficit present.      Mental Status: She is alert and oriented to person, place, and time.      Gait: Gait abnormal (slowed, ambulates with cane).   Psychiatric:         Mood and Affect: Mood normal.         Behavior: Behavior normal.         Thought Content: Thought content normal.         Cognition and Memory: Cognition normal.       Assessment & Plan   Diagnoses and all orders for this visit:    1. Bilateral hip pain " (Primary)    2. Lumbar radiculopathy, chronic    3. Chronic bilateral low back pain, unspecified whether sciatica present    4. Lumbar foraminal stenosis    5. Sacroiliac joint dysfunction of both sides    6. Chronic pain of both shoulders      Marilia Leo reports a pain score of 4.  Given her pain assessment as noted, treatment options were discussed and the following options were decided upon as a follow-up plan to address the patient's pain:  as needed for increased pain .    --- Continue Lidocaine patches. No refill needed at this time.   --- Follow-up as needed for increased pain and/or to repeat procedure       CHASE REPORT  As the clinician, I personally reviewed the CHASE from 12/15/23 while the patient was in the office today.    Dictated utilizing Dragon dictation.

## 2024-03-01 RX ORDER — LIDOCAINE 50 MG/G
PATCH TOPICAL
Qty: 6 PATCH | Refills: 3 | Status: SHIPPED | OUTPATIENT
Start: 2024-03-01

## 2024-03-25 ENCOUNTER — OFFICE VISIT (OUTPATIENT)
Dept: PAIN MEDICINE | Facility: CLINIC | Age: 79
End: 2024-03-25
Payer: MEDICARE

## 2024-03-25 ENCOUNTER — HOSPITAL ENCOUNTER (OUTPATIENT)
Dept: GENERAL RADIOLOGY | Facility: HOSPITAL | Age: 79
Discharge: HOME | End: 2024-03-25
Payer: MEDICARE

## 2024-03-25 VITALS
DIASTOLIC BLOOD PRESSURE: 76 MMHG | BODY MASS INDEX: 24.11 KG/M2 | OXYGEN SATURATION: 96 % | WEIGHT: 141.2 LBS | SYSTOLIC BLOOD PRESSURE: 160 MMHG | TEMPERATURE: 97.1 F | HEART RATE: 93 BPM | HEIGHT: 64 IN

## 2024-03-25 DIAGNOSIS — M25.511 CHRONIC PAIN OF BOTH SHOULDERS: ICD-10-CM

## 2024-03-25 DIAGNOSIS — M54.2 NECK PAIN: ICD-10-CM

## 2024-03-25 DIAGNOSIS — G89.29 CHRONIC BILATERAL LOW BACK PAIN, UNSPECIFIED WHETHER SCIATICA PRESENT: ICD-10-CM

## 2024-03-25 DIAGNOSIS — M54.12 CERVICAL RADICULOPATHY: ICD-10-CM

## 2024-03-25 DIAGNOSIS — M54.16 LUMBAR RADICULOPATHY, CHRONIC: Primary | ICD-10-CM

## 2024-03-25 DIAGNOSIS — M54.50 CHRONIC BILATERAL LOW BACK PAIN, UNSPECIFIED WHETHER SCIATICA PRESENT: ICD-10-CM

## 2024-03-25 DIAGNOSIS — M25.511 ACUTE PAIN OF RIGHT SHOULDER: ICD-10-CM

## 2024-03-25 DIAGNOSIS — M25.512 CHRONIC PAIN OF BOTH SHOULDERS: ICD-10-CM

## 2024-03-25 DIAGNOSIS — G89.29 CHRONIC PAIN OF BOTH SHOULDERS: ICD-10-CM

## 2024-03-25 DIAGNOSIS — M48.061 LUMBAR FORAMINAL STENOSIS: ICD-10-CM

## 2024-03-25 PROCEDURE — 99214 OFFICE O/P EST MOD 30 MIN: CPT

## 2024-03-25 PROCEDURE — 1125F AMNT PAIN NOTED PAIN PRSNT: CPT

## 2024-03-25 PROCEDURE — 3078F DIAST BP <80 MM HG: CPT

## 2024-03-25 PROCEDURE — 1160F RVW MEDS BY RX/DR IN RCRD: CPT

## 2024-03-25 PROCEDURE — 73030 X-RAY EXAM OF SHOULDER: CPT

## 2024-03-25 PROCEDURE — 3077F SYST BP >= 140 MM HG: CPT

## 2024-03-25 PROCEDURE — 1159F MED LIST DOCD IN RCRD: CPT

## 2024-03-25 NOTE — PROGRESS NOTES
"CHIEF COMPLAINT  Back and neck pain    Subjective   Marilia Leo is a 78 y.o. female  who presents for follow-up.  She has a history of chronic back pain. She reports that her back pain has remained stable sine her last office visit. New complaint of left sided neck pain that radiates into     Today pain is 5/10VAS in severity (patient appears uncomfortable, rubbing legs). Pain is located in her low back. She reports that groin pain has improved since her procedure. Describes this pain as an intermittent aching and burning. Pain is worsened by lying flat, prolonged standing or sitting, and walking long distances. Patient stats that neck pain has been worsening his the last \"3-4 weeks\". Pain is located on the left side and radiates down left arm terminating at the elbow. History of cervical fusion ~ 9 years ago with Dr. Cali Sexton. Pain is worsened by turning head, prolonged position, and bending/twisting. She has completed PT for her neck pain in the past and reports that this was somewhat helpful for her pain.     Pain improves with rest/reposition, heat/ice, and medications. Patient continues with Gabapentin 100mg QID (managed by PCP) and Tylenol 500mg PRN.      History of glaucoma. Patient is legally blind. Remaining vision is peripheral vision in her left eye.      Unable to tolerate Tramadol and Codeine - both caused nausea, Robaxin - patient did not like how medication made her feel    She see [facility] for hand/wrist injections. She was told by Claribel that she has a torn bicep in her right shoulder.      Procedures:  11/17/23 - L2/L3 LESI - 90% ongoing relief  8/15/23 - Right L2 & L5 TF LESI - 50% relief x 2 months  2/16/23 - Left L5 & S1 TF ROQUE - 80% relief x 2.5 months  1/11/23 - Bilateral SI joint injections- 100% relief to right side, 45-50% relief to left side x 1 week     History of previous epidurals at [facility] - she reports that these injections were initially helpful but the " last injections she had there were unsuccessful.     Neck Pain   This is a recurrent problem. The current episode started 1 to 4 weeks ago. The problem occurs intermittently. The problem has been gradually worsening. The pain is associated with an unknown factor. The pain is present in the left side. The quality of the pain is described as aching. The pain is at a severity of 5/10. The symptoms are aggravated by position, twisting and bending. Associated symptoms include weakness (bilateral arm). Pertinent negatives include no chest pain, fever, headaches or numbness. She has tried heat, ice and acetaminophen for the symptoms.   Back Pain  This is a chronic problem. The current episode started more than 1 year ago. The problem occurs intermittently. The problem is unchanged. The pain is present in the lumbar spine. The quality of the pain is described as aching and burning. The pain radiates to the left thigh (radiates down left buttock in posterior/lateral thigh - does not pass knee). The pain is at a severity of 5/10. The pain is moderate. The symptoms are aggravated by standing, bending, sitting, position and lying down (walking long distances, increased physical activity). Stiffness is present In the morning. Associated symptoms include weakness (bilateral arm). Pertinent negatives include no abdominal pain, chest pain, dysuria, fever, headaches or numbness. She has tried heat and ice (Gabapentin, ) for the symptoms. The treatment provided mild relief.   Hip Pain   The incident occurred more than 1 week ago. The injury mechanism is unknown. The pain is present in the right hip and left hip. The quality of the pain is described as aching. The pain is at a severity of 5/10. The pain is severe. The pain has been Fluctuating since onset. Pertinent negatives include no numbness. The symptoms are aggravated by movement and weight bearing. She has tried acetaminophen, non-weight bearing and rest (Lidocaine patches,  "Gabapentin) for the symptoms. The treatment provided mild relief.      PEG Assessment   What number best describes your pain on average in the past week?7  What number best describes how, during the past week, pain has interfered with your enjoyment of life?7  What number best describes how, during the past week, pain has interfered with your general activity?  7    The following portions of the patient's history were reviewed and updated as appropriate: allergies, current medications, past family history, past medical history, past social history, past surgical history, and problem list.    Review of Systems   Constitutional:  Negative for chills and fever.   Respiratory:  Negative for cough and shortness of breath.    Cardiovascular:  Negative for chest pain.   Gastrointestinal:  Positive for constipation (mild). Negative for abdominal pain and diarrhea.   Genitourinary:  Negative for difficulty urinating and dysuria.   Musculoskeletal:  Positive for back pain and neck pain.   Neurological:  Positive for weakness (bilateral arm). Negative for dizziness, light-headedness, numbness and headaches.   Psychiatric/Behavioral:  Negative for agitation.      I have reviewed and confirmed the accuracy of the ROS as documented by the MA/DYLONN/RN GISELL Ma    Vitals:    03/25/24 1146   BP: 160/76   BP Location: Left arm   Patient Position: Sitting   Pulse: 93   Temp: 97.1 °F (36.2 °C)   TempSrc: Temporal   SpO2: 96%   Weight: 64 kg (141 lb 3.2 oz)   Height: 162.6 cm (64\")   PainSc:   5   PainLoc: Neck     Objective   Physical Exam  Constitutional:       Appearance: Normal appearance.   HENT:      Head: Normocephalic.   Cardiovascular:      Rate and Rhythm: Normal rate and regular rhythm.   Pulmonary:      Effort: Pulmonary effort is normal.      Breath sounds: Normal breath sounds.   Musculoskeletal:      Cervical back: Normal range of motion.      Lumbar back: Tenderness and bony tenderness present. Decreased " range of motion. Positive right straight leg raise test and positive left straight leg raise test.      Right hip: No tenderness. Normal range of motion.      Left hip: Tenderness present. Normal range of motion.   Skin:     General: Skin is warm and dry.      Capillary Refill: Capillary refill takes less than 2 seconds.   Neurological:      General: No focal deficit present.      Mental Status: She is alert and oriented to person, place, and time.      Gait: Gait abnormal (slowed, ambulates with cane).   Psychiatric:         Mood and Affect: Mood normal.         Behavior: Behavior normal.         Thought Content: Thought content normal.         Cognition and Memory: Cognition normal.       Assessment & Plan   Diagnoses and all orders for this visit:    1. Lumbar radiculopathy, chronic (Primary)    2. Chronic bilateral low back pain, unspecified whether sciatica present    3. Lumbar foraminal stenosis    4. Chronic pain of both shoulders    5. Neck pain  -     MRI Cervical Spine With & Without Contrast; Future    6. Cervical radiculopathy  -     MRI Cervical Spine With & Without Contrast; Future    7. Acute pain of right shoulder  -     XR Shoulder 2+ View Right; Future      Marilia Leo reports a pain score of 5.  Given her pain assessment as noted, treatment options were discussed and the following options were decided upon as a follow-up plan to address the patient's pain: continuation of current treatment plan for pain and x-ray right shoulder .    --- Update cervical MRI due to worsening neck pain and complaints of left radicular arm pain. Pending results may consider KAMARI  --- Continue Lidocaine patches. No refills needed at this time.  --- X-ray right shoulder due to worsening pain. May consider following up with Orthopedics in regards to findings.  --- Follow-up after completion of MRI      CHASE BROOKS  As part of the patient's treatment plan, I am prescribing controlled substances. The patient has been  made aware of appropriate use of such medications, including potential risk of somnolence, limited ability to drive and/or work safely, and the potential for dependence or overdose. It has also been made clear that these medications are for use by this patient only, without concomitant use of alcohol or other substances unless prescribed.     Patient has completed prescribing agreement detailing terms of continued prescribing of controlled substances, including monitoring CHASE reports, urine drug screening, and pill counts if necessary. The patient is aware that inappropriate use will results in cessation of prescribing such medications.    As the clinician, I personally reviewed the CHASE from 3/25/24 while the patient was in the office today.    History and physical exam exhibit continued safe and appropriate use of controlled substances.    Dictated utilizing Dragon dictation.

## 2024-03-28 NOTE — PROGRESS NOTES
Pt daughter informed stated she will talk to pt and will call us back if she decides to move forward with ortho.

## 2024-04-04 ENCOUNTER — HOSPITAL ENCOUNTER (EMERGENCY)
Facility: HOSPITAL | Age: 79
Discharge: HOME | End: 2024-04-04
Attending: STUDENT IN AN ORGANIZED HEALTH CARE EDUCATION/TRAINING PROGRAM | Admitting: STUDENT IN AN ORGANIZED HEALTH CARE EDUCATION/TRAINING PROGRAM
Payer: MEDICARE

## 2024-04-04 ENCOUNTER — APPOINTMENT (OUTPATIENT)
Dept: CT IMAGING | Facility: HOSPITAL | Age: 79
End: 2024-04-04
Payer: MEDICARE

## 2024-04-04 VITALS
HEIGHT: 65 IN | HEART RATE: 85 BPM | BODY MASS INDEX: 23.49 KG/M2 | DIASTOLIC BLOOD PRESSURE: 77 MMHG | SYSTOLIC BLOOD PRESSURE: 130 MMHG | TEMPERATURE: 98.6 F | RESPIRATION RATE: 16 BRPM | WEIGHT: 141 LBS | OXYGEN SATURATION: 95 %

## 2024-04-04 DIAGNOSIS — R10.32 LEFT LOWER QUADRANT ABDOMINAL PAIN: Primary | ICD-10-CM

## 2024-04-04 LAB
ALBUMIN SERPL-MCNC: 4.6 G/DL (ref 3.5–5.2)
ALBUMIN/GLOB SERPL: 1.4 G/DL
ALP SERPL-CCNC: 99 U/L (ref 39–117)
ALT SERPL W P-5'-P-CCNC: 12 U/L (ref 1–33)
ANION GAP SERPL CALCULATED.3IONS-SCNC: 12 MMOL/L (ref 5–15)
AST SERPL-CCNC: 14 U/L (ref 1–32)
BASOPHILS # BLD AUTO: 0.06 10*3/MM3 (ref 0–0.2)
BASOPHILS NFR BLD AUTO: 0.7 % (ref 0–1.5)
BILIRUB SERPL-MCNC: 0.7 MG/DL (ref 0–1.2)
BILIRUB UR QL STRIP: NEGATIVE
BUN SERPL-MCNC: 13 MG/DL (ref 8–23)
BUN/CREAT SERPL: 13.8 (ref 7–25)
CALCIUM SPEC-SCNC: 9.5 MG/DL (ref 8.6–10.5)
CHLORIDE SERPL-SCNC: 107 MMOL/L (ref 98–107)
CLARITY UR: CLEAR
CO2 SERPL-SCNC: 24 MMOL/L (ref 22–29)
COLOR UR: YELLOW
CREAT SERPL-MCNC: 0.94 MG/DL (ref 0.57–1)
DEPRECATED RDW RBC AUTO: 42.3 FL (ref 37–54)
EGFRCR SERPLBLD CKD-EPI 2021: 62.2 ML/MIN/1.73
EOSINOPHIL # BLD AUTO: 0.48 10*3/MM3 (ref 0–0.4)
EOSINOPHIL NFR BLD AUTO: 5.7 % (ref 0.3–6.2)
ERYTHROCYTE [DISTWIDTH] IN BLOOD BY AUTOMATED COUNT: 14 % (ref 12.3–15.4)
GLOBULIN UR ELPH-MCNC: 3.4 GM/DL
GLUCOSE SERPL-MCNC: 95 MG/DL (ref 65–99)
GLUCOSE UR STRIP-MCNC: NEGATIVE MG/DL
HCT VFR BLD AUTO: 40.6 % (ref 34–46.6)
HGB BLD-MCNC: 13 G/DL (ref 12–15.9)
HGB UR QL STRIP.AUTO: NEGATIVE
HOLD SPECIMEN: NORMAL
HOLD SPECIMEN: NORMAL
IMM GRANULOCYTES # BLD AUTO: 0.02 10*3/MM3 (ref 0–0.05)
IMM GRANULOCYTES NFR BLD AUTO: 0.2 % (ref 0–0.5)
KETONES UR QL STRIP: NEGATIVE
LEUKOCYTE ESTERASE UR QL STRIP.AUTO: NEGATIVE
LIPASE SERPL-CCNC: 24 U/L (ref 13–60)
LYMPHOCYTES # BLD AUTO: 3.27 10*3/MM3 (ref 0.7–3.1)
LYMPHOCYTES NFR BLD AUTO: 38.8 % (ref 19.6–45.3)
MCH RBC QN AUTO: 26.6 PG (ref 26.6–33)
MCHC RBC AUTO-ENTMCNC: 32 G/DL (ref 31.5–35.7)
MCV RBC AUTO: 83.2 FL (ref 79–97)
MONOCYTES # BLD AUTO: 0.69 10*3/MM3 (ref 0.1–0.9)
MONOCYTES NFR BLD AUTO: 8.2 % (ref 5–12)
NEUTROPHILS NFR BLD AUTO: 3.9 10*3/MM3 (ref 1.7–7)
NEUTROPHILS NFR BLD AUTO: 46.4 % (ref 42.7–76)
NITRITE UR QL STRIP: NEGATIVE
NRBC BLD AUTO-RTO: 0 /100 WBC (ref 0–0.2)
PH UR STRIP.AUTO: 8.5 [PH] (ref 5–8)
PLATELET # BLD AUTO: 336 10*3/MM3 (ref 140–450)
PMV BLD AUTO: 8.6 FL (ref 6–12)
POTASSIUM SERPL-SCNC: 3.7 MMOL/L (ref 3.5–5.2)
PROT SERPL-MCNC: 8 G/DL (ref 6–8.5)
PROT UR QL STRIP: ABNORMAL
RBC # BLD AUTO: 4.88 10*6/MM3 (ref 3.77–5.28)
SODIUM SERPL-SCNC: 143 MMOL/L (ref 136–145)
SP GR UR STRIP: 1.01 (ref 1–1.03)
UROBILINOGEN UR QL STRIP: ABNORMAL
WBC NRBC COR # BLD AUTO: 8.42 10*3/MM3 (ref 3.4–10.8)
WHOLE BLOOD HOLD COAG: NORMAL
WHOLE BLOOD HOLD SPECIMEN: NORMAL

## 2024-04-04 PROCEDURE — 25010000002 ONDANSETRON PER 1 MG: Performed by: STUDENT IN AN ORGANIZED HEALTH CARE EDUCATION/TRAINING PROGRAM

## 2024-04-04 PROCEDURE — 96375 TX/PRO/DX INJ NEW DRUG ADDON: CPT

## 2024-04-04 PROCEDURE — 25510000001 IOPAMIDOL 61 % SOLUTION: Performed by: STUDENT IN AN ORGANIZED HEALTH CARE EDUCATION/TRAINING PROGRAM

## 2024-04-04 PROCEDURE — 85025 COMPLETE CBC W/AUTO DIFF WBC: CPT | Performed by: STUDENT IN AN ORGANIZED HEALTH CARE EDUCATION/TRAINING PROGRAM

## 2024-04-04 PROCEDURE — 99285 EMERGENCY DEPT VISIT HI MDM: CPT

## 2024-04-04 PROCEDURE — 96374 THER/PROPH/DIAG INJ IV PUSH: CPT

## 2024-04-04 PROCEDURE — 83690 ASSAY OF LIPASE: CPT | Performed by: STUDENT IN AN ORGANIZED HEALTH CARE EDUCATION/TRAINING PROGRAM

## 2024-04-04 PROCEDURE — 81003 URINALYSIS AUTO W/O SCOPE: CPT | Performed by: STUDENT IN AN ORGANIZED HEALTH CARE EDUCATION/TRAINING PROGRAM

## 2024-04-04 PROCEDURE — 80053 COMPREHEN METABOLIC PANEL: CPT | Performed by: STUDENT IN AN ORGANIZED HEALTH CARE EDUCATION/TRAINING PROGRAM

## 2024-04-04 PROCEDURE — 25010000002 MORPHINE PER 10 MG: Performed by: STUDENT IN AN ORGANIZED HEALTH CARE EDUCATION/TRAINING PROGRAM

## 2024-04-04 PROCEDURE — 74177 CT ABD & PELVIS W/CONTRAST: CPT

## 2024-04-04 RX ORDER — ONDANSETRON 2 MG/ML
4 INJECTION INTRAMUSCULAR; INTRAVENOUS ONCE
Status: COMPLETED | OUTPATIENT
Start: 2024-04-04 | End: 2024-04-04

## 2024-04-04 RX ORDER — MORPHINE SULFATE 2 MG/ML
4 INJECTION, SOLUTION INTRAMUSCULAR; INTRAVENOUS ONCE
Status: COMPLETED | OUTPATIENT
Start: 2024-04-04 | End: 2024-04-04

## 2024-04-04 RX ADMIN — MORPHINE SULFATE 4 MG: 2 INJECTION, SOLUTION INTRAMUSCULAR; INTRAVENOUS at 11:09

## 2024-04-04 RX ADMIN — ONDANSETRON 4 MG: 2 INJECTION INTRAMUSCULAR; INTRAVENOUS at 11:07

## 2024-04-04 RX ADMIN — IOPAMIDOL 85 ML: 612 INJECTION, SOLUTION INTRAVENOUS at 11:44

## 2024-04-04 NOTE — DISCHARGE INSTRUCTIONS
Please follow-up with your primary care physician at your previously scheduled appointment in 1 week    Please return to the ER with new or worsening symptoms including but not limited to uncontrolled pain, fevers, chills, nausea, vomiting, difficulty with ambulation.

## 2024-04-04 NOTE — ED PROVIDER NOTES
EMERGENCY DEPARTMENT ENCOUNTER  Room Number:  34/34  PCP: Grover Garcias MD  Independent Historians:       HPI:  Chief Complaint: had concerns including Abdominal Pain.       Context: Marilia Leo is a 78 y.o. female with a medical history of hypertension, lumbar radiculopathy, trochanteric bursitis, cataracts who presents to the ED c/o acute left groin pain.  Patient states pain has been occurring for the last 4 days.  Pain is located primarily in the left lower quadrant/groin with radiation to the left hip.  Patient denies changes to bowel movements.  Patient denies increased urinary frequency or dysuria.  Patient does have history of UTI.  Patient denies nausea vomiting.  Patient has no known traumatic injury.      Review of prior external notes (non-ED) -and- Review of prior external test results outside of this encounter: Office visit with internal medicine from 9/1/2023 reviewed and notable for visit secondary to hypertension, lumbar radiculopathy, GERD.  Plan at that time was to follow-up in 6 months and continue pantoprazole, amlodipine and obtain pneumococcal vaccine.    PAST MEDICAL HISTORY  Active Ambulatory Problems     Diagnosis Date Noted    Benign essential hypertension 04/18/2016    Cervical radiculopathy 04/18/2016    Glaucoma 04/18/2016    Pure hypercholesterolemia 04/18/2016    Menopausal symptom 04/18/2016    Atrophic vaginitis 04/18/2016    Health care maintenance 10/13/2016    Cervical radiculopathy 04/18/2016    Edema, lower extremity 01/16/2020    Elevated LFTs 08/04/2020    Fever of unknown origin (FUO) 08/04/2020    Nausea 08/04/2020    Sinus tachycardia 08/04/2020    Bilateral chronic knee pain 01/28/2022    Urinary frequency 09/09/2022    Lumbar radiculopathy, chronic 10/11/2022    DDD (degenerative disc disease), lumbar 11/08/2022    Sacroiliac joint dysfunction of both sides 11/30/2022    Medication management contract agreement 09/01/2023     Resolved Ambulatory Problems      Diagnosis Date Noted    Atopic dermatitis 04/18/2016    Neck pain 04/18/2016    Trochanteric bursitis of left hip 10/13/2016    Thoracic radiculopathy 03/07/2017    Chronic fatigue 04/13/2017    Impacted cerumen of right ear 10/16/2017    Impaired fasting blood sugar 05/13/2019    Palpitations 05/13/2019    Sepsis 08/04/2020    Back pain 08/04/2020     Past Medical History:   Diagnosis Date    Angioedema     Cataract     Diverticulosis     Edema     Esophageal reflux     Hammer toe     History of mammogram     Hx of bone scan     Hypercholesterolemia     Medial epicondylitis     Papanicolaou smear     PONV (postoperative nausea and vomiting)     Trochanteric bursitis          PAST SURGICAL HISTORY  Past Surgical History:   Procedure Laterality Date    APPENDECTOMY      BILATERAL BREAST REDUCTION      CERVICAL FUSION      Dr.John Sexton    COLONOSCOPY      4/2007 Dr Bradshaw Normal    CORNEAL TRANSPLANT      EPIDURAL Left 2/16/2023    Procedure: LEFT L5 & S1 TRANSFORAMINAL LUMBAR EPIDURAL STEROID INJECTION CPT: 38099, 62484;  Surgeon: Gigi Boateng MD;  Location: [facility];  Service: Pain Management;  Laterality: Left;    EPIDURAL Bilateral 8/15/2023    Procedure: RIGHT L2 AND LEFT L5 TRANSFORAMINAL LUMBAR EPIDURAL STEROID INJECTION CPT: 72356, 15373;  Surgeon: Gigi Boateng MD;  Location: [facility];  Service: Pain Management;  Laterality: Bilateral;    FETAL BLOOD TRANSFUSION  1960    FOOT SURGERY  02/2013    right bunion and hammer toe Dr Phelan    LUMBAR EPIDURAL INJECTION N/A 11/17/2023    Procedure: L2/L3 LUMBAR EPIDURAL STEROID INJECTION CPT: 78425;  Surgeon: Gigi Boateng MD;  Location: [facility];  Service: Pain Management;  Laterality: N/A;    SACROILIAC JOINT INJECTION Bilateral 1/11/2023    Procedure: SACROILIAC INJECTION;  Surgeon: Gigi Boateng MD;  Location: [facility];  Service: Pain Management;  Laterality: Bilateral;    SUBTOTAL HYSTERECTOMY      ovaries intact          FAMILY HISTORY  Family History   Problem Relation Age of Onset    Hypertension Mother     Diabetes Mother     Hypertension Father     Diabetes Sister     Multiple sclerosis Sister     Rheum arthritis Sister     Lung cancer Sister         smoker    Diabetes Brother     Heart disease Brother     Hypertension Brother     Rheum arthritis Brother     Sarcoidosis Brother     Glaucoma Brother          SOCIAL HISTORY  Social History     Socioeconomic History    Marital status: Single   Tobacco Use    Smoking status: Former     Current packs/day: 0.00     Average packs/day: 1 pack/day for 5.0 years (5.0 ttl pk-yrs)     Types: Cigarettes     Start date:      Quit date:      Years since quittin.2    Smokeless tobacco: Never   Vaping Use    Vaping status: Never Used   Substance and Sexual Activity    Alcohol use: Yes     Comment: occasionally    Drug use: No    Sexual activity: Defer         ALLERGIES  Nitrofurantoin, Codeine, Penicillins, Sulfa antibiotics, and Tramadol      REVIEW OF SYSTEMS  Review of Systems  Included in HPI  All systems reviewed and negative except for those discussed in HPI.      PHYSICAL EXAM    I have reviewed the triage vital signs and nursing notes.    ED Triage Vitals   Temp Heart Rate Resp BP SpO2   24 0912 24 0912 24 0912 24 0917 24 0912   98.6 °F (37 °C) 112 16 165/100 95 %      Temp src Heart Rate Source Patient Position BP Location FiO2 (%)   24 0912 24 0912 -- -- --   Tympanic Monitor          Physical Exam  GENERAL: alert, no acute distress  SKIN: Warm, dry, no rash  HENT: Normocephalic, atraumatic  EYES: no scleral icterus  CV: regular rhythm, regular rate  RESPIRATORY: normal effort, lungs clear  ABDOMEN:tenderness to palpation in the left lower quadrant, soft, nondistended, no guarding  MUSCULOSKELETAL: no deformity  NEURO: alert, moves all extremities, follows commands            LAB RESULTS  Recent Results (from the past 24  hour(s))   Comprehensive Metabolic Panel    Collection Time: 04/04/24  9:29 AM    Specimen: Blood   Result Value Ref Range    Glucose 95 65 - 99 mg/dL    BUN 13 8 - 23 mg/dL    Creatinine 0.94 0.57 - 1.00 mg/dL    Sodium 143 136 - 145 mmol/L    Potassium 3.7 3.5 - 5.2 mmol/L    Chloride 107 98 - 107 mmol/L    CO2 24.0 22.0 - 29.0 mmol/L    Calcium 9.5 8.6 - 10.5 mg/dL    Total Protein 8.0 6.0 - 8.5 g/dL    Albumin 4.6 3.5 - 5.2 g/dL    ALT (SGPT) 12 1 - 33 U/L    AST (SGOT) 14 1 - 32 U/L    Alkaline Phosphatase 99 39 - 117 U/L    Total Bilirubin 0.7 0.0 - 1.2 mg/dL    Globulin 3.4 gm/dL    A/G Ratio 1.4 g/dL    BUN/Creatinine Ratio 13.8 7.0 - 25.0    Anion Gap 12.0 5.0 - 15.0 mmol/L    eGFR 62.2 >60.0 mL/min/1.73   Lipase    Collection Time: 04/04/24  9:29 AM    Specimen: Blood   Result Value Ref Range    Lipase 24 13 - 60 U/L   CBC Auto Differential    Collection Time: 04/04/24  9:29 AM    Specimen: Blood   Result Value Ref Range    WBC 8.42 3.40 - 10.80 10*3/mm3    RBC 4.88 3.77 - 5.28 10*6/mm3    Hemoglobin 13.0 12.0 - 15.9 g/dL    Hematocrit 40.6 34.0 - 46.6 %    MCV 83.2 79.0 - 97.0 fL    MCH 26.6 26.6 - 33.0 pg    MCHC 32.0 31.5 - 35.7 g/dL    RDW 14.0 12.3 - 15.4 %    RDW-SD 42.3 37.0 - 54.0 fl    MPV 8.6 6.0 - 12.0 fL    Platelets 336 140 - 450 10*3/mm3    Neutrophil % 46.4 42.7 - 76.0 %    Lymphocyte % 38.8 19.6 - 45.3 %    Monocyte % 8.2 5.0 - 12.0 %    Eosinophil % 5.7 0.3 - 6.2 %    Basophil % 0.7 0.0 - 1.5 %    Immature Grans % 0.2 0.0 - 0.5 %    Neutrophils, Absolute 3.90 1.70 - 7.00 10*3/mm3    Lymphocytes, Absolute 3.27 (H) 0.70 - 3.10 10*3/mm3    Monocytes, Absolute 0.69 0.10 - 0.90 10*3/mm3    Eosinophils, Absolute 0.48 (H) 0.00 - 0.40 10*3/mm3    Basophils, Absolute 0.06 0.00 - 0.20 10*3/mm3    Immature Grans, Absolute 0.02 0.00 - 0.05 10*3/mm3    nRBC 0.0 0.0 - 0.2 /100 WBC   Green Top (Gel)    Collection Time: 04/04/24  9:29 AM   Result Value Ref Range    Extra Tube Hold for add-ons.    Lavender  Top    Collection Time: 24  9:29 AM   Result Value Ref Range    Extra Tube hold for add-on    Gold Top - SST    Collection Time: 24  9:29 AM   Result Value Ref Range    Extra Tube Hold for add-ons.    Light Blue Top    Collection Time: 24  9:29 AM   Result Value Ref Range    Extra Tube Hold for add-ons.    Urinalysis With Microscopic If Indicated (No Culture) - Urine, Clean Catch    Collection Time: 24  9:52 AM    Specimen: Urine, Clean Catch   Result Value Ref Range    Color, UA Yellow Yellow, Straw    Appearance, UA Clear Clear    pH, UA 8.5 (H) 5.0 - 8.0    Specific Gravity, UA 1.010 1.005 - 1.030    Glucose, UA Negative Negative    Ketones, UA Negative Negative    Bilirubin, UA Negative Negative    Blood, UA Negative Negative    Protein, UA Trace (A) Negative    Leuk Esterase, UA Negative Negative    Nitrite, UA Negative Negative    Urobilinogen, UA 0.2 E.U./dL 0.2 - 1.0 E.U./dL         RADIOLOGY  CT Abdomen Pelvis With Contrast    Result Date: 2024  CT SCAN OF THE ABDOMEN AND PELVIS WITH INTRAVENOUS CONTRAST  HISTORY: Left lower quadrant pain. Urinary frequency.  FINDINGS: The CT scan was performed as an emergency procedure through the abdomen and pelvis with intravenous contrast. It is compared to previous CT scans of the abdomen and pelvis dated 2020 and demonstrates the followin. The kidneys are normal in size and there is an exophytic cyst extending lateral to the left kidney measuring 4.4 cm that is unchanged from the previous exam. There are several other smaller cysts also unchanged. There is no evidence of renal stone or obstruction. 2. The lung bases are clear. The liver, spleen, pancreas, and both adrenal glands are unremarkable. The gallbladder also appears normal. 3. There is no aortic aneurysm or retroperitoneal lymphadenopathy. The large and small bowel loops are normal in caliber and show no inflammatory change. The gallbladder has been removed. In the  pelvis, the urinary bladder has a smooth contour. There is some minimal sigmoid diverticulosis without evidence of diverticulitis. There is a left ovarian cyst measuring 1.8 cm showing no significant change from 2020. The uterus has been removed.      Radiation dose reduction techniques were utilized, including automated exposure control and exposure modulation based on body size.   This report was finalized on 4/4/2024 1:09 PM by Dr. Nathanael Kyle M.D on Workstation: [id number]         MEDICATIONS GIVEN IN ER  Medications   morphine injection 4 mg (4 mg Intravenous Given 4/4/24 1109)   ondansetron (ZOFRAN) injection 4 mg (4 mg Intravenous Given 4/4/24 1107)   iopamidol (ISOVUE-300) 61 % injection 85 mL (85 mL Intravenous Given by Other 4/4/24 1144)         ORDERS PLACED DURING THIS VISIT:  Orders Placed This Encounter   Procedures    CT Abdomen Pelvis With Contrast    Comprehensive Metabolic Panel    Lipase    Urinalysis With Microscopic If Indicated (No Culture) - Urine, Clean Catch    CBC Auto Differential    [place] Draw    CBC & Differential    Green Top (Gel)    Lavender Top    Gold Top - SST    Light Blue Top         OUTPATIENT MEDICATION MANAGEMENT:  No current Epic-ordered facility-administered medications on file.     Current Outpatient Medications Ordered in Epic   Medication Sig Dispense Refill    acetaminophen (TYLENOL) 325 MG tablet Take 1 tablet by mouth As Needed.      amLODIPine (NORVASC) 5 MG tablet Take 1 tablet by mouth Daily. 90 tablet 4    Carboxymeth-Glycerin-Polysorb (REFRESH OPTIVE ALBERTO-3 OP) Apply 1 drop to eye(s) as directed by provider 2 (Two) Times a Day As Needed.      gabapentin (NEURONTIN) 100 MG capsule TAKE 1 CAPSULE BY MOUTH EVERY MORNING AND TAKE 2 CAPSULES BY MOUTH EVERY NIGHT AT BEDTIME 270 capsule 1    irbesartan (AVAPRO) 150 MG tablet TAKE 1 TABLET BY MOUTH EVERY NIGHT. 90 tablet 3    ketorolac (TORADOL) 10 MG tablet Take 1 tablet by mouth Every 6 (Six) Hours As Needed for  Moderate Pain. 20 tablet 0    lidocaine (LIDODERM) 5 % APPLY 1 PATCH TOPICALLY ONCE DAILY FOR 12 HOURS THEN REMOVE AS DIRECTED 6 patch 3    Misc. Devices (Raised Toilet Seat) misc 1 each Daily. 1 each 0    natamycin (NATACYN) 5 % ophthalmic solution Administer 1 drop to the right eye 2 (two) times a day. Cycles on for 14 days and off for 14 days  Patient does not need until 8/18/20, this comes from compound pharmacy      ofloxacin (OCUFLOX) 0.3 % ophthalmic solution Administer 1 drop into the left eye 2 (Two) Times a Day.      pantoprazole (Protonix) 20 MG EC tablet Take 1 tablet by mouth Daily. 90 tablet 1    prednisoLONE acetate (PRED FORTE) 1 % ophthalmic suspension       timolol (TIMOPTIC) 0.5 % ophthalmic solution Administer 1 drop into the left eye 2 (Two) Times a Day.           PROGRESS, DATA ANALYSIS, CONSULTS, AND MEDICAL DECISION MAKING  All labs have been independently interpreted by me.  All radiology studies have been reviewed by me. All EKG's have been independently viewed and interpreted by me.  Discussion below represents my analysis of pertinent findings related to patient's condition, differential diagnosis, treatment plan and final disposition.    Differential diagnosis includes but is not limited to diverticulitis, muscle strain, hernia, SBO.    Clinical Scores:                   ED Course as of 04/04/24 1338   Thu Apr 04, 2024   1216 CT abdomen and pelvis interpreted by me and demonstrates no evidence of bowel obstruction or free air [MW]   1337 Evaluation in the emergency department is overall unremarkable with laboratory evaluation grossly within normal limits.  CT abdomen with no acute findings.  Unclear etiology of patient's symptoms.  On reassessment her pain is improved to a 5.  Suspect possible muscle strain as likely etiology.  Patient is able to ambulate.  Will  patient to monitor symptoms at home and control pain with ibuprofen and Tylenol.  Counseled patient on need to  follow-up with primary care for which she is scheduled next Friday.  Return precautions provided and patient discharged in stable condition. [MW]      ED Course User Index  [MW] Erick Armando MD             AS OF 13:38 EDT VITALS:    BP - 130/77  HR - 85  TEMP - 98.6 °F (37 °C) (Tympanic)  O2 SATS - 95%    COMPLEXITY OF CARE  Admission was considered but after careful review of the patient's presentation, physical examination, diagnostic results, and response to treatment the patient may be safely discharged with outpatient follow-up.      DIAGNOSIS  Final diagnoses:   Left lower quadrant abdominal pain         DISPOSITION  ED Disposition       ED Disposition   Discharge    Condition   Stable    Comment   --                Please note that portions of this document were completed with a voice recognition program.    Note Disclaimer: At [facility], we believe that sharing information builds trust and better relationships. You are receiving this note because you recently visited [facility]. It is possible you will see health information before a provider has talked with you about it. This kind of information can be easy to misunderstand. To help you fully understand what it means for your health, we urge you to discuss this note with your provider.         Erick Armando MD  04/04/24 [id number]

## 2024-04-04 NOTE — ED NOTES
Pt c/o LLQ pain that radiates to left hip.  She noticed it Monday morning - it comes and goes.  She has had increased uop.

## 2024-04-08 ENCOUNTER — OFFICE VISIT (OUTPATIENT)
Dept: INTERNAL MEDICINE | Facility: CLINIC | Age: 79
End: 2024-04-08
Payer: MEDICARE

## 2024-04-08 ENCOUNTER — HOSPITAL ENCOUNTER (OUTPATIENT)
Facility: HOSPITAL | Age: 79
Discharge: HOME | End: 2024-04-08
Payer: MEDICARE

## 2024-04-08 VITALS
HEIGHT: 65 IN | WEIGHT: 138.6 LBS | SYSTOLIC BLOOD PRESSURE: 146 MMHG | BODY MASS INDEX: 23.09 KG/M2 | OXYGEN SATURATION: 98 % | TEMPERATURE: 98.6 F | HEART RATE: 83 BPM | DIASTOLIC BLOOD PRESSURE: 94 MMHG

## 2024-04-08 DIAGNOSIS — R10.84 GENERALIZED ABDOMINAL PAIN: Primary | ICD-10-CM

## 2024-04-08 DIAGNOSIS — R10.32 LLQ PAIN: ICD-10-CM

## 2024-04-08 DIAGNOSIS — R82.90 ABNORMAL URINE FINDINGS: ICD-10-CM

## 2024-04-08 DIAGNOSIS — R10.84 GENERALIZED ABDOMINAL PAIN: ICD-10-CM

## 2024-04-08 DIAGNOSIS — R31.9 URINARY TRACT INFECTION WITH HEMATURIA, SITE UNSPECIFIED: ICD-10-CM

## 2024-04-08 DIAGNOSIS — N39.0 URINARY TRACT INFECTION WITH HEMATURIA, SITE UNSPECIFIED: ICD-10-CM

## 2024-04-08 LAB
BACTERIA UR QL AUTO: ABNORMAL /HPF
BILIRUB BLD-MCNC: NEGATIVE MG/DL
CLARITY, POC: CLEAR
COLOR UR: YELLOW
EXPIRATION DATE: ABNORMAL
GLUCOSE UR STRIP-MCNC: NEGATIVE MG/DL
KETONES UR QL: NEGATIVE
LEUKOCYTE EST, POC: NEGATIVE
Lab: ABNORMAL
MUCUS, POC: ABNORMAL
NITRITE UR-MCNC: NEGATIVE MG/ML
PH UR: 7.5 [PH] (ref 5–8)
PROT UR STRIP-MCNC: ABNORMAL MG/DL
RBC # UR STRIP: ABNORMAL /HPF
RBC # UR STRIP: ABNORMAL /UL
SP GR UR: 1.01 (ref 1–1.03)
SQUAMOUS EPITHELIAL, POC: ABNORMAL
UROBILINOGEN UR QL: ABNORMAL
WBC # UR STRIP: ABNORMAL /HPF

## 2024-04-08 PROCEDURE — 76857 US EXAM PELVIC LIMITED: CPT

## 2024-04-08 PROCEDURE — 3077F SYST BP >= 140 MM HG: CPT

## 2024-04-08 PROCEDURE — 81001 URINALYSIS AUTO W/SCOPE: CPT

## 2024-04-08 PROCEDURE — 99214 OFFICE O/P EST MOD 30 MIN: CPT

## 2024-04-08 PROCEDURE — 3080F DIAST BP >= 90 MM HG: CPT

## 2024-04-08 PROCEDURE — 81003 URINALYSIS AUTO W/O SCOPE: CPT

## 2024-04-08 PROCEDURE — 76830 TRANSVAGINAL US NON-OB: CPT

## 2024-04-08 RX ORDER — CEPHALEXIN 500 MG/1
500 CAPSULE ORAL 2 TIMES DAILY
Qty: 10 CAPSULE | Refills: 0 | Status: SHIPPED | OUTPATIENT
Start: 2024-04-08 | End: 2024-04-12

## 2024-04-09 ENCOUNTER — TELEPHONE (OUTPATIENT)
Dept: INTERNAL MEDICINE | Facility: CLINIC | Age: 79
End: 2024-04-09
Payer: MEDICARE

## 2024-04-09 LAB
ALBUMIN SERPL-MCNC: 4.7 G/DL (ref 3.5–5.2)
ALBUMIN/GLOB SERPL: 1.5 G/DL
ALP SERPL-CCNC: 95 U/L (ref 39–117)
ALT SERPL-CCNC: 15 U/L (ref 1–33)
AST SERPL-CCNC: 15 U/L (ref 1–32)
BASOPHILS # BLD AUTO: 0.06 10*3/MM3 (ref 0–0.2)
BASOPHILS NFR BLD AUTO: 0.8 % (ref 0–1.5)
BILIRUB SERPL-MCNC: 0.6 MG/DL (ref 0–1.2)
BUN SERPL-MCNC: 18 MG/DL (ref 8–23)
BUN/CREAT SERPL: 17.3 (ref 7–25)
CALCIUM SERPL-MCNC: 10.1 MG/DL (ref 8.6–10.5)
CHLORIDE SERPL-SCNC: 103 MMOL/L (ref 98–107)
CO2 SERPL-SCNC: 27.1 MMOL/L (ref 22–29)
CREAT SERPL-MCNC: 1.04 MG/DL (ref 0.57–1)
EGFRCR SERPLBLD CKD-EPI 2021: 55.1 ML/MIN/1.73
EOSINOPHIL # BLD AUTO: 0.62 10*3/MM3 (ref 0–0.4)
EOSINOPHIL NFR BLD AUTO: 8.4 % (ref 0.3–6.2)
ERYTHROCYTE [DISTWIDTH] IN BLOOD BY AUTOMATED COUNT: 14.2 % (ref 12.3–15.4)
GLOBULIN SER CALC-MCNC: 3.2 GM/DL
GLUCOSE SERPL-MCNC: 78 MG/DL (ref 65–99)
HCT VFR BLD AUTO: 42.1 % (ref 34–46.6)
HGB BLD-MCNC: 13.6 G/DL (ref 12–15.9)
IMM GRANULOCYTES # BLD AUTO: 0.01 10*3/MM3 (ref 0–0.05)
IMM GRANULOCYTES NFR BLD AUTO: 0.1 % (ref 0–0.5)
LYMPHOCYTES # BLD AUTO: 3.46 10*3/MM3 (ref 0.7–3.1)
LYMPHOCYTES NFR BLD AUTO: 46.8 % (ref 19.6–45.3)
MCH RBC QN AUTO: 27.2 PG (ref 26.6–33)
MCHC RBC AUTO-ENTMCNC: 32.3 G/DL (ref 31.5–35.7)
MCV RBC AUTO: 84.2 FL (ref 79–97)
MONOCYTES # BLD AUTO: 0.58 10*3/MM3 (ref 0.1–0.9)
MONOCYTES NFR BLD AUTO: 7.8 % (ref 5–12)
NEUTROPHILS # BLD AUTO: 2.66 10*3/MM3 (ref 1.7–7)
NEUTROPHILS NFR BLD AUTO: 36.1 % (ref 42.7–76)
NRBC BLD AUTO-RTO: 0 /100 WBC (ref 0–0.2)
PLATELET # BLD AUTO: 318 10*3/MM3 (ref 140–450)
POTASSIUM SERPL-SCNC: 4.9 MMOL/L (ref 3.5–5.2)
PROT SERPL-MCNC: 7.9 G/DL (ref 6–8.5)
RBC # BLD AUTO: 5 10*6/MM3 (ref 3.77–5.28)
SODIUM SERPL-SCNC: 142 MMOL/L (ref 136–145)
VZV IGG SER IA-ACNC: >4000 INDEX
VZV IGM SER IA-ACNC: <0.91 INDEX (ref 0–0.9)
WBC # BLD AUTO: 7.39 10*3/MM3 (ref 3.4–10.8)

## 2024-04-09 NOTE — TELEPHONE ENCOUNTER
Educated patient's daughter on results of ultrasound.  Educated patient's daughter to get Gas-X for patient and make sure the patient is having normal bowel movements on a daily basis.  Educated patient that if these bowel movements stop or patient becomes constipated to reach out.  Educated patient that we will call once the culture has resulted.  Patient's daughter states that patient did okay on antibiotics.

## 2024-04-10 LAB
BACTERIA UR CULT: NORMAL
BACTERIA UR CULT: NORMAL

## 2024-04-12 ENCOUNTER — OFFICE VISIT (OUTPATIENT)
Dept: INTERNAL MEDICINE | Facility: CLINIC | Age: 79
End: 2024-04-12
Payer: MEDICARE

## 2024-04-12 VITALS
OXYGEN SATURATION: 97 % | WEIGHT: 138 LBS | BODY MASS INDEX: 22.99 KG/M2 | RESPIRATION RATE: 18 BRPM | HEIGHT: 65 IN | DIASTOLIC BLOOD PRESSURE: 82 MMHG | HEART RATE: 87 BPM | SYSTOLIC BLOOD PRESSURE: 122 MMHG

## 2024-04-12 DIAGNOSIS — M54.16 LUMBAR RADICULOPATHY, CHRONIC: Chronic | ICD-10-CM

## 2024-04-12 DIAGNOSIS — R10.32 CHRONIC LEFT LOWER QUADRANT PAIN: ICD-10-CM

## 2024-04-12 DIAGNOSIS — I10 BENIGN ESSENTIAL HYPERTENSION: Chronic | ICD-10-CM

## 2024-04-12 DIAGNOSIS — Z00.00 MEDICARE ANNUAL WELLNESS VISIT, SUBSEQUENT: Primary | ICD-10-CM

## 2024-04-12 DIAGNOSIS — G89.29 CHRONIC LEFT LOWER QUADRANT PAIN: ICD-10-CM

## 2024-04-12 DIAGNOSIS — N83.202 CYST OF LEFT OVARY: ICD-10-CM

## 2024-04-12 RX ORDER — AMLODIPINE BESYLATE 2.5 MG/1
2.5 TABLET ORAL DAILY
Qty: 30 TABLET | Refills: 2 | Status: SHIPPED | OUTPATIENT
Start: 2024-04-12

## 2024-04-12 RX ORDER — GABAPENTIN 100 MG/1
CAPSULE ORAL
Qty: 270 CAPSULE | Refills: 1 | Status: SHIPPED | OUTPATIENT
Start: 2024-04-12

## 2024-04-12 NOTE — PROGRESS NOTES
The ABCs of the Annual Wellness Visit  Subsequent Medicare Wellness Visit    Subjective    Marilia Leo is a 79 y.o. female who presents for a Subsequent Medicare Wellness Visit.    The following portions of the patient's history were reviewed and   updated as appropriate: allergies, current medications, past family history, past medical history, past social history, past surgical history, and problem list.    Compared to one year ago, the patient feels her physical   health is the same.    Compared to one year ago, the patient feels her mental   health is the same.    Recent Hospitalizations:  She was not admitted to the hospital during the last year.       Current Medical Providers:  Patient Care Team:  Grover Garcias MD as PCP - General (Family Medicine)    Outpatient Medications Prior to Visit   Medication Sig Dispense Refill    acetaminophen (TYLENOL) 325 MG tablet Take 1 tablet by mouth As Needed.      Carboxymeth-Glycerin-Polysorb (REFRESH OPTIVE ALBERTO-3 OP) Apply 1 drop to eye(s) as directed by provider 2 (Two) Times a Day As Needed.      irbesartan (AVAPRO) 150 MG tablet TAKE 1 TABLET BY MOUTH EVERY NIGHT. 90 tablet 3    ketorolac (TORADOL) 10 MG tablet Take 1 tablet by mouth Every 6 (Six) Hours As Needed for Moderate Pain. 20 tablet 0    lidocaine (LIDODERM) 5 % APPLY 1 PATCH TOPICALLY ONCE DAILY FOR 12 HOURS THEN REMOVE AS DIRECTED 6 patch 3    Misc. Devices (Raised Toilet Seat) misc 1 each Daily. 1 each 0    natamycin (NATACYN) 5 % ophthalmic solution Administer 1 drop to the right eye 2 (two) times a day. Cycles on for 14 days and off for 14 days  Patient does not need until 8/18/20, this comes from compound pharmacy      ofloxacin (OCUFLOX) 0.3 % ophthalmic solution Administer 1 drop into the left eye 2 (Two) Times a Day.      prednisoLONE acetate (PRED FORTE) 1 % ophthalmic suspension       timolol (TIMOPTIC) 0.5 % ophthalmic solution Administer 1 drop into the left eye 2 (Two) Times a Day.       "amLODIPine (NORVASC) 5 MG tablet Take 1 tablet by mouth Daily. 90 tablet 4    cephalexin (Keflex) 500 MG capsule Take 1 capsule by mouth 2 (Two) Times a Day for 5 days. 10 capsule 0    gabapentin (NEURONTIN) 100 MG capsule TAKE 1 CAPSULE BY MOUTH EVERY MORNING AND TAKE 2 CAPSULES BY MOUTH EVERY NIGHT AT BEDTIME 270 capsule 1    pantoprazole (Protonix) 20 MG EC tablet Take 1 tablet by mouth Daily. 90 tablet 1     No facility-administered medications prior to visit.       No opioid medication identified on active medication list. I have reviewed chart for other potential  high risk medication/s and harmful drug interactions in the elderly.        Aspirin is not on active medication list.  Aspirin use is not indicated based on review of current medical condition/s. Risk of harm outweighs potential benefits.  .    Patient Active Problem List   Diagnosis    Benign essential hypertension    Cervical radiculopathy    Glaucoma    Pure hypercholesterolemia    Menopausal symptom    Atrophic vaginitis    Health care maintenance    Cervical radiculopathy    Edema, lower extremity    Elevated LFTs    Fever of unknown origin (FUO)    Nausea    Sinus tachycardia    Bilateral chronic knee pain    Urinary frequency    Lumbar radiculopathy, chronic    DDD (degenerative disc disease), lumbar    Sacroiliac joint dysfunction of both sides    Medication management contract agreement     Advance Care Planning   Advance Care Planning     Advance Directive is on file.  ACP discussion was held with the patient during this visit. Patient has an advance directive in EMR which is still valid.      Objective    Vitals:    04/12/24 1133   BP: 122/82   BP Location: Left arm   Patient Position: Sitting   Cuff Size: Small Adult   Pulse: 87   Resp: 18   SpO2: 97%   Weight: 62.6 kg (138 lb)   Height: 165.1 cm (65\")     Estimated body mass index is 22.96 kg/m² as calculated from the following:    Height as of this encounter: 165.1 cm (65\").    Weight " as of this encounter: 62.6 kg (138 lb).    BMI is within normal parameters. No other follow-up for BMI required.      Does the patient have evidence of cognitive impairment? No          HEALTH RISK ASSESSMENT    Smoking Status:  Social History     Tobacco Use   Smoking Status Former    Current packs/day: 0.00    Average packs/day: 1 pack/day for 5.0 years (5.0 ttl pk-yrs)    Types: Cigarettes    Start date:     Quit date:     Years since quittin.3   Smokeless Tobacco Never     Alcohol Consumption:  Social History     Substance and Sexual Activity   Alcohol Use Yes    Comment: occasionally     Fall Risk Screen:    [place] Fall Risk Assessment was completed, and patient is at LOW risk for falls.Assessment completed on:2024    Depression Screenin/12/2024    11:37 AM   PHQ-2/PHQ-9 Depression Screening   Little Interest or Pleasure in Doing Things 0-->not at all   Feeling Down, Depressed or Hopeless 0-->not at all   PHQ-9: Brief Depression Severity Measure Score 0       Health Habits and Functional and Cognitive Screenin/12/2024    11:34 AM   Functional & Cognitive Status   Do you have difficulty preparing food and eating? No   Do you have difficulty bathing yourself, getting dressed or grooming yourself? No   Do you have difficulty using the toilet? No   Do you have difficulty moving around from place to place? No   Do you have trouble with steps or getting out of a bed or a chair? No   Current Diet Well Balanced Diet   Dental Exam Up to date   Eye Exam Up to date   Exercise (times per week) 2 times per week   Current Exercises Include Walking   Do you need help using the phone?  No   Are you deaf or do you have serious difficulty hearing?  No   Do you need help to go to places out of walking distance? Yes   Do you need help shopping? Yes   Do you need help preparing meals?  Yes   Do you need help with housework?  Yes   Do you need help with laundry? No   Do you need help taking your  medications? No   Do you need help managing money? No   Do you ever drive or ride in a car without wearing a seat belt? No   Have you felt unusual stress, anger or loneliness in the last month? No   Who do you live with? Spouse   If you need help, do you have trouble finding someone available to you? No   Have you been bothered in the last four weeks by sexual problems? No   Do you have difficulty concentrating, remembering or making decisions? Yes       Age-appropriate Screening Schedule:  Refer to the list below for future screening recommendations based on patient's age, sex and/or medical conditions. Orders for these recommended tests are listed in the plan section. The patient has been provided with a written plan.    Health Maintenance   Topic Date Due    RSV Vaccine - Adults (1 - 1-dose 60+ series) Never done    COVID-19 Vaccine (6 - 2023-24 season) 09/01/2023    ANNUAL WELLNESS VISIT  02/24/2024    LIPID PANEL  02/24/2024    TDAP/TD VACCINES (1 - Tdap) 10/13/2026 (Originally 10/14/2016)    INFLUENZA VACCINE  08/01/2024    DXA SCAN  10/04/2026    COLORECTAL CANCER SCREENING  01/10/2028    HEPATITIS C SCREENING  Completed    Pneumococcal Vaccine 65+  Completed    ZOSTER VACCINE  Addressed                  CMS Preventative Services Quick Reference  Risk Factors Identified During Encounter  Immunizations Discussed/Encouraged: Influenza, Prevnar 20 (Pneumococcal 20-valent conjugate), Shingrix, and COVID19  The above risks/problems have been discussed with the patient.  Pertinent information has been shared with the patient in the After Visit Summary.  An After Visit Summary and PPPS were made available to the patient.    Follow Up:   Next Medicare Wellness visit to be scheduled in 1 year.       Additional E&M Note during same encounter follows:  Patient has multiple medical problems which are significant and separately identifiable that require additional work above and beyond the Medicare Wellness Visit.       Chief Complaint  Medicare Wellness-subsequent, Abdominal Pain (Lower left side), and Constipation    Subjective        HPI  Marilia Leo is also being seen today for:    Hypertension - stable today in the office.  Patient taking medication as prescribed.    Patient is taking irbesartan, amlodipine.  Denies side effects of the medication.    She is remaining on the gabapentin at a low dose for lumbar radiculopathy.  She takes 1 in the morning and 2 at night of 300 mg.  PDMP reviewed and appropriate.    She is still complaining of intermittent left lower quadrant pain.  I reviewed all of the studies she has had done recently including CT abdomen, labs, ultrasound pelvis.  So far nothing much except there is a 1.8 cm cyst on her left ovary.  Bowel only showed some diverticulosis.  She is having intermittent gas and constipation.  Simethicone therapy seems to be helping some.  I also recommended maybe we should start decreasing some of the constipating medications and using some stool softeners/MiraLAX as necessary.      Current Outpatient Medications:     acetaminophen (TYLENOL) 325 MG tablet, Take 1 tablet by mouth As Needed., Disp: , Rfl:     amLODIPine (NORVASC) 2.5 MG tablet, Take 1 tablet by mouth Daily., Disp: 30 tablet, Rfl: 2    Carboxymeth-Glycerin-Polysorb (REFRESH OPTIVE ALBERTO-3 OP), Apply 1 drop to eye(s) as directed by provider 2 (Two) Times a Day As Needed., Disp: , Rfl:     gabapentin (NEURONTIN) 100 MG capsule, TAKE 1 CAPSULE BY MOUTH EVERY MORNING AND TAKE 2 CAPSULES BY MOUTH EVERY NIGHT AT BEDTIME, Disp: 270 capsule, Rfl: 1    irbesartan (AVAPRO) 150 MG tablet, TAKE 1 TABLET BY MOUTH EVERY NIGHT., Disp: 90 tablet, Rfl: 3    ketorolac (TORADOL) 10 MG tablet, Take 1 tablet by mouth Every 6 (Six) Hours As Needed for Moderate Pain., Disp: 20 tablet, Rfl: 0    lidocaine (LIDODERM) 5 %, APPLY 1 PATCH TOPICALLY ONCE DAILY FOR 12 HOURS THEN REMOVE AS DIRECTED, Disp: 6 patch, Rfl: 3    Misc. Devices (Raised  "Toilet Seat) misc, 1 each Daily., Disp: 1 each, Rfl: 0    natamycin (NATACYN) 5 % ophthalmic solution, Administer 1 drop to the right eye 2 (two) times a day. Cycles on for 14 days and off for 14 days Patient does not need until 8/18/20, this comes from compound pharmacy, Disp: , Rfl:     ofloxacin (OCUFLOX) 0.3 % ophthalmic solution, Administer 1 drop into the left eye 2 (Two) Times a Day., Disp: , Rfl:     prednisoLONE acetate (PRED FORTE) 1 % ophthalmic suspension, , Disp: , Rfl:     timolol (TIMOPTIC) 0.5 % ophthalmic solution, Administer 1 drop into the left eye 2 (Two) Times a Day., Disp: , Rfl:            Objective   Vital Signs:  /82 (BP Location: Left arm, Patient Position: Sitting, Cuff Size: Small Adult)   Pulse 87   Resp 18   Ht 165.1 cm (65\")   Wt 62.6 kg (138 lb)   SpO2 97%   BMI 22.96 kg/m²     Physical Exam  Vitals and nursing note reviewed.   Constitutional:       General: She is not in acute distress.     Appearance: Normal appearance.   Cardiovascular:      Rate and Rhythm: Normal rate and regular rhythm.      Heart sounds: Normal heart sounds. No murmur heard.  Pulmonary:      Effort: Pulmonary effort is normal.      Breath sounds: Normal breath sounds.   Abdominal:      General: Abdomen is flat. Bowel sounds are normal.      Palpations: Abdomen is soft.      Tenderness: There is no abdominal tenderness.   Neurological:      Mental Status: She is alert.          The following data was reviewed by: Grover Garcias MD on 04/12/2024:  Common labs          4/4/2024    09:29 4/8/2024    14:16   Common Labs   Glucose 95  78    BUN 13  18    Creatinine 0.94  1.04    Sodium 143  142    Potassium 3.7  4.9    Chloride 107  103    Calcium 9.5  10.1    Total Protein  7.9    Albumin 4.6  4.7    Total Bilirubin 0.7  0.6    Alkaline Phosphatase 99  95    AST (SGOT) 14  15    ALT (SGPT) 12  15    WBC 8.42  7.39    Hemoglobin 13.0  13.6    Hematocrit 40.6  42.1    Platelets 336  318      Data " reviewed : Radiologic studies CT abdomen/pelvis 4/4/2024, US pelvis and ovary 4/8/2024           Assessment and Plan   Diagnoses and all orders for this visit:    1. Medicare annual wellness visit, subsequent (Primary)    2. Benign essential hypertension  -     amLODIPine (NORVASC) 2.5 MG tablet; Take 1 tablet by mouth Daily.  Dispense: 30 tablet; Refill: 2    3. Lumbar radiculopathy, chronic  -     gabapentin (NEURONTIN) 100 MG capsule; TAKE 1 CAPSULE BY MOUTH EVERY MORNING AND TAKE 2 CAPSULES BY MOUTH EVERY NIGHT AT BEDTIME  Dispense: 270 capsule; Refill: 1    4. Chronic left lower quadrant pain    5. Cyst of left ovary      Clinically stable chronic conditions as above.  Continue all medications as above except for decreasing amlodipine to 2.5mg to see if that helps with her constipation.  If BP increases, go back to 5mg daily.  Labs reviewed/ordered as above.    I will also have her stop lactulose for about a week to see if that makes any difference.  If that does not help, stop gluten for a week.  I would like her to follow-up with a gynecologist next week to see if the cyst could be causing some of her discomfort.  If none of those avenues help, likely referral to gastroenterology.  Docusate and/or MiraLAX as needed.  Okay to continue simethicone as needed.         Follow Up   Return in about 4 months (around 8/12/2024) for Next scheduled follow up.  Patient was given instructions and counseling regarding her condition or for health maintenance advice. Please see specific information pulled into the AVS if appropriate.

## 2024-04-12 NOTE — PROGRESS NOTES
Chief Complaint  No chief complaint on file.    Subjective    {Problem List  Visit Diagnosis   Encounters  Notes  Medications  Labs  Result Review Imaging  Media :23}    Marilia Leo presents to [facility]  History of Present Illness    Hypertension - stable today in the office.  Patient taking medication as prescribed.    Patient is taking irbesartan, amlodipine.  Denies side effects of the medication.    She is remaining on the gabapentin at a low dose for lumbar radiculopathy.  She takes 1 in the morning and 2 at night of 300 mg.  PDMP reviewed and appropriate.      Current Outpatient Medications:     acetaminophen (TYLENOL) 325 MG tablet, Take 1 tablet by mouth As Needed., Disp: , Rfl:     amLODIPine (NORVASC) 5 MG tablet, Take 1 tablet by mouth Daily., Disp: 90 tablet, Rfl: 4    Carboxymeth-Glycerin-Polysorb (REFRESH OPTIVE ALBERTO-3 OP), Apply 1 drop to eye(s) as directed by provider 2 (Two) Times a Day As Needed., Disp: , Rfl:     cephalexin (Keflex) 500 MG capsule, Take 1 capsule by mouth 2 (Two) Times a Day for 5 days., Disp: 10 capsule, Rfl: 0    gabapentin (NEURONTIN) 100 MG capsule, TAKE 1 CAPSULE BY MOUTH EVERY MORNING AND TAKE 2 CAPSULES BY MOUTH EVERY NIGHT AT BEDTIME, Disp: 270 capsule, Rfl: 1    irbesartan (AVAPRO) 150 MG tablet, TAKE 1 TABLET BY MOUTH EVERY NIGHT., Disp: 90 tablet, Rfl: 3    ketorolac (TORADOL) 10 MG tablet, Take 1 tablet by mouth Every 6 (Six) Hours As Needed for Moderate Pain., Disp: 20 tablet, Rfl: 0    lidocaine (LIDODERM) 5 %, APPLY 1 PATCH TOPICALLY ONCE DAILY FOR 12 HOURS THEN REMOVE AS DIRECTED, Disp: 6 patch, Rfl: 3    Misc. Devices (Raised Toilet Seat) misc, 1 each Daily., Disp: 1 each, Rfl: 0    natamycin (NATACYN) 5 % ophthalmic solution, Administer 1 drop to the right eye 2 (two) times a day. Cycles on for 14 days and off for 14 days Patient does not need until 8/18/20, this comes from compound pharmacy, Disp: , Rfl:     ofloxacin  "(OCUFLOX) 0.3 % ophthalmic solution, Administer 1 drop into the left eye 2 (Two) Times a Day., Disp: , Rfl:     pantoprazole (Protonix) 20 MG EC tablet, Take 1 tablet by mouth Daily., Disp: 90 tablet, Rfl: 1    prednisoLONE acetate (PRED FORTE) 1 % ophthalmic suspension, , Disp: , Rfl:     timolol (TIMOPTIC) 0.5 % ophthalmic solution, Administer 1 drop into the left eye 2 (Two) Times a Day., Disp: , Rfl:       Objective   Vital Signs:  There were no vitals taken for this visit.  Estimated body mass index is 23.06 kg/m² as calculated from the following:    Height as of 4/8/24: 165.1 cm (65\").    Weight as of 4/8/24: 62.9 kg (138 lb 9.6 oz).       BMI is within normal parameters. No other follow-up for BMI required.      Physical Exam  Vitals and nursing note reviewed.   Constitutional:       General: She is not in acute distress.     Appearance: Normal appearance.   Cardiovascular:      Rate and Rhythm: Normal rate and regular rhythm.      Heart sounds: Normal heart sounds. No murmur heard.  Pulmonary:      Effort: Pulmonary effort is normal.      Breath sounds: Normal breath sounds.   Neurological:      Mental Status: She is alert.        Result Review :{Labs  Result Review  Imaging  Med Tab  Media  Procedures :23}  The following data was reviewed by: Grover Garcias MD on 09/01/2023:  CMP          4/4/2024    09:29 4/8/2024    14:16   CMP   Glucose 95  78    BUN 13  18    Creatinine 0.94  1.04    EGFR 62.2     Sodium 143  142    Potassium 3.7  4.9    Chloride 107  103    Calcium 9.5  10.1    Total Protein  7.9    Total Protein 8.0     Albumin 4.6  4.7    Globulin  3.2    Globulin 3.4     Total Bilirubin 0.7  0.6    Alkaline Phosphatase 99  95    AST (SGOT) 14  15    ALT (SGPT) 12  15    Albumin/Globulin Ratio 1.4     BUN/Creatinine Ratio 13.8  17.3    Anion Gap 12.0                    Assessment and Plan {CC Problem List  Visit Diagnosis   ROS  Review (Popup)  Health Maintenance  Quality  BestPractice "  Medications  SmartSets  SnapShot Encounters  Media :23}  There are no diagnoses linked to this encounter.      Clinically stable chronic conditions as above.  Continue all medications as above.  Labs reviewed as above.           Follow Up {Instructions Charge Capture  Follow-up Communications :23}  No follow-ups on file.  Patient was given instructions and counseling regarding her condition or for health maintenance advice. Please see specific information pulled into the AVS if appropriate.

## 2024-04-12 NOTE — PATIENT INSTRUCTIONS
Miralax 17g (1 cap) daily.    Cut out lactose for 1 week.  If no better, try cutting down gluten for 1 week.

## 2024-04-16 ENCOUNTER — TELEPHONE (OUTPATIENT)
Dept: INTERNAL MEDICINE | Facility: CLINIC | Age: 79
End: 2024-04-16
Payer: MEDICARE

## 2024-04-16 NOTE — TELEPHONE ENCOUNTER
Caller: Sh[employer]on    Relationship: Emergency Contact    Best call back number: [phone]     What is the medical concern/diagnosis: LOWER LEFT ABDOMINAL PAIN    What specialty or service is being requested: GASTROENTEROLOGY     What is the provider, practice or medical service name: [facility]    What is the office location: UNKNOWN    What is the office phone number: UNKNOWN    Any additional details: PATIENT WAS SEEN BY HER GYNECOLOGIST AND EVERYTHING CHECKED OUT FINE, WAS ADVISED TO GET A REFERRAL FROM DR JONES FOR GASTROENTEROLOGY.     PLEASE CALL TO ADVISE.

## 2024-04-17 DIAGNOSIS — R10.32 CHRONIC LEFT LOWER QUADRANT PAIN: ICD-10-CM

## 2024-04-17 DIAGNOSIS — G89.29 CHRONIC LEFT LOWER QUADRANT PAIN: ICD-10-CM

## 2024-04-17 DIAGNOSIS — R10.84 GENERALIZED ABDOMINAL PAIN: Primary | ICD-10-CM

## 2024-05-14 ENCOUNTER — PREP FOR SURGERY (OUTPATIENT)
Dept: SURGERY | Facility: SURGERY CENTER | Age: 79
End: 2024-05-14
Payer: MEDICARE

## 2024-05-14 ENCOUNTER — OFFICE VISIT (OUTPATIENT)
Dept: GASTROENTEROLOGY | Facility: CLINIC | Age: 79
End: 2024-05-14
Payer: MEDICARE

## 2024-05-14 VITALS
WEIGHT: 138.3 LBS | DIASTOLIC BLOOD PRESSURE: 90 MMHG | TEMPERATURE: 97.8 F | SYSTOLIC BLOOD PRESSURE: 140 MMHG | OXYGEN SATURATION: 98 % | HEART RATE: 87 BPM | BODY MASS INDEX: 23.61 KG/M2 | HEIGHT: 64 IN

## 2024-05-14 DIAGNOSIS — K21.9 GASTROESOPHAGEAL REFLUX DISEASE, UNSPECIFIED WHETHER ESOPHAGITIS PRESENT: ICD-10-CM

## 2024-05-14 DIAGNOSIS — K59.00 CONSTIPATION, UNSPECIFIED CONSTIPATION TYPE: ICD-10-CM

## 2024-05-14 DIAGNOSIS — R14.0 BLOATING: ICD-10-CM

## 2024-05-14 DIAGNOSIS — R10.32 LEFT LOWER QUADRANT ABDOMINAL PAIN: Primary | ICD-10-CM

## 2024-05-14 PROCEDURE — 99214 OFFICE O/P EST MOD 30 MIN: CPT | Performed by: NURSE PRACTITIONER

## 2024-05-14 PROCEDURE — 3077F SYST BP >= 140 MM HG: CPT | Performed by: NURSE PRACTITIONER

## 2024-05-14 PROCEDURE — 1160F RVW MEDS BY RX/DR IN RCRD: CPT | Performed by: NURSE PRACTITIONER

## 2024-05-14 PROCEDURE — 1159F MED LIST DOCD IN RCRD: CPT | Performed by: NURSE PRACTITIONER

## 2024-05-14 PROCEDURE — 3080F DIAST BP >= 90 MM HG: CPT | Performed by: NURSE PRACTITIONER

## 2024-05-14 RX ORDER — SODIUM CHLORIDE, SODIUM LACTATE, POTASSIUM CHLORIDE, CALCIUM CHLORIDE 600; 310; 30; 20 MG/100ML; MG/100ML; MG/100ML; MG/100ML
30 INJECTION, SOLUTION INTRAVENOUS CONTINUOUS PRN
OUTPATIENT
Start: 2024-05-14

## 2024-05-14 RX ORDER — SODIUM CHLORIDE 0.9 % (FLUSH) 0.9 %
10 SYRINGE (ML) INJECTION AS NEEDED
OUTPATIENT
Start: 2024-05-14

## 2024-05-14 RX ORDER — SODIUM CHLORIDE 0.9 % (FLUSH) 0.9 %
3 SYRINGE (ML) INJECTION EVERY 12 HOURS SCHEDULED
OUTPATIENT
Start: 2024-05-14

## 2024-05-14 NOTE — PATIENT INSTRUCTIONS
Schedule EGD and colonoscopy for further evaluation.     For GERD, follow antireflux precautions.  Recommend avoiding eating within 3 to 4 hours of bedtime.  Avoid foods that can trigger symptoms which may include citrus fruits, spicy foods, tomatoes and red sauces, chocolate, coffee/tea, caffeinated or carbonated beverages, alcohol.     For constipation, recommend starting MiraLAX daily available over-the-counter.  Mix 1 capful in 8 ounces of noncarbonated liquid and drink once daily.     For bloating, OK to use Gas X over the counter.

## 2024-05-14 NOTE — PROGRESS NOTES
"Chief Complaint   Patient presents with    Abdominal Pain         History of Present Illness  Patient is a 79-year-old Female who presents today for evaluation, referred for generalized abdominal pain.    She was in the ER in April for abdominal pain.  A CT scan was performed with no acute findings, incidental findings of renal cysts, diverticulosis, and a left ovarian cyst that was stable.     Patient presents today for evaluation.  She has been experiencing symptoms over the last 4 to 6 weeks.  Reports she has been experiencing frequent heartburn and indigestion.  This generally occurs after she eats.  She will feel some discomfort in her chest that can radiate to the right side of her back.  She has had a feeling that there is something stuck in her epigastric area.      She reports abdominal bloating and distention.    She has been experiencing abdominal pain but this is present to the left lower quadrant.  It comes and goes.  She has been constipated, generally has a bowel movement every 2 to 3 days.    She recently started MiraLAX and that has helped somewhat.  She is visually impaired but has not noted the smell of any blood in her stool.    Reports her last colonoscopy was greater than 10 years ago.     Result Review :       Comprehensive Metabolic Panel (04/08/2024 14:16)    CBC w AUTO Differential (04/08/2024 14:16)    US Non-ob Transvaginal (04/08/2024 15:04)    CT Abdomen Pelvis With Contrast (04/04/2024 11:42)    Office Visit with Lulú Nicholson APRN (04/08/2024)    Referral to Gastroenterology for Generalized abdominal pain; Chronic left lower quadrant pain (04/17/2024)    Vital Signs:   /90   Pulse 87   Temp 97.8 °F (36.6 °C)   Ht 162.6 cm (64\")   Wt 62.7 kg (138 lb 4.8 oz)   SpO2 98%   BMI 23.74 kg/m²     Body mass index is 23.74 kg/m².     Physical Exam  Vitals reviewed.   Constitutional:       General: She is not in acute distress.     Appearance: She is well-developed. "   HENT:      Head: Normocephalic and atraumatic.   Pulmonary:      Effort: Pulmonary effort is normal. No respiratory distress.   Abdominal:      General: Abdomen is flat. Bowel sounds are normal. There is no distension.      Palpations: Abdomen is soft.      Tenderness:  in the left lower quadrant   Skin:     General: Skin is dry.      Coloration: Skin is not pale.   Neurological:      Mental Status: She is alert and oriented to person, place, and time.   Psychiatric:         Thought Content: Thought content normal.           Assessment and Plan    Diagnoses and all orders for this visit:    1. Left lower quadrant abdominal pain (Primary)    2. Constipation, unspecified constipation type    3. Bloating    4. Gastroesophageal reflux disease, unspecified whether esophagitis present         Discussion  Patient presents today for evaluation with concerns about reflux, bloating, constipation, and left lower quadrant pain.  Recommended EGD to evaluate for any evidence of esophagitis, peptic ulcer disease, gastritis, or celiac disease that may be contributing to symptoms and recommended colonoscopy for further evaluation of left lower quadrant pain.  While this is being completed, discussed suspect constipation is contributing to symptoms and recommended continuing MiraLAX as needed.  She may also use Gas-X OTC as needed.  Reviewed dietary modifications to help with reflux.          Follow Up   Return for Follow up to review results after testing complete.    Patient Instructions   Schedule EGD and colonoscopy for further evaluation.     For GERD, follow antireflux precautions.  Recommend avoiding eating within 3 to 4 hours of bedtime.  Avoid foods that can trigger symptoms which may include citrus fruits, spicy foods, tomatoes and red sauces, chocolate, coffee/tea, caffeinated or carbonated beverages, alcohol.     For constipation, recommend starting MiraLAX daily available over-the-counter.  Mix 1 capful in 8 ounces of  noncarbonated liquid and drink once daily.     For bloating, OK to use Gas X over the counter.

## 2024-05-24 ENCOUNTER — OFFICE VISIT (OUTPATIENT)
Dept: PAIN MEDICINE | Facility: CLINIC | Age: 79
End: 2024-05-24
Payer: MEDICARE

## 2024-05-24 ENCOUNTER — PREP FOR SURGERY (OUTPATIENT)
Dept: SURGERY | Facility: SURGERY CENTER | Age: 79
End: 2024-05-24
Payer: MEDICARE

## 2024-05-24 VITALS
WEIGHT: 139 LBS | HEIGHT: 64 IN | SYSTOLIC BLOOD PRESSURE: 158 MMHG | HEART RATE: 81 BPM | TEMPERATURE: 97.5 F | BODY MASS INDEX: 23.73 KG/M2 | DIASTOLIC BLOOD PRESSURE: 96 MMHG | OXYGEN SATURATION: 96 %

## 2024-05-24 DIAGNOSIS — G89.29 CHRONIC BILATERAL LOW BACK PAIN, UNSPECIFIED WHETHER SCIATICA PRESENT: Primary | ICD-10-CM

## 2024-05-24 DIAGNOSIS — M48.061 LUMBAR FORAMINAL STENOSIS: ICD-10-CM

## 2024-05-24 DIAGNOSIS — M54.2 NECK PAIN: ICD-10-CM

## 2024-05-24 DIAGNOSIS — M54.50 CHRONIC BILATERAL LOW BACK PAIN, UNSPECIFIED WHETHER SCIATICA PRESENT: ICD-10-CM

## 2024-05-24 DIAGNOSIS — M54.50 CHRONIC BILATERAL LOW BACK PAIN, UNSPECIFIED WHETHER SCIATICA PRESENT: Primary | ICD-10-CM

## 2024-05-24 DIAGNOSIS — M25.511 CHRONIC PAIN OF BOTH SHOULDERS: ICD-10-CM

## 2024-05-24 DIAGNOSIS — M54.12 CERVICAL RADICULOPATHY: ICD-10-CM

## 2024-05-24 DIAGNOSIS — G89.29 CHRONIC PAIN OF BOTH SHOULDERS: ICD-10-CM

## 2024-05-24 DIAGNOSIS — M25.512 CHRONIC PAIN OF BOTH SHOULDERS: ICD-10-CM

## 2024-05-24 DIAGNOSIS — M25.511 ACUTE PAIN OF RIGHT SHOULDER: ICD-10-CM

## 2024-05-24 DIAGNOSIS — M54.16 LUMBAR RADICULOPATHY, CHRONIC: ICD-10-CM

## 2024-05-24 DIAGNOSIS — M54.16 LUMBAR RADICULOPATHY, CHRONIC: Primary | ICD-10-CM

## 2024-05-24 DIAGNOSIS — G89.29 CHRONIC BILATERAL LOW BACK PAIN, UNSPECIFIED WHETHER SCIATICA PRESENT: ICD-10-CM

## 2024-05-24 RX ORDER — KETOROLAC TROMETHAMINE 30 MG/ML
30 INJECTION, SOLUTION INTRAMUSCULAR; INTRAVENOUS ONCE
Status: COMPLETED | OUTPATIENT
Start: 2024-05-24 | End: 2024-05-24

## 2024-05-24 RX ORDER — DIAZEPAM 5 MG/1
10 TABLET ORAL ONCE
OUTPATIENT
Start: 2024-05-24

## 2024-05-24 RX ADMIN — KETOROLAC TROMETHAMINE 30 MG: 30 INJECTION, SOLUTION INTRAMUSCULAR; INTRAVENOUS at 14:54

## 2024-05-24 NOTE — PROGRESS NOTES
CHIEF COMPLAINT  Back, neck, and hip pain    Subjective   Marilia Leo is a 79 y.o. female  who presents for follow-up.  She has a history of chronic back, neck, and hip pain. She reports that her pain has remained consistent since her last office visit and varies based on activity level.    Today pain is 7/10VAS in severity (patient appears uncomfortable, rubbing legs). Pain is located in her low back and radiates into bilateral groin and down anterior thighs. Describes this pain as a nearly continuous aching and burning. Pain is worsened by lying flat, prolonged standing or sitting, and walking long distances. Pain improves with rest/reposition, heat/ice, and medications.  Neck pain remains consistent at this time. She has completed PT for her neck pain in the past and reports that this was somewhat helpful for her pain.      Patient continues with Gabapentin 100mg at night(managed by PCP) and Tylenol 500mg PRN. Unable to tolerate Tramadol and Codeine (both caused nausea), Robaxin - patient did not like how medication made her feel     History of glaucoma. Patient is legally blind. Remaining vision is peripheral vision in her left eye.      She see [facility] for hand/wrist injections. She was told by [facility] that she has a torn bicep in her right shoulder. She is scheduled to see them on June 21st.     She is scheduled for an esophagogastroduodenoscopy and colonoscopy on 6/5/24     Procedures:  11/17/23 - L2/L3 LESI - 90% relief for 5 months  8/15/23 - Right L2 & L5 TF LESI - 50% relief x 2 months  2/16/23 - Left L5 & S1 TF ROQUE - 80% relief x 2.5 months  1/11/23 - Bilateral SI joint injections- 100% relief to right side, 45-50% relief to left side x 1 week    Surgical History:  History of cervical fusion ~ 9 years ago with Dr. Cali Sexton.      History of previous epidurals at [facility] - she reports that these injections were initially helpful but the last injections she had there were  unsuccessful.     Neck Pain   This is a recurrent problem. The current episode started 1 to 4 weeks ago. The problem occurs intermittently. The problem has been waxing and waning. The pain is associated with an unknown factor. The pain is present in the left side. The quality of the pain is described as aching. The pain is at a severity of 5/10. The symptoms are aggravated by position, twisting and bending. Associated symptoms include headaches and weakness (shoulder). Pertinent negatives include no chest pain, fever or numbness. She has tried heat, ice and acetaminophen for the symptoms.   Back Pain  This is a chronic problem. The current episode started more than 1 year ago. The problem occurs intermittently. The problem has been gradually worsening since onset. The pain is present in the lumbar spine. The quality of the pain is described as aching and burning. The pain radiates to the left thigh (radiates down left buttock in posterior/lateral thigh - does not pass knee). The pain is at a severity of 7/10. The pain is moderate. The symptoms are aggravated by standing, bending, sitting, position and lying down (walking long distances, increased physical activity). Stiffness is present In the morning. Associated symptoms include abdominal pain, headaches and weakness (shoulder). Pertinent negatives include no chest pain, dysuria, fever or numbness. She has tried heat and ice (Gabapentin, ) for the symptoms. The treatment provided mild relief.   Hip Pain   The incident occurred more than 1 week ago. The injury mechanism is unknown. The pain is present in the right hip and left hip. The quality of the pain is described as aching. The pain is at a severity of 7/10. The pain is severe. The pain has been Worsening since onset. Pertinent negatives include no numbness. The symptoms are aggravated by movement and weight bearing. She has tried acetaminophen, non-weight bearing and rest (Lidocaine patches, Gabapentin) for  "the symptoms. The treatment provided mild relief.      PEG Assessment   What number best describes your pain on average in the past week?7  What number best describes how, during the past week, pain has interfered with your enjoyment of life?7  What number best describes how, during the past week, pain has interfered with your general activity?  5    Review of Pertinent Medical Data ---    The following portions of the patient's history were reviewed and updated as appropriate: allergies, current medications, past family history, past medical history, past social history, past surgical history, and problem list.    Review of Systems   Constitutional:  Negative for activity change, chills, fatigue and fever.   HENT:  Negative for congestion.    Respiratory:  Negative for chest tightness and shortness of breath.    Cardiovascular:  Negative for chest pain.   Gastrointestinal:  Positive for abdominal pain, constipation and diarrhea.   Genitourinary:  Negative for difficulty urinating, dyspareunia and dysuria.   Musculoskeletal:  Positive for neck pain. Negative for back pain.   Neurological:  Positive for weakness (shoulder) and headaches. Negative for dizziness, light-headedness and numbness.   Psychiatric/Behavioral:  Positive for agitation and sleep disturbance. The patient is not nervous/anxious.        I have reviewed and confirmed the accuracy of the ROS as documented by the MA/LPN/RN GISELL Ma    Vitals:    05/24/24 1423   BP: 158/96   Pulse: 81   Temp: 97.5 °F (36.4 °C)   SpO2: 96%   Weight: 63 kg (139 lb)   Height: 162.6 cm (64\")   PainSc:   7   PainLoc: Hip  Comment: bilateral     Objective   Physical Exam  Constitutional:       Appearance: Normal appearance.   HENT:      Head: Normocephalic.   Cardiovascular:      Rate and Rhythm: Normal rate and regular rhythm.   Pulmonary:      Effort: Pulmonary effort is normal.      Breath sounds: Normal breath sounds.   Musculoskeletal:      Cervical back: " Normal range of motion.      Lumbar back: Tenderness and bony tenderness present. Decreased range of motion. Positive right straight leg raise test and positive left straight leg raise test.      Right hip: No tenderness. Normal range of motion.      Left hip: Tenderness present. Normal range of motion.   Skin:     General: Skin is warm and dry.      Capillary Refill: Capillary refill takes less than 2 seconds.   Neurological:      General: No focal deficit present.      Mental Status: She is alert and oriented to person, place, and time.      Gait: Gait abnormal (slowed, ambulates with cane).   Psychiatric:         Mood and Affect: Mood normal.         Behavior: Behavior normal.         Thought Content: Thought content normal.         Cognition and Memory: Cognition normal.       Assessment & Plan   Diagnoses and all orders for this visit:    1. Chronic bilateral low back pain, unspecified whether sciatica present (Primary)  -     ketorolac (TORADOL) injection 30 mg    2. Lumbar radiculopathy, chronic    3. Lumbar foraminal stenosis    4. Chronic pain of both shoulders    5. Neck pain    6. Cervical radiculopathy    7. Acute pain of right shoulder      Marilia Leo reports a pain score of 7.  Given her pain assessment as noted, treatment options were discussed and the following options were decided upon as a follow-up plan to address the patient's pain: continuation of current treatment plan for pain.    --- L2/L3 LESI   ---  Indications for epidural injection:  Plan is to proceed with epidural at the appropriate level.  If the patient receives significant pain reduction and improvement in function and the plan will be to repeat the epidural when the pain worsens.  If a second epidural provides at least 6 weeks of sustained improvement that includes both pain reduction and improvement in function then an epidural injection could be repeated once again at the same level.  This is a mutual decision between the  clinician and the patient that includes discussions including risks and benefits in detail as well as alternative therapies.  Patient's questions were answered to their satisfaction and to their understanding.  ---   Discussed with the patient that sedation is optional for this procedure.  The sedation offered is called conscious sedation which is different from general anesthesia that is utilized in surgical procedures. The dosing of the sedation is determined by the physician and they will be monitored throughout the procedure. With conscious sedation it is possible to remember parts or all of the procedure, this is normal. They will need to have a  with them as driving is prohibited following conscious sedation.      NPO instructions for conscious sedation:  --- Do not eat 6 hours prior to the procedure.   --- Do not drink any dairy or citrus 4 hours prior to the procedure.   --- Do not drink anything, including clear liquids, 2 hours prior to procedure.      If the NPO instructions are not followed then the procedure may be performed without sedation or the procedure will need to be rescheduled.    --- Patient's daughter to call [facility] to schedule cervical MRI.  Pending results may consider KAMARI.   --- IM Toradol 30mg in office for sever flare in pain.   --- Follow-up for procedure      CHASE REPORT  As the clinician, I personally reviewed the CHASE from 5/24/24 while the patient was in the office today.    Dictated utilizing Dragon dictation.

## 2024-06-04 NOTE — SIGNIFICANT NOTE
Education provided the Patient on the following:    - Nothing to Eat or Drink after MN the night before the procedure    - Avoid red/purple fluids while completing their bowel prep as ordered by physician  -Contact Gastrointerologist office for any questions about specific details regarding colon prep    -You will need to have someone drive you home after your colonoscopy and remain with you for 24 hours after the procedure  - The date of your procedure, your are welcome to have one visitor at bedside or remain within 10-15 minutes of [facility]  -Please wear warm socks when you arrive for your procedure  -Remove all jewelry and leave any valuables before arriving the day of your procedure (all will have to be removed before leaving preop)  -You will need to arrive at 1200 on 6/5 procedure    -Feel free to contact us at: [phone] with any additional questions/concerns

## 2024-06-05 ENCOUNTER — ANESTHESIA EVENT (OUTPATIENT)
Dept: SURGERY | Facility: SURGERY CENTER | Age: 79
End: 2024-06-05
Payer: MEDICARE

## 2024-06-05 ENCOUNTER — HOSPITAL ENCOUNTER (OUTPATIENT)
Facility: SURGERY CENTER | Age: 79
Setting detail: HOSPITAL OUTPATIENT SURGERY
Discharge: HOME | End: 2024-06-05
Attending: INTERNAL MEDICINE | Admitting: INTERNAL MEDICINE
Payer: MEDICARE

## 2024-06-05 ENCOUNTER — ANESTHESIA (OUTPATIENT)
Dept: SURGERY | Facility: SURGERY CENTER | Age: 79
End: 2024-06-05
Payer: MEDICARE

## 2024-06-05 VITALS
RESPIRATION RATE: 16 BRPM | HEART RATE: 80 BPM | TEMPERATURE: 97.5 F | OXYGEN SATURATION: 98 % | HEIGHT: 65 IN | SYSTOLIC BLOOD PRESSURE: 137 MMHG | BODY MASS INDEX: 22.63 KG/M2 | WEIGHT: 135.8 LBS | DIASTOLIC BLOOD PRESSURE: 87 MMHG

## 2024-06-05 DIAGNOSIS — K21.9 GASTROESOPHAGEAL REFLUX DISEASE, UNSPECIFIED WHETHER ESOPHAGITIS PRESENT: ICD-10-CM

## 2024-06-05 DIAGNOSIS — R10.32 LEFT LOWER QUADRANT ABDOMINAL PAIN: ICD-10-CM

## 2024-06-05 DIAGNOSIS — R14.0 BLOATING: ICD-10-CM

## 2024-06-05 PROCEDURE — 45385 COLONOSCOPY W/LESION REMOVAL: CPT | Performed by: INTERNAL MEDICINE

## 2024-06-05 PROCEDURE — 43239 EGD BIOPSY SINGLE/MULTIPLE: CPT | Performed by: INTERNAL MEDICINE

## 2024-06-05 PROCEDURE — 25010000002 PROPOFOL 10 MG/ML EMULSION: Performed by: REGISTERED NURSE

## 2024-06-05 PROCEDURE — 45380 COLONOSCOPY AND BIOPSY: CPT | Performed by: INTERNAL MEDICINE

## 2024-06-05 PROCEDURE — 25810000003 LACTATED RINGERS PER 1000 ML: Performed by: NURSE PRACTITIONER

## 2024-06-05 PROCEDURE — 88305 TISSUE EXAM BY PATHOLOGIST: CPT | Performed by: INTERNAL MEDICINE

## 2024-06-05 RX ORDER — PROPOFOL 10 MG/ML
VIAL (ML) INTRAVENOUS AS NEEDED
Status: DISCONTINUED | OUTPATIENT
Start: 2024-06-05 | End: 2024-06-05 | Stop reason: SURG

## 2024-06-05 RX ORDER — SODIUM CHLORIDE 0.9 % (FLUSH) 0.9 %
10 SYRINGE (ML) INJECTION AS NEEDED
Status: DISCONTINUED | OUTPATIENT
Start: 2024-06-05 | End: 2024-06-05 | Stop reason: HOSPADM

## 2024-06-05 RX ORDER — SODIUM CHLORIDE 0.9 % (FLUSH) 0.9 %
3 SYRINGE (ML) INJECTION EVERY 12 HOURS SCHEDULED
Status: DISCONTINUED | OUTPATIENT
Start: 2024-06-05 | End: 2024-06-05 | Stop reason: HOSPADM

## 2024-06-05 RX ORDER — LIDOCAINE HYDROCHLORIDE 20 MG/ML
INJECTION, SOLUTION INFILTRATION; PERINEURAL AS NEEDED
Status: DISCONTINUED | OUTPATIENT
Start: 2024-06-05 | End: 2024-06-05 | Stop reason: SURG

## 2024-06-05 RX ORDER — LIDOCAINE HYDROCHLORIDE 10 MG/ML
0.5 INJECTION, SOLUTION INFILTRATION; PERINEURAL ONCE AS NEEDED
Status: COMPLETED | OUTPATIENT
Start: 2024-06-05 | End: 2024-06-05

## 2024-06-05 RX ORDER — SODIUM CHLORIDE, SODIUM LACTATE, POTASSIUM CHLORIDE, CALCIUM CHLORIDE 600; 310; 30; 20 MG/100ML; MG/100ML; MG/100ML; MG/100ML
30 INJECTION, SOLUTION INTRAVENOUS CONTINUOUS PRN
Status: DISCONTINUED | OUTPATIENT
Start: 2024-06-05 | End: 2024-06-05 | Stop reason: HOSPADM

## 2024-06-05 RX ADMIN — LIDOCAINE HYDROCHLORIDE 60 MG: 20 INJECTION, SOLUTION INFILTRATION; PERINEURAL at 13:38

## 2024-06-05 RX ADMIN — LIDOCAINE HYDROCHLORIDE 0.5 ML: 10 INJECTION, SOLUTION EPIDURAL; INFILTRATION; INTRACAUDAL; PERINEURAL at 13:12

## 2024-06-05 RX ADMIN — PROPOFOL 160 MCG/KG/MIN: 10 INJECTION, EMULSION INTRAVENOUS at 13:38

## 2024-06-05 RX ADMIN — SODIUM CHLORIDE, POTASSIUM CHLORIDE, SODIUM LACTATE AND CALCIUM CHLORIDE 30 ML/HR: 600; 310; 30; 20 INJECTION, SOLUTION INTRAVENOUS at 13:11

## 2024-06-05 RX ADMIN — PROPOFOL 40 MG: 10 INJECTION, EMULSION INTRAVENOUS at 13:38

## 2024-06-05 NOTE — ANESTHESIA POSTPROCEDURE EVALUATION
"Patient: Marilia Leo    Procedure Summary       Date: 06/05/24 Room / Location[facility]    Anesthesia Start: 1334 Anesthesia Stop: 1428    Procedures:       ESOPHAGOGASTRODUODENOSCOPY      COLONOSCOPY FOR SCREENING to Cecum with Polypectomy Diagnosis:       Left lower quadrant abdominal pain      Bloating      Gastroesophageal reflux disease, unspecified whether esophagitis present      (Left lower quadrant abdominal pain [R10.32])      (Bloating [R14.0])      (Gastroesophageal reflux disease, unspecified whether esophagitis present [K21.9])    Surgeons: Marco Antonio Meléndez MD Provider: José Miguel Cain DO    Anesthesia Type: MAC ASA Status: 3            Anesthesia Type: MAC    Vitals  Vitals Value Taken Time   /84 06/05/24 1426   Temp 36.4 °C (97.5 °F) 06/05/24 1426   Pulse 78 06/05/24 1426   Resp 16 06/05/24 1426   SpO2 98 % 06/05/24 1426           Post Anesthesia Care and Evaluation    Patient location during evaluation: bedside  Patient participation: complete - patient participated  Level of consciousness: awake and alert  Pain management: adequate    Airway patency: patent  Anesthetic complications: No anesthetic complications  PONV Status: controlled  Cardiovascular status: acceptable and hemodynamically stable  Respiratory status: acceptable, spontaneous ventilation and nonlabored ventilation  Hydration status: acceptable    Comments: /87   Pulse 80   Temp 36.4 °C (97.5 °F)   Resp 16   Ht 165.1 cm (65\")   Wt 61.6 kg (135 lb 12.8 oz)   SpO2 98%   BMI 22.60 kg/m²       "

## 2024-06-05 NOTE — ANESTHESIA PREPROCEDURE EVALUATION
" Anesthesia Evaluation     Patient summary reviewed   history of anesthetic complications:  PONV difficult airway  NPO Solid Status: > 8 hours  NPO Liquid Status: > 2 hours           Airway   Mallampati: II  TM distance: >3 FB  Neck ROM: limited  Possible difficult intubation  Comment: Prvs cervical fusion, was told \"she is narrow on the right side\"  Dental          Pulmonary     breath sounds clear to auscultation  (+) a smoker Former,  (-) shortness of breath, recent URI  Cardiovascular   Exercise tolerance: good (4-7 METS)    Rhythm: regular  Rate: normal    (+) hypertension, dysrhythmias Tachycardia, hyperlipidemia  (-) past MI, angina      Neuro/Psych  (+) numbness  (-) seizures, CVA  GI/Hepatic/Renal/Endo    (+) GERD  (-)  obesityRenal disease: Cr 1.04. Diabetes: IFG.    Musculoskeletal     (+) back pain, chronic pain, radiculopathy (cervical and lumbar)  (-) neck stiffness  Abdominal    Substance History      OB/GYN          Other   arthritis,     (-) blood dyscrasia              Anesthesia Plan    ASA 3     MAC     (MAC anesthesia discussed with patient and/or patient representative. Risks (including but not limited to intra-op awareness), benefits, and alternatives were discussed. Understanding was voiced with an agreement to proceed with a MAC technique and General as a backup option. )    Anesthetic plan, risks, benefits, and alternatives have been provided, discussed and informed consent has been obtained with: patient.      CODE STATUS:         "

## 2024-06-07 LAB
LAB AP CASE REPORT: NORMAL
PATH REPORT.FINAL DX SPEC: NORMAL
PATH REPORT.GROSS SPEC: NORMAL

## 2024-06-12 ENCOUNTER — PATIENT MESSAGE (OUTPATIENT)
Dept: GASTROENTEROLOGY | Facility: CLINIC | Age: 79
End: 2024-06-12
Payer: MEDICARE

## 2024-06-24 RX ORDER — LIDOCAINE 50 MG/G
PATCH TOPICAL
Qty: 6 PATCH | Refills: 3 | Status: SHIPPED | OUTPATIENT
Start: 2024-06-24

## 2024-06-24 RX ORDER — LIDOCAINE 50 MG/G
PATCH TOPICAL
Qty: 6 PATCH | Refills: 3 | OUTPATIENT
Start: 2024-06-24

## 2024-07-16 ENCOUNTER — PREP FOR SURGERY (OUTPATIENT)
Dept: OTHER | Facility: HOSPITAL | Age: 79
End: 2024-07-16
Payer: MEDICARE

## 2024-07-16 DIAGNOSIS — M54.16 LUMBAR RADICULOPATHY, CHRONIC: Primary | ICD-10-CM

## 2024-07-16 RX ORDER — LIDOCAINE HYDROCHLORIDE 10 MG/ML
5 INJECTION, SOLUTION INFILTRATION; PERINEURAL ONCE
Status: CANCELLED | OUTPATIENT
Start: 2024-07-17 | End: 2024-07-17

## 2024-07-16 RX ORDER — IOPAMIDOL 408 MG/ML
12 INJECTION, SOLUTION INTRATHECAL
Status: CANCELLED | OUTPATIENT
Start: 2024-07-17 | End: 2024-07-17

## 2024-07-16 RX ORDER — SODIUM CHLORIDE 0.9 % (FLUSH) 0.9 %
1-10 SYRINGE (ML) INJECTION AS NEEDED
Status: CANCELLED | OUTPATIENT
Start: 2024-07-16

## 2024-07-16 RX ORDER — BUPIVACAINE HYDROCHLORIDE 2.5 MG/ML
10 INJECTION, SOLUTION EPIDURAL; INFILTRATION; INTRACAUDAL ONCE
Status: CANCELLED | OUTPATIENT
Start: 2024-07-17 | End: 2024-07-17

## 2024-07-16 RX ORDER — METHYLPREDNISOLONE ACETATE 80 MG/ML
80 INJECTION, SUSPENSION INTRA-ARTICULAR; INTRALESIONAL; INTRAMUSCULAR; SOFT TISSUE ONCE
Status: CANCELLED | OUTPATIENT
Start: 2024-07-17 | End: 2024-07-17

## 2024-07-17 ENCOUNTER — HOSPITAL ENCOUNTER (OUTPATIENT)
Dept: PAIN MEDICINE | Facility: HOSPITAL | Age: 79
Discharge: HOME | End: 2024-07-17
Payer: MEDICARE

## 2024-07-17 ENCOUNTER — HOSPITAL ENCOUNTER (OUTPATIENT)
Dept: GENERAL RADIOLOGY | Facility: HOSPITAL | Age: 79
Discharge: HOME | End: 2024-07-17
Payer: MEDICARE

## 2024-07-17 VITALS
SYSTOLIC BLOOD PRESSURE: 158 MMHG | HEART RATE: 82 BPM | TEMPERATURE: 97.3 F | OXYGEN SATURATION: 99 % | RESPIRATION RATE: 16 BRPM | DIASTOLIC BLOOD PRESSURE: 95 MMHG

## 2024-07-17 DIAGNOSIS — R52 PAIN: ICD-10-CM

## 2024-07-17 DIAGNOSIS — G89.29 CHRONIC BILATERAL LOW BACK PAIN, UNSPECIFIED WHETHER SCIATICA PRESENT: ICD-10-CM

## 2024-07-17 DIAGNOSIS — M54.16 LUMBAR RADICULOPATHY, CHRONIC: ICD-10-CM

## 2024-07-17 DIAGNOSIS — M54.50 CHRONIC BILATERAL LOW BACK PAIN, UNSPECIFIED WHETHER SCIATICA PRESENT: ICD-10-CM

## 2024-07-17 DIAGNOSIS — M48.061 LUMBAR FORAMINAL STENOSIS: ICD-10-CM

## 2024-07-17 PROCEDURE — 25510000001 IOPAMIDOL 41 % SOLUTION: Performed by: ANESTHESIOLOGY

## 2024-07-17 PROCEDURE — 25010000002 BUPIVACAINE (PF) 0.25 % SOLUTION: Performed by: ANESTHESIOLOGY

## 2024-07-17 PROCEDURE — 25010000002 METHYLPREDNISOLONE PER 80 MG: Performed by: ANESTHESIOLOGY

## 2024-07-17 PROCEDURE — 77003 FLUOROGUIDE FOR SPINE INJECT: CPT

## 2024-07-17 RX ORDER — DIAZEPAM 5 MG/1
10 TABLET ORAL ONCE
Status: COMPLETED | OUTPATIENT
Start: 2024-07-17 | End: 2024-07-17

## 2024-07-17 RX ORDER — LIDOCAINE HYDROCHLORIDE 10 MG/ML
5 INJECTION, SOLUTION INFILTRATION; PERINEURAL ONCE
Status: DISCONTINUED | OUTPATIENT
Start: 2024-07-17 | End: 2024-07-18 | Stop reason: HOSPADM

## 2024-07-17 RX ORDER — BUPIVACAINE HYDROCHLORIDE 2.5 MG/ML
10 INJECTION, SOLUTION EPIDURAL; INFILTRATION; INTRACAUDAL ONCE
Status: COMPLETED | OUTPATIENT
Start: 2024-07-17 | End: 2024-07-17

## 2024-07-17 RX ORDER — SODIUM CHLORIDE 0.9 % (FLUSH) 0.9 %
1-10 SYRINGE (ML) INJECTION AS NEEDED
Status: DISCONTINUED | OUTPATIENT
Start: 2024-07-17 | End: 2024-07-18 | Stop reason: HOSPADM

## 2024-07-17 RX ORDER — METHYLPREDNISOLONE ACETATE 80 MG/ML
80 INJECTION, SUSPENSION INTRA-ARTICULAR; INTRALESIONAL; INTRAMUSCULAR; SOFT TISSUE ONCE
Status: COMPLETED | OUTPATIENT
Start: 2024-07-17 | End: 2024-07-17

## 2024-07-17 RX ORDER — IOPAMIDOL 408 MG/ML
12 INJECTION, SOLUTION INTRATHECAL
Status: COMPLETED | OUTPATIENT
Start: 2024-07-17 | End: 2024-07-17

## 2024-07-17 RX ADMIN — IOPAMIDOL 10 ML: 408 INJECTION, SOLUTION INTRATHECAL at 11:20

## 2024-07-17 RX ADMIN — DIAZEPAM 10 MG: 5 TABLET ORAL at 10:24

## 2024-07-17 RX ADMIN — BUPIVACAINE HYDROCHLORIDE 10 ML: 2.5 INJECTION, SOLUTION EPIDURAL; INFILTRATION; INTRACAUDAL; PERINEURAL at 11:20

## 2024-07-17 RX ADMIN — METHYLPREDNISOLONE ACETATE 80 MG: 80 INJECTION, SUSPENSION INTRA-ARTICULAR; INTRALESIONAL; INTRAMUSCULAR; SOFT TISSUE at 11:20

## 2024-07-17 NOTE — H&P (VIEW-ONLY)
"Brief Pre-procedural / Pre-operative H&P        -----    Pertinent Diagnosis:   Bilateral lumbar radiculopathy.  Also known lumbar foraminal stenosis.    Proposed Procedure: Lumbar epidural steroid injection, anticipated L2-L3      Subjective   Marilia Leo is a 79 y.o. female  who presents for intervention.  She has a history of back pain.      History of Present Illness     She has had a return of back pain with aching and burning, continuous pain there is some radiating into the groin and anterior thighs approximating his L2 and L3 dermatomes.  Standing and sitting for long periods will flare her pain walking long distances will flare her pain.  She has some worsening with lying flat but resting repositioning will help.  She had 90% relief from previous epidural injection at the aforementioned level, lasting 5 months.    -------    The following portions of the patient's history were reviewed and updated as appropriate: allergies, current medications, past family history, past medical history, past social history, past surgical history and problem list.    Allergies   Allergen Reactions    Nitrofurantoin Unknown - High Severity and Anaphylaxis    Codeine     Penicillins     Sulfa Antibiotics     Tramadol Nausea Only     Feels like \"jumping out of skin\"         Current Outpatient Medications:     acetaminophen (TYLENOL) 325 MG tablet, Take 1 tablet by mouth As Needed., Disp: , Rfl:     amLODIPine (NORVASC) 2.5 MG tablet, Take 1 tablet by mouth Daily., Disp: 30 tablet, Rfl: 2    Carboxymeth-Glycerin-Polysorb (REFRESH OPTIVE ALBERTO-3 OP), Apply 1 drop to eye(s) as directed by provider 2 (Two) Times a Day As Needed., Disp: , Rfl:     gabapentin (NEURONTIN) 100 MG capsule, TAKE 1 CAPSULE BY MOUTH EVERY MORNING AND TAKE 2 CAPSULES BY MOUTH EVERY NIGHT AT BEDTIME, Disp: 270 capsule, Rfl: 1    irbesartan (AVAPRO) 150 MG tablet, TAKE 1 TABLET BY MOUTH EVERY NIGHT., Disp: 90 tablet, Rfl: 3    lidocaine (LIDODERM) 5 %, APPLY " 1 PATCH TOPICALLY ONCE DAILY FOR 12 HOURS THEN REMOVE AS DIRECTED, Disp: 6 patch, Rfl: 3    Misc. Devices (Raised Toilet Seat) misc, 1 each Daily., Disp: 1 each, Rfl: 0    natamycin (NATACYN) 5 % ophthalmic solution, Administer 1 drop to the right eye 2 (two) times a day. Cycles on for 14 days and off for 14 days Patient does not need until 8/18/20, this comes from compound pharmacy, Disp: , Rfl:     ofloxacin (OCUFLOX) 0.3 % ophthalmic solution, Administer 1 drop into the left eye 2 (Two) Times a Day., Disp: , Rfl:     prednisoLONE acetate (PRED FORTE) 1 % ophthalmic suspension, , Disp: , Rfl:     timolol (TIMOPTIC) 0.5 % ophthalmic solution, Administer 1 drop into the left eye 2 (Two) Times a Day., Disp: , Rfl:     Current Facility-Administered Medications:     bupivacaine (PF) (MARCAINE) 0.25 % injection 10 mL, 10 mL, Infiltration, Once, Gigi Boateng MD    iopamidol (ISOVUE-M 200) injection 41%, 12 mL, Epidural, Once in imaging, Gigi Boateng MD    lidocaine (XYLOCAINE) 1 % injection 5 mL, 5 mL, Infiltration, Once, Gigi Boateng MD    methylPREDNISolone acetate (DEPO-medrol) injection 80 mg, 80 mg, Epidural, Once, Gigi Boateng MD    sodium chloride 0.9 % flush 1-10 mL, 1-10 mL, Intravenous, PRN, Gigi Boateng MD    Current Outpatient Medications on File Prior to Encounter   Medication Sig Dispense Refill    acetaminophen (TYLENOL) 325 MG tablet Take 1 tablet by mouth As Needed.      amLODIPine (NORVASC) 2.5 MG tablet Take 1 tablet by mouth Daily. 30 tablet 2    Carboxymeth-Glycerin-Polysorb (REFRESH OPTIVE ALBERTO-3 OP) Apply 1 drop to eye(s) as directed by provider 2 (Two) Times a Day As Needed.      gabapentin (NEURONTIN) 100 MG capsule TAKE 1 CAPSULE BY MOUTH EVERY MORNING AND TAKE 2 CAPSULES BY MOUTH EVERY NIGHT AT BEDTIME 270 capsule 1    irbesartan (AVAPRO) 150 MG tablet TAKE 1 TABLET BY MOUTH EVERY NIGHT. 90 tablet 3    lidocaine (LIDODERM) 5 % APPLY 1 PATCH TOPICALLY ONCE DAILY FOR  12 HOURS THEN REMOVE AS DIRECTED 6 patch 3    Misc. Devices (Raised Toilet Seat) misc 1 each Daily. 1 each 0    natamycin (NATACYN) 5 % ophthalmic solution Administer 1 drop to the right eye 2 (two) times a day. Cycles on for 14 days and off for 14 days  Patient does not need until 8/18/20, this comes from compound pharmacy      ofloxacin (OCUFLOX) 0.3 % ophthalmic solution Administer 1 drop into the left eye 2 (Two) Times a Day.      prednisoLONE acetate (PRED FORTE) 1 % ophthalmic suspension       timolol (TIMOPTIC) 0.5 % ophthalmic solution Administer 1 drop into the left eye 2 (Two) Times a Day.       No current facility-administered medications on file prior to encounter.         * No active hospital problems. *       Past Medical History:   Diagnosis Date    Angioedema     due to ACEI 8/2002    Atopic dermatitis 04/18/2016    Cataract     Diverticulosis     Edema     due to Norvasc    Esophageal reflux     Glaucoma     right eye open angle Dr astorga s/p lazar Sx x3    Hammer toe     Hard to intubate     History of mammogram     3/11/14 Quick 4/10/15    Hx of bone scan     DEXA normal 2/2010, 09/2015    Hypercholesterolemia     Impaired fasting blood sugar 05/13/2019    Legally blind     SOME VISION L EYE, 30% PERIPHERAL    Medial epicondylitis     L 2005    Palpitations 05/13/2019    Papanicolaou smear     reported pos. pap smear and previous pos 2 or more years ago  s/p hysterectomy    PONV (postoperative nausea and vomiting)     Sepsis 08/04/2020    Trochanteric bursitis     Trochanteric bursitis of left hip 10/13/2016       Past Surgical History:   Procedure Laterality Date    APPENDECTOMY      BILATERAL BREAST REDUCTION      CERVICAL FUSION      Dr.John Sexton    COLONOSCOPY      4/2007 Dr Bradshaw Normal    COLONOSCOPY N/A 6/5/2024    Procedure: COLONOSCOPY FOR SCREENING to Cecum with Polypectomy;  Surgeon: Marco Antonio Meléndez MD;  [place] [facility] [place]  Service: Gastroenterology;  Laterality: N/A;   Ascending Colon x2  Polyp with a Cold Snare and Biopsy, Transverse Polyp x2  with a Cold Snare    CORNEAL TRANSPLANT      ENDOSCOPY N/A 6/5/2024    Procedure: ESOPHAGOGASTRODUODENOSCOPY;  Surgeon: Marco Antonio Meléndez MD;  [facility];  Service: Gastroenterology;  Laterality: N/A;    EPIDURAL Left 2/16/2023    Procedure: LEFT L5 & S1 TRANSFORAMINAL LUMBAR EPIDURAL STEROID INJECTION CPT: 80786, 96943;  Surgeon: Gigi Boateng MD;  [facility];  Service: Pain Management;  Laterality: Left;    EPIDURAL Bilateral 8/15/2023    Procedure: RIGHT L2 AND LEFT L5 TRANSFORAMINAL LUMBAR EPIDURAL STEROID INJECTION CPT: 82316, 53550;  Surgeon: Gigi Boateng MD;  [facility];  Service: Pain Management;  Laterality: Bilateral;    FETAL BLOOD TRANSFUSION  1960    FOOT SURGERY  02/2013    right bunion and hammer toe Dr Phelan    LUMBAR EPIDURAL INJECTION N/A 11/17/2023    Procedure: L2/L3 LUMBAR EPIDURAL STEROID INJECTION CPT: 23163;  Surgeon: Gigi Boateng MD;  [facility];  Service: Pain Management;  Laterality: N/A;    SACROILIAC JOINT INJECTION Bilateral 1/11/2023    Procedure: SACROILIAC INJECTION;  Surgeon: Gigi Boateng MD;  [facility];  Service: Pain Management;  Laterality: Bilateral;    SUBTOTAL HYSTERECTOMY      ovaries intact       Family History   Problem Relation Age of Onset    Hypertension Mother     Diabetes Mother     Hypertension Father     Diabetes Sister     Multiple sclerosis Sister     Rheum arthritis Sister     Lung cancer Sister         smoker    Diabetes Brother     Heart disease Brother     Hypertension Brother     Rheum arthritis Brother     Sarcoidosis Brother     Glaucoma Brother     Colon cancer Neg Hx     Colon polyps Neg Hx     Crohn's disease Neg Hx     Irritable bowel syndrome Neg Hx     Ulcerative colitis Neg Hx        Social History     Socioeconomic History    Marital status: Single   Tobacco Use    Smoking status:  Former     Current packs/day: 0.00     Average packs/day: 1 pack/day for 5.0 years (5.0 ttl pk-yrs)     Types: Cigarettes     Start date:      Quit date:      Years since quittin.5    Smokeless tobacco: Never   Vaping Use    Vaping status: Never Used   Substance and Sexual Activity    Alcohol use: Yes     Comment: occasionally    Drug use: No    Sexual activity: Defer       -------       Review of Systems  No Fever, No Chills, No ear pain, No sinus pressure or drainage, No eye pain or drainage, No cough, No SOB, No chest tightness nor chest pain, no palpitations.          Vitals:    24 1038   BP: 143/86   BP Location: Left arm   Patient Position: Sitting   Pulse: 87   Resp: 16   Temp: 97.3 °F (36.3 °C)   TempSrc: Infrared   SpO2: 96%         Objective   Physical Exam  VSS, NNR, NCAT, NMNA, NRD, AAOx3.    Mallampati 2  -------    Assessment & Plan:  - as noted above, the stated intervention is indicated  - Follow-up plan will be noted in the operative report      ASA 3.  She is requesting a sedation.  Anxiolytic is reasonable and consistent with Medicare guidelines.  Oral diazepam is anticipated.  Has a follow-up visit in the office in a few weeks      EMR Dragon/Transcription disclaimer:   Typed items in this encounter note may have been created by electronic transcription/translation software which converts spoken language to printed text. The electronic translation of spoken language may permit erroneous, or at times, nonsensical words or phrases to be inadvertently transcribed; Although I have reviewed the note for such errors, some may still exist.

## 2024-07-17 NOTE — PROGRESS NOTES
"Brief Pre-procedural / Pre-operative H&P        -----    Pertinent Diagnosis:   Bilateral lumbar radiculopathy.  Also known lumbar foraminal stenosis.    Proposed Procedure: Lumbar epidural steroid injection, anticipated L2-L3      Subjective   Marilia Leo is a 79 y.o. female  who presents for intervention.  She has a history of back pain.      History of Present Illness     She has had a return of back pain with aching and burning, continuous pain there is some radiating into the groin and anterior thighs approximating his L2 and L3 dermatomes.  Standing and sitting for long periods will flare her pain walking long distances will flare her pain.  She has some worsening with lying flat but resting repositioning will help.  She had 90% relief from previous epidural injection at the aforementioned level, lasting 5 months.    -------    The following portions of the patient's history were reviewed and updated as appropriate: allergies, current medications, past family history, past medical history, past social history, past surgical history and problem list.    Allergies   Allergen Reactions    Nitrofurantoin Unknown - High Severity and Anaphylaxis    Codeine     Penicillins     Sulfa Antibiotics     Tramadol Nausea Only     Feels like \"jumping out of skin\"         Current Outpatient Medications:     acetaminophen (TYLENOL) 325 MG tablet, Take 1 tablet by mouth As Needed., Disp: , Rfl:     amLODIPine (NORVASC) 2.5 MG tablet, Take 1 tablet by mouth Daily., Disp: 30 tablet, Rfl: 2    Carboxymeth-Glycerin-Polysorb (REFRESH OPTIVE ALBERTO-3 OP), Apply 1 drop to eye(s) as directed by provider 2 (Two) Times a Day As Needed., Disp: , Rfl:     gabapentin (NEURONTIN) 100 MG capsule, TAKE 1 CAPSULE BY MOUTH EVERY MORNING AND TAKE 2 CAPSULES BY MOUTH EVERY NIGHT AT BEDTIME, Disp: 270 capsule, Rfl: 1    irbesartan (AVAPRO) 150 MG tablet, TAKE 1 TABLET BY MOUTH EVERY NIGHT., Disp: 90 tablet, Rfl: 3    lidocaine (LIDODERM) 5 %, APPLY " 1 PATCH TOPICALLY ONCE DAILY FOR 12 HOURS THEN REMOVE AS DIRECTED, Disp: 6 patch, Rfl: 3    Misc. Devices (Raised Toilet Seat) misc, 1 each Daily., Disp: 1 each, Rfl: 0    natamycin (NATACYN) 5 % ophthalmic solution, Administer 1 drop to the right eye 2 (two) times a day. Cycles on for 14 days and off for 14 days Patient does not need until 8/18/20, this comes from compound pharmacy, Disp: , Rfl:     ofloxacin (OCUFLOX) 0.3 % ophthalmic solution, Administer 1 drop into the left eye 2 (Two) Times a Day., Disp: , Rfl:     prednisoLONE acetate (PRED FORTE) 1 % ophthalmic suspension, , Disp: , Rfl:     timolol (TIMOPTIC) 0.5 % ophthalmic solution, Administer 1 drop into the left eye 2 (Two) Times a Day., Disp: , Rfl:     Current Facility-Administered Medications:     bupivacaine (PF) (MARCAINE) 0.25 % injection 10 mL, 10 mL, Infiltration, Once, Gigi Boateng MD    iopamidol (ISOVUE-M 200) injection 41%, 12 mL, Epidural, Once in imaging, Gigi Boateng MD    lidocaine (XYLOCAINE) 1 % injection 5 mL, 5 mL, Infiltration, Once, Gigi Boateng MD    methylPREDNISolone acetate (DEPO-medrol) injection 80 mg, 80 mg, Epidural, Once, Gigi Boateng MD    sodium chloride 0.9 % flush 1-10 mL, 1-10 mL, Intravenous, PRN, Gigi Boateng MD    Current Outpatient Medications on File Prior to Encounter   Medication Sig Dispense Refill    acetaminophen (TYLENOL) 325 MG tablet Take 1 tablet by mouth As Needed.      amLODIPine (NORVASC) 2.5 MG tablet Take 1 tablet by mouth Daily. 30 tablet 2    Carboxymeth-Glycerin-Polysorb (REFRESH OPTIVE ALBERTO-3 OP) Apply 1 drop to eye(s) as directed by provider 2 (Two) Times a Day As Needed.      gabapentin (NEURONTIN) 100 MG capsule TAKE 1 CAPSULE BY MOUTH EVERY MORNING AND TAKE 2 CAPSULES BY MOUTH EVERY NIGHT AT BEDTIME 270 capsule 1    irbesartan (AVAPRO) 150 MG tablet TAKE 1 TABLET BY MOUTH EVERY NIGHT. 90 tablet 3    lidocaine (LIDODERM) 5 % APPLY 1 PATCH TOPICALLY ONCE DAILY FOR  12 HOURS THEN REMOVE AS DIRECTED 6 patch 3    Misc. Devices (Raised Toilet Seat) misc 1 each Daily. 1 each 0    natamycin (NATACYN) 5 % ophthalmic solution Administer 1 drop to the right eye 2 (two) times a day. Cycles on for 14 days and off for 14 days  Patient does not need until 8/18/20, this comes from compound pharmacy      ofloxacin (OCUFLOX) 0.3 % ophthalmic solution Administer 1 drop into the left eye 2 (Two) Times a Day.      prednisoLONE acetate (PRED FORTE) 1 % ophthalmic suspension       timolol (TIMOPTIC) 0.5 % ophthalmic solution Administer 1 drop into the left eye 2 (Two) Times a Day.       No current facility-administered medications on file prior to encounter.         * No active hospital problems. *       Past Medical History:   Diagnosis Date    Angioedema     due to ACEI 8/2002    Atopic dermatitis 04/18/2016    Cataract     Diverticulosis     Edema     due to Norvasc    Esophageal reflux     Glaucoma     right eye open angle Dr astorga s/p lazar Sx x3    Hammer toe     Hard to intubate     History of mammogram     3/11/14 Quick 4/10/15    Hx of bone scan     DEXA normal 2/2010, 09/2015    Hypercholesterolemia     Impaired fasting blood sugar 05/13/2019    Legally blind     SOME VISION L EYE, 30% PERIPHERAL    Medial epicondylitis     L 2005    Palpitations 05/13/2019    Papanicolaou smear     reported pos. pap smear and previous pos 2 or more years ago  s/p hysterectomy    PONV (postoperative nausea and vomiting)     Sepsis 08/04/2020    Trochanteric bursitis     Trochanteric bursitis of left hip 10/13/2016       Past Surgical History:   Procedure Laterality Date    APPENDECTOMY      BILATERAL BREAST REDUCTION      CERVICAL FUSION      Dr.John Sexton    COLONOSCOPY      4/2007 Dr Bradshaw Normal    COLONOSCOPY N/A 6/5/2024    Procedure: COLONOSCOPY FOR SCREENING to Cecum with Polypectomy;  Surgeon: Marco Antonio Meléndez MD;  Location: [facility] MAIN OR;  Service: Gastroenterology;  Laterality: N/A;   Ascending Colon x2  Polyp with a Cold Snare and Biopsy, Transverse Polyp x2  with a Cold Snare    CORNEAL TRANSPLANT      ENDOSCOPY N/A 6/5/2024    Procedure: ESOPHAGOGASTRODUODENOSCOPY;  Surgeon: Marco Antonio Meléndez MD;  Location: [facility];  Service: Gastroenterology;  Laterality: N/A;    EPIDURAL Left 2/16/2023    Procedure: LEFT L5 & S1 TRANSFORAMINAL LUMBAR EPIDURAL STEROID INJECTION CPT: 32406, 79042;  Surgeon: Gigi Boateng MD;  Location: [facility];  Service: Pain Management;  Laterality: Left;    EPIDURAL Bilateral 8/15/2023    Procedure: RIGHT L2 AND LEFT L5 TRANSFORAMINAL LUMBAR EPIDURAL STEROID INJECTION CPT: 71792, 62188;  Surgeon: Gigi Boateng MD;  Location: [facility];  Service: Pain Management;  Laterality: Bilateral;    FETAL BLOOD TRANSFUSION  1960    FOOT SURGERY  02/2013    right bunion and hammer toe Dr Phelan    LUMBAR EPIDURAL INJECTION N/A 11/17/2023    Procedure: L2/L3 LUMBAR EPIDURAL STEROID INJECTION CPT: 15746;  Surgeon: Gigi Boateng MD;  Location: [facility];  Service: Pain Management;  Laterality: N/A;    SACROILIAC JOINT INJECTION Bilateral 1/11/2023    Procedure: SACROILIAC INJECTION;  Surgeon: Gigi Boateng MD;  Location: [facility];  Service: Pain Management;  Laterality: Bilateral;    SUBTOTAL HYSTERECTOMY      ovaries intact       Family History   Problem Relation Age of Onset    Hypertension Mother     Diabetes Mother     Hypertension Father     Diabetes Sister     Multiple sclerosis Sister     Rheum arthritis Sister     Lung cancer Sister         smoker    Diabetes Brother     Heart disease Brother     Hypertension Brother     Rheum arthritis Brother     Sarcoidosis Brother     Glaucoma Brother     Colon cancer Neg Hx     Colon polyps Neg Hx     Crohn's disease Neg Hx     Irritable bowel syndrome Neg Hx     Ulcerative colitis Neg Hx        Social History     Socioeconomic History    Marital status: Single   Tobacco Use    Smoking status:  Former     Current packs/day: 0.00     Average packs/day: 1 pack/day for 5.0 years (5.0 ttl pk-yrs)     Types: Cigarettes     Start date:      Quit date:      Years since quittin.5    Smokeless tobacco: Never   Vaping Use    Vaping status: Never Used   Substance and Sexual Activity    Alcohol use: Yes     Comment: occasionally    Drug use: No    Sexual activity: Defer       -------       Review of Systems  No Fever, No Chills, No ear pain, No sinus pressure or drainage, No eye pain or drainage, No cough, No SOB, No chest tightness nor chest pain, no palpitations.          Vitals:    24 1038   BP: 143/86   BP Location: Left arm   Patient Position: Sitting   Pulse: 87   Resp: 16   Temp: 97.3 °F (36.3 °C)   TempSrc: Infrared   SpO2: 96%         Objective   Physical Exam  VSS, NNR, NCAT, NMNA, NRD, AAOx3.    Mallampati 2  -------    Assessment & Plan:  - as noted above, the stated intervention is indicated  - Follow-up plan will be noted in the operative report      ASA 3.  She is requesting a sedation.  Anxiolytic is reasonable and consistent with Medicare guidelines.  Oral diazepam is anticipated.  Has a follow-up visit in the office in a few weeks      EMR Dragon/Transcription disclaimer:   Typed items in this encounter note may have been created by electronic transcription/translation software which converts spoken language to printed text. The electronic translation of spoken language may permit erroneous, or at times, nonsensical words or phrases to be inadvertently transcribed; Although I have reviewed the note for such errors, some may still exist.

## 2024-07-17 NOTE — DISCHARGE INSTRUCTIONS
[facility] - Post-procedure Instructions          --  While there are no absolute restrictions, it is recommended that you do not perform strenuous activity today. In the morning, you may resume your level of activity as before your block.    --  If you have a band-aid at your injection site, please remove it later today. Observe the area for any redness, swelling, pus-like drainage, or a temperature over 101°. If any of these symptoms occur, please call your doctor at [phone]. If after office hours, leave a message and the on-call provider will return your call.    --  Ice may be applied to your injection site. It is recommended you avoid direct heat (heating pad; hot tub) for 1-2 days.    --  Call [facility] at [phone] if you experience persistent headache, persistent bleeding from the injection site, or severe pain not relieved by heat or oral medication.    --  Do not make important decisions today.    --  Due to the effects of the block and/or the I.V. Sedation, DO NOT drive or operate hazardous machinery for 12 hours.  Local anesthetics may cause numbness after procedure and precautions must be taken with regards to operating equipment as well as with walking, even if ambulating with assistance of another person or with an assistive device.    --  Do not drink alcohol for 12 hours.    -- You may return to work tomorrow, or as directed by your referring doctor.    --  Occasionally you may notice a slight increase in your pain after the procedure. This should start to improve within the next 24-48 hours. Radiofrequency ablation procedure pain may last 3-4 weeks.    --  It may take as long as 3-4 days before you notice a gradual improvement in your pain and/or other symptoms.    -- You may continue to take your prescribed pain medication as needed.    --  Some normal possible side effects of steroid use could include fluid retention, increased blood sugar, dull headache,  increased sweating, increased appetite, mood swings and flushing.    --  Diabetics are recommended to watch their blood glucose level closely for 24-48 hours after the injection.    --  Must stay in PACU for 20 min upon arrival and prove no leg weakness before being discharged.    --  IN THE EVENT OF A LIFE THREATENING EMERGENCY, (CHEST PAIN, BREATHING DIFFICULTIES, PARALYSIS…) YOU SHOULD GO TO YOUR NEAREST EMERGENCY ROOM.    --  You should be contacted by our office within 2-3 days to schedule follow up or next appointment date.  If not contacted within 7 days, please call the office at [phone]

## 2024-07-17 NOTE — PROCEDURES
Procedures    Lumbar Epidural Steroid Injection  [facility]    PREOPERATIVE DIAGNOSIS:   bilateral Lumbar Radiculopathy  POSTOPERATIVE DIAGNOSIS:  Same as preop diagnosis    PROCEDURE:   Lumbar Epidural Steroid Injection, Therapeutic Translaminar Injection, with epidurogram, at  L2/L3 level    PRE-PROCEDURE DISCUSSION WITH PATIENT:    Risks and complications were discussed with the patient prior to starting the procedure and informed consent was obtained.  We discussed various topics including but not limited to bleeding, infection, injury, paralysis, nerve injury, dural puncture, coma, death, worsening of clinical picture, lack of pain relief, and postprocedural soreness.    Preprocedure assessment/presedation assessment is noted as the H&P/H&P update as part of this Epic chart encounter.  Preassessment plan and preprocedure airway assessment are noted.  Immediate in the room airway assessment is unchanged from the preprocedure assessment performed in the preoperative area a few minutes ago.      SURGEON:  Gigi Boateng MD    REASON FOR PROCEDURE:    Previous clinically significant therapeutic effect is noted., Degenerative changes are noted in the area., and Radiating pattern of pain is likely consistent with degenerative changes in the area.    SEDATION:  The patient had higher than average levels of procedural anxiety and the need to provide additional procedural sedation was needed to safely proceed. and she had diazepam 10 mg by mouth in the preoperative area  ANESTHETIC:  Marcaine 0.25%  STEROID:   Methylprednisolone (DEPO MEDROL) 80mg/ml    DESCRIPTON OF PROCEDURE:    After obtaining informed consent, I.V. was not started in the preop area.   The patient was taken to the operating room and placed in the prone position.  EKG, blood pressure, and pulse oximeter were monitored throughout, and sedation was provided as needed by the RN under my guidance. All pressure points were well padded.  The  lumbar spine area was prepped with Chloraprep and draped in a sterile fashion.  Under fluoroscopic guidance, the above mentioned interlaminar space was identified. Skin and subcutaneous tissues were anesthetized with 1% lidocaine in the middle of the space. A Tuohy needle was introduced through the skin and advanced to this interlaminar space and into the epidural space under fluoroscopic guidance and verified with loss-of-resistance technique to air.  After confirming the position of the needle with the fluoroscope with all the views, and after aspiration was confirmed negative for blood and CSF, 1.5 mL of Omnipaque was injected.  After seeing appropriate epidurogram with lateral and PA views, a total of 3 cc solution was injected, consisting of 1cc of local anesthetic as above, with normal saline and injectable steroid as above.     ESTIMATED BLOOD LOSS:  <5 mL  SPECIMENS:  None    COMPLICATIONS:     No complications were noted., There was no indication of vascular uptake on live injection of contrast dye., and There was no indication of intrathecal uptake on live injection of contrast dye.    TOLERANCE & DISCHARGE CONDITION:    The patient tolerated the procedure well.  The patient was transported to the recovery area without difficulties.  The patient was discharged to home under the care of family in stable and satisfactory condition.    PLAN OF CARE:  The patient was given our standard instruction sheet.  The patient will Return to clinic 3 wks  The patient will resume all medications as per the medication reconciliation sheet.

## 2024-07-25 DIAGNOSIS — I10 BENIGN ESSENTIAL HYPERTENSION: Chronic | ICD-10-CM

## 2024-07-25 RX ORDER — AMLODIPINE BESYLATE 2.5 MG/1
2.5 TABLET ORAL DAILY
Qty: 30 TABLET | Refills: 2 | Status: SHIPPED | OUTPATIENT
Start: 2024-07-25

## 2024-08-05 DIAGNOSIS — I10 BENIGN ESSENTIAL HYPERTENSION: ICD-10-CM

## 2024-08-06 RX ORDER — IRBESARTAN 150 MG/1
150 TABLET ORAL NIGHTLY
Qty: 90 TABLET | Refills: 3 | Status: SHIPPED | OUTPATIENT
Start: 2024-08-06

## 2024-08-08 ENCOUNTER — OFFICE VISIT (OUTPATIENT)
Dept: PAIN MEDICINE | Facility: CLINIC | Age: 79
End: 2024-08-08
Payer: MEDICARE

## 2024-08-08 ENCOUNTER — OFFICE VISIT (OUTPATIENT)
Dept: GASTROENTEROLOGY | Facility: CLINIC | Age: 79
End: 2024-08-08
Payer: MEDICARE

## 2024-08-08 VITALS
SYSTOLIC BLOOD PRESSURE: 136 MMHG | DIASTOLIC BLOOD PRESSURE: 77 MMHG | BODY MASS INDEX: 23.09 KG/M2 | TEMPERATURE: 96.9 F | RESPIRATION RATE: 12 BRPM | HEART RATE: 95 BPM | WEIGHT: 138.6 LBS | HEIGHT: 65 IN | OXYGEN SATURATION: 98 %

## 2024-08-08 VITALS
WEIGHT: 137.7 LBS | DIASTOLIC BLOOD PRESSURE: 100 MMHG | HEIGHT: 65 IN | OXYGEN SATURATION: 99 % | SYSTOLIC BLOOD PRESSURE: 140 MMHG | HEART RATE: 55 BPM | TEMPERATURE: 96.9 F | BODY MASS INDEX: 22.94 KG/M2

## 2024-08-08 DIAGNOSIS — G89.29 CHRONIC BILATERAL LOW BACK PAIN, UNSPECIFIED WHETHER SCIATICA PRESENT: ICD-10-CM

## 2024-08-08 DIAGNOSIS — M54.12 CERVICAL RADICULOPATHY: ICD-10-CM

## 2024-08-08 DIAGNOSIS — M54.50 CHRONIC BILATERAL LOW BACK PAIN, UNSPECIFIED WHETHER SCIATICA PRESENT: ICD-10-CM

## 2024-08-08 DIAGNOSIS — Z86.010 HISTORY OF COLON POLYPS: ICD-10-CM

## 2024-08-08 DIAGNOSIS — M54.16 LUMBAR RADICULOPATHY, CHRONIC: Primary | ICD-10-CM

## 2024-08-08 DIAGNOSIS — M25.511 ACUTE PAIN OF RIGHT SHOULDER: ICD-10-CM

## 2024-08-08 DIAGNOSIS — M25.512 CHRONIC PAIN OF BOTH SHOULDERS: ICD-10-CM

## 2024-08-08 DIAGNOSIS — M25.552 BILATERAL HIP PAIN: ICD-10-CM

## 2024-08-08 DIAGNOSIS — G89.29 CHRONIC PAIN OF BOTH SHOULDERS: ICD-10-CM

## 2024-08-08 DIAGNOSIS — M54.2 NECK PAIN: ICD-10-CM

## 2024-08-08 DIAGNOSIS — K44.9 HIATAL HERNIA: Primary | ICD-10-CM

## 2024-08-08 DIAGNOSIS — M48.061 LUMBAR FORAMINAL STENOSIS: ICD-10-CM

## 2024-08-08 DIAGNOSIS — R10.32 LEFT LOWER QUADRANT ABDOMINAL PAIN: ICD-10-CM

## 2024-08-08 DIAGNOSIS — K57.90 DIVERTICULOSIS: ICD-10-CM

## 2024-08-08 DIAGNOSIS — M25.551 BILATERAL HIP PAIN: ICD-10-CM

## 2024-08-08 DIAGNOSIS — M25.511 CHRONIC PAIN OF BOTH SHOULDERS: ICD-10-CM

## 2024-08-08 PROCEDURE — 3080F DIAST BP >= 90 MM HG: CPT | Performed by: NURSE PRACTITIONER

## 2024-08-08 PROCEDURE — 1160F RVW MEDS BY RX/DR IN RCRD: CPT | Performed by: NURSE PRACTITIONER

## 2024-08-08 PROCEDURE — 1160F RVW MEDS BY RX/DR IN RCRD: CPT

## 2024-08-08 PROCEDURE — 3078F DIAST BP <80 MM HG: CPT

## 2024-08-08 PROCEDURE — 99214 OFFICE O/P EST MOD 30 MIN: CPT

## 2024-08-08 PROCEDURE — 1159F MED LIST DOCD IN RCRD: CPT | Performed by: NURSE PRACTITIONER

## 2024-08-08 PROCEDURE — 3077F SYST BP >= 140 MM HG: CPT | Performed by: NURSE PRACTITIONER

## 2024-08-08 PROCEDURE — 1159F MED LIST DOCD IN RCRD: CPT

## 2024-08-08 PROCEDURE — 99214 OFFICE O/P EST MOD 30 MIN: CPT | Performed by: NURSE PRACTITIONER

## 2024-08-08 PROCEDURE — 1125F AMNT PAIN NOTED PAIN PRSNT: CPT

## 2024-08-08 PROCEDURE — 3075F SYST BP GE 130 - 139MM HG: CPT

## 2024-08-08 NOTE — PROGRESS NOTES
"Chief Complaint   Patient presents with    GI Problem           History of Present Illness  Patient is a 79-year-old female who presents today for follow-up. She was last seen in the office May 2024 with concerns about left lower quadrant pain, constipation, and bloating.  EGD and colonoscopy were performed.  EGD showed a 1 cm hiatal hernia but was otherwise normal.  Colonoscopy with internal hemorrhoids, 4 polyps, diverticulosis.    Patient presents today for follow up after Egd and colonoscopy. Reports overall she is feeling much better. She has noted occasional pain to her LUQ or LLQ but overall much improved. She at times feels a lump to her LUQ.    She denies and n/v, reports bowels are moving regularly, generally every other day to twice per day.     Result Review :       Tissue Pathology Exam (06/05/2024 13:43)    Upper GI Endoscopy (06/05/2024 13:31)    Colonoscopy (06/05/2024 13:27)    Office Visit with Lashell Terrazas APRN (05/14/2024)    US Non-ob Transvaginal (04/08/2024 15:04)    CT Abdomen Pelvis With Contrast (04/04/2024 11:42)    Vital Signs:   /100   Pulse 55   Temp 96.9 °F (36.1 °C)   Ht 165.1 cm (65\")   Wt 62.5 kg (137 lb 11.2 oz)   SpO2 99%   BMI 22.91 kg/m²     Body mass index is 22.91 kg/m².     Physical Exam  Vitals reviewed.   Constitutional:       General: She is not in acute distress.     Appearance: She is well-developed.   HENT:      Head: Normocephalic and atraumatic.   Pulmonary:      Effort: Pulmonary effort is normal. No respiratory distress.   Abdominal:      General: Abdomen is flat. Bowel sounds are normal. There is no distension.      Palpations: Abdomen is soft.      Tenderness: There is no abdominal tenderness.   Skin:     General: Skin is dry.      Coloration: Skin is not pale.   Neurological:      Mental Status: She is alert and oriented to person, place, and time.   Psychiatric:         Thought Content: Thought content normal.           Assessment and Plan  "   Diagnoses and all orders for this visit:    1. Hiatal hernia (Primary)    2. Diverticulosis    3. History of colon polyps    4. Left lower quadrant abdominal pain         Discussion  Patient presents today for follow-up after EGD and colonoscopy.  EGD and colonoscopy findings reviewed at today's office visit.  EGD with evidence of small hiatal hernia, otherwise normal.  No indication for surgery based on small size and minimal symptoms.  Reviewed dietary and lifestyle modifications to help with reflux in the setting of hernia.    Colonoscopy with diverticulosis and 4 polyps.  Due to polyps, will need a repeat colonoscopy in 3 years.  For diverticulosis, encouraged high-fiber diet and recommended continuing daily fiber supplement.    As symptoms have improved, no further testing or treatment needed at this time.  Suspect residual intermittent pain likely due to gas or stool distention of the colon and encouraged continued use of MiraLAX as needed for constipation.  Patient instructed to call for any new or worsening symptoms.          Follow Up   Return if symptoms worsen or fail to improve.    Patient Instructions   For surveillance of colon polyps, colonoscopy recommended at 3-year interval, due June 2027.    Continue taking daily fiber supplement.    Continue using Miralax as needed for constipation.    For GERD with hiatal hernia, follow antireflux precautions.  Recommend avoiding eating within 3 to 4 hours of bedtime.  Avoid foods that can trigger symptoms which may include citrus fruits, spicy foods, tomatoes and red sauces, chocolate, coffee/tea, caffeinated or carbonated beverages, alcohol.

## 2024-08-08 NOTE — PATIENT INSTRUCTIONS
For surveillance of colon polyps, colonoscopy recommended at 3-year interval, due June 2027.    Continue taking daily fiber supplement.    Continue using Miralax as needed for constipation.    For GERD with hiatal hernia, follow antireflux precautions.  Recommend avoiding eating within 3 to 4 hours of bedtime.  Avoid foods that can trigger symptoms which may include citrus fruits, spicy foods, tomatoes and red sauces, chocolate, coffee/tea, caffeinated or carbonated beverages, alcohol.

## 2024-08-16 ENCOUNTER — OFFICE VISIT (OUTPATIENT)
Dept: INTERNAL MEDICINE | Facility: CLINIC | Age: 79
End: 2024-08-16
Payer: MEDICARE

## 2024-08-16 VITALS
BODY MASS INDEX: 22.99 KG/M2 | HEIGHT: 65 IN | OXYGEN SATURATION: 96 % | DIASTOLIC BLOOD PRESSURE: 80 MMHG | WEIGHT: 138 LBS | SYSTOLIC BLOOD PRESSURE: 138 MMHG | HEART RATE: 78 BPM | RESPIRATION RATE: 18 BRPM

## 2024-08-16 DIAGNOSIS — I10 BENIGN ESSENTIAL HYPERTENSION: Primary | Chronic | ICD-10-CM

## 2024-08-16 DIAGNOSIS — M51.36 DDD (DEGENERATIVE DISC DISEASE), LUMBAR: Chronic | ICD-10-CM

## 2024-08-16 DIAGNOSIS — M54.16 LUMBAR RADICULOPATHY, CHRONIC: Chronic | ICD-10-CM

## 2024-08-16 PROBLEM — R50.9 FEVER OF UNKNOWN ORIGIN (FUO): Status: RESOLVED | Noted: 2020-08-04 | Resolved: 2024-08-16

## 2024-08-16 PROCEDURE — 1160F RVW MEDS BY RX/DR IN RCRD: CPT | Performed by: FAMILY MEDICINE

## 2024-08-16 PROCEDURE — G2211 COMPLEX E/M VISIT ADD ON: HCPCS | Performed by: FAMILY MEDICINE

## 2024-08-16 PROCEDURE — 1159F MED LIST DOCD IN RCRD: CPT | Performed by: FAMILY MEDICINE

## 2024-08-16 PROCEDURE — 99214 OFFICE O/P EST MOD 30 MIN: CPT | Performed by: FAMILY MEDICINE

## 2024-08-16 PROCEDURE — 1125F AMNT PAIN NOTED PAIN PRSNT: CPT | Performed by: FAMILY MEDICINE

## 2024-08-16 PROCEDURE — 3075F SYST BP GE 130 - 139MM HG: CPT | Performed by: FAMILY MEDICINE

## 2024-08-16 PROCEDURE — 3079F DIAST BP 80-89 MM HG: CPT | Performed by: FAMILY MEDICINE

## 2024-08-16 RX ORDER — GABAPENTIN 100 MG/1
100 CAPSULE ORAL NIGHTLY
Qty: 90 CAPSULE | Refills: 0
Start: 2024-08-16

## 2024-08-16 RX ORDER — AMLODIPINE BESYLATE 2.5 MG/1
2.5 TABLET ORAL DAILY
Qty: 90 TABLET | Refills: 0 | Status: SHIPPED | OUTPATIENT
Start: 2024-08-16

## 2024-08-16 NOTE — PROGRESS NOTES
"Chief Complaint  Follow-up (HTN, lumbar back pain)    Subjective        Marilia Leo presents to [facility]  History of Present Illness    Hypertension - stable today in the office.  Patient taking medication as prescribed.    Patient is taking irbesartan, amlodipine.  Denies side effects of the medication.     She is remaining on the gabapentin at a low dose for lumbar radiculopathy.  She is prescribed 1 in the morning and 2 at night of 100 mg.  PDMP reviewed and appropriate.  She is usually only taking at night.        Current Outpatient Medications:     acetaminophen (TYLENOL) 325 MG tablet, Take 1 tablet by mouth As Needed., Disp: , Rfl:     Carboxymeth-Glycerin-Polysorb (REFRESH OPTIVE ALBERTO-3 OP), Apply 1 drop to eye(s) as directed by provider 2 (Two) Times a Day As Needed., Disp: , Rfl:     irbesartan (AVAPRO) 150 MG tablet, TAKE 1 TABLET EVERY NIGHT, Disp: 90 tablet, Rfl: 3    Misc. Devices (Raised Toilet Seat) misc, 1 each Daily., Disp: 1 each, Rfl: 0    natamycin (NATACYN) 5 % ophthalmic solution, Administer 1 drop to the right eye 2 (two) times a day. Cycles on for 14 days and off for 14 days Patient does not need until 8/18/20, this comes from compound pharmacy  First 2 days of each month, Disp: , Rfl:     ofloxacin (OCUFLOX) 0.3 % ophthalmic solution, Administer 1 drop into the left eye 2 (Two) Times a Day., Disp: , Rfl:     prednisoLONE acetate (PRED FORTE) 1 % ophthalmic suspension, , Disp: , Rfl:     timolol (TIMOPTIC) 0.5 % ophthalmic solution, Administer 1 drop into the left eye 2 (Two) Times a Day., Disp: , Rfl:     amLODIPine (NORVASC) 2.5 MG tablet, Take 1 tablet by mouth Daily., Disp: 90 tablet, Rfl: 0    gabapentin (NEURONTIN) 100 MG capsule, Take 1 capsule by mouth Every Night., Disp: 90 capsule, Rfl: 0      Objective   Vital Signs:  /80 (BP Location: Left arm, Patient Position: Sitting, Cuff Size: Small Adult)   Pulse 78   Resp 18   Ht 165.1 cm (65\")   " "Wt 62.6 kg (138 lb)   SpO2 96%   BMI 22.96 kg/m²   Estimated body mass index is 22.96 kg/m² as calculated from the following:    Height as of this encounter: 165.1 cm (65\").    Weight as of this encounter: 62.6 kg (138 lb).    BMI is within normal parameters. No other follow-up for BMI required.      Physical Exam  Vitals and nursing note reviewed.   Constitutional:       General: She is not in acute distress.     Appearance: Normal appearance.   Cardiovascular:      Rate and Rhythm: Normal rate and regular rhythm.      Heart sounds: Normal heart sounds. No murmur heard.  Pulmonary:      Effort: Pulmonary effort is normal.      Breath sounds: Normal breath sounds.   Neurological:      Mental Status: She is alert.        Result Review :  The following data was reviewed by: Grover Garcias MD on 08/16/2024:  Common labs          4/4/2024    09:29 4/8/2024    14:16   Common Labs   Glucose 95  78    BUN 13  18    Creatinine 0.94  1.04    Sodium 143  142    Potassium 3.7  4.9    Chloride 107  103    Calcium 9.5  10.1    Total Protein  7.9    Albumin 4.6  4.7    Total Bilirubin 0.7  0.6    Alkaline Phosphatase 99  95    AST (SGOT) 14  15    ALT (SGPT) 12  15    WBC 8.42  7.39    Hemoglobin 13.0  13.6    Hematocrit 40.6  42.1    Platelets 336  318                Assessment and Plan   Diagnoses and all orders for this visit:    1. Benign essential hypertension (Primary)  -     amLODIPine (NORVASC) 2.5 MG tablet; Take 1 tablet by mouth Daily.  Dispense: 90 tablet; Refill: 0    2. DDD (degenerative disc disease), lumbar    3. Lumbar radiculopathy, chronic  -     gabapentin (NEURONTIN) 100 MG capsule; Take 1 capsule by mouth Every Night.  Dispense: 90 capsule; Refill: 0        Clinically stable chronic conditions as above.  Continue all medications as above.  Labs reviewed as above.  Changed the Rx for the gabapentin to reflect how much she is taking.    I think she is doing well with the epidurals controlling the majority " of her back pain.           Follow Up   Return if symptoms worsen or fail to improve.  Patient was given instructions and counseling regarding her condition or for health maintenance advice. Please see specific information pulled into the AVS if appropriate.

## 2024-08-16 NOTE — PATIENT INSTRUCTIONS
"MyChart Tips:    [employer] can be a useful part of our patient care program and is a way for us to communicate lab results and for you to request refills.  Here is some information regarding the best way to use [employer] for communication.       Examples of medical issues that are APPROPRIATE for [employer]:  -Follow-up on problems we have already addressed in a visit such as home testing results, blood pressure readings, glucose readings  -Questions that can be answered with a simple \"yes\" or \"no\"  -Please keep communication concise and to the point.  All other types of communication should be held for an office visit.    Communication that is NOT APPROPRIATE for [employer]:  -New problems, serious problems or urgent problems.  Urgent matters should be addressed by phone for advice, in the office, urgent care or the ER.  -Requesting Paxlovid or Antibiotics: These types of problems require an evaluation unless your provider approved this previously.    -[employer] messages are not email.  Staff will check messages each weekday.  We strive for a 48-hour turnaround on messaging.    -[employer] is not for private issues.  Messages are received first by our office staff.    Please be mindful that sometimes it may take longer to receive a response on [employer].  Many times of the year we have high volumes of messages coming into physician inboxes.      Please also be mindful that [employer] messages become part of your permanent medical record.    We appreciate your respect for the limitations and boundaries of [employer].      Lab Results:  Please allow up to 7 business days for lab results to be sent through [employer] to you before contacting your provider.  Sometimes we are waiting for results to get back from the lab and also your provider needs time to analyze them thoroughly before making recommendations.  Also, providers may be out of the office on vacation.      While the internet has great resources, it is not a substitute for " interpreting lab results.

## 2024-11-11 DIAGNOSIS — I10 BENIGN ESSENTIAL HYPERTENSION: Chronic | ICD-10-CM

## 2024-11-11 NOTE — TELEPHONE ENCOUNTER
"    Caller: Ahmet Marilia \"Ms. Leo\"    Relationship: Self    Best call back [phone]     Requested Prescriptions:   Requested Prescriptions     Pending Prescriptions Disp Refills    amLODIPine (NORVASC) 2.5 MG tablet 90 tablet 0     Sig: Take 1 tablet by mouth Daily.        Pharmacy where request should be sent: [facility] - [facility] [address] - [phone]  - [phone]      Last office visit with prescribing clinician: 4/8/2024   Last telemedicine visit with prescribing clinician: Visit date not found   Next office visit with prescribing clinician: 11/20/2024     Additional details provided by patient: 4-5 DAYS  LEFT   Does the patient have less than a 3 day supply:  [] Yes  [x] No    Would you like a call back once the refill request has been completed: [] Yes [x] No    If the office needs to give you a call back, can they leave a voicemail: [] Yes [x] No    Ezio Landry Rep   11/11/24 13:21 EST     "

## 2024-11-12 RX ORDER — AMLODIPINE BESYLATE 2.5 MG/1
2.5 TABLET ORAL DAILY
Qty: 90 TABLET | Refills: 0 | Status: SHIPPED | OUTPATIENT
Start: 2024-11-12

## 2024-11-20 ENCOUNTER — OFFICE VISIT (OUTPATIENT)
Dept: INTERNAL MEDICINE | Facility: CLINIC | Age: 79
End: 2024-11-20
Payer: MEDICARE

## 2024-11-20 VITALS
BODY MASS INDEX: 24.26 KG/M2 | TEMPERATURE: 97.9 F | HEART RATE: 84 BPM | HEIGHT: 65 IN | WEIGHT: 145.6 LBS | DIASTOLIC BLOOD PRESSURE: 92 MMHG | SYSTOLIC BLOOD PRESSURE: 128 MMHG | OXYGEN SATURATION: 96 %

## 2024-11-20 DIAGNOSIS — M25.512 CHRONIC PAIN OF BOTH SHOULDERS: Chronic | ICD-10-CM

## 2024-11-20 DIAGNOSIS — M25.532 PAIN IN BOTH WRISTS: Chronic | ICD-10-CM

## 2024-11-20 DIAGNOSIS — M51.369 DEGENERATION OF INTERVERTEBRAL DISC OF LUMBAR REGION, UNSPECIFIED WHETHER PAIN PRESENT: Chronic | ICD-10-CM

## 2024-11-20 DIAGNOSIS — M25.511 CHRONIC PAIN OF BOTH SHOULDERS: Chronic | ICD-10-CM

## 2024-11-20 DIAGNOSIS — M25.562 BILATERAL CHRONIC KNEE PAIN: Chronic | ICD-10-CM

## 2024-11-20 DIAGNOSIS — G89.29 BILATERAL CHRONIC KNEE PAIN: Chronic | ICD-10-CM

## 2024-11-20 DIAGNOSIS — M25.561 BILATERAL CHRONIC KNEE PAIN: Chronic | ICD-10-CM

## 2024-11-20 DIAGNOSIS — M25.531 PAIN IN BOTH WRISTS: Chronic | ICD-10-CM

## 2024-11-20 DIAGNOSIS — R35.0 URINARY FREQUENCY: ICD-10-CM

## 2024-11-20 DIAGNOSIS — G89.29 CHRONIC PAIN OF BOTH SHOULDERS: Chronic | ICD-10-CM

## 2024-11-20 DIAGNOSIS — R35.1 NOCTURIA: ICD-10-CM

## 2024-11-20 DIAGNOSIS — Z76.89 ENCOUNTER TO ESTABLISH CARE: Primary | ICD-10-CM

## 2024-11-20 DIAGNOSIS — I10 BENIGN ESSENTIAL HYPERTENSION: Chronic | ICD-10-CM

## 2024-11-20 RX ORDER — SODIUM CHLORIDE 50 MG/ML
SOLUTION OPHTHALMIC
COMMUNITY
Start: 2024-10-28

## 2024-11-20 RX ORDER — DIFLUPREDNATE OPHTHALMIC 0.5 MG/ML
1 EMULSION OPHTHALMIC 4 TIMES DAILY
COMMUNITY
End: 2024-11-20

## 2024-11-20 RX ORDER — LIDOCAINE 50 MG/G
PATCH TOPICAL
COMMUNITY
Start: 2024-11-19

## 2024-11-20 RX ORDER — NEOMYCIN SULFATE, POLYMYXIN B SULFATE AND DEXAMETHASONE 3.5; 10000; 1 MG/ML; [USP'U]/ML; MG/ML
SUSPENSION/ DROPS OPHTHALMIC
COMMUNITY
Start: 2024-08-30

## 2024-11-20 RX ORDER — DORZOLAMIDE HYDROCHLORIDE AND TIMOLOL MALEATE PRESERVATIVE FREE 20; 5 MG/ML; MG/ML
2 SOLUTION/ DROPS OPHTHALMIC
COMMUNITY

## 2024-11-20 NOTE — PROGRESS NOTES
Chief Complaint  Establish Care, Knee Pain, Back Pain, Ankle Pain, Hypertension, and Peripheral Neuropathy     Subjective:      History of Present Illness {CC  Problem List  Visit  Diagnosis   Encounters  Notes  Medications  Labs  Result Review Imaging  Media :23}     Marilia Leo presents to [facility] for:      History of Present Illness        The patient presents for evaluation of multiple medical concerns. She is accompanied by an adult female.    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 She also experiences pain in her left shoulder, which has a fusion and a pinched nerve, even after undergoing surgery. She receives injections every 3 months from Dr. Rush and Dr. Kleiner. Additionally, she sees Pearl Borges at [place] for pain management of her lower sciatic nerve, which now extends down to her knees. Her knees were injected by Dr. Garcias 2 years ago, which provided relief.    She is currently on irbesartan and amlodipine for blood pressure management, which she monitors at home. Her readings typically range from 140/86 to 140/90. She takes her medication consistently but is considering a change due to frequent urination at night, which disrupts her sleep. She takes Tylenol and gabapentin at night and tries to stop drinking water by 10:00 PM.    Pt experiences a sensation of cold water being thrown on her right ankle, which sometimes swells. She was previously on gabapentin 300 mg, but it affected her glaucoma, so she was weaned off and now takes 100 mg at bedtime. She also takes magnesium for restless legs. She has not refilled her gabapentin prescription in 1.5 years.    She experiences hot flashes, which she attributes to cortisone injections. She has had 4 injections in one day, which caused a slight fever. She takes amlodipine 2.5 mg, sometimes two  "tablets, and uses lidocaine patches for pain relief. She has no history of heart disease or diabetes. She takes fiber gummies and has had a colonoscopy.         I have reviewed patient's medical history, any new submitted information provided by patient or medical assistant and updated medical record.      Objective:      Physical Exam  Vitals reviewed.   Constitutional:       Appearance: Normal appearance.   HENT:      Head: Normocephalic.   Cardiovascular:      Rate and Rhythm: Normal rate and regular rhythm.      Pulses: Normal pulses.      Heart sounds: Normal heart sounds.   Pulmonary:      Effort: Pulmonary effort is normal.      Breath sounds: Normal breath sounds.   Abdominal:      General: Abdomen is flat. Bowel sounds are normal.      Palpations: Abdomen is soft.   Musculoskeletal:         General: No swelling.      Right lower leg: No edema.      Left lower leg: No edema.   Skin:     General: Skin is warm and dry.      Capillary Refill: Capillary refill takes less than 2 seconds.   Neurological:      General: No focal deficit present.      Mental Status: She is alert.   Psychiatric:         Mood and Affect: Mood normal.         Behavior: Behavior normal.        Result Review  Data Reviewed:{ Labs  Result Review  Imaging  Med Tab  Media :23}     Results  Laboratory Studies  Glucose was 78.                  Vital Signs:   /92 (BP Location: Left arm, Patient Position: Sitting, Cuff Size: Adult)   Pulse 84   Temp 97.9 °F (36.6 °C) (Oral)   Ht 165.1 cm (65\")   Wt 66 kg (145 lb 9.6 oz)   SpO2 96%   BMI 24.23 kg/m²                 Requested Prescriptions      No prescriptions requested or ordered in this encounter       Routine medications provided by this office will also be refilled via pharmacy request.       Current Outpatient Medications:     acetaminophen (TYLENOL) 325 MG tablet, Take 1 tablet by mouth As Needed., Disp: , Rfl:     amLODIPine (NORVASC) 2.5 MG tablet, Take 1 tablet by mouth " Daily., Disp: 90 tablet, Rfl: 0    Dorzolamide HCl-Timolol Mal PF 2-0.5 % solution, Apply 2 drops to eye(s) as directed by provider., Disp: , Rfl:     gabapentin (NEURONTIN) 100 MG capsule, Take 1 capsule by mouth Every Night., Disp: 90 capsule, Rfl: 0    irbesartan (AVAPRO) 150 MG tablet, TAKE 1 TABLET EVERY NIGHT, Disp: 90 tablet, Rfl: 3    lidocaine (LIDODERM) 5 %, , Disp: , Rfl:     Misc. Devices (Raised Toilet Seat) misc, 1 each Daily., Disp: 1 each, Rfl: 0    Nasim 128 5 % ophthalmic solution, INSTILL 1 DROP IN LEFT EYE FOUR TIMES DAILY, Disp: , Rfl:     natamycin (NATACYN) 5 % ophthalmic solution, Administer 1 drop to the right eye 2 (two) times a day. Cycles on for 14 days and off for 14 days Patient does not need until 8/18/20, this comes from compound pharmacy  First 2 days of each month, Disp: , Rfl:     neomycin-polymyxin-dexamethasone (MAXITROL) 3.5-13982-3.1 ophthalmic suspension, SHAKE LIQUID AND INSTILL 1 DROP IN LEFT EYE TWICE DAILY, Disp: , Rfl:     ofloxacin (OCUFLOX) 0.3 % ophthalmic solution, Administer 1 drop into the left eye 2 (Two) Times a Day., Disp: , Rfl:     prednisoLONE acetate (PRED FORTE) 1 % ophthalmic suspension, , Disp: , Rfl:     timolol (TIMOPTIC) 0.5 % ophthalmic solution, Administer 1 drop into the left eye 2 (Two) Times a Day., Disp: , Rfl:      Assessment and Plan:      Assessment and Plan {CC Problem List  Visit Diagnosis  ROS  Review (Popup)  [facility] Maintenance  Quality  BestPractice  Medications  SmartSets  SnapShot Encounters  Media :23}     Problem List Items Addressed This Visit       Benign essential hypertension (Chronic)    Bilateral chronic knee pain (Chronic)    Urinary frequency    DDD (degenerative disc disease), lumbar (Chronic)    Chronic pain of both shoulders    Pain in both wrists (Chronic)     Other Visit Diagnoses       Encounter to establish care    -  Primary    Nocturia                   1. Overactive bladder.  The frequent nocturnal  urination could be indicative of an overactive bladder, a common condition in older women. This is unlikely to be a side effect of her antihypertensive medications. She is advised to attempt bladder retraining by urinating every 2 hours during the day and ceasing fluid intake around 7 PM or 8 PM. She should aim to remain in bed until 5 AM, gradually extending this time daily. If bladder retraining proves unsuccessful and nocturnal discomfort persists, Gemtesa may be considered as an alternative treatment option. Samples of Gemtesa are available if needed.    2. Hypertension.  Her blood pressure is well-controlled, although the diastolic reading is slightly elevated. She is currently on irbesartan and amlodipine. She is advised to continue monitoring her blood pressure and maintain her current antihypertensive regimen.    3. Neuropathy.  She reports a sensation of cold water being thrown on her right ankle, which could be indicative of neuropathy. Gabapentin 100 mg at bedtime is currently being taken. She is advised to discuss with her pain management team about potentially switching to Lyrica or another medication that might be more effective and have fewer side effects.    4. Arthritis.  She has arthritis in her wrist and received an injection in her shoulder for a small tear in the rotator cuff. She is advised to continue with her current pain management plan, including periodic injections as needed.    5. Sciatica.  She is experiencing pain down to her knees and has been receiving pain management for her lower sciatic nerve. She is advised to continue with her current pain management plan and discuss any additional concerns with her pain management team.       Patient verbalizes understanding and is comfortable with the plan of care.     Follow Up {Instructions Charge Capture  Follow-up Communications :23}     Return in about 5 months (around 4/15/2025) for Medicare Wellness.      Patient was given  instructions and counseling regarding her condition or for health maintenance advice. Please see specific information pulled into the AVS if appropriate.    Manan disclaimer:   Much of this encounter note is an electronic transcription/translation of spoken language to printed text. The electronic translation of spoken language may permit erroneous, or at times, nonsensical words or phrases to be inadvertently transcribed; Although I have reviewed the note for such errors, some may still exist.         Additional Patient Counseling:       There are no Patient Instructions on file for this visit.    Patient or patient representative verbalized consent for the use of Ambient Listening during the visit with  GISELL Owusu for chart documentation. 11/20/2024  15:34 EST

## 2025-01-23 ENCOUNTER — OFFICE VISIT (OUTPATIENT)
Dept: PAIN MEDICINE | Facility: CLINIC | Age: 80
End: 2025-01-23
Payer: MEDICARE

## 2025-01-23 VITALS
SYSTOLIC BLOOD PRESSURE: 159 MMHG | BODY MASS INDEX: 24.09 KG/M2 | DIASTOLIC BLOOD PRESSURE: 81 MMHG | OXYGEN SATURATION: 97 % | TEMPERATURE: 98.2 F | WEIGHT: 144.6 LBS | RESPIRATION RATE: 18 BRPM | HEART RATE: 84 BPM | HEIGHT: 65 IN

## 2025-01-23 DIAGNOSIS — M54.16 LUMBAR RADICULOPATHY, CHRONIC: ICD-10-CM

## 2025-01-23 DIAGNOSIS — M48.061 LUMBAR FORAMINAL STENOSIS: ICD-10-CM

## 2025-01-23 DIAGNOSIS — M25.511 CHRONIC PAIN OF BOTH SHOULDERS: ICD-10-CM

## 2025-01-23 DIAGNOSIS — M54.12 CERVICAL RADICULOPATHY: ICD-10-CM

## 2025-01-23 DIAGNOSIS — G89.29 CHRONIC PAIN OF BOTH SHOULDERS: ICD-10-CM

## 2025-01-23 DIAGNOSIS — M54.2 NECK PAIN: Primary | ICD-10-CM

## 2025-01-23 DIAGNOSIS — M25.512 CHRONIC PAIN OF BOTH SHOULDERS: ICD-10-CM

## 2025-01-23 PROCEDURE — 1159F MED LIST DOCD IN RCRD: CPT

## 2025-01-23 PROCEDURE — 3077F SYST BP >= 140 MM HG: CPT

## 2025-01-23 PROCEDURE — 3079F DIAST BP 80-89 MM HG: CPT

## 2025-01-23 PROCEDURE — 1160F RVW MEDS BY RX/DR IN RCRD: CPT

## 2025-01-23 PROCEDURE — 99214 OFFICE O/P EST MOD 30 MIN: CPT

## 2025-01-23 PROCEDURE — 1125F AMNT PAIN NOTED PAIN PRSNT: CPT

## 2025-01-23 RX ORDER — METHYLPREDNISOLONE 4 MG/1
TABLET ORAL
Qty: 21 TABLET | Refills: 0 | Status: SHIPPED | OUTPATIENT
Start: 2025-01-23

## 2025-01-23 NOTE — PROGRESS NOTES
CHIEF COMPLAINT  Back, neck, and shoulder pain     Subjective   Marilia Leo is a 79 y.o. female  who presents for follow-up.  She has a history of chronic neck, back, and hip pain. She reports that her neck pain has worsened since her last office visit.     Today pain is 7/10VAS in severity (patient appears uncomfortable).  Her main complaint today is neck pain, left side worse than right, and radiates into left shoulder blade and down left arm into elbow. She notes tingling to left index and middle finger. Back pain remains consistent at this time and radiates down left posterior thigh terminating at the knee. Describes this pain as a nearly continuous aching and burning. Pain is worsened by lying flat, prolonged standing or sitting, and walking long distances. Pain improves with rest/reposition, heat/ice, and medications.  She has completed PT for her neck pain in the past and reports that this was somewhat helpful for her pain.      Patient continues with Gabapentin 100mg at night(managed by PCP) and Tylenol 500mg PRN. Unable to tolerate Tramadol and Codeine (both caused nausea), Robaxin - patient did not like how medication made her feel     History of glaucoma. Patient is legally blind. Remaining vision is peripheral vision in her left eye.      She see Don and Kristie for hand/wrist injections. She received bilateral wrist and shoulder injections 3 months ago. Shoulder MRI completed in October.       She is scheduled for an esophagogastroduodenoscopy and colonoscopy on 6/5/24     Procedures:  11/17/23 - L2/L3 LESI - 90% relief for 5 months  8/15/23 - Right L2 & L5 TF LESI - 50% relief x 2 months  2/16/23 - Left L5 & S1 TF ROQUE - 80% relief x 2.5 months  1/11/23 - Bilateral SI joint injections- 100% relief to right side, 45-50% relief to left side x 1 week     Surgical History:  History of cervical fusion ~ 9 years ago with Dr. Cali Sexton.      History of previous epidurals at [facility] - she reports that  these injections were initially helpful but the last injections she had there were unsuccessful.      Neck Pain   This is a recurrent problem. The current episode started 1 to 4 weeks ago. The problem occurs intermittently. The problem has been unchanged. The pain is associated with an unknown factor. The pain is present in the left side. The quality of the pain is described as aching. The pain is at a severity of 5/10. The symptoms are aggravated by position, twisting and bending. Associated symptoms include weakness (bilateral arms). Pertinent negatives include no chest pain, fever, headaches or numbness. She has tried heat, ice and acetaminophen for the symptoms.   Back Pain  This is a chronic problem. The current episode started more than 1 year ago. The problem occurs intermittently. The problem has been waxing and waning since onset. The pain is present in the lumbar spine. The quality of the pain is described as aching and burning. The pain radiates to the left thigh (radiates down left buttock in posterior/lateral thigh - does not pass knee). The pain is at a severity of 5/10. The pain is moderate. The symptoms are aggravated by standing, bending, sitting, position and lying down (walking long distances, increased physical activity). Stiffness is present In the morning. Associated symptoms include weakness (bilateral arms). Pertinent negatives include no abdominal pain, chest pain, dysuria, fever, headaches or numbness. She has tried heat and ice (Gabapentin, ) for the symptoms. The treatment provided mild relief.   Hip Pain   The incident occurred more than 1 week ago. The injury mechanism is unknown. The pain is present in the right hip and left hip. The quality of the pain is described as aching. The pain is at a severity of 5/10. The pain is severe. The pain has been Constant since onset. Pertinent negatives include no numbness. The symptoms are aggravated by movement and weight bearing. She has tried  "acetaminophen, non-weight bearing and rest (Lidocaine patches, Gabapentin) for the symptoms. The treatment provided mild relief.      PEG Assessment   What number best describes your pain on average in the past week?7  What number best describes how, during the past week, pain has interfered with your enjoyment of life?10  What number best describes how, during the past week, pain has interfered with your general activity?  4    Review of Pertinent Medical Data ---      The following portions of the patient's history were reviewed and updated as appropriate: allergies, current medications, past family history, past medical history, past social history, past surgical history, and problem list.    Review of Systems   Constitutional:  Negative for fever.   Cardiovascular:  Negative for chest pain.   Gastrointestinal:  Negative for abdominal pain, constipation and diarrhea.   Genitourinary:  Negative for dysuria.   Musculoskeletal:  Positive for arthralgias (left shoulder), back pain and neck pain.   Neurological:  Positive for weakness (bilateral arms). Negative for numbness and headaches.   Psychiatric/Behavioral:  Positive for sleep disturbance. Negative for suicidal ideas. The patient is nervous/anxious.      I have reviewed and confirmed the accuracy of the ROS as documented by the MA/LPN/RN Pearl Terrazas, APRN    Vitals:    01/23/25 1529   BP: 159/81   Pulse: 84   Resp: 18   Temp: 98.2 °F (36.8 °C)   SpO2: 97%   Weight: 65.6 kg (144 lb 9.6 oz)   Height: 165.1 cm (65\")   PainSc:   7   PainLoc: Neck     Objective   Physical Exam  Constitutional:       Appearance: Normal appearance.   HENT:      Head: Normocephalic.   Neck:     Cardiovascular:      Rate and Rhythm: Normal rate and regular rhythm.   Pulmonary:      Effort: Pulmonary effort is normal.      Breath sounds: Normal breath sounds.   Musculoskeletal:      Cervical back: Normal range of motion.      Lumbar back: Tenderness and bony tenderness present. " Decreased range of motion. Negative right straight leg raise test and negative left straight leg raise test.      Right hip: No tenderness. Normal range of motion.      Left hip: Tenderness present. Normal range of motion.   Skin:     General: Skin is warm and dry.      Capillary Refill: Capillary refill takes less than 2 seconds.   Neurological:      General: No focal deficit present.      Mental Status: She is alert and oriented to person, place, and time.      Gait: Gait abnormal (slowed, ambulates with cane).   Psychiatric:         Mood and Affect: Mood normal.         Behavior: Behavior normal.         Thought Content: Thought content normal.         Cognition and Memory: Cognition normal.       Assessment & Plan   Diagnoses and all orders for this visit:    1. Neck pain (Primary)  -     MRI Cervical Spine With & Without Contrast; Future  -     Ibuprofen 3 %, Gabapentin 10 %, Baclofen 2 %, lidocaine 4 %, Ketamine HCl 4 %; Apply 1-2 g topically to the appropriate area as directed 3 (Three) to 4 (Four) times daily.  Dispense: 90 g; Refill: 0    2. Cervical radiculopathy  -     MRI Cervical Spine With & Without Contrast; Future    3. Lumbar radiculopathy, chronic    4. Chronic pain of both shoulders    5. Lumbar foraminal stenosis    Other orders  -     methylPREDNISolone (MEDROL) 4 MG dose pack; Take as directed on package instructions.  Dispense: 21 tablet; Refill: 0      Marilia Leo reports a pain score of 7.  Given her pain assessment as noted, treatment options were discussed and the following options were decided upon as a follow-up plan to address the patient's pain: continuation of current treatment plan for pain and MDP to pharmacy. Update cervical MRI due to worsening pain .    --- Update Cervical MRI due to worsening pain and new complaints of left arm radiculopathy.  Pending imaging, may consider KAMARI.  --- MDP to pharmacy for flare in pain  --- Trial compound pain cream. Prescription sent to [facility].   --- Sign LORENZO to obtain records from [facility]  --- Follow-up after completion of cervical MRI    Pain / Disability Scale  The scale used for measurement of pain and/or disability for this patient was the [place] back pain disability scale.  The score was 59 on 01/23/2025     CHASE REPORT  As the clinician, I personally reviewed the CHASE from 1/23/25 while the patient was in the office today.    Dictated utilizing Dragon dictation.

## 2025-01-27 DIAGNOSIS — I10 BENIGN ESSENTIAL HYPERTENSION: Chronic | ICD-10-CM

## 2025-01-27 NOTE — TELEPHONE ENCOUNTER
"Caller: Marilia Leo \"Ms. Leo\"    Relationship: Self    Best call back number: [phone]    Requested Prescriptions:   Requested Prescriptions     Pending Prescriptions Disp Refills    amLODIPine (NORVASC) 2.5 MG tablet 90 tablet 0     Sig: Take 1 tablet by mouth Daily.      Pharmacy where request should be sent: [facility] - [facility] [address] - [phone]  - [phone]      Last office visit with prescribing clinician: 11/20/2024   Last telemedicine visit with prescribing clinician: Visit date not found   Next office visit with prescribing clinician: 4/21/2025     Does the patient have less than a 3 day supply:  [] Yes  [x] No    Ezio Sue Rep   01/27/25 10:30 EST   " 60

## 2025-01-28 RX ORDER — AMLODIPINE BESYLATE 2.5 MG/1
2.5 TABLET ORAL DAILY
Qty: 90 TABLET | Refills: 0 | Status: SHIPPED | OUTPATIENT
Start: 2025-01-28

## 2025-01-28 RX ORDER — AMLODIPINE BESYLATE 2.5 MG/1
2.5 TABLET ORAL DAILY
Qty: 90 TABLET | Refills: 3 | OUTPATIENT
Start: 2025-01-28

## 2025-02-11 ENCOUNTER — PREP FOR SURGERY (OUTPATIENT)
Dept: SURGERY | Facility: SURGERY CENTER | Age: 80
End: 2025-02-11
Payer: MEDICARE

## 2025-02-11 DIAGNOSIS — M54.2 NECK PAIN: ICD-10-CM

## 2025-02-11 DIAGNOSIS — M54.12 CERVICAL RADICULOPATHY: Primary | ICD-10-CM

## 2025-02-11 RX ORDER — DIAZEPAM 5 MG/1
10 TABLET ORAL ONCE
OUTPATIENT
Start: 2025-02-11

## 2025-02-13 ENCOUNTER — TRANSCRIBE ORDERS (OUTPATIENT)
Dept: SURGERY | Facility: SURGERY CENTER | Age: 80
End: 2025-02-13
Payer: MEDICARE

## 2025-02-13 DIAGNOSIS — Z41.9 SURGERY, ELECTIVE: Primary | ICD-10-CM

## 2025-03-06 ENCOUNTER — OFFICE VISIT (OUTPATIENT)
Dept: PAIN MEDICINE | Facility: CLINIC | Age: 80
End: 2025-03-06
Payer: MEDICARE

## 2025-03-06 VITALS
TEMPERATURE: 96.8 F | OXYGEN SATURATION: 97 % | BODY MASS INDEX: 24.06 KG/M2 | HEART RATE: 97 BPM | SYSTOLIC BLOOD PRESSURE: 161 MMHG | DIASTOLIC BLOOD PRESSURE: 89 MMHG | HEIGHT: 65 IN | RESPIRATION RATE: 12 BRPM

## 2025-03-06 DIAGNOSIS — G89.29 CHRONIC BILATERAL LOW BACK PAIN, UNSPECIFIED WHETHER SCIATICA PRESENT: ICD-10-CM

## 2025-03-06 DIAGNOSIS — M25.511 CHRONIC PAIN OF BOTH SHOULDERS: ICD-10-CM

## 2025-03-06 DIAGNOSIS — M25.512 CHRONIC PAIN OF BOTH SHOULDERS: ICD-10-CM

## 2025-03-06 DIAGNOSIS — M54.12 CERVICAL RADICULOPATHY: ICD-10-CM

## 2025-03-06 DIAGNOSIS — M54.2 NECK PAIN: Primary | ICD-10-CM

## 2025-03-06 DIAGNOSIS — G89.29 CHRONIC PAIN OF BOTH SHOULDERS: ICD-10-CM

## 2025-03-06 DIAGNOSIS — M54.16 LUMBAR RADICULOPATHY, CHRONIC: ICD-10-CM

## 2025-03-06 DIAGNOSIS — M48.061 LUMBAR FORAMINAL STENOSIS: ICD-10-CM

## 2025-03-06 DIAGNOSIS — M54.50 CHRONIC BILATERAL LOW BACK PAIN, UNSPECIFIED WHETHER SCIATICA PRESENT: ICD-10-CM

## 2025-03-06 PROCEDURE — 1160F RVW MEDS BY RX/DR IN RCRD: CPT

## 2025-03-06 PROCEDURE — 3077F SYST BP >= 140 MM HG: CPT

## 2025-03-06 PROCEDURE — 99214 OFFICE O/P EST MOD 30 MIN: CPT

## 2025-03-06 PROCEDURE — 1159F MED LIST DOCD IN RCRD: CPT

## 2025-03-06 PROCEDURE — 1125F AMNT PAIN NOTED PAIN PRSNT: CPT

## 2025-03-06 PROCEDURE — 3079F DIAST BP 80-89 MM HG: CPT

## 2025-03-06 RX ORDER — LIDOCAINE 50 MG/G
1 PATCH TOPICAL EVERY 24 HOURS
Qty: 30 PATCH | Refills: 3 | Status: SHIPPED | OUTPATIENT
Start: 2025-03-06

## 2025-03-06 NOTE — PROGRESS NOTES
CHIEF COMPLAINT  Back, neck, and hip pain    Subjective   Marilia Leo is a 79 y.o. female  who presents for follow-up.  She has a history of chronic neck, back, and hip pain. She reports that her pain level has fluctuated since her last office visit. She is here to discuss most recent     Today pain is 7/10VAS in severity (patient appears uncomfortable).  Her main complaint today is neck pain, left side worse than right, and radiates into left shoulder blade and down left arm into elbow. She notes tingling to left index and middle finger. Back pain remains consistent at this time and radiates down left posterior thigh terminating at the knee. Describes this pain as a nearly continuous aching and burning. Pain is worsened by lying flat, prolonged standing or sitting, and walking long distances. Pain improves with rest/reposition, heat/ice, and medications.  She has completed PT for her neck pain in the past and reports that this was somewhat helpful for her pain.      Patient continues with Gabapentin 100mg at night(managed by PCP) and Tylenol 500mg PRN. Unable to tolerate Tramadol and Codeine (both caused nausea), Robaxin - patient did not like how medication made her feel. She had an allergic reaction to the compound pain cream due to it containing sulfa.      History of glaucoma. Patient is legally blind. Remaining vision is peripheral vision in her left eye.      She see Claribel for hand/wrist injections as needed.      She is scheduled for an esophagogastroduodenoscopy and colonoscopy on 6/5/24     Procedures:  11/17/23 - L2/L3 LESI - 90% relief for 5 months  8/15/23 - Right L2 & L5 TF LESI - 50% relief x 2 months  2/16/23 - Left L5 & S1 TF ROQUE - 80% relief x 2.5 months  1/11/23 - Bilateral SI joint injections- 100% relief to right side, 45-50% relief to left side x 1 week     Surgical History:  History of cervical fusion ~ 9 years ago with Dr. Cail Sexton.      History of previous epidurals at [facility] - she reports that these injections were initially helpful but the last injections she had there were unsuccessful.     Neck Pain   This is a recurrent problem. The current episode started 1 to 4 weeks ago. The problem occurs intermittently. The problem has been waxing and waning. The pain is associated with an unknown factor. The pain is present in the left side. The quality of the pain is described as aching. The pain is at a severity of 5/10. The symptoms are aggravated by position, twisting and bending. Associated symptoms include numbness (left 2nd digit finger tingling). Pertinent negatives include no chest pain, fever, headaches or weakness. She has tried heat, ice and acetaminophen for the symptoms.   Back Pain  This is a chronic problem. The current episode started more than 1 year ago. The problem occurs intermittently. The problem is unchanged. The pain is present in the lumbar spine. The quality of the pain is described as aching and burning. The pain radiates to the left thigh (radiates down left buttock in posterior/lateral thigh - does not pass knee). The pain is at a severity of 5/10. The pain is moderate. The symptoms are aggravated by standing, bending, sitting, position and lying down (walking long distances, increased physical activity). Stiffness is present In the morning. Associated symptoms include numbness (left 2nd digit finger tingling). Pertinent negatives include no abdominal pain, chest pain, dysuria, fever, headaches or weakness. She has tried heat and ice (Gabapentin, ) for the symptoms. The treatment provided mild relief.   Hip Pain   The incident occurred more than 1 week ago. The injury mechanism is unknown. The pain is present in the right hip and left hip. The quality of the pain is described as aching. The pain is at a severity of 5/10. The pain is severe. The pain has been Intermittent since onset. Associated symptoms include numbness (left 2nd digit finger tingling).  "The symptoms are aggravated by movement and weight bearing. She has tried acetaminophen, non-weight bearing and rest (Lidocaine patches, Gabapentin) for the symptoms. The treatment provided mild relief.      PEG Assessment   What number best describes your pain on average in the past week?6  What number best describes how, during the past week, pain has interfered with your enjoyment of life?8  What number best describes how, during the past week, pain has interfered with your general activity?  8    Review of Pertinent Medical Data ---        The following portions of the patient's history were reviewed and updated as appropriate: allergies, current medications, past family history, past medical history, past social history, past surgical history, and problem list.    Review of Systems   Constitutional:  Negative for activity change, fatigue and fever.   Eyes:  Positive for visual disturbance (vision impaired).   Cardiovascular:  Negative for chest pain.   Gastrointestinal:  Negative for abdominal pain, constipation and diarrhea.   Genitourinary:  Negative for dysuria.   Musculoskeletal:  Positive for back pain and neck pain.   Neurological:  Positive for dizziness and numbness (left 2nd digit finger tingling). Negative for weakness, light-headedness and headaches.   Psychiatric/Behavioral:  Positive for agitation and sleep disturbance. Negative for suicidal ideas. The patient is nervous/anxious.      I have reviewed and confirmed the accuracy of the ROS as documented by the MA/LPN/RN GISELL Ma    Vitals:    03/06/25 0945 03/06/25 0950   BP: (!) 184/88 161/89   BP Location: Left arm Right arm   Patient Position: Sitting Sitting   Cuff Size: Adult Adult   Pulse: 97    Resp: 12    Temp: 96.8 °F (36 °C)    TempSrc: Temporal    SpO2: 97%    Height: 165.1 cm (65\")    PainSc:  5    PainLoc:  Neck     Objective   Physical Exam  Constitutional:       Appearance: Normal appearance.   HENT:      Head: " Normocephalic.   Neck:     Cardiovascular:      Rate and Rhythm: Normal rate and regular rhythm.   Pulmonary:      Effort: Pulmonary effort is normal.      Breath sounds: Normal breath sounds.   Musculoskeletal:      Cervical back: Normal range of motion.      Lumbar back: Tenderness and bony tenderness present. Decreased range of motion. Negative right straight leg raise test and negative left straight leg raise test.      Right hip: No tenderness. Normal range of motion.      Left hip: Tenderness present. Normal range of motion.   Skin:     General: Skin is warm and dry.      Capillary Refill: Capillary refill takes less than 2 seconds.   Neurological:      General: No focal deficit present.      Mental Status: She is alert and oriented to person, place, and time.      Gait: Gait abnormal (slowed, ambulates with cane).   Psychiatric:         Mood and Affect: Mood normal.         Behavior: Behavior normal.         Thought Content: Thought content normal.         Cognition and Memory: Cognition normal.       Assessment & Plan   Diagnoses and all orders for this visit:    1. Neck pain (Primary)  -     lidocaine (LIDODERM) 5 %; Place 1 patch on the skin as directed by provider Daily. Remove & Discard patch within 12 hours or as directed by MD  Dispense: 30 patch; Refill: 3    2. Chronic bilateral low back pain, unspecified whether sciatica present  -     lidocaine (LIDODERM) 5 %; Place 1 patch on the skin as directed by provider Daily. Remove & Discard patch within 12 hours or as directed by MD  Dispense: 30 patch; Refill: 3    3. Cervical radiculopathy    4. Lumbar radiculopathy, chronic    5. Chronic pain of both shoulders    6. Lumbar foraminal stenosis      Marilia Leo reports a pain score of 5.  Given her pain assessment as noted, treatment options were discussed and the following options were decided upon as a follow-up plan to address the patient's pain: continuation of current treatment plan for pain,  prescription for non-opiod analgesics, and steroid injections.    --- C5-C6 KAMARI - patient is requesting IV sedation for this procedure  ---  Indications for epidural injection:  Plan is to proceed with epidural at the appropriate level.  If the patient receives significant pain reduction and improvement in function and the plan will be to repeat the epidural when the pain worsens.  If a second epidural provides at least 6 weeks of sustained improvement that includes both pain reduction and improvement in function then an epidural injection could be repeated once again at the same level.  This is a mutual decision between the clinician and the patient that includes discussions including risks and benefits in detail as well as alternative therapies.  Patient's questions were answered to their satisfaction and to their understanding.  ---   Discussed with the patient that sedation is optional for this procedure.  The sedation offered is called conscious sedation which is different from general anesthesia that is utilized in surgical procedures. The dosing of the sedation is determined by the physician and they will be monitored throughout the procedure. With conscious sedation it is possible to remember parts or all of the procedure, this is normal. They will need to have a  with them as driving is prohibited following conscious sedation.      NPO instructions for conscious sedation:  --- Do not eat 6 hours prior to the procedure.   --- Do not drink any dairy or citrus 4 hours prior to the procedure.   --- Do not drink anything, including clear liquids, 2 hours prior to procedure.      If the NPO instructions are not followed then the procedure may be performed without sedation or the procedure will need to be rescheduled.    --- Continue Lidocaine patches - refill sent to pharmacy   --- Follow-up for procedure - scheduled 4/17/25    Pain / Disability Scale  The scale used for measurement of pain and/or  disability for this patient was the [place] back pain disability scale.  The score was 59 on 01/23/2025     CHASE REPORT  As the clinician, I personally reviewed the CHASE from 3/6/25 while the patient was in the office today.    Dictated utilizing Dragon dictation.

## 2025-03-06 NOTE — H&P (VIEW-ONLY)
CHIEF COMPLAINT  Back, neck, and hip pain    Subjective   Marilia Leo is a 79 y.o. female  who presents for follow-up.  She has a history of chronic neck, back, and hip pain. She reports that her pain level has fluctuated since her last office visit. She is here to discuss most recent     Today pain is 7/10VAS in severity (patient appears uncomfortable).  Her main complaint today is neck pain, left side worse than right, and radiates into left shoulder blade and down left arm into elbow. She notes tingling to left index and middle finger. Back pain remains consistent at this time and radiates down left posterior thigh terminating at the knee. Describes this pain as a nearly continuous aching and burning. Pain is worsened by lying flat, prolonged standing or sitting, and walking long distances. Pain improves with rest/reposition, heat/ice, and medications.  She has completed PT for her neck pain in the past and reports that this was somewhat helpful for her pain.      Patient continues with Gabapentin 100mg at night(managed by PCP) and Tylenol 500mg PRN. Unable to tolerate Tramadol and Codeine (both caused nausea), Robaxin - patient did not like how medication made her feel. She had an allergic reaction to the compound pain cream due to it containing sulfa.      History of glaucoma. Patient is legally blind. Remaining vision is peripheral vision in her left eye.      She see Claribel for hand/wrist injections as needed.      She is scheduled for an esophagogastroduodenoscopy and colonoscopy on 6/5/24     Procedures:  11/17/23 - L2/L3 LESI - 90% relief for 5 months  8/15/23 - Right L2 & L5 TF LESI - 50% relief x 2 months  2/16/23 - Left L5 & S1 TF ROQUE - 80% relief x 2.5 months  1/11/23 - Bilateral SI joint injections- 100% relief to right side, 45-50% relief to left side x 1 week     Surgical History:  History of cervical fusion ~ 9 years ago with Dr. Cali Sexton.      History of previous epidurals at [facility] - she reports that these injections were initially helpful but the last injections she had there were unsuccessful.     Neck Pain   This is a recurrent problem. The current episode started 1 to 4 weeks ago. The problem occurs intermittently. The problem has been waxing and waning. The pain is associated with an unknown factor. The pain is present in the left side. The quality of the pain is described as aching. The pain is at a severity of 5/10. The symptoms are aggravated by position, twisting and bending. Associated symptoms include numbness (left 2nd digit finger tingling). Pertinent negatives include no chest pain, fever, headaches or weakness. She has tried heat, ice and acetaminophen for the symptoms.   Back Pain  This is a chronic problem. The current episode started more than 1 year ago. The problem occurs intermittently. The problem is unchanged. The pain is present in the lumbar spine. The quality of the pain is described as aching and burning. The pain radiates to the left thigh (radiates down left buttock in posterior/lateral thigh - does not pass knee). The pain is at a severity of 5/10. The pain is moderate. The symptoms are aggravated by standing, bending, sitting, position and lying down (walking long distances, increased physical activity). Stiffness is present In the morning. Associated symptoms include numbness (left 2nd digit finger tingling). Pertinent negatives include no abdominal pain, chest pain, dysuria, fever, headaches or weakness. She has tried heat and ice (Gabapentin, ) for the symptoms. The treatment provided mild relief.   Hip Pain   The incident occurred more than 1 week ago. The injury mechanism is unknown. The pain is present in the right hip and left hip. The quality of the pain is described as aching. The pain is at a severity of 5/10. The pain is severe. The pain has been Intermittent since onset. Associated symptoms include numbness (left 2nd digit finger tingling).  "The symptoms are aggravated by movement and weight bearing. She has tried acetaminophen, non-weight bearing and rest (Lidocaine patches, Gabapentin) for the symptoms. The treatment provided mild relief.      PEG Assessment   What number best describes your pain on average in the past week?6  What number best describes how, during the past week, pain has interfered with your enjoyment of life?8  What number best describes how, during the past week, pain has interfered with your general activity?  8    Review of Pertinent Medical Data ---        The following portions of the patient's history were reviewed and updated as appropriate: allergies, current medications, past family history, past medical history, past social history, past surgical history, and problem list.    Review of Systems   Constitutional:  Negative for activity change, fatigue and fever.   Eyes:  Positive for visual disturbance (vision impaired).   Cardiovascular:  Negative for chest pain.   Gastrointestinal:  Negative for abdominal pain, constipation and diarrhea.   Genitourinary:  Negative for dysuria.   Musculoskeletal:  Positive for back pain and neck pain.   Neurological:  Positive for dizziness and numbness (left 2nd digit finger tingling). Negative for weakness, light-headedness and headaches.   Psychiatric/Behavioral:  Positive for agitation and sleep disturbance. Negative for suicidal ideas. The patient is nervous/anxious.      I have reviewed and confirmed the accuracy of the ROS as documented by the MA/LPN/RN GISELL Ma    Vitals:    03/06/25 0945 03/06/25 0950   BP: (!) 184/88 161/89   BP Location: Left arm Right arm   Patient Position: Sitting Sitting   Cuff Size: Adult Adult   Pulse: 97    Resp: 12    Temp: 96.8 °F (36 °C)    TempSrc: Temporal    SpO2: 97%    Height: 165.1 cm (65\")    PainSc:  5    PainLoc:  Neck     Objective   Physical Exam  Constitutional:       Appearance: Normal appearance.   HENT:      Head: " Normocephalic.   Neck:     Cardiovascular:      Rate and Rhythm: Normal rate and regular rhythm.   Pulmonary:      Effort: Pulmonary effort is normal.      Breath sounds: Normal breath sounds.   Musculoskeletal:      Cervical back: Normal range of motion.      Lumbar back: Tenderness and bony tenderness present. Decreased range of motion. Negative right straight leg raise test and negative left straight leg raise test.      Right hip: No tenderness. Normal range of motion.      Left hip: Tenderness present. Normal range of motion.   Skin:     General: Skin is warm and dry.      Capillary Refill: Capillary refill takes less than 2 seconds.   Neurological:      General: No focal deficit present.      Mental Status: She is alert and oriented to person, place, and time.      Gait: Gait abnormal (slowed, ambulates with cane).   Psychiatric:         Mood and Affect: Mood normal.         Behavior: Behavior normal.         Thought Content: Thought content normal.         Cognition and Memory: Cognition normal.       Assessment & Plan   Diagnoses and all orders for this visit:    1. Neck pain (Primary)  -     lidocaine (LIDODERM) 5 %; Place 1 patch on the skin as directed by provider Daily. Remove & Discard patch within 12 hours or as directed by MD  Dispense: 30 patch; Refill: 3    2. Chronic bilateral low back pain, unspecified whether sciatica present  -     lidocaine (LIDODERM) 5 %; Place 1 patch on the skin as directed by provider Daily. Remove & Discard patch within 12 hours or as directed by MD  Dispense: 30 patch; Refill: 3    3. Cervical radiculopathy    4. Lumbar radiculopathy, chronic    5. Chronic pain of both shoulders    6. Lumbar foraminal stenosis      Marilia Leo reports a pain score of 5.  Given her pain assessment as noted, treatment options were discussed and the following options were decided upon as a follow-up plan to address the patient's pain: continuation of current treatment plan for pain,  prescription for non-opiod analgesics, and steroid injections.    --- C5-C6 KAMARI - patient is requesting IV sedation for this procedure  ---  Indications for epidural injection:  Plan is to proceed with epidural at the appropriate level.  If the patient receives significant pain reduction and improvement in function and the plan will be to repeat the epidural when the pain worsens.  If a second epidural provides at least 6 weeks of sustained improvement that includes both pain reduction and improvement in function then an epidural injection could be repeated once again at the same level.  This is a mutual decision between the clinician and the patient that includes discussions including risks and benefits in detail as well as alternative therapies.  Patient's questions were answered to their satisfaction and to their understanding.  ---   Discussed with the patient that sedation is optional for this procedure.  The sedation offered is called conscious sedation which is different from general anesthesia that is utilized in surgical procedures. The dosing of the sedation is determined by the physician and they will be monitored throughout the procedure. With conscious sedation it is possible to remember parts or all of the procedure, this is normal. They will need to have a  with them as driving is prohibited following conscious sedation.      NPO instructions for conscious sedation:  --- Do not eat 6 hours prior to the procedure.   --- Do not drink any dairy or citrus 4 hours prior to the procedure.   --- Do not drink anything, including clear liquids, 2 hours prior to procedure.      If the NPO instructions are not followed then the procedure may be performed without sedation or the procedure will need to be rescheduled.    --- Continue Lidocaine patches - refill sent to pharmacy   --- Follow-up for procedure - scheduled 4/17/25    Pain / Disability Scale  The scale used for measurement of pain and/or  disability for this patient was the [place] back pain disability scale.  The score was 59 on 01/23/2025     CHASE REPORT  As the clinician, I personally reviewed the CHASE from 3/6/25 while the patient was in the office today.    Dictated utilizing Dragon dictation.

## 2025-03-17 RX ORDER — DIAZEPAM 5 MG/1
15 TABLET ORAL ONCE AS NEEDED
Status: COMPLETED | OUTPATIENT
Start: 2025-03-18 | End: 2025-03-18

## 2025-03-18 ENCOUNTER — HOSPITAL ENCOUNTER (OUTPATIENT)
Dept: GENERAL RADIOLOGY | Facility: SURGERY CENTER | Age: 80
End: 2025-03-18
Payer: MEDICARE

## 2025-03-18 ENCOUNTER — HOSPITAL ENCOUNTER (OUTPATIENT)
Facility: SURGERY CENTER | Age: 80
Setting detail: HOSPITAL OUTPATIENT SURGERY
Discharge: HOME | End: 2025-03-18
Attending: ANESTHESIOLOGY | Admitting: ANESTHESIOLOGY
Payer: MEDICARE

## 2025-03-18 VITALS
HEIGHT: 64 IN | RESPIRATION RATE: 16 BRPM | HEART RATE: 93 BPM | WEIGHT: 144 LBS | TEMPERATURE: 98 F | DIASTOLIC BLOOD PRESSURE: 85 MMHG | BODY MASS INDEX: 24.59 KG/M2 | OXYGEN SATURATION: 96 % | SYSTOLIC BLOOD PRESSURE: 127 MMHG

## 2025-03-18 DIAGNOSIS — M54.12 CERVICAL RADICULOPATHY: ICD-10-CM

## 2025-03-18 DIAGNOSIS — Z41.9 SURGERY, ELECTIVE: ICD-10-CM

## 2025-03-18 DIAGNOSIS — M54.2 NECK PAIN: ICD-10-CM

## 2025-03-18 PROCEDURE — 62321 NJX INTERLAMINAR CRV/THRC: CPT | Performed by: ANESTHESIOLOGY

## 2025-03-18 PROCEDURE — 25010000002 LIDOCAINE PF 1% 1 % SOLUTION 5 ML VIAL: Performed by: ANESTHESIOLOGY

## 2025-03-18 PROCEDURE — 77002 NEEDLE LOCALIZATION BY XRAY: CPT

## 2025-03-18 PROCEDURE — 25510000001 IOPAMIDOL 61 % SOLUTION 30 ML VIAL: Performed by: ANESTHESIOLOGY

## 2025-03-18 PROCEDURE — 76000 FLUOROSCOPY <1 HR PHYS/QHP: CPT

## 2025-03-18 PROCEDURE — 25010000002 BUPIVACAINE (PF) 0.5 % SOLUTION 10 ML VIAL: Performed by: ANESTHESIOLOGY

## 2025-03-18 PROCEDURE — 25010000002 METHYLPREDNISOLONE PER 80 MG: Performed by: ANESTHESIOLOGY

## 2025-03-18 RX ADMIN — DIAZEPAM 15 MG: 5 TABLET ORAL at 13:11

## 2025-03-18 NOTE — DISCHARGE INSTRUCTIONS
[facility] – [place] [place] [facility] - Post-procedure Instructions          --  While there are no absolute restrictions, it is recommended that you do not perform strenuous activity today. In the morning, you may resume your level of activity as before your block.    --  If you have a band-aid at your injection site, please remove it later today. Observe the area for any redness, swelling, pus-like drainage, or a temperature over 101°. If any of these symptoms occur, please call your doctor at [phone]. If after office hours, leave a message and the on-call provider will return your call.    --  Ice may be applied to your injection site. It is recommended you avoid direct heat (heating pad; hot tub) for 1-2 days.    --  Call [facility] [place], [place]-[facility] at [phone] if you experience persistent headache, persistent bleeding from the injection site, or severe pain not relieved by heat or oral medication.    --  Do not make important decisions today.    --  Due to the effects of the block and/or the I.V. Sedation, DO NOT drive or operate hazardous machinery for 12 hours.  Local anesthetics may cause numbness after procedure and precautions must be taken with regards to operating equipment as well as with walking, even if ambulating with assistance of another person or with an assistive device.    --  Do not drink alcohol for 12 hours.    -- You may return to work tomorrow, or as directed by your referring doctor.    --  Occasionally you may notice a slight increase in your pain after the procedure. This should start to improve within the next 24-48 hours. Radiofrequency ablation procedure pain may last 3-4 weeks.    --  It may take as long as 3-4 days before you notice a gradual improvement in your pain and/or other symptoms.    -- You may continue to take your prescribed pain medication as needed.    --  Some normal possible side effects of steroid use could include fluid retention, increased blood sugar, dull headache,  This office note has been dictated.     increased sweating, increased appetite, mood swings and flushing.    --  Diabetics are recommended to watch their blood glucose level closely for 24-48 hours after the injection.    --  Must stay in PACU for 20 min upon arrival and prove no leg weakness before being discharged.    --  IN THE EVENT OF A LIFE THREATENING EMERGENCY, (CHEST PAIN, BREATHING DIFFICULTIES, PARALYSIS…) YOU SHOULD GO TO YOUR NEAREST EMERGENCY ROOM.    --  You should be contacted by our office within 2-3 days to schedule follow up or next appointment date.  If not contacted within 7 days, please call the office at [phone]

## 2025-03-18 NOTE — OP NOTE
Cervical Epidural Steroid Injection @ C5-C6  [facility]    PREOPERATIVE DIAGNOSIS:  Left Cervical Radiculopathy  POSTOPERATIVE DIAGNOSIS:  Same as preop diagnosis    PROCEDURE:   Cervical Epidural Steroid Injection, Therapeutic Translaminar Injection, with epidurogram, at  C5-C6 level    PRE-PROCEDURE DISCUSSION WITH PATIENT:    Risks and complications were discussed with the patient prior to starting the procedure and informed consent was obtained.  We discussed various topics including but not limited to bleeding, infection, injury, paralysis, nerve injury, dural puncture, coma, death, worsening of clinical picture, lack of pain relief, and postprocedural soreness.    SURGEON:  Gigi Boateng MD    REASON FOR PROCEDURE:   Degenerative changes are noted in the area. and Radiating pattern of pain is likely consistent with degenerative changes in the area.    SEDATION:  The patient had higher than average levels of procedural anxiety and the need to provide additional procedural sedation was needed to safely proceed. and she received 15 mg oral diazepam in the preoperative area  ANESTHETIC:  Marcaine 0.25%  STEROID:   Methylprednisolone (DEPO MEDROL) 80mg/ml    DESCRIPTON OF PROCEDURE:    After obtaining informed consent, I.V. was started in the preop area.   The patient was taken to the operating room and placed in the prone position.  EKG, blood pressure, and pulse oximeter were monitored throughout, and sedation was provided as needed by the RN under my guidance. All pressure points were well padded.  The cervicothoracic spine area was prepped with Chloraprep and draped in a sterile fashion.  Under fluoroscopic guidance, the aforementioned interlaminar space was identified. Skin and subcutaneous tissues were anesthetized with 1% lidocaine in the middle of the space. A Tuohy needle was introduced through the skin and advanced to this interlaminar space and into the epidural space under  fluoroscopic guidance and verified with loss-of-resistance technique to air.  After confirming the position of the needle with the fluoroscope with all the views, and after aspiration was confirmed negative for blood and CSF, 1.5 mL of Omnipaque was injected.  After seeing appropriate epidurogram with lateral and PA views, a total of 3 cc solution was injected, consisting of 1cc of local anesthetic as above, with normal saline and injectable steroid as above.     ESTIMATED BLOOD LOSS:  <5 mL  SPECIMENS:  None    COMPLICATIONS:     No complications were noted. and The patient did not have any signs of postprocedure numbness nor weakness.    TOLERANCE & DISCHARGE CONDITION:    The patient tolerated the procedure well.  The patient was transported to the recovery area without difficulties.  The patient was discharged to home under the care of family in stable and satisfactory condition.    PLAN OF CARE:  The patient was given our standard instruction sheet.  The patient will Return to clinic 4 wks.  The patient will resume all medications as per the medication reconciliation sheet.

## 2025-03-19 NOTE — PROGRESS NOTES
[facility]  Sedation/Analgesia History & Physical    Pt Name: Ashok Blevins  Account number: [id number]  MRN: [id number]  YOB: 1952  Provider Performing Procedure: Joceline Sneed MD MD [facility]  Primary Care Physician: Marvin De Anda DO  Date: 3/19/2025    PRE-PROCEDURE    Code Status: FULL CODE  Brief History/Pre-Procedure Diagnosis:   Angina   Abn stress test      Consent: : I have discussed with the patient risks, benefits, and alternatives (and relevant risks, benefits, and side effects related to alternatives or not receiving care), and likelihood of the success.   The patient and/or representative appear to understand and agree to proceed.  The discussion encompasses risks, benefits, and side effects related to the alternatives and the risks related to not receiving the proposed care, treatment, and services.         MEDICAL HISTORY  []ASHD/ANGINA/MI/CHF   [x]Hypertension  []Diabetes  []Hyperlipidemia  []Smoking  []Family Hx of ASHD  []Additional information:       has a past medical history of Arthritis, Cervical pain, Diabetes mellitus (HCC), History of kidney stones, Hypertension, DELMER on CPAP, Prostate cancer (HCC), Shingles, Strain of quadriceps muscle, and Thyroid disease.    SURGICAL HISTORY   has a past surgical history that includes Prostate surgery (12/2008); Leg Tendon Surgery (03/2008); hernia repair (2008); Tonsillectomy (1958); Colonoscopy (03/10/2017); Cystocopy (03/20/2017); and Neck surgery (09/2017).  Additional information:       ALLERGIES   Allergies as of 03/13/2025 - Fully Reviewed 05/30/2024   Allergen Reaction Noted    Flecainide Itching and Other (See Comments) 02/25/2025    Pcn [penicillins]  12/07/2011    Kefzol [cefazolin sodium] Rash 12/07/2011     Additional information:       MEDICATIONS   Aspirin  [x] 81 mg  [] 325 mg  [] None  Antiplatelet drug therapy use last 5 days  []No []Yes  Coumadin Use Last 5 Days []No []Yes  Other anticoagulant use  Please let her now that her shoulder x-ray shows mild to moderate arthritis. No fracture or abnormality. I can refer her to Ortho if she is still experiencing increased pain and concerned about a bicep tear.

## 2025-04-17 ENCOUNTER — OFFICE VISIT (OUTPATIENT)
Dept: PAIN MEDICINE | Facility: CLINIC | Age: 80
End: 2025-04-17
Payer: MEDICARE

## 2025-04-17 VITALS
DIASTOLIC BLOOD PRESSURE: 98 MMHG | SYSTOLIC BLOOD PRESSURE: 172 MMHG | RESPIRATION RATE: 16 BRPM | TEMPERATURE: 96.9 F | OXYGEN SATURATION: 98 % | BODY MASS INDEX: 24.24 KG/M2 | WEIGHT: 142 LBS | HEART RATE: 90 BPM | HEIGHT: 64 IN

## 2025-04-17 DIAGNOSIS — M25.552 BILATERAL HIP PAIN: ICD-10-CM

## 2025-04-17 DIAGNOSIS — G89.29 CHRONIC PAIN OF BOTH SHOULDERS: ICD-10-CM

## 2025-04-17 DIAGNOSIS — M25.512 CHRONIC PAIN OF BOTH SHOULDERS: ICD-10-CM

## 2025-04-17 DIAGNOSIS — M25.511 CHRONIC PAIN OF BOTH SHOULDERS: ICD-10-CM

## 2025-04-17 DIAGNOSIS — M54.16 LUMBAR RADICULOPATHY, CHRONIC: ICD-10-CM

## 2025-04-17 DIAGNOSIS — G89.29 CHRONIC BILATERAL LOW BACK PAIN, UNSPECIFIED WHETHER SCIATICA PRESENT: ICD-10-CM

## 2025-04-17 DIAGNOSIS — M54.2 NECK PAIN: Primary | ICD-10-CM

## 2025-04-17 DIAGNOSIS — M25.551 BILATERAL HIP PAIN: ICD-10-CM

## 2025-04-17 DIAGNOSIS — M54.12 CERVICAL RADICULOPATHY: ICD-10-CM

## 2025-04-17 DIAGNOSIS — M54.50 CHRONIC BILATERAL LOW BACK PAIN, UNSPECIFIED WHETHER SCIATICA PRESENT: ICD-10-CM

## 2025-04-17 PROCEDURE — 1125F AMNT PAIN NOTED PAIN PRSNT: CPT

## 2025-04-17 PROCEDURE — 1160F RVW MEDS BY RX/DR IN RCRD: CPT

## 2025-04-17 PROCEDURE — 3077F SYST BP >= 140 MM HG: CPT

## 2025-04-17 PROCEDURE — 1159F MED LIST DOCD IN RCRD: CPT

## 2025-04-17 PROCEDURE — 3080F DIAST BP >= 90 MM HG: CPT

## 2025-04-17 PROCEDURE — 99214 OFFICE O/P EST MOD 30 MIN: CPT

## 2025-04-17 NOTE — PROGRESS NOTES
CHIEF COMPLAINT  Back, neck, and hip pain    Subjective   Marilia Leo is a 80 y.o. female  who presents to the office for follow-up of procedure.  She completed a C5-C6 KAMARI on 3/18/25 performed by Dr. Boateng for management of neck pain. Patient reports 60% ongoing relief from the procedure. She reports the numbness tingling in the index and middle fingers have improved since her injections. She also notes improved ROM.     Today pain is 5/10VAS in severity (patient appears uncomfortable).  She continues to complain of left hip, neck, and back pain. She reports that pain level has fluctuated since her last office visit. Describes this pain as a nearly continuous aching and burning. Pain is worsened by lying flat, prolonged standing or sitting, and walking long distances. Pain improves with rest/reposition, heat/ice, and medications.  PT offered some relief in the past.      Patient continues with Gabapentin 100mg at night(managed by PCP) and Tylenol 500mg PRN. Unable to tolerate Tramadol and Codeine (both caused nausea), Robaxin - patient did not like how medication made her feel. She had an allergic reaction to the compound pain cream due to it containing sulfa.      History of glaucoma. Patient is legally blind. Remaining vision is peripheral vision in her left eye.      She see Don and Kristie for hand/wrist injections as needed.     She reports no changes to her health or medication since her last office visit.     Procedures:  3/18/25 - C5-C6 KAMARI - 60% ongoing relief  11/17/23 - L2/L3 LESI - 90% relief for 5 months  8/15/23 - Right L2 & L5 TF LESI - 50% relief x 2 months  2/16/23 - Left L5 & S1 TF ROQUE - 80% relief x 2.5 months  1/11/23 - Bilateral SI joint injections- 100% relief to right side, 45-50% relief to left side x 1 week     Surgical History:  History of cervical fusion ~ 9 years ago with Dr. Cali Sexton.      History of previous epidurals at [facility] - she reports that these injections were  initially helpful but the last injections she had there were unsuccessful.     Back Pain  This is a chronic problem. The current episode started more than 1 year ago. The problem occurs intermittently. The problem has been comes and goes since onset. The pain is present in the lumbar spine. The quality of the pain is described as aching and burning. The pain radiates to the left thigh (radiates down left buttock in posterior/lateral thigh - does not pass knee). The pain is at a severity of 5/10. The pain is moderate. The symptoms are aggravated by standing, bending, sitting, position and lying down (walking long distances, increased physical activity). Stiffness is present In the morning. Associated symptoms include numbness (bilateral arms and hips) and weakness (bilateral arms and hips). Pertinent negatives include no abdominal pain, chest pain, dysuria, fever or headaches. She has tried heat and ice (Gabapentin, ) for the symptoms. The treatment provided mild relief.   Neck Pain   This is a recurrent problem. The current episode started 1 to 4 weeks ago. The problem occurs intermittently. The problem has been waxing and waning (pain level has fluctuated since her last office visit). The pain is associated with an unknown factor. The pain is present in the left side. The quality of the pain is described as aching. The pain is at a severity of 5/10. The symptoms are aggravated by position, twisting and bending. Associated symptoms include numbness (bilateral arms and hips) and weakness (bilateral arms and hips). Pertinent negatives include no chest pain, fever or headaches. She has tried heat, ice and acetaminophen for the symptoms.   Hip Pain   The incident occurred more than 1 week ago. The injury mechanism is unknown. The pain is present in the right hip and left hip. The quality of the pain is described as aching. The pain is at a severity of 5/10. The pain is severe. The pain has been Fluctuating since onset.  "Associated symptoms include numbness (bilateral arms and hips). The symptoms are aggravated by movement and weight bearing. She has tried acetaminophen, non-weight bearing and rest (Lidocaine patches, Gabapentin) for the symptoms. The treatment provided mild relief.        PEG Assessment   What number best describes your pain on average in the past week?5  What number best describes how, during the past week, pain has interfered with your enjoyment of life?5  What number best describes how, during the past week, pain has interfered with your general activity?  5    Review of Pertinent Medical Data ---          The following portions of the patient's history were reviewed and updated as appropriate: allergies, current medications, past family history, past medical history, past social history, past surgical history, and problem list.    Review of Systems   Constitutional:  Negative for activity change and fever.   Cardiovascular:  Negative for chest pain.   Gastrointestinal:  Positive for constipation. Negative for abdominal pain and diarrhea.   Genitourinary:  Negative for difficulty urinating and dysuria.   Musculoskeletal:  Positive for back pain and neck pain.   Neurological:  Positive for weakness (bilateral arms and hips) and numbness (bilateral arms and hips). Negative for headaches.   Psychiatric/Behavioral:  Positive for sleep disturbance. Negative for self-injury and suicidal ideas.      I have reviewed and confirmed the accuracy of the ROS as documented by the MA/LPN/RN Pearl Terrazas APRN    Vitals:    04/17/25 1241   BP: 172/98   Pulse: 90   Resp: 16   Temp: 96.9 °F (36.1 °C)   SpO2: 98%   Weight: 64.4 kg (142 lb)   Height: 162.6 cm (64\")   PainSc: 5      Objective   Physical Exam  Constitutional:       Appearance: Normal appearance.   HENT:      Head: Normocephalic.   Neck:     Cardiovascular:      Rate and Rhythm: Normal rate and regular rhythm.   Pulmonary:      Effort: Pulmonary effort is normal. "      Breath sounds: Normal breath sounds.   Musculoskeletal:      Cervical back: Normal range of motion.      Lumbar back: Tenderness and bony tenderness present. Decreased range of motion. Negative right straight leg raise test and negative left straight leg raise test.      Right hip: No tenderness. Normal range of motion.      Left hip: Tenderness present. Normal range of motion.   Skin:     General: Skin is warm and dry.      Capillary Refill: Capillary refill takes less than 2 seconds.   Neurological:      General: No focal deficit present.      Mental Status: She is alert and oriented to person, place, and time.      Gait: Gait abnormal (slowed, ambulates with cane).   Psychiatric:         Mood and Affect: Mood normal.         Behavior: Behavior normal.         Thought Content: Thought content normal.         Cognition and Memory: Cognition normal.       Assessment & Plan   Diagnoses and all orders for this visit:    1. Neck pain (Primary)    2. Chronic bilateral low back pain, unspecified whether sciatica present    3. Lumbar radiculopathy, chronic    4. Cervical radiculopathy    5. Chronic pain of both shoulders    6. Bilateral hip pain      Marilia Leo reports a pain score of 5.  Given her pain assessment as noted, treatment options were discussed and the following options were decided upon as a follow-up plan to address the patient's pain: continuation of current treatment plan for pain.      --- She will follow up with Dr. Esteves with Kristie and Jayson in regards to worsening shoulder pain.   --- Continue Lidocaine. No refill needed at this time.   --- Repeat KAMARI or LESI every 3 months PRN  --- Follow-up as needed for worsening pain and/or to repeat injections     CHASE REPORT  As the clinician, I personally reviewed the CHASE from 4/17/25 while the patient was in the office today.    Dictated utilizing Dragon dictation.

## 2025-04-21 ENCOUNTER — OFFICE VISIT (OUTPATIENT)
Dept: INTERNAL MEDICINE | Facility: CLINIC | Age: 80
End: 2025-04-21
Payer: MEDICARE

## 2025-04-21 VITALS
TEMPERATURE: 98.9 F | OXYGEN SATURATION: 97 % | DIASTOLIC BLOOD PRESSURE: 92 MMHG | HEIGHT: 64 IN | SYSTOLIC BLOOD PRESSURE: 138 MMHG | BODY MASS INDEX: 24.92 KG/M2 | WEIGHT: 146 LBS | HEART RATE: 84 BPM

## 2025-04-21 DIAGNOSIS — R13.10 DYSPHAGIA, UNSPECIFIED TYPE: ICD-10-CM

## 2025-04-21 DIAGNOSIS — Z00.00 MEDICARE ANNUAL WELLNESS VISIT, SUBSEQUENT: Primary | ICD-10-CM

## 2025-04-21 DIAGNOSIS — R30.0 DYSURIA: ICD-10-CM

## 2025-04-21 DIAGNOSIS — G89.29 CHRONIC PAIN OF BOTH SHOULDERS: Chronic | ICD-10-CM

## 2025-04-21 DIAGNOSIS — E78.2 MIXED HYPERLIPIDEMIA: Chronic | ICD-10-CM

## 2025-04-21 DIAGNOSIS — M54.2 NECK PAIN: Chronic | ICD-10-CM

## 2025-04-21 DIAGNOSIS — M25.512 CHRONIC PAIN OF BOTH SHOULDERS: Chronic | ICD-10-CM

## 2025-04-21 DIAGNOSIS — M54.12 CERVICAL RADICULOPATHY: Chronic | ICD-10-CM

## 2025-04-21 DIAGNOSIS — I10 BENIGN ESSENTIAL HYPERTENSION: Chronic | ICD-10-CM

## 2025-04-21 DIAGNOSIS — M25.511 CHRONIC PAIN OF BOTH SHOULDERS: Chronic | ICD-10-CM

## 2025-04-21 LAB
ALBUMIN SERPL-MCNC: 4.5 G/DL (ref 3.5–5.2)
ALBUMIN/GLOB SERPL: 1.5 G/DL
ALP SERPL-CCNC: 88 U/L (ref 39–117)
ALT SERPL-CCNC: 13 U/L (ref 1–33)
AST SERPL-CCNC: 18 U/L (ref 1–32)
BASOPHILS # BLD AUTO: 0.1 10*3/MM3 (ref 0–0.2)
BASOPHILS NFR BLD AUTO: 1.1 % (ref 0–1.5)
BILIRUB BLD-MCNC: NEGATIVE MG/DL
BILIRUB SERPL-MCNC: 0.4 MG/DL (ref 0–1.2)
BUN SERPL-MCNC: 17 MG/DL (ref 8–23)
BUN/CREAT SERPL: 18.5 (ref 7–25)
CALCIUM SERPL-MCNC: 10 MG/DL (ref 8.6–10.5)
CHLORIDE SERPL-SCNC: 106 MMOL/L (ref 98–107)
CHOLEST SERPL-MCNC: 243 MG/DL (ref 0–200)
CLARITY, POC: CLEAR
CO2 SERPL-SCNC: 25.9 MMOL/L (ref 22–29)
COLOR UR: YELLOW
CREAT SERPL-MCNC: 0.92 MG/DL (ref 0.57–1)
EGFRCR SERPLBLD CKD-EPI 2021: 63.1 ML/MIN/1.73
EOSINOPHIL # BLD AUTO: 0.6 10*3/MM3 (ref 0–0.4)
EOSINOPHIL NFR BLD AUTO: 6.3 % (ref 0.3–6.2)
ERYTHROCYTE [DISTWIDTH] IN BLOOD BY AUTOMATED COUNT: 13.8 % (ref 12.3–15.4)
EXPIRATION DATE: NORMAL
GLOBULIN SER CALC-MCNC: 3 GM/DL
GLUCOSE SERPL-MCNC: 78 MG/DL (ref 65–99)
GLUCOSE UR STRIP-MCNC: NEGATIVE MG/DL
HCT VFR BLD AUTO: 40.8 % (ref 34–46.6)
HDLC SERPL-MCNC: 85 MG/DL (ref 40–60)
HGB BLD-MCNC: 13.1 G/DL (ref 12–15.9)
IMM GRANULOCYTES # BLD AUTO: 0.02 10*3/MM3 (ref 0–0.05)
IMM GRANULOCYTES NFR BLD AUTO: 0.2 % (ref 0–0.5)
KETONES UR QL: NEGATIVE
LDLC SERPL CALC-MCNC: 143 MG/DL (ref 0–100)
LEUKOCYTE EST, POC: NEGATIVE
LYMPHOCYTES # BLD AUTO: 4.37 10*3/MM3 (ref 0.7–3.1)
LYMPHOCYTES NFR BLD AUTO: 46.2 % (ref 19.6–45.3)
Lab: NORMAL
MCH RBC QN AUTO: 27.6 PG (ref 26.6–33)
MCHC RBC AUTO-ENTMCNC: 32.1 G/DL (ref 31.5–35.7)
MCV RBC AUTO: 85.9 FL (ref 79–97)
MONOCYTES # BLD AUTO: 0.72 10*3/MM3 (ref 0.1–0.9)
MONOCYTES NFR BLD AUTO: 7.6 % (ref 5–12)
NEUTROPHILS # BLD AUTO: 3.65 10*3/MM3 (ref 1.7–7)
NEUTROPHILS NFR BLD AUTO: 38.6 % (ref 42.7–76)
NITRITE UR-MCNC: NEGATIVE MG/ML
NRBC BLD AUTO-RTO: 0 /100 WBC (ref 0–0.2)
PH UR: 7 [PH] (ref 5–8)
PLATELET # BLD AUTO: 337 10*3/MM3 (ref 140–450)
POTASSIUM SERPL-SCNC: 4.4 MMOL/L (ref 3.5–5.2)
PROT SERPL-MCNC: 7.5 G/DL (ref 6–8.5)
PROT UR STRIP-MCNC: NEGATIVE MG/DL
RBC # BLD AUTO: 4.75 10*6/MM3 (ref 3.77–5.28)
RBC # UR STRIP: NEGATIVE /UL
SODIUM SERPL-SCNC: 145 MMOL/L (ref 136–145)
SP GR UR: 1.01 (ref 1–1.03)
TRIGL SERPL-MCNC: 87 MG/DL (ref 0–150)
UROBILINOGEN UR QL: NORMAL
VLDLC SERPL CALC-MCNC: 15 MG/DL (ref 5–40)
WBC # BLD AUTO: 9.46 10*3/MM3 (ref 3.4–10.8)

## 2025-04-21 RX ORDER — AMLODIPINE BESYLATE 2.5 MG/1
2.5 TABLET ORAL DAILY
Qty: 90 TABLET | Refills: 1 | Status: SHIPPED | OUTPATIENT
Start: 2025-04-21

## 2025-04-21 RX ORDER — IRBESARTAN 150 MG/1
150 TABLET ORAL NIGHTLY
Qty: 90 TABLET | Refills: 3 | Status: CANCELLED | OUTPATIENT
Start: 2025-04-21

## 2025-04-21 RX ORDER — IRBESARTAN 150 MG/1
150 TABLET ORAL NIGHTLY
Qty: 90 TABLET | Refills: 1 | Status: SHIPPED | OUTPATIENT
Start: 2025-04-21

## 2025-04-21 RX ORDER — AMLODIPINE BESYLATE 2.5 MG/1
2.5 TABLET ORAL DAILY
Qty: 90 TABLET | Refills: 0 | Status: CANCELLED | OUTPATIENT
Start: 2025-04-21

## 2025-04-21 NOTE — PATIENT INSTRUCTIONS
Advance Care Planning and Advance Directives     You make decisions on a daily basis - decisions about where you want to live, your career, your home, your life. Perhaps one of the most important decisions you face is your choice for future medical care. Take time to talk with your family and your healthcare team and start planning today.  Advance Care Planning is a process that can help you:  Understand possible future healthcare decisions in light of your own experiences  Reflect on those decision in light of your goals and values  Discuss your decisions with those closest to you and the healthcare professionals that care for you  Make a plan by creating a document that reflects your wishes    Surrogate Decision Maker  In the event of a medical emergency, which has left you unable to communicate or to make your own decisions, you would need someone to make decisions for you.  It is important to discuss your preferences for medical treatment with this person while you are in good health.     Qualities of a surrogate decision maker:  Willing to take on this role and responsibility  Knows what you want for future medical care  Willing to follow your wishes even if they don't agree with them  Able to make difficult medical decisions under stressful circumstances    Advance Directives  These are legal documents you can create that will guide your healthcare team and decision maker(s) when needed. These documents can be stored in the electronic medical record.    Living Will - a legal document to guide your care if you have a terminal condition or a serious illness and are unable to communicate. States vary by statute in document names/types, but most forms may include one or more of the following:        -  Directions regarding life-prolonging treatments        -  Directions regarding artificially provided nutrition/hydration        -  Choosing a healthcare decision maker        -  Direction regarding organ/tissue  donation    Durable Power of  for Healthcare - this document names an -in-fact to make medical decisions for you, but it may also allow this person to make personal and financial decisions for you. Please seek the advice of an  if you need this type of document.    **Advance Directives are not required and no one may discriminate against you if you do not sign one.    Medical Orders  Many states allow specific forms/orders signed by your physician to record your wishes for medical treatment in your current state of health. This form, signed in personal communication with your physician, addresses resuscitation and other medical interventions that you may or may not want.      For more information or to schedule a time with a [facility] Advance Care Planning Facilitator contact: [facility] or call [phone] and someone will contact you directly.    Medicare Wellness  Personal Prevention Plan of Service     Date of Office Visit:    Encounter Provider:  GISELL Owusu  Place of Service:  [facility]  Patient Name: Marilia Leo  :  1945    As part of the Medicare Wellness portion of your visit today, we are providing you with this personalized preventive plan of services (PPPS). This plan is based upon recommendations of the [place] Preventive Services Task Force (USPSTF) and the Advisory Committee on Immunization Practices (ACIP).    This lists the preventive care services that should be considered, and provides dates of when you are due. Items listed as completed are up-to-date and do not require any further intervention.    Health Maintenance   Topic Date Due   • RSV Vaccine - Adults (1 - 1-dose 75+ series) Never done   • LIPID PANEL  2024   • ANNUAL WELLNESS VISIT  2025   • COVID-19 Vaccine (2024- season) 2025   • TDAP/TD VACCINES (1 - Tdap) 10/13/2026 (Originally 10/14/2016)   • INFLUENZA  VACCINE  07/01/2025   • DXA SCAN  10/04/2026   • COLORECTAL CANCER SCREENING  06/05/2027   • Pneumococcal Vaccine 50+  Completed   • ZOSTER VACCINE  Addressed   • MAMMOGRAM  Discontinued       Orders Placed This Encounter   Procedures   • POCT urinalysis dipstick, automated     Release to patient:   Routine Release [[id number]]       No follow-ups on file.

## 2025-04-21 NOTE — PROGRESS NOTES
Subjective   The ABCs of the Annual Wellness Visit  Medicare Wellness Visit      Marilia Leo is a 80 y.o. patient who presents for a Medicare Wellness Visit.    The following portions of the patient's history were reviewed and   updated as appropriate: allergies, current medications, past family history, past medical history, past social history, past surgical history, and problem list.    Compared to one year ago, the patient's physical   health is the same.  Compared to one year ago, the patient's mental   health is the same.    Recent Hospitalizations:  She was not admitted to the hospital during the last year.     Current Medical Providers:  Patient Care Team:  Lulú Nicholson APRN as PCP - General (Nurse Practitioner)  Lashell Terrazas APRN as Nurse Practitioner (Nurse Practitioner)    Outpatient Medications Prior to Visit   Medication Sig Dispense Refill    acetaminophen (TYLENOL) 325 MG tablet Take 1 tablet by mouth As Needed.      lidocaine (LIDODERM) 5 % Place 1 patch on the skin as directed by provider Daily. Remove & Discard patch within 12 hours or as directed by MD 30 patch 3    Misc. Devices (Raised Toilet Seat) misc 1 each Daily. 1 each 0    natamycin (NATACYN) 5 % ophthalmic solution Administer 1 drop to the right eye 2 (two) times a day. Cycles on for 14 days and off for 14 days  Patient does not need until 8/18/20, this comes from compound pharmacy    First 2 days of each month      ofloxacin (OCUFLOX) 0.3 % ophthalmic solution Administer 1 drop into the left eye 2 (Two) Times a Day.      prednisoLONE acetate (PRED FORTE) 1 % ophthalmic suspension       amLODIPine (NORVASC) 2.5 MG tablet Take 1 tablet by mouth Daily. 90 tablet 0    irbesartan (AVAPRO) 150 MG tablet TAKE 1 TABLET EVERY NIGHT 90 tablet 3    gabapentin (NEURONTIN) 100 MG capsule Take 1 capsule by mouth Every Night. (Patient not taking: Reported on 4/21/2025) 90 capsule 0    Nasim 128 5 % ophthalmic solution INSTILL 1 DROP  "IN LEFT EYE FOUR TIMES DAILY (Patient not taking: Reported on 4/21/2025)       No facility-administered medications prior to visit.     No opioid medication identified on active medication list. I have reviewed chart for other potential  high risk medication/s and harmful drug interactions in the elderly.      Aspirin is not on active medication list.  Aspirin use is not indicated based on review of current medical condition/s. Risk of harm outweighs potential benefits.  .    Patient Active Problem List   Diagnosis    Benign essential hypertension    Cervical radiculopathy    Glaucoma    Pure hypercholesterolemia    Menopausal symptom    Atrophic vaginitis    Cervical radiculopathy    Edema, lower extremity    Elevated LFTs    Nausea    Sinus tachycardia    Bilateral chronic knee pain    Urinary frequency    Lumbar radiculopathy, chronic    DDD (degenerative disc disease), lumbar    Sacroiliac joint dysfunction of both sides    Medication management contract agreement    Left lower quadrant abdominal pain    Bloating    Gastroesophageal reflux disease    Chronic pain of both shoulders    Pain in both wrists    Neck pain    Mixed hyperlipidemia     Advance Care Planning Advance Directive is on file.  ACP discussion was held with the patient during this visit. Patient has an advance directive in EMR which is still valid.             Objective   Vitals:    04/21/25 1346   BP: 138/92   BP Location: Left arm   Patient Position: Sitting   Cuff Size: Adult   Pulse: 84   Temp: 98.9 °F (37.2 °C)   TempSrc: Oral   SpO2: 97%   Weight: 66.2 kg (146 lb)   Height: 162.6 cm (64\")   PainSc: 4    PainLoc: Shoulder       Estimated body mass index is 25.06 kg/m² as calculated from the following:    Height as of this encounter: 162.6 cm (64\").    Weight as of this encounter: 66.2 kg (146 lb).    BMI is >= 25 and <30. (Overweight) The following options were offered after discussion;: weight loss educational material (shared in after " visit summary), exercise counseling/recommendations, and nutrition counseling/recommendations           Does the patient have evidence of cognitive impairment? No  Lab Results   Component Value Date    CHLPL 243 (H) 2025    TRIG 87 2025    HDL 85 (H) 2025     (H) 2025    VLDL 15 2025                                                                                                Health  Risk Assessment    Smoking Status:  Social History     Tobacco Use   Smoking Status Former    Current packs/day: 0.00    Average packs/day: 1 pack/day for 5.0 years (5.0 ttl pk-yrs)    Types: Cigarettes    Start date:     Quit date:     Years since quittin.4   Smokeless Tobacco Never     Alcohol Consumption:  Social History     Substance and Sexual Activity   Alcohol Use Yes    Comment: occasionally       Fall Risk Screen  STEADI Fall Risk Assessment was completed, and patient is at LOW risk for falls.Assessment completed on:2025    Depression Screening   Little interest or pleasure in doing things? Not at all   Feeling down, depressed, or hopeless? Not at all   PHQ-2 Total Score 0      Health Habits and Functional and Cognitive Screenin/21/2025     1:42 PM   Functional & Cognitive Status   Do you have difficulty preparing food and eating? No   Do you have difficulty bathing yourself, getting dressed or grooming yourself? No   Do you have difficulty using the toilet? No   Do you have difficulty moving around from place to place? Yes   Do you have trouble with steps or getting out of a bed or a chair? Yes   Current Diet Well Balanced Diet   Dental Exam Up to date   Eye Exam Up to date   Exercise (times per week) 3 times per week   Current Exercises Include Walking   Do you need help using the phone?  Yes   Are you deaf or do you have serious difficulty hearing?  Yes   Do you need help to go to places out of walking distance? Yes   Do you need help shopping? Yes   Do you  need help preparing meals?  Yes   Do you need help with housework?  No   Do you need help with laundry? No   Do you need help taking your medications? No   Do you need help managing money? No   Do you ever drive or ride in a car without wearing a seat belt? No   Have you felt unusual stress, anger or loneliness in the last month? Yes   Who do you live with? Spouse   If you need help, do you have trouble finding someone available to you? No   Have you been bothered in the last four weeks by sexual problems? No   Do you have difficulty concentrating, remembering or making decisions? No           Age-appropriate Screening Schedule:  Refer to the list below for future screening recommendations based on patient's age, sex and/or medical conditions. Orders for these recommended tests are listed in the plan section. The patient has been provided with a written plan.    Health Maintenance List  Health Maintenance   Topic Date Due    COVID-19 Vaccine (7 - 2024-25 season) 10/08/2025 (Originally 4/21/2025)    RSV Vaccine - Adults (1 - 1-dose 75+ series) 04/21/2026 (Originally 4/10/2020)    TDAP/TD VACCINES (1 - Tdap) 10/13/2026 (Originally 10/14/2016)    INFLUENZA VACCINE  07/01/2025    ANNUAL WELLNESS VISIT  04/21/2026    LIPID PANEL  04/21/2026    DXA SCAN  10/04/2026    COLORECTAL CANCER SCREENING  06/05/2027    Pneumococcal Vaccine 50+  Completed    ZOSTER VACCINE  Addressed    MAMMOGRAM  Discontinued                                                                                                                                                CMS Preventative Services Quick Reference  Risk Factors Identified During Encounter  Chronic Pain: Chronic Pain Educational material Discussed and Printed for Patient.   Fall Risk-High or Moderate: Discussed Fall Prevention in the home, Information on Fall Prevention Shared in After Visit Summary, and Sit to Stand Exercise Information Shared in After Visit Summary    The above  risks/problems have been discussed with the patient.  Pertinent information has been shared with the patient in the After Visit Summary.  An After Visit Summary and PPPS were made available to the patient.    Follow Up:   Next Medicare Wellness visit to be scheduled in 1 year.         Additional E&M Note during same encounter follows:  Patient has additional, significant, and separately identifiable condition(s)/problem(s) that require work above and beyond the Medicare Wellness Visit     Chief Complaint  Medicare Wellness-subsequent, Urinary Tract Infection, Difficulty Swallowing, Shoulder Pain, and Hypertension    Subjective   HPI  Ms. Leo is also being seen today for additional medical problem/s.                  History of Present Illness  The patient presents for evaluation of urinary tract infection, dysphagia, shoulder pain, tingling sensation in the right ankle, and health maintenance. She is accompanied by her daughter.    Suspected urinary tract infection is reported with symptoms of itching, frequent urination, and a sensation of incomplete bladder emptying. These symptoms began approximately 3 days ago. Facial flushing is also noted, potentially related to an increased dosage of prednisolone eye drops from 3 to 6 times daily. Sani-panties are used as a precautionary measure. No abnormal discharge is reported. Occasionally, underwear is foregone in favor of sweatpants. Eucerin and unscented toilet paper are used due to irritation from other brands. Sometimes, the inside of the labia is washed with soap during showers. Fluid intake is approximately 2 to 3 bottles of water daily.    Difficulty swallowing, particularly with medications such as Tylenol and irbesartan, is reported, with a sensation of the pills becoming lodged in the throat. This issue is more pronounced with pills than with food. A previous swallow study was conducted, and taking medications with apple sauce has been advised. A sensation of  the throat sinking when taking a deep breath is noted. Mild cough and hoarseness in the morning are attributed to previous intubations. She has been under the care of an ENT specialist for several years for these symptoms.    A history of cervical fusion is noted, and a cervical epidural for neck pain was performed, which did not alleviate shoulder pain. Stiffness on the left side of the neck is believed to be due to compensatory head movements to the right. Diagnosed partial rotator cuff tears in both shoulders may be contributing to shoulder and arm pain. Consultation with orthopedics for shoulder pain has been advised. A history of scoliosis is noted, with braces worn in the past. Heat packs and ice are used for pain management.    Intermittent tingling in the right ankle, resolving spontaneously after a few seconds, is reported. Gabapentin 100 mg is taken 2 to 3 times weekly at bedtime since 2014, which may be exacerbating sleep disturbances and affecting the eyes.    No significant changes in physical or mental health compared to the previous year are reported. No hospitalization within the past year is noted. Daily aspirin is not taken. A living will and advanced directive are in place. Assistance is required with mobility, including navigating steps, using the phone, walking long distances, shopping, and meal preparation. Comfort with the level of assistance received is expressed. Mild stress over the past month is attributed to shoulder pain and eye issues. COVID-19 and RSV vaccines have been advised. Three doses of the COVID-19 vaccine have been received, with the last dose administered in October 2024.    Decreased hearing in the right ear is reported.    PAST SURGICAL HISTORY:  - Cervical fusion  - Multiple intubations  - Cervical epidural  - Swallow study in 2023  - History of scoliosis with braces worn in the past    Objective   Vital Signs:  /92 (BP Location: Left arm, Patient Position: Sitting,  "Cuff Size: Adult)   Pulse 84   Temp 98.9 °F (37.2 °C) (Oral)   Ht 162.6 cm (64\")   Wt 66.2 kg (146 lb)   SpO2 97%   BMI 25.06 kg/m²   Physical Exam  Vitals reviewed.   Constitutional:       Appearance: Normal appearance.   HENT:      Head: Normocephalic.   Cardiovascular:      Rate and Rhythm: Normal rate and regular rhythm.      Pulses: Normal pulses.      Heart sounds: Normal heart sounds.   Pulmonary:      Effort: Pulmonary effort is normal.      Breath sounds: Normal breath sounds.   Abdominal:      General: Abdomen is flat. Bowel sounds are normal.      Palpations: Abdomen is soft.   Musculoskeletal:         General: No swelling.      Right lower leg: No edema.      Left lower leg: No edema.   Skin:     General: Skin is warm and dry.      Capillary Refill: Capillary refill takes less than 2 seconds.   Neurological:      General: No focal deficit present.      Mental Status: She is alert.      Gait: Gait abnormal (uses cane).   Psychiatric:         Mood and Affect: Mood normal.         Behavior: Behavior normal.                        Assessment and Plan      Benign essential hypertension      Orders:    irbesartan (AVAPRO) 150 MG tablet; Take 1 tablet by mouth Every Night.    amLODIPine (NORVASC) 2.5 MG tablet; Take 1 tablet by mouth Daily.    CBC & Differential    Comprehensive Metabolic Panel    Dysuria    Orders:    POCT urinalysis dipstick, automated    Mixed hyperlipidemia       Orders:    Lipid Panel    CBC & Differential    Comprehensive Metabolic Panel    Medicare annual wellness visit, subsequent         Dysphagia, unspecified type         Chronic pain of both shoulders         Neck pain         Cervical radiculopathy                 Assessment & Plan  1. Suspected urinary tract infection.  - Urine analysis results were unremarkable, indicating no presence of bacteria.  - Itching could be attributed to dryness or an imbalance in pH levels.  - Recommendations were made to wash the external " genitalia with soap while using only water and a washcloth for the internal areas.  - If symptoms worsen over the next few days, she should provide another urine sample for further analysis.    2. Dysphagia.  - Reported difficulty swallowing, particularly with pills.  - Suggested to take medications with apple sauce or pudding to ease swallowing.  - A follow-up swallow study was recommended to assess any changes since the last study in 2023.  - If experiencing coughing while drinking thin liquids, she should take small sips instead of large gulps.    3. Shoulder pain.  - Experiencing shoulder pain, possibly due to partial rotator cuff tears in both arms.  - Advised to follow up with orthopedic specialists for further evaluation and management.  - Pain management has been involved, and further orthopedic consultation is recommended.  - Lidocaine patches have been helpful for hip pain.    4. Tingling sensation in the right ankle.  - Reported occasional tingling in the right ankle, which might be related to nerve issues.  - Gabapentin 100 mg taken 2-3 times a week at bedtime was continued.  - Advised to continue massaging the area when symptoms occur.  - Noted that the sensation might be related to nerve irritation or neuropathy.    5. Health maintenance.  - Blood pressure readings were within acceptable limits for her age group.  - Encouraged to receive the RSV vaccine at her earliest convenience.  - Advised to wait until 10/2025 for her next COVID-19 vaccine dose.  - Comprehensive lab workup will be conducted today, including tests for electrolytes, kidney function, liver function, blood counts, and cholesterol levels.  - Confirmed that she has a living will and advanced directive on file.        Follow Up   Return in about 6 months (around 10/21/2025) for Recheck.  Patient was given instructions and counseling regarding her condition or for health maintenance advice. Please see specific information pulled into the  AVS if appropriate.    Patient or patient representative verbalized consent for the use of Ambient Listening during the visit with  GISELL Owusu for chart documentation. 5/23/2025  09:37 EDT

## 2025-04-21 NOTE — ASSESSMENT & PLAN NOTE
Orders:    irbesartan (AVAPRO) 150 MG tablet; Take 1 tablet by mouth Every Night.    amLODIPine (NORVASC) 2.5 MG tablet; Take 1 tablet by mouth Daily.    CBC & Differential    Comprehensive Metabolic Panel

## 2025-04-22 ENCOUNTER — RESULTS FOLLOW-UP (OUTPATIENT)
Dept: INTERNAL MEDICINE | Facility: CLINIC | Age: 80
End: 2025-04-22
Payer: MEDICARE

## 2025-04-25 NOTE — TELEPHONE ENCOUNTER
"Left voicemail for patient to call back and get lab results     Hub okay to relay     Ms. eLo,      I have reviewed your recent lab work.   All labs were in a normal range except as commented below:      Your cholesterol labs were elevated. While your HDL (good cholesterol) was very good, your total and LDL (bad cholesterol) were high. Please work on dietary changes- reducing added sugars, decreasing saturated fats, and increase foods with omega-3 fatty acids. Also, engaging in 30 minutes of exercise 5 times per week can improve your cholesterol.         Some of your blood counts were low, but that seems to be your \"norm\" over the last few years. We will continue to monitor this.         Your kidney labs have improved.         Stay well,   "

## 2025-05-23 PROBLEM — E78.2 MIXED HYPERLIPIDEMIA: Chronic | Status: ACTIVE | Noted: 2025-05-23

## (undated) DEVICE — ADAPT CLN SCPE ENDO PORPOISE BX/50 DISP

## (undated) DEVICE — MSK ENDO PORT O2 POM ELITE CURAPLEX A/

## (undated) DEVICE — NDL EPID TUOHY W/WINGS 20G 3.5IN

## (undated) DEVICE — NDL SPINE 22G 31/2IN BLK

## (undated) DEVICE — GOWN ISOL W/THUMB UNIV BLU BX/15

## (undated) DEVICE — Device: Brand: PORTEX

## (undated) DEVICE — GLV SURG TRIUMPH PF LTX 7.5 STRL

## (undated) DEVICE — GOWN ,SIRUS,NONREINFORCED 3XL: Brand: MEDLINE

## (undated) DEVICE — SINGLE-USE BIOPSY FORCEPS: Brand: RADIAL JAW 4

## (undated) DEVICE — KT ORCA ORCAPOD DISP STRL

## (undated) DEVICE — SNAR POLYP CAPTIVATOR/COLD STFF RND 10MM 240CM

## (undated) DEVICE — CANN O2 ETCO2 FITS ALL CONN CO2 SMPL A/ 7IN DISP LF

## (undated) DEVICE — BITEBLOCK OMNI BLOC

## (undated) DEVICE — NDL SPINE 22G 5IN BLK

## (undated) DEVICE — EPIDURAL TRAY: Brand: MEDLINE INDUSTRIES, INC.

## (undated) DEVICE — Device

## (undated) DEVICE — SYRINGE, LUER SLIP, STERILE, 60ML: Brand: MEDLINE

## (undated) DEVICE — FRCP BIOP RADLJAW4 HOT 2.2X240 BX40

## (undated) DEVICE — FLEX ADVANTAGE 1500CC: Brand: FLEX ADVANTAGE

## (undated) DEVICE — THE SINGLE USE ETRAP – POLYP TRAP IS USED FOR SUCTION RETRIEVAL OF ENDOSCOPICALLY REMOVED POLYPS.: Brand: ETRAP

## (undated) DEVICE — JACKT LAB F/R KNIT CUFF/COLR XLG BLU

## (undated) DEVICE — VIAL FORMLN CAP 10PCT 20ML